# Patient Record
Sex: FEMALE | Race: WHITE | NOT HISPANIC OR LATINO | Employment: OTHER | URBAN - METROPOLITAN AREA
[De-identification: names, ages, dates, MRNs, and addresses within clinical notes are randomized per-mention and may not be internally consistent; named-entity substitution may affect disease eponyms.]

---

## 2017-01-23 ENCOUNTER — TELEPHONE (OUTPATIENT)
Dept: INTERNAL MEDICINE | Facility: CLINIC | Age: 72
End: 2017-01-23

## 2017-01-23 DIAGNOSIS — E11.9 TYPE 2 DIABETES MELLITUS WITHOUT COMPLICATION, WITHOUT LONG-TERM CURRENT USE OF INSULIN (HCC): ICD-10-CM

## 2017-01-23 RX ORDER — GLYBURIDE 5 MG/1
5 TABLET ORAL 4 TIMES DAILY
Qty: 360 TABLET | Refills: 1 | Status: SHIPPED | OUTPATIENT
Start: 2017-01-23 | End: 2017-04-20

## 2017-01-23 NOTE — TELEPHONE ENCOUNTER
RX sent in.   for patient.    ----- Message from Sherri Holliday MA sent at 2017 10:02 AM EST -----  Regarding: FW: REFILL  Contact: [phone]      ----- Message -----     From: Loren Thakkar     Sent: 2017   9:41 AM       To: [facility]  Subject: REFILL                                           :  49    ROBERTA PATIENT    PATIENT NEEDS A REFILL ON GLYBURIDE, 5 MG TABLET, TAKEN 4 X DAILY.    QUANTITY: 90    PHARMACY IS [id number], [place], [place].    FAX # [phone]

## 2017-03-09 RX ORDER — BENAZEPRIL HYDROCHLORIDE AND HYDROCHLOROTHIAZIDE 20; 12.5 MG/1; MG/1
1 TABLET ORAL DAILY
Qty: 90 TABLET | Refills: 1 | OUTPATIENT
Start: 2017-03-09 | End: 2017-08-24 | Stop reason: SDUPTHER

## 2017-04-05 RX ORDER — OMEPRAZOLE 40 MG/1
CAPSULE, DELAYED RELEASE ORAL
Qty: 90 CAPSULE | Refills: 0 | Status: SHIPPED | OUTPATIENT
Start: 2017-04-05 | End: 2017-04-20

## 2017-04-20 ENCOUNTER — OFFICE VISIT (OUTPATIENT)
Dept: INTERNAL MEDICINE | Facility: CLINIC | Age: 72
End: 2017-04-20

## 2017-04-20 VITALS
SYSTOLIC BLOOD PRESSURE: 124 MMHG | WEIGHT: 123 LBS | OXYGEN SATURATION: 97 % | BODY MASS INDEX: 21 KG/M2 | HEART RATE: 76 BPM | TEMPERATURE: 97.9 F | DIASTOLIC BLOOD PRESSURE: 64 MMHG | HEIGHT: 64 IN

## 2017-04-20 DIAGNOSIS — Z12.31 ENCOUNTER FOR SCREENING MAMMOGRAM FOR MALIGNANT NEOPLASM OF BREAST: ICD-10-CM

## 2017-04-20 DIAGNOSIS — Z13.9 SCREENING: ICD-10-CM

## 2017-04-20 DIAGNOSIS — I10 ESSENTIAL HYPERTENSION: Primary | ICD-10-CM

## 2017-04-20 DIAGNOSIS — N95.1 MENOPAUSAL SYMPTOM: ICD-10-CM

## 2017-04-20 DIAGNOSIS — E55.9 VITAMIN D DEFICIENCY: ICD-10-CM

## 2017-04-20 DIAGNOSIS — E11.9 TYPE 2 DIABETES MELLITUS WITHOUT COMPLICATION, WITHOUT LONG-TERM CURRENT USE OF INSULIN (HCC): ICD-10-CM

## 2017-04-20 DIAGNOSIS — E03.9 HYPOTHYROIDISM, UNSPECIFIED TYPE: ICD-10-CM

## 2017-04-20 PROCEDURE — 99214 OFFICE O/P EST MOD 30 MIN: CPT | Performed by: FAMILY MEDICINE

## 2017-04-20 RX ORDER — ASPIRIN 81 MG/1
81 TABLET ORAL DAILY
COMMUNITY
End: 2018-02-26

## 2017-04-20 NOTE — PROGRESS NOTES
Subjective     Olga Greco is a 71 y.o. female, who presents with a chief complaint of   Chief Complaint   Patient presents with   • Hypertension   • Hypothyroidism       HPI     1. DMII.  Denies episodes of hypoglycemia.  Has had an eye exam within the past year.    2. Menopausal symptoms.  Pt off hormones and we started paroxetine last time.  Feeling better.    3. HTN.  Denies chest pain, dizziness.    The following portions of the patient's history were reviewed and updated as appropriate: allergies, current medications, past family history, past medical history, past social history, past surgical history and problem list.    Allergies: Aspirin and Epinephrine    Review of Systems   Constitutional: Negative.    HENT: Negative.    Eyes: Negative.    Respiratory: Negative.    Cardiovascular: Negative.    Gastrointestinal: Negative.    Endocrine: Negative.    Genitourinary: Negative.    Musculoskeletal: Negative.    Skin: Negative.    Allergic/Immunologic: Negative.    Neurological: Negative.    Hematological: Negative.    Psychiatric/Behavioral: Negative.        Objective     Wt Readings from Last 3 Encounters:   04/20/17 123 lb (55.8 kg)   11/09/16 124 lb 6.4 oz (56.4 kg)   08/08/16 126 lb (57.2 kg)     Temp Readings from Last 3 Encounters:   04/20/17 97.9 °F (36.6 °C)   04/25/16 98.3 °F (36.8 °C) (Oral)   12/07/15 97.5 °F (36.4 °C)     BP Readings from Last 3 Encounters:   04/20/17 124/64   11/09/16 110/58   08/08/16 116/60     Pulse Readings from Last 3 Encounters:   04/20/17 76   11/09/16 56   08/08/16 85     Body mass index is 21.11 kg/(m^2).  SpO2 Readings from Last 3 Encounters:   04/20/17 97%   11/09/16 98%   08/08/16 98%       Physical Exam   Constitutional: She is oriented to person, place, and time. She appears well-developed and well-nourished.   HENT:   Head: Normocephalic.   Mouth/Throat: Oropharynx is clear and moist.   Eyes: Conjunctivae and EOM are normal. Pupils are equal, round, and reactive to  light.   Neck: Normal range of motion. Neck supple. No thyromegaly present.   Cardiovascular: Normal rate, regular rhythm and normal heart sounds.    Pulmonary/Chest: Effort normal and breath sounds normal.   Abdominal: Soft. Bowel sounds are normal. There is no hepatosplenomegaly.   Musculoskeletal: Normal range of motion. She exhibits no edema.   Lymphadenopathy:     She has no cervical adenopathy.   Neurological: She is alert and oriented to person, place, and time.   Skin: Skin is warm and dry. No rash noted.   Psychiatric: She has a normal mood and affect. Her behavior is normal.   Vitals reviewed.      Results for orders placed or performed in visit on 11/14/16   Comprehensive Metabolic Panel   Result Value Ref Range    Glucose 231 (H) 65 - 99 mg/dL    BUN 13 8 - 23 mg/dL    Creatinine 0.91 0.57 - 1.00 mg/dL    eGFR Non African Am 61 >60 mL/min/1.73    eGFR African Am 74 >60 mL/min/1.73    BUN/Creatinine Ratio 14.3 7.0 - 25.0    Sodium 140 136 - 145 mmol/L    Potassium 4.3 3.5 - 5.2 mmol/L    Chloride 99 98 - 107 mmol/L    Total CO2 28.5 22.0 - 29.0 mmol/L    Calcium 10.2 8.8 - 10.5 mg/dL    Total Protein 7.0 6.0 - 8.5 g/dL    Albumin 4.70 3.50 - 5.20 g/dL    Globulin 2.3 gm/dL    A/G Ratio 2.0 g/dL    Total Bilirubin 0.7 0.2 - 1.2 mg/dL    Alkaline Phosphatase 61 40 - 129 U/L    AST (SGOT) 16 5 - 32 U/L    ALT (SGPT) 20 5 - 33 U/L   Lipid Panel With / Chol / HDL Ratio   Result Value Ref Range    Total Cholesterol 246 (H) 0 - 200 mg/dL    Triglycerides 72 0 - 150 mg/dL    HDL Cholesterol 98 (H) 40 - 60 mg/dL    VLDL Cholesterol 14.4 7 - 27 mg/dL    LDL Cholesterol  134 (H) 0 - 100 mg/dL    Chol/HDL Ratio 2.51    Hemoglobin A1c   Result Value Ref Range    Hemoglobin A1C 9.80 (H) 4.80 - 5.60 %   Microalbumin / Creatinine Urine Ratio   Result Value Ref Range    Creatinine, Urine 67.0 Not Estab. mg/dL    Microalbumin, Urine 9.6 Not Estab. ug/mL    Microalbumin/Creatinine Ratio Urine 14.3 0.0 - 30.0 mg/g creat    Vitamin B12   Result Value Ref Range    Vitamin B-12 673 211 - 946 pg/mL   Vitamin D 25 Hydroxy   Result Value Ref Range    25 Hydroxy, Vitamin D 34.1 ng/mL   TSH   Result Value Ref Range    TSH 0.407 0.270 - 4.200 mIU/mL   T4, Free   Result Value Ref Range    Free T4 1.90 (H) 0.93 - 1.70 ng/dL   CBC & Differential   Result Value Ref Range    WBC 5.70 4.80 - 10.80 10*3/mm3    RBC 4.66 4.20 - 5.40 10*6/mm3    Hemoglobin 14.6 12.0 - 16.0 g/dL    Hematocrit 43.2 37.0 - 47.0 %    MCV 92.7 81.0 - 99.0 fL    MCH 31.3 (H) 27.0 - 31.0 pg    MCHC 33.8 31.0 - 37.0 g/dL    RDW 11.9 11.5 - 14.5 %    Platelets 259 140 - 500 10*3/mm3    Neutrophil Rel % 58.8 45.0 - 70.0 %    Lymphocyte Rel % 31.2 20.0 - 45.0 %    Monocyte Rel % 5.6 3.0 - 8.0 %    Eosinophil Rel % 3.0 0.0 - 4.0 %    Basophil Rel % 0.9 0.0 - 2.0 %    Neutrophils Absolute 3.35 1.50 - 8.30 10*3/mm3    Lymphocytes Absolute 1.78 0.60 - 4.80 10*3/mm3    Monocytes Absolute 0.32 0.00 - 1.00 10*3/mm3    Eosinophils Absolute 0.17 0.10 - 0.30 10*3/mm3    Basophils Absolute 0.05 0.00 - 0.20 10*3/mm3    Immature Granulocyte Rel % 0.5 0.0 - 0.5 %    Immature Grans Absolute 0.03 0.00 - 0.03 10*3/mm3    nRBC 0.0 0.0 - 0.0 /100 WBC       Assessment/Plan   Olga was seen today for hypertension and hypothyroidism.    Diagnoses and all orders for this visit:    Essential hypertension  -     Comprehensive Metabolic Panel; Future    Hypothyroidism, unspecified type  -     CBC & Differential; Future  -     TSH; Future  -     T4, Free; Future    Menopausal symptom    Type 2 diabetes mellitus without complication, without long-term current use of insulin  -     Hemoglobin A1c; Future  -     Lipid Panel With / Chol / HDL Ratio; Future  -     Vitamin B12; Future  -     Insulin Glargine (LANTUS SOLOSTAR) 100 UNIT/ML injection pen; Inject 10 Units under the skin Every Night.    Vitamin D deficiency  -     Vitamin D 25 Hydroxy; Future    Screening  -     Mammo Screening Bilateral With CAD;  Future    Encounter for screening mammogram for malignant neoplasm of breast   -     Mammo Screening Bilateral With CAD; Future      1. HTN.  Controlled.  Labs reviewed.  Continue same.    2. Hypothyroidism.  Free T4 mildly elevated.  TSH normal. Continue same for now.    3. Menopausal symptoms.  Continue paroxetine.    4. Vitamin D deficiency.  Improved with supplement.    5. DMII.  A1c 9.8, up from 8.2.  Max doses of Bydureon and Januvia, and Invokana.  Also on glyburide.  Add long-acting insulin, Lantus 10 units daily.  Call in 2-3 weeks with fasting blood sugars. Eye exam UTD.  She is on ACE-I, not statin.  Add ASA 81 mg daily.    Outpatient Medications Prior to Visit   Medication Sig Dispense Refill   • B Complex Vitamins (VITAMIN B COMPLEX) capsule capsule Take  by mouth daily.     • benazepril-hydrochlorthiazide (LOTENSIN HCT) 20-12.5 MG per tablet Take 1 tablet by mouth Daily. 90 tablet 1   • Biotin (BIOTIN MAXIMUM STRENGTH) 10 MG tablet Take 1,000 mcg by mouth daily.     • CALCIUM-VITAMIN D PO Take  by mouth daily.     • Canagliflozin (INVOKANA) 300 MG tablet Take 300 mg by mouth daily. 90 tablet 0   • Chromium Picolinate 1000 MCG tablet Take 1,000 mcg by mouth daily.     • Exenatide ER (BYDUREON) 2 MG Suspension Reconstituted ER Inject 2 mg as directed Every 7 (Seven) Days. 12 each 2   • glucose blood test strip Accu-Chek Compact Test Drum In Vitro Strip; Patient Sig: Accu-Chek Compact Test Drum In Vitro Strip ; 0; 07-Dec-2015; Active 180 each 1   • Lancets (ACCU-CHEK SOFT TOUCH) lancets Use one daily. 90 each 3   • levothyroxine (SYNTHROID) 88 MCG tablet Take 1 tablet by mouth Daily. She alternates this QOD with the 100 mcg dose. 90 tablet 3   • levothyroxine (SYNTHROID, LEVOTHROID) 100 MCG tablet Take 100 mcg by mouth daily.     • PARoxetine (PAXIL) 10 MG tablet Take 1 tablet by mouth Every Morning. 90 tablet 1   • SITagliptin (JANUVIA) 100 MG tablet Take one po  daily 90 tablet 1   • glyBURIDE (DIAbeta)  5 MG tablet Take 1 tablet by mouth 4 (Four) Times a Day. 360 tablet 1   • estrogens, conjugated, (PREMARIN) 0.3 MG tablet Take 1 tablet by mouth daily. 90 tablet 1   • omeprazole (priLOSEC) 40 MG capsule TAKE ONE CAPSULE BY MOUTH DAILY 90 capsule 0   • triamcinolone (KENALOG) 0.5 % ointment Apply  topically 2 (two) times a day. 15 g 1     No facility-administered medications prior to visit.      New Medications Ordered This Visit   Medications   • Insulin Glargine (LANTUS SOLOSTAR) 100 UNIT/ML injection pen     Sig: Inject 10 Units under the skin Every Night.     Dispense:  1 pen     Refill:  11     [unfilled]  Medications Discontinued During This Encounter   Medication Reason   • estrogens, conjugated, (PREMARIN) 0.3 MG tablet Therapy completed   • omeprazole (priLOSEC) 40 MG capsule Therapy completed   • triamcinolone (KENALOG) 0.5 % ointment Therapy completed   • glyBURIDE (DIAbeta) 5 MG tablet          Return in about 3 months (around 7/20/2017).

## 2017-04-21 RX ORDER — BLOOD SUGAR DIAGNOSTIC, DRUM
STRIP MISCELLANEOUS
Qty: 180 EACH | Refills: 2 | Status: SHIPPED | OUTPATIENT
Start: 2017-04-21 | End: 2018-03-12 | Stop reason: SDUPTHER

## 2017-04-25 ENCOUNTER — RESULTS ENCOUNTER (OUTPATIENT)
Dept: INTERNAL MEDICINE | Facility: CLINIC | Age: 72
End: 2017-04-25

## 2017-04-25 DIAGNOSIS — E55.9 VITAMIN D DEFICIENCY: ICD-10-CM

## 2017-04-25 DIAGNOSIS — E11.9 TYPE 2 DIABETES MELLITUS WITHOUT COMPLICATION, WITHOUT LONG-TERM CURRENT USE OF INSULIN (HCC): ICD-10-CM

## 2017-04-25 DIAGNOSIS — E03.9 HYPOTHYROIDISM, UNSPECIFIED TYPE: ICD-10-CM

## 2017-04-25 DIAGNOSIS — I10 ESSENTIAL HYPERTENSION: ICD-10-CM

## 2017-05-04 DIAGNOSIS — N95.1 MENOPAUSAL SYMPTOM: ICD-10-CM

## 2017-05-04 DIAGNOSIS — E11.9 TYPE 2 DIABETES MELLITUS WITHOUT COMPLICATION, WITH LONG-TERM CURRENT USE OF INSULIN (HCC): ICD-10-CM

## 2017-05-04 DIAGNOSIS — Z79.4 TYPE 2 DIABETES MELLITUS WITHOUT COMPLICATION, WITH LONG-TERM CURRENT USE OF INSULIN (HCC): ICD-10-CM

## 2017-05-04 DIAGNOSIS — E03.9 ACQUIRED HYPOTHYROIDISM: ICD-10-CM

## 2017-05-04 RX ORDER — PAROXETINE 10 MG/1
10 TABLET, FILM COATED ORAL EVERY MORNING
Qty: 90 TABLET | Refills: 1 | Status: SHIPPED | OUTPATIENT
Start: 2017-05-04 | End: 2017-07-13

## 2017-05-04 RX ORDER — LEVOTHYROXINE SODIUM 0.1 MG/1
100 TABLET ORAL DAILY
Qty: 90 TABLET | Refills: 1 | Status: SHIPPED | OUTPATIENT
Start: 2017-05-04 | End: 2017-07-13 | Stop reason: SDUPTHER

## 2017-05-15 ENCOUNTER — HOSPITAL ENCOUNTER (OUTPATIENT)
Dept: MAMMOGRAPHY | Facility: HOSPITAL | Age: 72
Discharge: HOME | End: 2017-05-15
Attending: FAMILY MEDICINE | Admitting: FAMILY MEDICINE

## 2017-05-15 DIAGNOSIS — Z13.9 SCREENING: ICD-10-CM

## 2017-05-15 DIAGNOSIS — Z12.31 ENCOUNTER FOR SCREENING MAMMOGRAM FOR MALIGNANT NEOPLASM OF BREAST: ICD-10-CM

## 2017-05-15 PROCEDURE — G0202 SCR MAMMO BI INCL CAD: HCPCS

## 2017-05-15 PROCEDURE — 77063 BREAST TOMOSYNTHESIS BI: CPT

## 2017-06-29 RX ORDER — OMEPRAZOLE 40 MG/1
CAPSULE, DELAYED RELEASE ORAL
Qty: 90 CAPSULE | Refills: 1 | Status: SHIPPED | OUTPATIENT
Start: 2017-06-29 | End: 2017-12-28 | Stop reason: SDUPTHER

## 2017-07-07 LAB
25(OH)D3+25(OH)D2 SERPL-MCNC: 27.7 NG/ML
ALBUMIN SERPL-MCNC: 4.4 G/DL (ref 3.5–5.2)
ALBUMIN/GLOB SERPL: 2.2 G/DL
ALP SERPL-CCNC: 58 U/L (ref 40–129)
ALT SERPL-CCNC: 19 U/L (ref 5–33)
AST SERPL-CCNC: 17 U/L (ref 5–32)
BASOPHILS # BLD AUTO: 0.03 10*3/MM3 (ref 0–0.2)
BASOPHILS NFR BLD AUTO: 0.7 % (ref 0–2)
BILIRUB SERPL-MCNC: 0.9 MG/DL (ref 0.2–1.2)
BUN SERPL-MCNC: 18 MG/DL (ref 8–23)
BUN/CREAT SERPL: 20.7 (ref 7–25)
CALCIUM SERPL-MCNC: 9.8 MG/DL (ref 8.8–10.5)
CHLORIDE SERPL-SCNC: 100 MMOL/L (ref 98–107)
CHOLEST SERPL-MCNC: 207 MG/DL (ref 0–200)
CHOLEST/HDLC SERPL: 1.78 {RATIO}
CO2 SERPL-SCNC: 27.9 MMOL/L (ref 22–29)
CREAT SERPL-MCNC: 0.87 MG/DL (ref 0.57–1)
EOSINOPHIL # BLD AUTO: 0.13 10*3/MM3 (ref 0.1–0.3)
EOSINOPHIL NFR BLD AUTO: 3.1 % (ref 0–4)
ERYTHROCYTE [DISTWIDTH] IN BLOOD BY AUTOMATED COUNT: 12.1 % (ref 11.5–14.5)
GLOBULIN SER CALC-MCNC: 2 GM/DL
GLUCOSE SERPL-MCNC: 140 MG/DL (ref 65–99)
HBA1C MFR BLD: 8.2 % (ref 4.8–5.6)
HCT VFR BLD AUTO: 42 % (ref 37–47)
HDLC SERPL-MCNC: 116 MG/DL (ref 40–60)
HGB BLD-MCNC: 13.9 G/DL (ref 12–16)
IMM GRANULOCYTES # BLD: 0.01 10*3/MM3 (ref 0–0.03)
IMM GRANULOCYTES NFR BLD: 0.2 % (ref 0–0.5)
LDLC SERPL CALC-MCNC: 80 MG/DL (ref 0–100)
LYMPHOCYTES # BLD AUTO: 1.47 10*3/MM3 (ref 0.6–4.8)
LYMPHOCYTES NFR BLD AUTO: 34.8 % (ref 20–45)
MCH RBC QN AUTO: 31.2 PG (ref 27–31)
MCHC RBC AUTO-ENTMCNC: 33.1 G/DL (ref 31–37)
MCV RBC AUTO: 94.4 FL (ref 81–99)
MONOCYTES # BLD AUTO: 0.28 10*3/MM3 (ref 0–1)
MONOCYTES NFR BLD AUTO: 6.6 % (ref 3–8)
NEUTROPHILS # BLD AUTO: 2.31 10*3/MM3 (ref 1.5–8.3)
NEUTROPHILS NFR BLD AUTO: 54.6 % (ref 45–70)
NRBC BLD AUTO-RTO: 0 /100 WBC (ref 0–0)
PLATELET # BLD AUTO: 241 10*3/MM3 (ref 140–500)
POTASSIUM SERPL-SCNC: 4.3 MMOL/L (ref 3.5–5.2)
PROT SERPL-MCNC: 6.4 G/DL (ref 6–8.5)
RBC # BLD AUTO: 4.45 10*6/MM3 (ref 4.2–5.4)
SODIUM SERPL-SCNC: 140 MMOL/L (ref 136–145)
T4 FREE SERPL-MCNC: 1.8 NG/DL (ref 0.93–1.7)
TRIGL SERPL-MCNC: 56 MG/DL (ref 0–150)
TSH SERPL DL<=0.005 MIU/L-ACNC: 0.26 MIU/ML (ref 0.27–4.2)
VIT B12 SERPL-MCNC: 489 PG/ML (ref 211–946)
VLDLC SERPL CALC-MCNC: 11.2 MG/DL (ref 7–27)
WBC # BLD AUTO: 4.23 10*3/MM3 (ref 4.8–10.8)

## 2017-07-13 ENCOUNTER — OFFICE VISIT (OUTPATIENT)
Dept: INTERNAL MEDICINE | Facility: CLINIC | Age: 72
End: 2017-07-13

## 2017-07-13 VITALS
DIASTOLIC BLOOD PRESSURE: 74 MMHG | OXYGEN SATURATION: 98 % | SYSTOLIC BLOOD PRESSURE: 128 MMHG | HEIGHT: 64 IN | HEART RATE: 77 BPM | WEIGHT: 132 LBS | BODY MASS INDEX: 22.53 KG/M2 | TEMPERATURE: 97.7 F

## 2017-07-13 DIAGNOSIS — N95.1 MENOPAUSAL SYMPTOM: ICD-10-CM

## 2017-07-13 DIAGNOSIS — E03.9 ACQUIRED HYPOTHYROIDISM: ICD-10-CM

## 2017-07-13 DIAGNOSIS — E55.9 VITAMIN D DEFICIENCY: ICD-10-CM

## 2017-07-13 DIAGNOSIS — E11.9 TYPE 2 DIABETES MELLITUS WITHOUT COMPLICATION, WITH LONG-TERM CURRENT USE OF INSULIN (HCC): ICD-10-CM

## 2017-07-13 DIAGNOSIS — K21.9 GASTROESOPHAGEAL REFLUX DISEASE WITHOUT ESOPHAGITIS: ICD-10-CM

## 2017-07-13 DIAGNOSIS — Z79.4 TYPE 2 DIABETES MELLITUS WITHOUT COMPLICATION, WITH LONG-TERM CURRENT USE OF INSULIN (HCC): ICD-10-CM

## 2017-07-13 DIAGNOSIS — I10 ESSENTIAL HYPERTENSION: Primary | ICD-10-CM

## 2017-07-13 DIAGNOSIS — E03.9 HYPOTHYROIDISM, UNSPECIFIED TYPE: ICD-10-CM

## 2017-07-13 DIAGNOSIS — M75.41 ROTATOR CUFF IMPINGEMENT SYNDROME OF RIGHT SHOULDER: ICD-10-CM

## 2017-07-13 PROCEDURE — 99214 OFFICE O/P EST MOD 30 MIN: CPT | Performed by: FAMILY MEDICINE

## 2017-07-13 RX ORDER — LEVOTHYROXINE SODIUM 0.1 MG/1
100 TABLET ORAL EVERY OTHER DAY
Qty: 90 TABLET | Refills: 1 | Status: SHIPPED | OUTPATIENT
Start: 2017-07-13 | End: 2017-10-11

## 2017-07-13 RX ORDER — LEVOTHYROXINE SODIUM 88 MCG
88 TABLET ORAL DAILY
Qty: 90 TABLET | Refills: 3 | Status: SHIPPED | OUTPATIENT
Start: 2017-07-13 | End: 2017-10-11

## 2017-07-13 RX ORDER — MELOXICAM 15 MG/1
15 TABLET ORAL DAILY
Qty: 30 TABLET | Refills: 0 | Status: SHIPPED | OUTPATIENT
Start: 2017-07-13 | End: 2018-01-17

## 2017-07-13 NOTE — PROGRESS NOTES
Subjective     Olga Greco is a 71 y.o. female, who presents with a chief complaint of   Chief Complaint   Patient presents with   • Hypertension   • Shoulder Pain     Right  x  2 months       Hypertension        1. DMII.  Started Lantus 10 units last visit.  Denies episodes of hypoglycemia.  Has had an eye exam within the past year.  Home fasting blood sugars 100-150s.    2. Menopausal symptoms.  Wants to wean of paroxetine due to weight gain.    3. HTN.  Denies chest pain, dizziness.    4. Right shoulder pain X 2 months.  Hurts to sleep on it and work the .  Achy.    The following portions of the patient's history were reviewed and updated as appropriate: allergies, current medications, past family history, past medical history, past social history, past surgical history and problem list.    Allergies: Aspirin and Epinephrine    Review of Systems   Constitutional: Negative.    HENT: Negative.    Eyes: Negative.    Respiratory: Negative.    Cardiovascular: Negative.    Gastrointestinal: Negative.    Endocrine: Negative.    Genitourinary: Negative.    Musculoskeletal: Negative.    Skin: Negative.    Allergic/Immunologic: Negative.    Neurological: Negative.    Hematological: Negative.    Psychiatric/Behavioral: Negative.        Objective     Wt Readings from Last 3 Encounters:   07/13/17 132 lb (59.9 kg)   04/20/17 123 lb (55.8 kg)   11/09/16 124 lb 6.4 oz (56.4 kg)     Temp Readings from Last 3 Encounters:   07/13/17 97.7 °F (36.5 °C)   04/20/17 97.9 °F (36.6 °C)   04/25/16 98.3 °F (36.8 °C) (Oral)     BP Readings from Last 3 Encounters:   07/13/17 128/74   04/20/17 124/64   11/09/16 110/58     Pulse Readings from Last 3 Encounters:   07/13/17 77   04/20/17 76   11/09/16 56     Body mass index is 22.66 kg/(m^2).  SpO2 Readings from Last 3 Encounters:   07/13/17 98%   04/20/17 97%   11/09/16 98%       Physical Exam   Constitutional: She is oriented to person, place, and time. She appears well-developed  and well-nourished.   HENT:   Head: Normocephalic.   Mouth/Throat: Oropharynx is clear and moist.   Eyes: Conjunctivae and EOM are normal. Pupils are equal, round, and reactive to light.   Neck: Normal range of motion. Neck supple. No thyromegaly present.   Cardiovascular: Normal rate, regular rhythm and normal heart sounds.    Pulmonary/Chest: Effort normal and breath sounds normal.   Abdominal: Soft. Bowel sounds are normal. There is no hepatosplenomegaly.   Musculoskeletal: Normal range of motion. She exhibits no edema.        Right shoulder: She exhibits pain (with impingement test). She exhibits normal range of motion, no tenderness, no bony tenderness, no swelling and normal strength.   Lymphadenopathy:     She has no cervical adenopathy.   Neurological: She is alert and oriented to person, place, and time.   Skin: Skin is warm and dry. No rash noted.   Psychiatric: She has a normal mood and affect. Her behavior is normal.   Vitals reviewed.      Results for orders placed or performed in visit on 04/25/17   Comprehensive Metabolic Panel   Result Value Ref Range    Glucose 140 (H) 65 - 99 mg/dL    BUN 18 8 - 23 mg/dL    Creatinine 0.87 0.57 - 1.00 mg/dL    eGFR Non African Am 65 >60 mL/min/1.73    eGFR African Am 78 >60 mL/min/1.73    BUN/Creatinine Ratio 20.7 7.0 - 25.0    Sodium 140 136 - 145 mmol/L    Potassium 4.3 3.5 - 5.2 mmol/L    Chloride 100 98 - 107 mmol/L    Total CO2 27.9 22.0 - 29.0 mmol/L    Calcium 9.8 8.8 - 10.5 mg/dL    Total Protein 6.4 6.0 - 8.5 g/dL    Albumin 4.40 3.50 - 5.20 g/dL    Globulin 2.0 gm/dL    A/G Ratio 2.2 g/dL    Total Bilirubin 0.9 0.2 - 1.2 mg/dL    Alkaline Phosphatase 58 40 - 129 U/L    AST (SGOT) 17 5 - 32 U/L    ALT (SGPT) 19 5 - 33 U/L   Hemoglobin A1c   Result Value Ref Range    Hemoglobin A1C 8.20 (H) 4.80 - 5.60 %   TSH   Result Value Ref Range    TSH 0.261 (L) 0.270 - 4.200 mIU/mL   T4, Free   Result Value Ref Range    Free T4 1.80 (H) 0.93 - 1.70 ng/dL   Lipid  Panel With / Chol / HDL Ratio   Result Value Ref Range    Total Cholesterol 207 (H) 0 - 200 mg/dL    Triglycerides 56 0 - 150 mg/dL    HDL Cholesterol 116 (H) 40 - 60 mg/dL    VLDL Cholesterol 11.2 7 - 27 mg/dL    LDL Cholesterol  80 0 - 100 mg/dL    Chol/HDL Ratio 1.78    Vitamin B12   Result Value Ref Range    Vitamin B-12 489 211 - 946 pg/mL   Vitamin D 25 Hydroxy   Result Value Ref Range    25 Hydroxy, Vitamin D 27.7 ng/mL   CBC & Differential   Result Value Ref Range    WBC 4.23 (L) 4.80 - 10.80 10*3/mm3    RBC 4.45 4.20 - 5.40 10*6/mm3    Hemoglobin 13.9 12.0 - 16.0 g/dL    Hematocrit 42.0 37.0 - 47.0 %    MCV 94.4 81.0 - 99.0 fL    MCH 31.2 (H) 27.0 - 31.0 pg    MCHC 33.1 31.0 - 37.0 g/dL    RDW 12.1 11.5 - 14.5 %    Platelets 241 140 - 500 10*3/mm3    Neutrophil Rel % 54.6 45.0 - 70.0 %    Lymphocyte Rel % 34.8 20.0 - 45.0 %    Monocyte Rel % 6.6 3.0 - 8.0 %    Eosinophil Rel % 3.1 0.0 - 4.0 %    Basophil Rel % 0.7 0.0 - 2.0 %    Neutrophils Absolute 2.31 1.50 - 8.30 10*3/mm3    Lymphocytes Absolute 1.47 0.60 - 4.80 10*3/mm3    Monocytes Absolute 0.28 0.00 - 1.00 10*3/mm3    Eosinophils Absolute 0.13 0.10 - 0.30 10*3/mm3    Basophils Absolute 0.03 0.00 - 0.20 10*3/mm3    Immature Granulocyte Rel % 0.2 0.0 - 0.5 %    Immature Grans Absolute 0.01 0.00 - 0.03 10*3/mm3    nRBC 0.0 0.0 - 0.0 /100 WBC       Assessment/Plan   Olga was seen today for hypertension and shoulder pain.    Diagnoses and all orders for this visit:    Essential hypertension  -     Comprehensive Metabolic Panel; Future    Hypothyroidism, unspecified type  -     CBC & Differential; Future  -     TSH; Future  -     T4, Free; Future    Menopausal symptom    Vitamin D deficiency  -     Vitamin D 25 Hydroxy; Future    Type 2 diabetes mellitus without complication, with long-term current use of insulin  -     Hemoglobin A1c; Future  -     Lipid Panel With / Chol / HDL Ratio; Future  -     Microalbumin / Creatinine Urine Ratio; Future  -      Vitamin B12; Future  -     Insulin Pen Needle (BD PEN NEEDLE RICHARD U/F) 32G X 4 MM misc; 1 each Daily.    Gastroesophageal reflux disease without esophagitis    Acquired hypothyroidism  -     SYNTHROID 88 MCG tablet; Take 1 tablet by mouth Daily. She alternates this QOD with the 100 mcg dose.  -     levothyroxine (SYNTHROID, LEVOTHROID) 100 MCG tablet; Take 1 tablet by mouth Every Other Day. Alternate QOD with the 88 mcg tablet.    Rotator cuff impingement syndrome of right shoulder  -     meloxicam (MOBIC) 15 MG tablet; Take 1 tablet by mouth Daily. Take with food.      1. HTN.  Controlled.  Labs reviewed.  Continue same.    2. Hypothyroidism.  Free T4 mildly elevated and TSH suppressed. She is alternating 100 mcg with 88 mcg QOD.  Change to 88 mcg daily.    3. Menopausal symptoms.  Doing well.  Wean off paroxetine.    4. Vitamin D deficiency.  Continue supplement.    5. DMII.  A1c 8.2, down from 9.8.  Max doses of Bydureon and Januvia, and Invokana.  Increase Lantus to 12 units daily.   Eye exam UTD.  She is on ACE-I, not statin.  Add ASA 81 mg daily.    6. GERD. Controlled with PPI.    7. Right rotator cuff impingement.  Meloxicam.  Consider PT.    Outpatient Medications Prior to Visit   Medication Sig Dispense Refill   • ACCU-CHEK COMPACT PLUS test strip USE TO TEST TWO TIMES A  each 2   • aspirin 81 MG EC tablet Take 81 mg by mouth Daily.     • B Complex Vitamins (VITAMIN B COMPLEX) capsule capsule Take  by mouth daily.     • benazepril-hydrochlorthiazide (LOTENSIN HCT) 20-12.5 MG per tablet Take 1 tablet by mouth Daily. 90 tablet 1   • Biotin (BIOTIN MAXIMUM STRENGTH) 10 MG tablet Take 1,000 mcg by mouth daily.     • CALCIUM-VITAMIN D PO Take  by mouth daily.     • Canagliflozin (INVOKANA) 300 MG tablet Take 300 mg by mouth Daily. 90 tablet 3   • Chromium Picolinate 1000 MCG tablet Take 1,000 mcg by mouth daily.     • Exenatide ER (BYDUREON) 2 MG Suspension Reconstituted ER Inject 2 mg as directed Every  7 (Seven) Days. 12 each 2   • Insulin Glargine (LANTUS SOLOSTAR) 100 UNIT/ML injection pen Inject 10 Units under the skin Every Night. 1 pen 11   • Lancets (ACCU-CHEK SOFT TOUCH) lancets Use one daily. 90 each 3   • omeprazole (priLOSEC) 40 MG capsule TAKE ONE CAPSULE BY MOUTH DAILY 90 capsule 1   • SITagliptin (JANUVIA) 100 MG tablet Take one po  daily 90 tablet 1   • levothyroxine (SYNTHROID) 88 MCG tablet Take 1 tablet by mouth Daily. She alternates this QOD with the 100 mcg dose. 90 tablet 3   • levothyroxine (SYNTHROID, LEVOTHROID) 100 MCG tablet Take 1 tablet by mouth Daily. 90 tablet 1   • PARoxetine (PAXIL) 10 MG tablet Take 1 tablet by mouth Every Morning. 90 tablet 1     No facility-administered medications prior to visit.      New Medications Ordered This Visit   Medications   • SYNTHROID 88 MCG tablet     Sig: Take 1 tablet by mouth Daily. She alternates this QOD with the 100 mcg dose.     Dispense:  90 tablet     Refill:  3     Brand name Synthroid please.   • levothyroxine (SYNTHROID, LEVOTHROID) 100 MCG tablet     Sig: Take 1 tablet by mouth Every Other Day. Alternate QOD with the 88 mcg tablet.     Dispense:  90 tablet     Refill:  1     Brand name Synthroid only please.   • Insulin Pen Needle (BD PEN NEEDLE RICHARD U/F) 32G X 4 MM misc     Si each Daily.     Dispense:  100 each     Refill:  3   • meloxicam (MOBIC) 15 MG tablet     Sig: Take 1 tablet by mouth Daily. Take with food.     Dispense:  30 tablet     Refill:  0     [unfilled]  Medications Discontinued During This Encounter   Medication Reason   • levothyroxine (SYNTHROID) 88 MCG tablet Reorder   • levothyroxine (SYNTHROID, LEVOTHROID) 100 MCG tablet Reorder   • PARoxetine (PAXIL) 10 MG tablet          Return in about 3 months (around 10/13/2017).

## 2017-07-18 ENCOUNTER — RESULTS ENCOUNTER (OUTPATIENT)
Dept: INTERNAL MEDICINE | Facility: CLINIC | Age: 72
End: 2017-07-18

## 2017-07-18 DIAGNOSIS — I10 ESSENTIAL HYPERTENSION: ICD-10-CM

## 2017-07-18 DIAGNOSIS — Z79.4 TYPE 2 DIABETES MELLITUS WITHOUT COMPLICATION, WITH LONG-TERM CURRENT USE OF INSULIN (HCC): ICD-10-CM

## 2017-07-18 DIAGNOSIS — E55.9 VITAMIN D DEFICIENCY: ICD-10-CM

## 2017-07-18 DIAGNOSIS — E03.9 HYPOTHYROIDISM, UNSPECIFIED TYPE: ICD-10-CM

## 2017-07-18 DIAGNOSIS — E11.9 TYPE 2 DIABETES MELLITUS WITHOUT COMPLICATION, WITH LONG-TERM CURRENT USE OF INSULIN (HCC): ICD-10-CM

## 2017-07-27 ENCOUNTER — TELEPHONE (OUTPATIENT)
Dept: INTERNAL MEDICINE | Facility: CLINIC | Age: 72
End: 2017-07-27

## 2017-08-24 RX ORDER — BENAZEPRIL HYDROCHLORIDE AND HYDROCHLOROTHIAZIDE 20; 12.5 MG/1; MG/1
1 TABLET ORAL DAILY
Qty: 90 TABLET | Refills: 1 | Status: SHIPPED | OUTPATIENT
Start: 2017-08-24 | End: 2018-01-17

## 2017-09-11 RX ORDER — EXENATIDE 2 MG/.65ML
INJECTION, SUSPENSION, EXTENDED RELEASE SUBCUTANEOUS
Qty: 4 EACH | Refills: 4 | Status: SHIPPED | OUTPATIENT
Start: 2017-09-11 | End: 2018-02-03 | Stop reason: SDUPTHER

## 2017-10-05 LAB
25(OH)D3+25(OH)D2 SERPL-MCNC: 33.4 NG/ML
ALBUMIN SERPL-MCNC: 4.4 G/DL (ref 3.5–5.2)
ALBUMIN/CREAT UR: 8.6 MG/G CREAT (ref 0–30)
ALBUMIN/GLOB SERPL: 2.3 G/DL
ALP SERPL-CCNC: 63 U/L (ref 40–129)
ALT SERPL-CCNC: 28 U/L (ref 5–33)
AST SERPL-CCNC: 26 U/L (ref 5–32)
BASOPHILS # BLD AUTO: 0.04 10*3/MM3 (ref 0–0.2)
BASOPHILS NFR BLD AUTO: 0.8 % (ref 0–2)
BILIRUB SERPL-MCNC: 0.8 MG/DL (ref 0.2–1.2)
BUN SERPL-MCNC: 20 MG/DL (ref 8–23)
BUN/CREAT SERPL: 22.2 (ref 7–25)
CALCIUM SERPL-MCNC: 9.8 MG/DL (ref 8.8–10.5)
CHLORIDE SERPL-SCNC: 98 MMOL/L (ref 98–107)
CHOLEST SERPL-MCNC: 195 MG/DL (ref 0–200)
CHOLEST/HDLC SERPL: 2.5 {RATIO}
CO2 SERPL-SCNC: 24.1 MMOL/L (ref 22–29)
CREAT SERPL-MCNC: 0.9 MG/DL (ref 0.57–1)
CREAT UR-MCNC: 123.4 MG/DL
EOSINOPHIL # BLD AUTO: 0.26 10*3/MM3 (ref 0.1–0.3)
EOSINOPHIL NFR BLD AUTO: 5.1 % (ref 0–4)
ERYTHROCYTE [DISTWIDTH] IN BLOOD BY AUTOMATED COUNT: 12.1 % (ref 11.5–14.5)
GLOBULIN SER CALC-MCNC: 1.9 GM/DL
GLUCOSE SERPL-MCNC: 138 MG/DL (ref 65–99)
HBA1C MFR BLD: 9.2 % (ref 4.8–5.6)
HCT VFR BLD AUTO: 40.8 % (ref 37–47)
HDLC SERPL-MCNC: 78 MG/DL (ref 40–60)
HGB BLD-MCNC: 13.8 G/DL (ref 12–16)
IMM GRANULOCYTES # BLD: 0.03 10*3/MM3 (ref 0–0.03)
IMM GRANULOCYTES NFR BLD: 0.6 % (ref 0–0.5)
LDLC SERPL CALC-MCNC: 102 MG/DL (ref 0–100)
LYMPHOCYTES # BLD AUTO: 1.75 10*3/MM3 (ref 0.6–4.8)
LYMPHOCYTES NFR BLD AUTO: 34.2 % (ref 20–45)
MCH RBC QN AUTO: 31.6 PG (ref 27–31)
MCHC RBC AUTO-ENTMCNC: 33.8 G/DL (ref 31–37)
MCV RBC AUTO: 93.4 FL (ref 81–99)
MICROALBUMIN UR-MCNC: 10.6 UG/ML
MONOCYTES # BLD AUTO: 0.34 10*3/MM3 (ref 0–1)
MONOCYTES NFR BLD AUTO: 6.6 % (ref 3–8)
NEUTROPHILS # BLD AUTO: 2.7 10*3/MM3 (ref 1.5–8.3)
NEUTROPHILS NFR BLD AUTO: 52.7 % (ref 45–70)
NRBC BLD AUTO-RTO: 0 /100 WBC (ref 0–0)
PLATELET # BLD AUTO: 245 10*3/MM3 (ref 140–500)
POTASSIUM SERPL-SCNC: 3.6 MMOL/L (ref 3.5–5.2)
PROT SERPL-MCNC: 6.3 G/DL (ref 6–8.5)
RBC # BLD AUTO: 4.37 10*6/MM3 (ref 4.2–5.4)
SODIUM SERPL-SCNC: 137 MMOL/L (ref 136–145)
T4 FREE SERPL-MCNC: 1.76 NG/DL (ref 0.93–1.7)
TRIGL SERPL-MCNC: 75 MG/DL (ref 0–150)
TSH SERPL DL<=0.005 MIU/L-ACNC: 0.72 MIU/ML (ref 0.27–4.2)
VIT B12 SERPL-MCNC: 629 PG/ML (ref 211–946)
VLDLC SERPL CALC-MCNC: 15 MG/DL (ref 7–27)
WBC # BLD AUTO: 5.12 10*3/MM3 (ref 4.8–10.8)

## 2017-10-11 ENCOUNTER — OFFICE VISIT (OUTPATIENT)
Dept: INTERNAL MEDICINE | Facility: CLINIC | Age: 72
End: 2017-10-11

## 2017-10-11 VITALS
SYSTOLIC BLOOD PRESSURE: 128 MMHG | BODY MASS INDEX: 22.2 KG/M2 | WEIGHT: 130 LBS | DIASTOLIC BLOOD PRESSURE: 70 MMHG | HEIGHT: 64 IN | OXYGEN SATURATION: 98 % | HEART RATE: 72 BPM | RESPIRATION RATE: 16 BRPM

## 2017-10-11 DIAGNOSIS — I10 ESSENTIAL HYPERTENSION: Primary | ICD-10-CM

## 2017-10-11 DIAGNOSIS — K21.9 GASTROESOPHAGEAL REFLUX DISEASE WITHOUT ESOPHAGITIS: ICD-10-CM

## 2017-10-11 DIAGNOSIS — N95.1 MENOPAUSAL SYMPTOM: ICD-10-CM

## 2017-10-11 DIAGNOSIS — E11.9 TYPE 2 DIABETES MELLITUS WITHOUT COMPLICATION, WITH LONG-TERM CURRENT USE OF INSULIN (HCC): ICD-10-CM

## 2017-10-11 DIAGNOSIS — E03.9 ACQUIRED HYPOTHYROIDISM: ICD-10-CM

## 2017-10-11 DIAGNOSIS — E03.9 HYPOTHYROIDISM, UNSPECIFIED TYPE: ICD-10-CM

## 2017-10-11 DIAGNOSIS — E55.9 VITAMIN D DEFICIENCY: ICD-10-CM

## 2017-10-11 DIAGNOSIS — Z79.4 TYPE 2 DIABETES MELLITUS WITHOUT COMPLICATION, WITH LONG-TERM CURRENT USE OF INSULIN (HCC): ICD-10-CM

## 2017-10-11 PROCEDURE — 99214 OFFICE O/P EST MOD 30 MIN: CPT | Performed by: FAMILY MEDICINE

## 2017-10-11 RX ORDER — LEVOTHYROXINE SODIUM 88 MCG
88 TABLET ORAL EVERY OTHER DAY
Qty: 90 TABLET | Refills: 3
Start: 2017-10-11 | End: 2018-04-19

## 2017-10-11 RX ORDER — LEVOTHYROXINE SODIUM 0.1 MG/1
100 TABLET ORAL EVERY OTHER DAY
COMMUNITY
Start: 2017-10-11 | End: 2018-04-19 | Stop reason: SDUPTHER

## 2017-10-11 RX ORDER — LEVOTHYROXINE SODIUM 0.1 MG/1
100 TABLET ORAL EVERY OTHER DAY
COMMUNITY
End: 2017-10-11

## 2017-10-11 NOTE — PROGRESS NOTES
Subjective     Olga Greco is a 72 y.o. female, who presents with a chief complaint of   Chief Complaint   Patient presents with   • Hypothyroidism   • Diabetes       Hypothyroidism     Diabetes     Hypertension     1. DMII.  Started Lantus 10 units last visit.  Denies episodes of hypoglycemia.  Has had an eye exam within the past year.  Her fasting blood sugars are often > 200 despite very low carb diet.    2. HTN.  Denies chest pain, dizziness.    3. Hypothyroidism.  We decreased her dose from alternating 100 mcg with 88 mcg to 88 mcg daily last visit.    The following portions of the patient's history were reviewed and updated as appropriate: allergies, current medications, past family history, past medical history, past social history, past surgical history and problem list.    Allergies: Aspirin and Epinephrine    Review of Systems   Constitutional: Negative.    HENT: Negative.    Eyes: Negative.    Respiratory: Negative.    Cardiovascular: Negative.    Gastrointestinal: Negative.    Endocrine: Negative.    Genitourinary: Negative.    Musculoskeletal: Negative.    Skin: Negative.    Allergic/Immunologic: Negative.    Neurological: Negative.    Hematological: Negative.    Psychiatric/Behavioral: Negative.      Objective     Wt Readings from Last 3 Encounters:   10/11/17 130 lb (59 kg)   07/13/17 132 lb (59.9 kg)   04/20/17 123 lb (55.8 kg)     Temp Readings from Last 3 Encounters:   07/13/17 97.7 °F (36.5 °C)   04/20/17 97.9 °F (36.6 °C)   04/25/16 98.3 °F (36.8 °C) (Oral)     BP Readings from Last 3 Encounters:   10/11/17 128/70   07/13/17 128/74   04/20/17 124/64     Pulse Readings from Last 3 Encounters:   10/11/17 72   07/13/17 77   04/20/17 76     Body mass index is 22.31 kg/(m^2).  SpO2 Readings from Last 3 Encounters:   10/11/17 98%   07/13/17 98%   04/20/17 97%       Physical Exam   Constitutional: She is oriented to person, place, and time. She appears well-developed and well-nourished.   HENT:   Head:  Normocephalic.   Mouth/Throat: Oropharynx is clear and moist.   Eyes: Conjunctivae and EOM are normal. Pupils are equal, round, and reactive to light.   Neck: Normal range of motion. Neck supple. No thyromegaly present.   Cardiovascular: Normal rate, regular rhythm and normal heart sounds.    Pulmonary/Chest: Effort normal and breath sounds normal.   Abdominal: Soft. Bowel sounds are normal. There is no hepatosplenomegaly.   Musculoskeletal: Normal range of motion. She exhibits no edema.        Right shoulder: She exhibits pain (with impingement test). She exhibits normal range of motion, no tenderness, no bony tenderness, no swelling and normal strength.   Lymphadenopathy:     She has no cervical adenopathy.   Neurological: She is alert and oriented to person, place, and time.   Skin: Skin is warm and dry. No rash noted.   Psychiatric: She has a normal mood and affect. Her behavior is normal.   Vitals reviewed.      Results for orders placed or performed in visit on 07/18/17   Comprehensive Metabolic Panel   Result Value Ref Range    Glucose 138 (H) 65 - 99 mg/dL    BUN 20 8 - 23 mg/dL    Creatinine 0.90 0.57 - 1.00 mg/dL    eGFR Non African Am 62 >60 mL/min/1.73    eGFR African Am 75 >60 mL/min/1.73    BUN/Creatinine Ratio 22.2 7.0 - 25.0    Sodium 137 136 - 145 mmol/L    Potassium 3.6 3.5 - 5.2 mmol/L    Chloride 98 98 - 107 mmol/L    Total CO2 24.1 22.0 - 29.0 mmol/L    Calcium 9.8 8.8 - 10.5 mg/dL    Total Protein 6.3 6.0 - 8.5 g/dL    Albumin 4.40 3.50 - 5.20 g/dL    Globulin 1.9 gm/dL    A/G Ratio 2.3 g/dL    Total Bilirubin 0.8 0.2 - 1.2 mg/dL    Alkaline Phosphatase 63 40 - 129 U/L    AST (SGOT) 26 5 - 32 U/L    ALT (SGPT) 28 5 - 33 U/L   Hemoglobin A1c   Result Value Ref Range    Hemoglobin A1C 9.20 (H) 4.80 - 5.60 %   Lipid Panel With / Chol / HDL Ratio   Result Value Ref Range    Total Cholesterol 195 0 - 200 mg/dL    Triglycerides 75 0 - 150 mg/dL    HDL Cholesterol 78 (H) 40 - 60 mg/dL    VLDL  Cholesterol 15 7 - 27 mg/dL    LDL Cholesterol  102 (H) 0 - 100 mg/dL    Chol/HDL Ratio 2.50    Microalbumin / Creatinine Urine Ratio   Result Value Ref Range    Creatinine, Urine 123.4 Not Estab. mg/dL    Microalbumin, Urine 10.6 Not Estab. ug/mL    Microalbumin/Creatinine Ratio 8.6 0.0 - 30.0 mg/g creat   TSH   Result Value Ref Range    TSH 0.724 0.270 - 4.200 mIU/mL   Vitamin D 25 Hydroxy   Result Value Ref Range    25 Hydroxy, Vitamin D 33.4 ng/mL   Vitamin B12   Result Value Ref Range    Vitamin B-12 629 211 - 946 pg/mL   T4, Free   Result Value Ref Range    Free T4 1.76 (H) 0.93 - 1.70 ng/dL   CBC & Differential   Result Value Ref Range    WBC 5.12 4.80 - 10.80 10*3/mm3    RBC 4.37 4.20 - 5.40 10*6/mm3    Hemoglobin 13.8 12.0 - 16.0 g/dL    Hematocrit 40.8 37.0 - 47.0 %    MCV 93.4 81.0 - 99.0 fL    MCH 31.6 (H) 27.0 - 31.0 pg    MCHC 33.8 31.0 - 37.0 g/dL    RDW 12.1 11.5 - 14.5 %    Platelets 245 140 - 500 10*3/mm3    Neutrophil Rel % 52.7 45.0 - 70.0 %    Lymphocyte Rel % 34.2 20.0 - 45.0 %    Monocyte Rel % 6.6 3.0 - 8.0 %    Eosinophil Rel % 5.1 (H) 0.0 - 4.0 %    Basophil Rel % 0.8 0.0 - 2.0 %    Neutrophils Absolute 2.70 1.50 - 8.30 10*3/mm3    Lymphocytes Absolute 1.75 0.60 - 4.80 10*3/mm3    Monocytes Absolute 0.34 0.00 - 1.00 10*3/mm3    Eosinophils Absolute 0.26 0.10 - 0.30 10*3/mm3    Basophils Absolute 0.04 0.00 - 0.20 10*3/mm3    Immature Granulocyte Rel % 0.6 (H) 0.0 - 0.5 %    Immature Grans Absolute 0.03 0.00 - 0.03 10*3/mm3    nRBC 0.0 0.0 - 0.0 /100 WBC       Assessment/Plan   Olga was seen today for hypothyroidism and diabetes.    Diagnoses and all orders for this visit:    Essential hypertension  -     Comprehensive Metabolic Panel; Future  -     Lipid Panel With / Chol / HDL Ratio; Future    Hypothyroidism, unspecified type  -     TSH; Future  -     T4, Free; Future    Menopausal symptom    Vitamin D deficiency    Type 2 diabetes mellitus without complication, with long-term current use of  insulin  -     Hemoglobin A1c; Future  -     Vitamin B12; Future    Gastroesophageal reflux disease without esophagitis  -     CBC & Differential; Future    Acquired hypothyroidism  -     SYNTHROID 88 MCG tablet; Take 1 tablet by mouth Every Other Day. She alternates this QOD with the 100 mcg dose.    1. HTN.  Controlled.  Labs reviewed.  Continue same.    2. Hypothyroidism.  Adequate replacement with 88 mcg and alternating 100 mcg every other daily.    3. Menopausal symptoms.  Doing well off HRT and paroxetine.    4. Vitamin D deficiency.  Continue supplement.    5. DMII.  A1c 9.2, up from 8.2.  Max doses of Bydureon and Januvia, and Invokana.  On Lantus to 12 units daily; increase to 16 units daily and will titrate based on fasting blood sugars.  Eye exam UTD.  She is on ACE-I in ASA, not statin.  Consider endocrinology consult.  Consider short-acting insulin at meals.    6. GERD. Controlled with PPI.    Outpatient Medications Prior to Visit   Medication Sig Dispense Refill   • ACCU-CHEK COMPACT PLUS test strip USE TO TEST TWO TIMES A  each 2   • aspirin 81 MG EC tablet Take 81 mg by mouth Daily.     • B Complex Vitamins (VITAMIN B COMPLEX) capsule capsule Take  by mouth daily.     • benazepril-hydrochlorthiazide (LOTENSIN HCT) 20-12.5 MG per tablet Take 1 tablet by mouth Daily. 90 tablet 1   • Biotin (BIOTIN MAXIMUM STRENGTH) 10 MG tablet Take 1,000 mcg by mouth daily.     • BYDUREON 2 MG pen-injector INJECT 2MG (THE CONTENTS OF ONE PEN OR ONE VIAL) IMMEDIATELY SUBCUTANEOUSLY INTO THE THIGH, ABDOMEN, OR UPPER ARM EVERY 7 DAYS 4 each 4   • CALCIUM-VITAMIN D PO Take  by mouth daily.     • Canagliflozin (INVOKANA) 300 MG tablet Take 300 mg by mouth Daily. 90 tablet 3   • Chromium Picolinate 1000 MCG tablet Take 1,000 mcg by mouth daily.     • Exenatide ER (BYDUREON) 2 MG Suspension Reconstituted ER Inject 2 mg as directed Every 7 (Seven) Days. 12 each 2   • Insulin Glargine (LANTUS SOLOSTAR) 100 UNIT/ML  injection pen Inject 10 Units under the skin Every Night. 1 pen 11   • Insulin Pen Needle (BD PEN NEEDLE RICHARD U/F) 32G X 4 MM misc 1 each Daily. 100 each 3   • Lancets (ACCU-CHEK SOFT TOUCH) lancets Use one daily. 90 each 3   • meloxicam (MOBIC) 15 MG tablet Take 1 tablet by mouth Daily. Take with food. 30 tablet 0   • omeprazole (priLOSEC) 40 MG capsule TAKE ONE CAPSULE BY MOUTH DAILY 90 capsule 1   • SITagliptin (JANUVIA) 100 MG tablet Take one po  daily 90 tablet 1   • levothyroxine (SYNTHROID, LEVOTHROID) 100 MCG tablet Take 1 tablet by mouth Every Other Day. Alternate QOD with the 88 mcg tablet. 90 tablet 1   • SYNTHROID 88 MCG tablet Take 1 tablet by mouth Daily. She alternates this QOD with the 100 mcg dose. 90 tablet 3     No facility-administered medications prior to visit.      New Medications Ordered This Visit   Medications   • SYNTHROID 88 MCG tablet     Sig: Take 1 tablet by mouth Every Other Day. She alternates this QOD with the 100 mcg dose.     Dispense:  90 tablet     Refill:  3     Brand name Synthroid please.     [unfilled]  Medications Discontinued During This Encounter   Medication Reason   • levothyroxine (SYNTHROID, LEVOTHROID) 100 MCG tablet    • SYNTHROID 88 MCG tablet    • levothyroxine (SYNTHROID, LEVOTHROID) 100 MCG tablet          Return in about 3 months (around 1/11/2018).

## 2017-10-16 ENCOUNTER — RESULTS ENCOUNTER (OUTPATIENT)
Dept: INTERNAL MEDICINE | Facility: CLINIC | Age: 72
End: 2017-10-16

## 2017-10-16 DIAGNOSIS — I10 ESSENTIAL HYPERTENSION: ICD-10-CM

## 2017-10-16 DIAGNOSIS — K21.9 GASTROESOPHAGEAL REFLUX DISEASE WITHOUT ESOPHAGITIS: ICD-10-CM

## 2017-10-16 DIAGNOSIS — Z79.4 TYPE 2 DIABETES MELLITUS WITHOUT COMPLICATION, WITH LONG-TERM CURRENT USE OF INSULIN (HCC): ICD-10-CM

## 2017-10-16 DIAGNOSIS — E03.9 HYPOTHYROIDISM, UNSPECIFIED TYPE: ICD-10-CM

## 2017-10-16 DIAGNOSIS — E11.9 TYPE 2 DIABETES MELLITUS WITHOUT COMPLICATION, WITH LONG-TERM CURRENT USE OF INSULIN (HCC): ICD-10-CM

## 2017-12-28 RX ORDER — OMEPRAZOLE 40 MG/1
CAPSULE, DELAYED RELEASE ORAL
Qty: 90 CAPSULE | Refills: 1 | Status: ON HOLD | OUTPATIENT
Start: 2017-12-28 | End: 2018-10-21

## 2018-01-15 LAB
ALBUMIN SERPL-MCNC: 4.3 G/DL (ref 3.5–5.2)
ALBUMIN/GLOB SERPL: 2.9 G/DL
ALP SERPL-CCNC: 66 U/L (ref 40–129)
ALT SERPL-CCNC: 72 U/L (ref 5–33)
AST SERPL-CCNC: 64 U/L (ref 5–32)
BASOPHILS # BLD AUTO: 0.05 10*3/MM3 (ref 0–0.2)
BASOPHILS NFR BLD AUTO: 1.1 % (ref 0–2)
BILIRUB SERPL-MCNC: 0.6 MG/DL (ref 0.2–1.2)
BUN SERPL-MCNC: 16 MG/DL (ref 8–23)
BUN/CREAT SERPL: 19.8 (ref 7–25)
CALCIUM SERPL-MCNC: 9.3 MG/DL (ref 8.8–10.5)
CHLORIDE SERPL-SCNC: 101 MMOL/L (ref 98–107)
CHOLEST SERPL-MCNC: 225 MG/DL (ref 0–200)
CHOLEST/HDLC SERPL: 3.31 {RATIO}
CO2 SERPL-SCNC: 27.3 MMOL/L (ref 22–29)
CREAT SERPL-MCNC: 0.81 MG/DL (ref 0.57–1)
EOSINOPHIL # BLD AUTO: 0.28 10*3/MM3 (ref 0.1–0.3)
EOSINOPHIL NFR BLD AUTO: 5.9 % (ref 0–4)
ERYTHROCYTE [DISTWIDTH] IN BLOOD BY AUTOMATED COUNT: 12.8 % (ref 11.5–14.5)
GLOBULIN SER CALC-MCNC: 1.5 GM/DL
GLUCOSE SERPL-MCNC: 172 MG/DL (ref 65–99)
HBA1C MFR BLD: 10.3 % (ref 4.8–5.6)
HCT VFR BLD AUTO: 40.1 % (ref 37–47)
HDLC SERPL-MCNC: 68 MG/DL (ref 40–60)
HGB BLD-MCNC: 13.2 G/DL (ref 12–16)
IMM GRANULOCYTES # BLD: 0.01 10*3/MM3 (ref 0–0.03)
IMM GRANULOCYTES NFR BLD: 0.2 % (ref 0–0.5)
LDLC SERPL CALC-MCNC: 131 MG/DL (ref 0–100)
LYMPHOCYTES # BLD AUTO: 1.46 10*3/MM3 (ref 0.6–4.8)
LYMPHOCYTES NFR BLD AUTO: 30.7 % (ref 20–45)
MCH RBC QN AUTO: 31.9 PG (ref 27–31)
MCHC RBC AUTO-ENTMCNC: 32.9 G/DL (ref 31–37)
MCV RBC AUTO: 96.9 FL (ref 81–99)
MONOCYTES # BLD AUTO: 0.37 10*3/MM3 (ref 0–1)
MONOCYTES NFR BLD AUTO: 7.8 % (ref 3–8)
NEUTROPHILS # BLD AUTO: 2.59 10*3/MM3 (ref 1.5–8.3)
NEUTROPHILS NFR BLD AUTO: 54.3 % (ref 45–70)
NRBC BLD AUTO-RTO: 0 /100 WBC (ref 0–0)
PLATELET # BLD AUTO: 261 10*3/MM3 (ref 140–500)
POTASSIUM SERPL-SCNC: 4.2 MMOL/L (ref 3.5–5.2)
PROT SERPL-MCNC: 5.8 G/DL (ref 6–8.5)
RBC # BLD AUTO: 4.14 10*6/MM3 (ref 4.2–5.4)
SODIUM SERPL-SCNC: 140 MMOL/L (ref 136–145)
T4 FREE SERPL-MCNC: 1.2 NG/DL (ref 0.93–1.7)
TRIGL SERPL-MCNC: 130 MG/DL (ref 0–150)
TSH SERPL DL<=0.005 MIU/L-ACNC: 2.84 MIU/ML (ref 0.27–4.2)
VIT B12 SERPL-MCNC: 731 PG/ML (ref 232–1245)
VLDLC SERPL CALC-MCNC: 26 MG/DL (ref 7–27)
WBC # BLD AUTO: 4.76 10*3/MM3 (ref 4.8–10.8)

## 2018-01-17 ENCOUNTER — OFFICE VISIT (OUTPATIENT)
Dept: INTERNAL MEDICINE | Facility: CLINIC | Age: 73
End: 2018-01-17

## 2018-01-17 VITALS
DIASTOLIC BLOOD PRESSURE: 80 MMHG | HEART RATE: 90 BPM | TEMPERATURE: 97.8 F | WEIGHT: 120 LBS | OXYGEN SATURATION: 99 % | SYSTOLIC BLOOD PRESSURE: 140 MMHG | BODY MASS INDEX: 20.49 KG/M2 | HEIGHT: 64 IN

## 2018-01-17 DIAGNOSIS — E55.9 VITAMIN D DEFICIENCY: ICD-10-CM

## 2018-01-17 DIAGNOSIS — E03.9 HYPOTHYROIDISM, UNSPECIFIED TYPE: ICD-10-CM

## 2018-01-17 DIAGNOSIS — I10 ESSENTIAL HYPERTENSION: ICD-10-CM

## 2018-01-17 DIAGNOSIS — K21.9 GASTROESOPHAGEAL REFLUX DISEASE WITHOUT ESOPHAGITIS: ICD-10-CM

## 2018-01-17 DIAGNOSIS — E11.9 TYPE 2 DIABETES MELLITUS WITHOUT COMPLICATION, WITH LONG-TERM CURRENT USE OF INSULIN (HCC): Primary | ICD-10-CM

## 2018-01-17 DIAGNOSIS — R74.01 ELEVATED TRANSAMINASE LEVEL: ICD-10-CM

## 2018-01-17 DIAGNOSIS — Z79.4 TYPE 2 DIABETES MELLITUS WITHOUT COMPLICATION, WITH LONG-TERM CURRENT USE OF INSULIN (HCC): Primary | ICD-10-CM

## 2018-01-17 PROCEDURE — 99214 OFFICE O/P EST MOD 30 MIN: CPT | Performed by: FAMILY MEDICINE

## 2018-01-17 RX ORDER — BENAZEPRIL HYDROCHLORIDE 5 MG/1
5 TABLET, FILM COATED ORAL DAILY
Qty: 30 TABLET | Refills: 5 | Status: SHIPPED | OUTPATIENT
Start: 2018-01-17 | End: 2018-01-18 | Stop reason: SDUPTHER

## 2018-01-17 NOTE — PROGRESS NOTES
Subjective     Olga Greco is a 72 y.o. female, who presents with a chief complaint of   Chief Complaint   Patient presents with   • Hypertension   • Weight Loss       Hypertension     Weight Loss     Hypothyroidism     Diabetes   Associated symptoms include weight loss.     1. DMII.  Fasting blood sugars 120s.  Blood sugars after meals going up to 300s, 400s.  Taking Lantus 16 units QHS.    2. HTN.  Pt was feeling dizzy, light-headed, tired.  Noticed BP was 70s systolic.  D/Ze benazepril/hctz and felt much better.  Now home blood pressures running 130s systolic.    3. Hypothyroidism.  She is alternating 88 mcg and 100 mcg every other day.    The following portions of the patient's history were reviewed and updated as appropriate: allergies, current medications, past family history, past medical history, past social history, past surgical history and problem list.    Allergies: Aspirin and Epinephrine    Review of Systems   Constitutional: Positive for weight loss.   HENT: Negative.    Eyes: Negative.    Respiratory: Negative.    Cardiovascular: Negative.    Gastrointestinal: Negative.    Endocrine: Negative.    Genitourinary: Negative.    Musculoskeletal: Negative.    Skin: Negative.    Allergic/Immunologic: Negative.    Neurological: Negative.    Hematological: Negative.    Psychiatric/Behavioral: Negative.      Objective     Wt Readings from Last 3 Encounters:   01/17/18 54.4 kg (120 lb)   10/11/17 59 kg (130 lb)   07/13/17 59.9 kg (132 lb)     Temp Readings from Last 3 Encounters:   01/17/18 97.8 °F (36.6 °C)   07/13/17 97.7 °F (36.5 °C)   04/20/17 97.9 °F (36.6 °C)     BP Readings from Last 3 Encounters:   01/17/18 140/80   10/11/17 128/70   07/13/17 128/74     Pulse Readings from Last 3 Encounters:   01/17/18 90   10/11/17 72   07/13/17 77     Body mass index is 20.59 kg/(m^2).  SpO2 Readings from Last 3 Encounters:   01/17/18 99%   10/11/17 98%   07/13/17 98%       Physical Exam   Constitutional: She is  oriented to person, place, and time. She appears well-developed and well-nourished.   HENT:   Head: Normocephalic.   Mouth/Throat: Oropharynx is clear and moist.   Eyes: Conjunctivae and EOM are normal. Pupils are equal, round, and reactive to light.   Neck: Normal range of motion. Neck supple. No thyromegaly present.   Cardiovascular: Normal rate, regular rhythm and normal heart sounds.    Pulmonary/Chest: Effort normal and breath sounds normal.   Abdominal: Soft. Bowel sounds are normal. There is no hepatosplenomegaly.   Musculoskeletal: Normal range of motion. She exhibits no edema.   Lymphadenopathy:     She has no cervical adenopathy.   Neurological: She is alert and oriented to person, place, and time.   Skin: Skin is warm and dry. No rash noted.   Psychiatric: She has a normal mood and affect. Her behavior is normal.   Vitals reviewed.      Results for orders placed or performed in visit on 10/16/17   Comprehensive Metabolic Panel   Result Value Ref Range    Glucose 172 (H) 65 - 99 mg/dL    BUN 16 8 - 23 mg/dL    Creatinine 0.81 0.57 - 1.00 mg/dL    eGFR Non African Am 70 >60 mL/min/1.73    eGFR African Am 85 >60 mL/min/1.73    BUN/Creatinine Ratio 19.8 7.0 - 25.0    Sodium 140 136 - 145 mmol/L    Potassium 4.2 3.5 - 5.2 mmol/L    Chloride 101 98 - 107 mmol/L    Total CO2 27.3 22.0 - 29.0 mmol/L    Calcium 9.3 8.8 - 10.5 mg/dL    Total Protein 5.8 (L) 6.0 - 8.5 g/dL    Albumin 4.30 3.50 - 5.20 g/dL    Globulin 1.5 gm/dL    A/G Ratio 2.9 g/dL    Total Bilirubin 0.6 0.2 - 1.2 mg/dL    Alkaline Phosphatase 66 40 - 129 U/L    AST (SGOT) 64 (H) 5 - 32 U/L    ALT (SGPT) 72 (H) 5 - 33 U/L   Hemoglobin A1c   Result Value Ref Range    Hemoglobin A1C 10.30 (H) 4.80 - 5.60 %   Lipid Panel With / Chol / HDL Ratio   Result Value Ref Range    Total Cholesterol 225 (H) 0 - 200 mg/dL    Triglycerides 130 0 - 150 mg/dL    HDL Cholesterol 68 (H) 40 - 60 mg/dL    VLDL Cholesterol 26 7 - 27 mg/dL    LDL Cholesterol  131 (H) 0 -  100 mg/dL    Chol/HDL Ratio 3.31    TSH   Result Value Ref Range    TSH 2.840 0.270 - 4.200 mIU/mL   T4, Free   Result Value Ref Range    Free T4 1.20 0.93 - 1.70 ng/dL   Vitamin B12   Result Value Ref Range    Vitamin B-12 731 232 - 1245 pg/mL   CBC & Differential   Result Value Ref Range    WBC 4.76 (L) 4.80 - 10.80 10*3/mm3    RBC 4.14 (L) 4.20 - 5.40 10*6/mm3    Hemoglobin 13.2 12.0 - 16.0 g/dL    Hematocrit 40.1 37.0 - 47.0 %    MCV 96.9 81.0 - 99.0 fL    MCH 31.9 (H) 27.0 - 31.0 pg    MCHC 32.9 31.0 - 37.0 g/dL    RDW 12.8 11.5 - 14.5 %    Platelets 261 140 - 500 10*3/mm3    Neutrophil Rel % 54.3 45.0 - 70.0 %    Lymphocyte Rel % 30.7 20.0 - 45.0 %    Monocyte Rel % 7.8 3.0 - 8.0 %    Eosinophil Rel % 5.9 (H) 0.0 - 4.0 %    Basophil Rel % 1.1 0.0 - 2.0 %    Neutrophils Absolute 2.59 1.50 - 8.30 10*3/mm3    Lymphocytes Absolute 1.46 0.60 - 4.80 10*3/mm3    Monocytes Absolute 0.37 0.00 - 1.00 10*3/mm3    Eosinophils Absolute 0.28 0.10 - 0.30 10*3/mm3    Basophils Absolute 0.05 0.00 - 0.20 10*3/mm3    Immature Granulocyte Rel % 0.2 0.0 - 0.5 %    Immature Grans Absolute 0.01 0.00 - 0.03 10*3/mm3    nRBC 0.0 0.0 - 0.0 /100 WBC       Assessment/Plan   Olga was seen today for hypertension and weight loss.    Diagnoses and all orders for this visit:    Type 2 diabetes mellitus without complication, with long-term current use of insulin  -     Ambulatory Referral to Endocrinology  -     Hemoglobin A1c; Future  -     Vitamin B12; Future    Essential hypertension  -     benazepril (LOTENSIN) 5 MG tablet; Take 1 tablet by mouth Daily.  -     Comprehensive Metabolic Panel; Future  -     Lipid Panel With / Chol / HDL Ratio; Future    Hypothyroidism, unspecified type  -     CBC & Differential; Future  -     TSH; Future  -     T4, Free; Future    Vitamin D deficiency    Gastroesophageal reflux disease without esophagitis    Elevated transaminase level  -     Hepatitis B & C Profile; Future      1. DMII.  A1c 10.3, up from  9.2.  Max doses of Bydureon and Januvia, and Invokana.  On Lantus to 16 units daily.  Blood sugar going up with meals.  Fasting blood sugars okay.  Likely needs short acting insulin with meals.  She is on ACE-I in ASA, not statin.  Endocrinology consult.  Eye exam due in April.    2. HTN.  Over-treated.  D/c benazepril-hctz.  Start benazepril 5 mg daily.  Monitor home blood pressures.    3. Hypothyroidism.  Adequate replacement with 88 mcg and alternating 100 mcg every other daily.    4. Vitamin D deficiency.  Continue supplement.    5. GERD. Controlled with PPI.    6. Elevated transaminases.  Check hep b, c.  Consider liver u/s.    Outpatient Medications Prior to Visit   Medication Sig Dispense Refill   • ACCU-CHEK COMPACT PLUS test strip USE TO TEST TWO TIMES A  each 2   • aspirin 81 MG EC tablet Take 81 mg by mouth Daily.     • B Complex Vitamins (VITAMIN B COMPLEX) capsule capsule Take  by mouth daily.     • Biotin (BIOTIN MAXIMUM STRENGTH) 10 MG tablet Take 1,000 mcg by mouth daily.     • BYDUREON 2 MG pen-injector INJECT 2MG (THE CONTENTS OF ONE PEN OR ONE VIAL) IMMEDIATELY SUBCUTANEOUSLY INTO THE THIGH, ABDOMEN, OR UPPER ARM EVERY 7 DAYS 4 each 4   • CALCIUM-VITAMIN D PO Take  by mouth daily.     • Canagliflozin (INVOKANA) 300 MG tablet Take 300 mg by mouth Daily. 90 tablet 3   • Chromium Picolinate 1000 MCG tablet Take 1,000 mcg by mouth daily.     • Insulin Glargine (LANTUS SOLOSTAR) 100 UNIT/ML injection pen Inject 10 Units under the skin Every Night. 1 pen 11   • Insulin Pen Needle (BD PEN NEEDLE RICHARD U/F) 32G X 4 MM misc 1 each Daily. 100 each 3   • Lancets (ACCU-CHEK SOFT TOUCH) lancets Use one daily. 90 each 3   • levothyroxine (SYNTHROID, LEVOTHROID) 100 MCG tablet Take 1 tablet by mouth Every Other Day. Alternate every other day with 88 mcg daily.     • omeprazole (priLOSEC) 40 MG capsule TAKE ONE CAPSULE BY MOUTH DAILY 90 capsule 1   • SITagliptin (JANUVIA) 100 MG tablet Take one po  daily 90  tablet 1   • SYNTHROID 88 MCG tablet Take 1 tablet by mouth Every Other Day. She alternates this QOD with the 100 mcg dose. 90 tablet 3   • benazepril-hydrochlorthiazide (LOTENSIN HCT) 20-12.5 MG per tablet Take 1 tablet by mouth Daily. 90 tablet 1   • Exenatide ER (BYDUREON) 2 MG Suspension Reconstituted ER Inject 2 mg as directed Every 7 (Seven) Days. 12 each 2   • meloxicam (MOBIC) 15 MG tablet Take 1 tablet by mouth Daily. Take with food. 30 tablet 0     No facility-administered medications prior to visit.      New Medications Ordered This Visit   Medications   • benazepril (LOTENSIN) 5 MG tablet     Sig: Take 1 tablet by mouth Daily.     Dispense:  30 tablet     Refill:  5     [unfilled]  Medications Discontinued During This Encounter   Medication Reason   • Exenatide ER (BYDUREON) 2 MG Suspension Reconstituted ER Therapy completed   • meloxicam (MOBIC) 15 MG tablet Therapy completed   • benazepril-hydrochlorthiazide (LOTENSIN HCT) 20-12.5 MG per tablet          Return in about 3 months (around 4/17/2018).

## 2018-01-18 DIAGNOSIS — I10 ESSENTIAL HYPERTENSION: ICD-10-CM

## 2018-01-18 RX ORDER — BENAZEPRIL HYDROCHLORIDE 5 MG/1
5 TABLET, FILM COATED ORAL DAILY
Qty: 30 TABLET | Refills: 5 | Status: SHIPPED | OUTPATIENT
Start: 2018-01-18 | End: 2018-04-25

## 2018-02-03 DIAGNOSIS — E11.9 TYPE 2 DIABETES MELLITUS WITHOUT COMPLICATION, WITHOUT LONG-TERM CURRENT USE OF INSULIN (HCC): ICD-10-CM

## 2018-02-05 RX ORDER — LANCETS
EACH MISCELLANEOUS
Qty: 100 EACH | Refills: 2 | Status: SHIPPED | OUTPATIENT
Start: 2018-02-05 | End: 2018-03-12 | Stop reason: SDUPTHER

## 2018-02-05 RX ORDER — EXENATIDE 2 MG/.65ML
INJECTION, SUSPENSION, EXTENDED RELEASE SUBCUTANEOUS
Qty: 4 EACH | Refills: 6 | Status: SHIPPED | OUTPATIENT
Start: 2018-02-05 | End: 2018-04-06

## 2018-02-16 ENCOUNTER — TELEPHONE (OUTPATIENT)
Dept: INTERNAL MEDICINE | Facility: CLINIC | Age: 73
End: 2018-02-16

## 2018-02-16 RX ORDER — AZITHROMYCIN 250 MG/1
TABLET, FILM COATED ORAL
Qty: 6 TABLET | Refills: 0 | Status: SHIPPED | OUTPATIENT
Start: 2018-02-16 | End: 2018-02-26

## 2018-02-16 NOTE — TELEPHONE ENCOUNTER
----- Message from OPAL Nieto sent at 2/16/2018  9:45 AM EST -----  Regarding: FW: SINUS INFECTION  Contact: [phone]  LENA roper   as directed  Disp #1  ----- Message -----     From: Samantha Street MA     Sent: 2/16/2018   9:41 AM       To: OPAL Nieto  Subject: FW: SINUS INFECTION                                  ----- Message -----     From: Lena Cope     Sent: 2/16/2018   9:33 AM       To: [facility]  Subject: SINUS INFECTION                                  ROBERTA PT    Patient called to say that she has had a sore throat, cough with green mucus, and a sinus infection for 14 days. She said that she knows it is not the flu because her  has been with her the whole time and he does not have it.  She has been taking mucinex but now she is asking for antibiotic.    eunice banuelos    Please call pt     Sent to pharmacy   Pt aware

## 2018-02-26 ENCOUNTER — OFFICE VISIT (OUTPATIENT)
Dept: ENDOCRINOLOGY | Age: 73
End: 2018-02-26

## 2018-02-26 VITALS
OXYGEN SATURATION: 98 % | SYSTOLIC BLOOD PRESSURE: 110 MMHG | WEIGHT: 119 LBS | DIASTOLIC BLOOD PRESSURE: 66 MMHG | HEIGHT: 64 IN | HEART RATE: 105 BPM | BODY MASS INDEX: 20.32 KG/M2

## 2018-02-26 DIAGNOSIS — Z79.4 TYPE 2 DIABETES MELLITUS WITHOUT COMPLICATION, WITH LONG-TERM CURRENT USE OF INSULIN (HCC): Primary | ICD-10-CM

## 2018-02-26 DIAGNOSIS — E03.9 HYPOTHYROIDISM, UNSPECIFIED TYPE: ICD-10-CM

## 2018-02-26 DIAGNOSIS — E11.9 TYPE 2 DIABETES MELLITUS WITHOUT COMPLICATION, WITH LONG-TERM CURRENT USE OF INSULIN (HCC): Primary | ICD-10-CM

## 2018-02-26 PROCEDURE — 99205 OFFICE O/P NEW HI 60 MIN: CPT | Performed by: INTERNAL MEDICINE

## 2018-02-26 RX ORDER — GLIMEPIRIDE 2 MG/1
2 TABLET ORAL
Qty: 30 TABLET | Refills: 11 | Status: SHIPPED | OUTPATIENT
Start: 2018-02-26 | End: 2018-04-06

## 2018-02-26 NOTE — PROGRESS NOTES
Chief Complaint   Patient presents with   • Diabetes     TYPE 2 DM   NEW PATIENT APPOINTMENT/TYPE 2 DIABETES MELLITUS    Olga Greco 72 y.o. WF presents with Type 2 dm as a new patient. Consulted by Dr. Zapata.     Type 2 dm - Diagnosed in 2003. Was started on oral agents initially. Insulin was started on April 24th 2017. Currently on Bydueron 2 mg once a week, lantus 12 - 18 units at bed time, she changes the dose based on her BG and what she ate at that time, januvia 100 mg po daily, invokana 300 mg po daily, not on metformin due to diarrhea.   No UTI, no hx of pancreatitis.   Checks BG 2 times a day.   FBG - 62 - 180 mg/dl and Pre - meals - 200 - 300  Mg/dl  C/o increased urination or increased thirst. No BG less than 60 mg/dl in the last one month, does have hypoglycemic awareness. Highest BG in the last 1 month was -  409 Mg/dl.  Pt got her log book for me. No nausea and vomiting.   Up to date with eye exam, April 2017 and no dm retinopathy.    No tingling or numbness in her b/l feet, no ulcers and amputations of her feet.   No CAD, CKD,CVA per pt.   Pt is physically active. weight has been stable.   Pt tries to follow DM diet for most part.    On Ace inb.    Hypothyroidism    Currently on synthroid 88 mcg oral every other day, 100 mcg oral every other day.     I have reviewed the patient's allergies, medicines, past medical hx, family hx and social hx in detail.    Past Medical History:   Diagnosis Date   • Diabetes mellitus    • Hypertension    • Hypothyroidism        Family History   Problem Relation Age of Onset   • Heart disease Father    • Diabetes Sister    • Hypertension Mother        Social History     Social History   • Marital status:      Spouse name: N/A   • Number of children: N/A   • Years of education: N/A     Occupational History   • Not on file.     Social History Main Topics   • Smoking status: Never Smoker   • Smokeless tobacco: Never Used   • Alcohol use 1.2 oz/week     2  Glasses of wine per week      Comment: daily   • Drug use: No   • Sexual activity: Yes     Partners: Male     Other Topics Concern   • Not on file     Social History Narrative       Allergies   Allergen Reactions   • Aspirin      GI distress   • Epinephrine      Tachycardia         Current Outpatient Prescriptions:   •  ACCU-CHEK COMPACT PLUS test strip, USE TO TEST TWO TIMES A DAY, Disp: 180 each, Rfl: 2  •  ACCU-CHEK SOFTCLIX LANCETS lancets, USE ONCE DAILY, Disp: 100 each, Rfl: 2  •  B Complex Vitamins (VITAMIN B COMPLEX) capsule capsule, Take  by mouth daily., Disp: , Rfl:   •  benazepril (LOTENSIN) 5 MG tablet, Take 1 tablet by mouth Daily., Disp: 30 tablet, Rfl: 5  •  Biotin (BIOTIN MAXIMUM STRENGTH) 10 MG tablet, Take 1,000 mcg by mouth daily., Disp: , Rfl:   •  BYDUREON 2 MG pen-injector injection, INJECT 2MG (THE CONTENTS OF ONE PEN OR ONE VIAL) IMMEDIATELY SUBCUTANEOUSLY INTO THE THIGH, ABDOMEN, OR UPPER ARM EVERY 7 DAYS, Disp: 4 each, Rfl: 6  •  CALCIUM-VITAMIN D PO, Take  by mouth daily., Disp: , Rfl:   •  Canagliflozin (INVOKANA) 300 MG tablet, Take 300 mg by mouth Daily., Disp: 90 tablet, Rfl: 3  •  Chromium Picolinate 1000 MCG tablet, Take 1,000 mcg by mouth daily., Disp: , Rfl:   •  Insulin Glargine (LANTUS SOLOSTAR) 100 UNIT/ML injection pen, Inject 10 Units under the skin Every Night. (Patient taking differently: Inject 18 Units under the skin Every Night.), Disp: 1 pen, Rfl: 11  •  Insulin Pen Needle (BD PEN NEEDLE RICHARD U/F) 32G X 4 MM misc, 1 each Daily., Disp: 100 each, Rfl: 3  •  levothyroxine (SYNTHROID, LEVOTHROID) 100 MCG tablet, Take 1 tablet by mouth Every Other Day. Alternate every other day with 88 mcg daily., Disp: , Rfl:   •  omeprazole (priLOSEC) 40 MG capsule, TAKE ONE CAPSULE BY MOUTH DAILY, Disp: 90 capsule, Rfl: 1  •  SITagliptin (JANUVIA) 100 MG tablet, Take one po  daily, Disp: 90 tablet, Rfl: 1  •  SYNTHROID 88 MCG tablet, Take 1 tablet by mouth Every Other Day. She alternates  "this QOD with the 100 mcg dose., Disp: 90 tablet, Rfl: 3  •  glimepiride (AMARYL) 2 MG tablet, Take 1 tablet by mouth Daily With Breakfast & Dinner., Disp: 30 tablet, Rfl: 11    Review of Systems   Constitutional: Positive for fatigue. Negative for fever.   HENT: Positive for voice change. Negative for facial swelling, nosebleeds and trouble swallowing.    Eyes: Negative for pain and redness.   Respiratory: Positive for shortness of breath. Negative for wheezing.    Cardiovascular: Positive for palpitations. Negative for leg swelling.   Gastrointestinal: Negative for abdominal pain, diarrhea and vomiting.   Endocrine: Positive for cold intolerance, heat intolerance and polydipsia. Negative for polyuria.   Genitourinary: Positive for frequency. Negative for decreased urine volume.   Musculoskeletal: Negative for joint swelling and neck pain.   Skin: Negative for rash.   Allergic/Immunologic: Negative for immunocompromised state.   Neurological: Positive for light-headedness. Negative for seizures and facial asymmetry.   Hematological: Does not bruise/bleed easily.   Psychiatric/Behavioral: Negative for agitation and confusion.       Objective:     /66  Pulse 105  Ht 162.6 cm (64\")  Wt 54 kg (119 lb)  SpO2 98%  BMI 20.43 kg/m2    Physical Exam   Constitutional: She is oriented to person, place, and time. She appears well-nourished.   Thin female   HENT:   Head: Normocephalic and atraumatic.   Eyes: Conjunctivae and EOM are normal. No scleral icterus.   Neck: Normal range of motion. Neck supple. No thyromegaly present.   Cardiovascular: Normal rate and normal heart sounds.  Exam reveals no friction rub.    No murmur heard.  Pulmonary/Chest: Effort normal and breath sounds normal. No stridor. She has no wheezes. She has no rales.   Abdominal: Soft. Bowel sounds are normal. She exhibits no distension. There is no tenderness.   No central obesity   Musculoskeletal: She exhibits no edema or tenderness. "   Lymphadenopathy:     She has no cervical adenopathy.   Neurological: She is alert and oriented to person, place, and time. She displays normal reflexes. She exhibits normal muscle tone.   Skin: Skin is warm and dry. She is not diaphoretic.   Psychiatric: She has a normal mood and affect.   Vitals reviewed.         Results Review:    I reviewed the patient's new clinical results.    Results Encounter on 10/16/2017   Component Date Value Ref Range Status   • WBC 01/15/2018 4.76* 4.80 - 10.80 10*3/mm3 Final   • RBC 01/15/2018 4.14* 4.20 - 5.40 10*6/mm3 Final   • Hemoglobin 01/15/2018 13.2  12.0 - 16.0 g/dL Final   • Hematocrit 01/15/2018 40.1  37.0 - 47.0 % Final   • MCV 01/15/2018 96.9  81.0 - 99.0 fL Final   • MCH 01/15/2018 31.9* 27.0 - 31.0 pg Final   • MCHC 01/15/2018 32.9  31.0 - 37.0 g/dL Final   • RDW 01/15/2018 12.8  11.5 - 14.5 % Final   • Platelets 01/15/2018 261  140 - 500 10*3/mm3 Final   • Neutrophil Rel % 01/15/2018 54.3  45.0 - 70.0 % Final   • Lymphocyte Rel % 01/15/2018 30.7  20.0 - 45.0 % Final   • Monocyte Rel % 01/15/2018 7.8  3.0 - 8.0 % Final   • Eosinophil Rel % 01/15/2018 5.9* 0.0 - 4.0 % Final   • Basophil Rel % 01/15/2018 1.1  0.0 - 2.0 % Final   • Neutrophils Absolute 01/15/2018 2.59  1.50 - 8.30 10*3/mm3 Final   • Lymphocytes Absolute 01/15/2018 1.46  0.60 - 4.80 10*3/mm3 Final   • Monocytes Absolute 01/15/2018 0.37  0.00 - 1.00 10*3/mm3 Final   • Eosinophils Absolute 01/15/2018 0.28  0.10 - 0.30 10*3/mm3 Final   • Basophils Absolute 01/15/2018 0.05  0.00 - 0.20 10*3/mm3 Final   • Immature Granulocyte Rel % 01/15/2018 0.2  0.0 - 0.5 % Final   • Immature Grans Absolute 01/15/2018 0.01  0.00 - 0.03 10*3/mm3 Final   • nRBC 01/15/2018 0.0  0.0 - 0.0 /100 WBC Final   • Glucose 01/15/2018 172* 65 - 99 mg/dL Final   • BUN 01/15/2018 16  8 - 23 mg/dL Final   • Creatinine 01/15/2018 0.81  0.57 - 1.00 mg/dL Final   • eGFR Non  Am 01/15/2018 70  >60 mL/min/1.73 Final   • eGFR African Am  01/15/2018 85  >60 mL/min/1.73 Final   • BUN/Creatinine Ratio 01/15/2018 19.8  7.0 - 25.0 Final   • Sodium 01/15/2018 140  136 - 145 mmol/L Final   • Potassium 01/15/2018 4.2  3.5 - 5.2 mmol/L Final   • Chloride 01/15/2018 101  98 - 107 mmol/L Final   • Total CO2 01/15/2018 27.3  22.0 - 29.0 mmol/L Final   • Calcium 01/15/2018 9.3  8.8 - 10.5 mg/dL Final   • Total Protein 01/15/2018 5.8* 6.0 - 8.5 g/dL Final   • Albumin 01/15/2018 4.30  3.50 - 5.20 g/dL Final   • Globulin 01/15/2018 1.5  gm/dL Final   • A/G Ratio 01/15/2018 2.9  g/dL Final   • Total Bilirubin 01/15/2018 0.6  0.2 - 1.2 mg/dL Final   • Alkaline Phosphatase 01/15/2018 66  40 - 129 U/L Final   • AST (SGOT) 01/15/2018 64* 5 - 32 U/L Final   • ALT (SGPT) 01/15/2018 72* 5 - 33 U/L Final   • Hemoglobin A1C 01/15/2018 10.30* 4.80 - 5.60 % Final   • Total Cholesterol 01/15/2018 225* 0 - 200 mg/dL Final   • Triglycerides 01/15/2018 130  0 - 150 mg/dL Final   • HDL Cholesterol 01/15/2018 68* 40 - 60 mg/dL Final   • VLDL Cholesterol 01/15/2018 26  7 - 27 mg/dL Final   • LDL Cholesterol  01/15/2018 131* 0 - 100 mg/dL Final   • Chol/HDL Ratio 01/15/2018 3.31   Final   • TSH 01/15/2018 2.840  0.270 - 4.200 mIU/mL Final   • Free T4 01/15/2018 1.20  0.93 - 1.70 ng/dL Final   • Vitamin B-12 01/15/2018 731  232 - 1245 pg/mL Final       Olga was seen today for diabetes.    Diagnoses and all orders for this visit:    Type 2 diabetes mellitus without complication, with long-term current use of insulin  -     TSH; Future  -     T4, Free; Future  -     Basic Metabolic Panel; Future  -     Hemoglobin A1c; Future  -     Vitamin B12 & Folate; Future  -     Lipid Panel; Future  -     Anti-islet Cell Antibody; Future  -     Glutamic Acid Decarboxylase; Future  -     C-Peptide; Future  -     Insulin Antibody; Future    Hypothyroidism, unspecified type  -     TSH; Future  -     T4, Free; Future  -     Basic Metabolic Panel; Future  -     Hemoglobin A1c; Future  -     Vitamin B12 &  Folate; Future  -     Lipid Panel; Future  -     Anti-islet Cell Antibody; Future  -     Glutamic Acid Decarboxylase; Future  -     C-Peptide; Future  -     Insulin Antibody; Future    Other orders  -     glimepiride (AMARYL) 2 MG tablet; Take 1 tablet by mouth Daily With Breakfast & Dinner.    Type 2 diabetes mellitus-uncontrolled  Will change Lantus to 10 units in the morning and 8 units in the evening  Will add glimiperide 2 mg twice daily with meals  Continue Januvia and invokana at the current doses  Given the family history of type 1 diabetes mellitus and patient's phenotype Will check C-peptide level, dad 65, insulin antibody, anti-islet cell antibody levels.    Hyperlipidemia  Will check lipid panel    Hypothyroidism  Continue the current doses of levothyroxin  Noted that TSH levels are within normal limits.    Discussed with the patient extensively about continues glucose monitoring and the benefits of it.  Patient was also interested in insulin pump, discussed with the patient that based on the current blood work up will make further plans in this regard.    Thank you for asking me to see your patient, Olga Greco in consultation.    35 minutes out of 60 minutes face to face spent in counseling the patient extensively on medication changes, treatment plan, blood workup that is necessary.    Angi Rogers MD  02/26/18

## 2018-03-12 DIAGNOSIS — E11.9 TYPE 2 DIABETES MELLITUS WITHOUT COMPLICATION, WITHOUT LONG-TERM CURRENT USE OF INSULIN (HCC): ICD-10-CM

## 2018-03-12 RX ORDER — LANCETS
EACH MISCELLANEOUS
Qty: 200 EACH | Refills: 5 | Status: SHIPPED | OUTPATIENT
Start: 2018-03-12

## 2018-03-14 RX ORDER — SITAGLIPTIN 100 MG/1
TABLET, FILM COATED ORAL
Qty: 90 TABLET | Refills: 0 | Status: SHIPPED | OUTPATIENT
Start: 2018-03-14 | End: 2018-04-06

## 2018-03-28 DIAGNOSIS — E11.9 TYPE 2 DIABETES MELLITUS WITHOUT COMPLICATION, WITHOUT LONG-TERM CURRENT USE OF INSULIN (HCC): ICD-10-CM

## 2018-03-28 DIAGNOSIS — E11.9 TYPE 2 DIABETES MELLITUS WITHOUT COMPLICATION, WITH LONG-TERM CURRENT USE OF INSULIN (HCC): ICD-10-CM

## 2018-03-28 DIAGNOSIS — Z79.4 TYPE 2 DIABETES MELLITUS WITHOUT COMPLICATION, WITH LONG-TERM CURRENT USE OF INSULIN (HCC): ICD-10-CM

## 2018-04-03 ENCOUNTER — LAB (OUTPATIENT)
Dept: LAB | Facility: HOSPITAL | Age: 73
End: 2018-04-03

## 2018-04-03 DIAGNOSIS — E11.9 TYPE 2 DIABETES MELLITUS WITHOUT COMPLICATION, WITH LONG-TERM CURRENT USE OF INSULIN (HCC): ICD-10-CM

## 2018-04-03 DIAGNOSIS — Z79.4 TYPE 2 DIABETES MELLITUS WITHOUT COMPLICATION, WITH LONG-TERM CURRENT USE OF INSULIN (HCC): ICD-10-CM

## 2018-04-03 DIAGNOSIS — E03.9 HYPOTHYROIDISM, UNSPECIFIED TYPE: ICD-10-CM

## 2018-04-03 LAB
ANION GAP SERPL CALCULATED.3IONS-SCNC: 12.4 MMOL/L
BUN BLD-MCNC: 22 MG/DL (ref 8–23)
BUN/CREAT SERPL: 28.6 (ref 7–25)
CALCIUM SPEC-SCNC: 9.6 MG/DL (ref 8.8–10.5)
CHLORIDE SERPL-SCNC: 102 MMOL/L (ref 98–107)
CHOLEST SERPL-MCNC: 232 MG/DL (ref 0–200)
CO2 SERPL-SCNC: 26.6 MMOL/L (ref 22–29)
CREAT BLD-MCNC: 0.77 MG/DL (ref 0.57–1)
FOLATE SERPL-MCNC: >20 NG/ML (ref 4.78–20)
GFR SERPL CREATININE-BSD FRML MDRD: 74 ML/MIN/1.73
GLUCOSE BLD-MCNC: 170 MG/DL (ref 65–99)
HBA1C MFR BLD: 9.3 % (ref 4.8–5.6)
HDLC SERPL-MCNC: 98 MG/DL (ref 40–60)
LDLC SERPL CALC-MCNC: 122 MG/DL (ref 0–100)
LDLC/HDLC SERPL: 1.24 {RATIO}
POTASSIUM BLD-SCNC: 4.2 MMOL/L (ref 3.5–5.2)
SODIUM BLD-SCNC: 141 MMOL/L (ref 136–145)
T4 FREE SERPL-MCNC: 1.48 NG/DL (ref 0.93–1.7)
TRIGL SERPL-MCNC: 62 MG/DL (ref 0–150)
TSH SERPL DL<=0.05 MIU/L-ACNC: 5.78 MIU/ML (ref 0.27–4.2)
VIT B12 BLD-MCNC: 637 PG/ML (ref 232–1245)
VLDLC SERPL-MCNC: 12.4 MG/DL (ref 7–27)

## 2018-04-03 PROCEDURE — 36415 COLL VENOUS BLD VENIPUNCTURE: CPT

## 2018-04-03 PROCEDURE — 84439 ASSAY OF FREE THYROXINE: CPT

## 2018-04-03 PROCEDURE — 84443 ASSAY THYROID STIM HORMONE: CPT

## 2018-04-03 PROCEDURE — 86341 ISLET CELL ANTIBODY: CPT

## 2018-04-03 PROCEDURE — 82746 ASSAY OF FOLIC ACID SERUM: CPT

## 2018-04-03 PROCEDURE — 82607 VITAMIN B-12: CPT

## 2018-04-03 PROCEDURE — 80048 BASIC METABOLIC PNL TOTAL CA: CPT

## 2018-04-03 PROCEDURE — 83036 HEMOGLOBIN GLYCOSYLATED A1C: CPT

## 2018-04-03 PROCEDURE — 80061 LIPID PANEL: CPT

## 2018-04-03 PROCEDURE — 86337 INSULIN ANTIBODIES: CPT

## 2018-04-03 PROCEDURE — 84681 ASSAY OF C-PEPTIDE: CPT

## 2018-04-04 LAB
C PEPTIDE SERPL-MCNC: <0.1 NG/ML (ref 1.1–4.4)
PANC ISLET CELL AB TITR SER: NEGATIVE {TITER}

## 2018-04-06 LAB — GAD65 AB SER-ACNC: NORMAL U/ML (ref 0–5)

## 2018-04-08 LAB — INSULIN AB SER-ACNC: 32 UU/ML

## 2018-04-10 ENCOUNTER — TELEPHONE (OUTPATIENT)
Dept: ENDOCRINOLOGY | Age: 73
End: 2018-04-10

## 2018-04-10 NOTE — TELEPHONE ENCOUNTER
Patient called office about her novolog dose she was suppose to take. Spoke with patient about the sliding scale that Dr Rogers wanted her to follow patient voice understanding and sliding scale was emailed to patient     Will add NovoLog sliding scale before meals - low dose-1 unit for 50 about 1 50 mg/dL.  BG less than 150 - zero  151 - 200 - 1 unit  201 - 250 - 2 units  251 - 300 - 3 units  301 - 350 - 4 units  Above 350 mg/dl - 5 units.     ------

## 2018-04-16 ENCOUNTER — TELEPHONE (OUTPATIENT)
Dept: INTERNAL MEDICINE | Facility: CLINIC | Age: 73
End: 2018-04-16

## 2018-04-16 NOTE — TELEPHONE ENCOUNTER
Deleted duplicate orders and advised patient still need to keep lab appt to draw the rest.  She voiced understanding.    ----- Message from Yair Zapata MD sent at 4/16/2018  5:12 PM EDT -----  Regarding: RE: LAB CHANGES  I can see the labs from endo.  All I need is the Hepatitis B/C profile, CBC, CMP.  Everything else is a duplicate.  (Use orrders from 1/17/18.)  ----- Message -----  From: Sherri Holliday MA  Sent: 4/16/2018   2:16 PM  To: Yair Zapata MD  Subject: FW: LAB CHANGES                                  Labs are on chart.  Please advise any changes.  Thanks.  ----- Message -----  From: Aziza Albarran  Sent: 4/16/2018   1:56 PM  To: Hailey García [facility]  Subject: LAB CHANGES                                      Benny    Has lab appointment on morning of April 19th, Thursday.  She had labs approximately 2 weeks ago with the Endocrinologist.  Does Dr. Zapata want to adjust any of the orders based on what was done for the Endocrinologist.  Patient says they should have faxed them over.    She will be here Thursday morning.

## 2018-04-17 DIAGNOSIS — Z00.00 HEALTHCARE MAINTENANCE: ICD-10-CM

## 2018-04-17 DIAGNOSIS — E53.9 VITAMIN B DEFICIENCY: ICD-10-CM

## 2018-04-17 DIAGNOSIS — E11.9 TYPE 2 DIABETES MELLITUS WITHOUT COMPLICATION, WITH LONG-TERM CURRENT USE OF INSULIN (HCC): ICD-10-CM

## 2018-04-17 DIAGNOSIS — N95.1 MENOPAUSAL SYMPTOM: ICD-10-CM

## 2018-04-17 DIAGNOSIS — E78.5 HYPERLIPIDEMIA, UNSPECIFIED HYPERLIPIDEMIA TYPE: ICD-10-CM

## 2018-04-17 DIAGNOSIS — E55.9 VITAMIN D DEFICIENCY: ICD-10-CM

## 2018-04-17 DIAGNOSIS — E03.9 HYPOTHYROIDISM, UNSPECIFIED TYPE: ICD-10-CM

## 2018-04-17 DIAGNOSIS — Z79.4 TYPE 2 DIABETES MELLITUS WITHOUT COMPLICATION, WITH LONG-TERM CURRENT USE OF INSULIN (HCC): ICD-10-CM

## 2018-04-17 DIAGNOSIS — I10 ESSENTIAL HYPERTENSION: Primary | ICD-10-CM

## 2018-04-19 ENCOUNTER — LAB (OUTPATIENT)
Dept: INTERNAL MEDICINE | Facility: CLINIC | Age: 73
End: 2018-04-19

## 2018-04-19 ENCOUNTER — OFFICE VISIT (OUTPATIENT)
Dept: ENDOCRINOLOGY | Age: 73
End: 2018-04-19

## 2018-04-19 VITALS
WEIGHT: 121 LBS | HEIGHT: 64 IN | SYSTOLIC BLOOD PRESSURE: 122 MMHG | OXYGEN SATURATION: 96 % | DIASTOLIC BLOOD PRESSURE: 68 MMHG | HEART RATE: 74 BPM | BODY MASS INDEX: 20.66 KG/M2

## 2018-04-19 DIAGNOSIS — I10 ESSENTIAL HYPERTENSION: ICD-10-CM

## 2018-04-19 DIAGNOSIS — E03.9 ACQUIRED HYPOTHYROIDISM: ICD-10-CM

## 2018-04-19 DIAGNOSIS — E55.9 VITAMIN D DEFICIENCY: ICD-10-CM

## 2018-04-19 DIAGNOSIS — Z79.4 TYPE 2 DIABETES MELLITUS WITHOUT COMPLICATION, WITH LONG-TERM CURRENT USE OF INSULIN (HCC): ICD-10-CM

## 2018-04-19 DIAGNOSIS — E53.9 VITAMIN B DEFICIENCY: ICD-10-CM

## 2018-04-19 DIAGNOSIS — E03.9 HYPOTHYROIDISM, UNSPECIFIED TYPE: ICD-10-CM

## 2018-04-19 DIAGNOSIS — IMO0002 DIABETES MELLITUS TYPE 1, UNCONTROLLED, WITH COMPLICATIONS: Primary | ICD-10-CM

## 2018-04-19 DIAGNOSIS — Z11.59 NEED FOR HEPATITIS B SCREENING TEST: ICD-10-CM

## 2018-04-19 DIAGNOSIS — N95.1 MENOPAUSAL SYMPTOM: ICD-10-CM

## 2018-04-19 DIAGNOSIS — R74.01 ELEVATED TRANSAMINASE LEVEL: ICD-10-CM

## 2018-04-19 DIAGNOSIS — E78.5 HYPERLIPIDEMIA, UNSPECIFIED HYPERLIPIDEMIA TYPE: ICD-10-CM

## 2018-04-19 DIAGNOSIS — E11.9 TYPE 2 DIABETES MELLITUS WITHOUT COMPLICATION, WITH LONG-TERM CURRENT USE OF INSULIN (HCC): ICD-10-CM

## 2018-04-19 DIAGNOSIS — Z11.59 ENCOUNTER FOR HEPATITIS C SCREENING TEST FOR LOW RISK PATIENT: ICD-10-CM

## 2018-04-19 DIAGNOSIS — Z79.4 TYPE 2 DIABETES MELLITUS WITHOUT COMPLICATION, WITH LONG-TERM CURRENT USE OF INSULIN (HCC): Primary | ICD-10-CM

## 2018-04-19 DIAGNOSIS — E11.9 TYPE 2 DIABETES MELLITUS WITHOUT COMPLICATION, WITH LONG-TERM CURRENT USE OF INSULIN (HCC): Primary | ICD-10-CM

## 2018-04-19 DIAGNOSIS — I10 ESSENTIAL HYPERTENSION: Primary | ICD-10-CM

## 2018-04-19 DIAGNOSIS — Z00.00 HEALTHCARE MAINTENANCE: ICD-10-CM

## 2018-04-19 DIAGNOSIS — E78.49 OTHER HYPERLIPIDEMIA: ICD-10-CM

## 2018-04-19 PROCEDURE — 95250 CONT GLUC MNTR PHYS/QHP EQP: CPT | Performed by: INTERNAL MEDICINE

## 2018-04-19 PROCEDURE — 99215 OFFICE O/P EST HI 40 MIN: CPT | Performed by: INTERNAL MEDICINE

## 2018-04-19 RX ORDER — LEVOTHYROXINE SODIUM 0.1 MG/1
100 TABLET ORAL DAILY
Qty: 30 TABLET | Refills: 11 | Status: SHIPPED | OUTPATIENT
Start: 2018-04-19 | End: 2018-05-31

## 2018-04-19 RX ORDER — ATORVASTATIN CALCIUM 20 MG/1
20 TABLET, FILM COATED ORAL DAILY
Qty: 30 TABLET | Refills: 11 | Status: SHIPPED | OUTPATIENT
Start: 2018-04-19 | End: 2019-03-04 | Stop reason: SDUPTHER

## 2018-04-19 NOTE — PROGRESS NOTES
72 y.o.    Patient Care Team:  Yair Zapata MD as PCP - General    Chief Complaint:    6 WEEK FOLLOW UP/ TYPE 1 DIABETES MELLITUS  Subjective     HPI    Olga Greco 72 y.o. WF presents with Type 1 dm as a follow up patient. Consulted by Dr. Zapata.      Type 1 dm - Diagnosed in 2003 was initially thought to be type 2 dm. Was started on oral agents initially. Insulin was started on April 24th 2017.    When she came to me in Feb 2018 she was oral hypoglycemic agents but due to her phenotype and family hx of type 1 dm, I checked her ab and they were positive.   Patient had low C-peptide levels, positive james 65, insulin autoantibody's.  Patient's oral hypoglycemics were stopped.  Currently on lantus 10 units in the morning and 8 units at bed time.   Novolog ssi - low dose ssi tid ac - 1 unit - 50 above 150 mg/dl.   She has been checking her blood sugars at least 9 -10 times a day for the last 60 days.  Noted extremely variable blood sugars.  Average blood sugars at around  mg/dL.  She got her log book for me to review.  No nausea or vomiting.  Still continues to have some increased urination and thirst.  Last eye examination was in April 2018 in no history of diabetic retinopathy. No tingling or numbness in her bilateral feet.  No ulcers and amputations of her feet.  No coronary artery disease, CK D, CVA per patient.  She is physically active and tries to exercise at least 3-4 times a week.  She is trying to follow diabetic diet as much as she can.  She wants to get a diabetic education.  On Ace inhibitor.  Never had DKA episodes.     Hypothyroidism    Currently on synthroid 88 mcg oral every other day, 100 mcg oral every other day.     Interval History      The following portions of the patient's history were reviewed and updated as appropriate: allergies, current medications, past family history, past medical history, past social history, past surgical history and problem list.    Past Medical  History:   Diagnosis Date   • Diabetes mellitus    • Hypertension    • Hypothyroidism      Family History   Problem Relation Age of Onset   • Heart disease Father    • Diabetes Sister    • Hypertension Mother      Social History     Social History   • Marital status:      Spouse name: N/A   • Number of children: N/A   • Years of education: N/A     Occupational History   • Not on file.     Social History Main Topics   • Smoking status: Never Smoker   • Smokeless tobacco: Never Used   • Alcohol use 1.2 oz/week     2 Glasses of wine per week      Comment: daily   • Drug use: No   • Sexual activity: Yes     Partners: Male     Other Topics Concern   • Not on file     Social History Narrative   • No narrative on file     Allergies   Allergen Reactions   • Aspirin      GI distress   • Epinephrine      Tachycardia       Current Outpatient Prescriptions:   •  ACCU-CHEK SOFTCLIX LANCETS lancets, USE TO TEST BLOOD SUGAR 4 TIMES DAILY, Disp: 200 each, Rfl: 5  •  B Complex Vitamins (VITAMIN B COMPLEX) capsule capsule, Take  by mouth daily., Disp: , Rfl:   •  benazepril (LOTENSIN) 5 MG tablet, Take 1 tablet by mouth Daily., Disp: 30 tablet, Rfl: 5  •  Biotin (BIOTIN MAXIMUM STRENGTH) 10 MG tablet, Take 1,000 mcg by mouth daily., Disp: , Rfl:   •  CALCIUM-VITAMIN D PO, Take  by mouth daily., Disp: , Rfl:   •  Chromium Picolinate 1000 MCG tablet, Take 1,000 mcg by mouth daily., Disp: , Rfl:   •  glucose blood (ACCU-CHEK COMPACT PLUS) test strip, USE TO TEST BLOOD SUGAR 4 TIMES DAILY, Disp: 200 each, Rfl: 5  •  insulin aspart (NOVOLOG FLEXPEN) 100 UNIT/ML solution pen-injector sc pen, Inject 0-5 Units under the skin 3 (Three) Times a Day With Meals., Disp: 5 pen, Rfl: 3  •  Insulin Glargine (LANTUS SOLOSTAR) 100 UNIT/ML injection pen, INJECT 10 UNITS UNDER THE SKIN IN THE AM AND 8 UNITS UNDER THE SKIN IN THE PM, Disp: 10 pen, Rfl: 3  •  Insulin Pen Needle (BD PEN NEEDLE RICHARD U/F) 32G X 4 MM misc, USE TO INJECT INSULIN, Disp:  "200 each, Rfl: 3  •  levothyroxine (SYNTHROID, LEVOTHROID) 100 MCG tablet, Take 1 tablet by mouth Daily., Disp: 30 tablet, Rfl: 11  •  atorvastatin (LIPITOR) 20 MG tablet, Take 1 tablet by mouth Daily., Disp: 30 tablet, Rfl: 11  •  omeprazole (priLOSEC) 40 MG capsule, TAKE ONE CAPSULE BY MOUTH DAILY, Disp: 90 capsule, Rfl: 1        Review of Systems   Constitutional: Positive for fatigue. Negative for fever.   HENT: Negative for facial swelling, nosebleeds, trouble swallowing and voice change.    Eyes: Positive for visual disturbance. Negative for pain and redness.   Respiratory: Negative for shortness of breath and wheezing.    Cardiovascular: Negative for palpitations and leg swelling.   Gastrointestinal: Negative for abdominal pain, diarrhea and vomiting.   Endocrine: Negative for polydipsia and polyuria.   Genitourinary: Negative for decreased urine volume and frequency.   Musculoskeletal: Negative for joint swelling and neck pain.   Skin: Negative for rash.   Allergic/Immunologic: Negative for immunocompromised state.   Neurological: Positive for dizziness. Negative for seizures and facial asymmetry.   Hematological: Bruises/bleeds easily.   Psychiatric/Behavioral: Negative for agitation and confusion.       Objective       Vitals:    04/19/18 1326   BP: 122/68   Pulse: 74   SpO2: 96%   Weight: 54.9 kg (121 lb)   Height: 162.6 cm (64\")     Body mass index is 20.77 kg/m².      Physical Exam   Gen exam - alert and oriented x 3, not in distress.   HEENT - Acanthosis nigricans. Thyroid palpable.   Resp - Clear to auscultation.   CVS - S1,S2 heard and no murmurs.   Abd - Non tender, BS heard.   Ext - No edema and intact pin prick and proprioception.     Results Review:     I reviewed the patient's new clinical results.    Medical records reviewed  Summary: done      Lab on 04/03/2018   Component Date Value Ref Range Status   • TSH 04/03/2018 5.780* 0.270 - 4.200 mIU/mL Final   • Free T4 04/03/2018 1.48  0.93 - 1.70 " ng/dL Final   • Glucose 04/03/2018 170* 65 - 99 mg/dL Final   • BUN 04/03/2018 22  8 - 23 mg/dL Final   • Creatinine 04/03/2018 0.77  0.57 - 1.00 mg/dL Final   • Sodium 04/03/2018 141  136 - 145 mmol/L Final   • Potassium 04/03/2018 4.2  3.5 - 5.2 mmol/L Final   • Chloride 04/03/2018 102  98 - 107 mmol/L Final   • CO2 04/03/2018 26.6  22.0 - 29.0 mmol/L Final   • Calcium 04/03/2018 9.6  8.8 - 10.5 mg/dL Final   • eGFR Non African Amer 04/03/2018 74  >60 mL/min/1.73 Final   • BUN/Creatinine Ratio 04/03/2018 28.6* 7.0 - 25.0 Final   • Anion Gap 04/03/2018 12.4  mmol/L Final   • Hemoglobin A1C 04/03/2018 9.30* 4.80 - 5.60 % Final   • Folate 04/03/2018 >20.00* 4.78 - 20.00 ng/mL Final   • Vitamin B-12 04/03/2018 637  232 - 1,245 pg/mL Final   • Total Cholesterol 04/03/2018 232* 0 - 200 mg/dL Final   • Triglycerides 04/03/2018 62  0 - 150 mg/dL Final   • HDL Cholesterol 04/03/2018 98* 40 - 60 mg/dL Final   • LDL Cholesterol  04/03/2018 122* 0 - 100 mg/dL Final   • VLDL Cholesterol 04/03/2018 12.4  7 - 27 mg/dL Final   • LDL/HDL Ratio 04/03/2018 1.24   Final   • Islet Cell Ab 04/04/2018 Negative  Neg:<1:1 Final   • RONNIE-65 04/06/2018 See below.  0.0 - 5.0 U/mL Final   • C-Peptide 04/04/2018 <0.1* 1.1 - 4.4 ng/mL Final   • Insulin AutoAb 04/08/2018 32* uU/mL Final     Lab Results   Component Value Date    HGBA1C 9.30 (H) 04/03/2018    HGBA1C 10.30 (H) 01/15/2018    HGBA1C 9.20 (H) 10/04/2017     Lab Results   Component Value Date    MICROALBUR 10.6 10/04/2017    CREATININE 0.77 04/03/2018     Imaging Results (most recent)     None                Assessment and Plan:    Olga was seen today for diabetes.    Diagnoses and all orders for this visit:    Diabetes mellitus type 1, uncontrolled, with complications  -     Basic Metabolic Panel; Future  -     Hemoglobin A1c; Future  -     Lipid Panel; Future  -     Ambulatory Referral to Diabetic Education    Acquired hypothyroidism  -     Basic Metabolic Panel; Future  -      Hemoglobin A1c; Future  -     Lipid Panel; Future  -     Ambulatory Referral to Diabetic Education  -     TSH; Future  -     T4, Free; Future    Other hyperlipidemia  -     Basic Metabolic Panel; Future  -     Hemoglobin A1c; Future  -     Lipid Panel; Future  -     Ambulatory Referral to Diabetic Education    Other orders  -     levothyroxine (SYNTHROID, LEVOTHROID) 100 MCG tablet; Take 1 tablet by mouth Daily.  -     atorvastatin (LIPITOR) 20 MG tablet; Take 1 tablet by mouth Daily.    Type 1 diabetes mellitus-newly diagnosed  Patient was probably could have PB.   Extensively discussed with the patient differences between type 1 diabetes and type 2 diabetes  Discussed with the patient about DKA its causes, prevention strategy and went to approach 8 hours  Will refer the patient to diabetic educator to talk more about diabetes, diabetic education, carbohydrate counting and also about insulin pump.  Will increase Lantus to 12 units in the morning and 8 units at bedtime  Will add NovoLog 4 units with each meal  Will continue low-dose sliding scale NovoLog 3 times a day before meals and at bedtime.    Hypothyroidism  Will change levothyroxin to 100 µg oral daily  Will repeat thyroid function tests in the next 6 weeks.    Hyperlipidemia  Will start patient on Lipitor 20 mg oral at bedtime.  LDL elevated.    Placed CGM on the pt for BG monitoring.   Explained the benefits of CGM to the pt.   CGM would help in adjusting pt's insulin regimen and prevent hyperglycemia and hypoglycemia episodes.   Pt would wear CGM for the next 2 weeks and based on the data downloaded we will adjust his DM medications.       Reviewed Lab results with the patient.       The total face to face time spent 40minutes with the patient,22minutes (greater than 50% of the total time) was spent counseling and coordination of the care on type 1 diabetes mellitus, its implications and treatment plan.

## 2018-04-20 LAB
ALBUMIN SERPL-MCNC: 4.1 G/DL (ref 3.5–5.2)
ALBUMIN/GLOB SERPL: 2.1 G/DL
ALP SERPL-CCNC: 70 U/L (ref 40–129)
ALT SERPL-CCNC: 65 U/L (ref 5–33)
AST SERPL-CCNC: 43 U/L (ref 5–32)
BASOPHILS # BLD AUTO: 0.04 10*3/MM3 (ref 0–0.2)
BASOPHILS NFR BLD AUTO: 1.2 % (ref 0–2)
BILIRUB SERPL-MCNC: 0.8 MG/DL (ref 0.2–1.2)
BUN SERPL-MCNC: 17 MG/DL (ref 8–23)
BUN/CREAT SERPL: 22.1 (ref 7–25)
CALCIUM SERPL-MCNC: 9.6 MG/DL (ref 8.8–10.5)
CHLORIDE SERPL-SCNC: 96 MMOL/L (ref 98–107)
CO2 SERPL-SCNC: 25.8 MMOL/L (ref 22–29)
CREAT SERPL-MCNC: 0.77 MG/DL (ref 0.57–1)
EOSINOPHIL # BLD AUTO: 0.11 10*3/MM3 (ref 0.1–0.3)
EOSINOPHIL NFR BLD AUTO: 3.2 % (ref 0–4)
ERYTHROCYTE [DISTWIDTH] IN BLOOD BY AUTOMATED COUNT: 12.3 % (ref 11.5–14.5)
GFR SERPLBLD CREATININE-BSD FMLA CKD-EPI: 74 ML/MIN/1.73
GFR SERPLBLD CREATININE-BSD FMLA CKD-EPI: 90 ML/MIN/1.73
GLOBULIN SER CALC-MCNC: 2 GM/DL
GLUCOSE SERPL-MCNC: 316 MG/DL (ref 65–99)
HBV CORE AB SERPL QL IA: NEGATIVE
HBV CORE IGM SERPL QL IA: NEGATIVE
HBV E AB SERPL QL IA: NEGATIVE
HBV E AG SERPL QL IA: NEGATIVE
HBV SURFACE AB SER QL: NON REACTIVE
HBV SURFACE AG SERPL QL IA: NEGATIVE
HCT VFR BLD AUTO: 40.4 % (ref 37–47)
HCV AB S/CO SERPL IA: <0.1 S/CO RATIO (ref 0–0.9)
HGB BLD-MCNC: 13.2 G/DL (ref 12–16)
IMM GRANULOCYTES # BLD: 0.01 10*3/MM3 (ref 0–0.03)
IMM GRANULOCYTES NFR BLD: 0.3 % (ref 0–0.5)
LABORATORY COMMENT REPORT: NORMAL
LYMPHOCYTES # BLD AUTO: 1.23 10*3/MM3 (ref 0.6–4.8)
LYMPHOCYTES NFR BLD AUTO: 36.1 % (ref 20–45)
MCH RBC QN AUTO: 32.8 PG (ref 27–31)
MCHC RBC AUTO-ENTMCNC: 32.7 G/DL (ref 31–37)
MCV RBC AUTO: 100.5 FL (ref 81–99)
MONOCYTES # BLD AUTO: 0.31 10*3/MM3 (ref 0–1)
MONOCYTES NFR BLD AUTO: 9.1 % (ref 3–8)
NEUTROPHILS # BLD AUTO: 1.71 10*3/MM3 (ref 1.5–8.3)
NEUTROPHILS NFR BLD AUTO: 50.1 % (ref 45–70)
NRBC BLD AUTO-RTO: 0 /100 WBC (ref 0–0)
PLATELET # BLD AUTO: 213 10*3/MM3 (ref 140–500)
POTASSIUM SERPL-SCNC: 4.3 MMOL/L (ref 3.5–5.2)
PROT SERPL-MCNC: 6.1 G/DL (ref 6–8.5)
RBC # BLD AUTO: 4.02 10*6/MM3 (ref 4.2–5.4)
SODIUM SERPL-SCNC: 136 MMOL/L (ref 136–145)
WBC # BLD AUTO: 3.41 10*3/MM3 (ref 4.8–10.8)

## 2018-04-25 ENCOUNTER — OFFICE VISIT (OUTPATIENT)
Dept: INTERNAL MEDICINE | Facility: CLINIC | Age: 73
End: 2018-04-25

## 2018-04-25 VITALS
WEIGHT: 122 LBS | HEIGHT: 64 IN | RESPIRATION RATE: 16 BRPM | BODY MASS INDEX: 20.83 KG/M2 | TEMPERATURE: 98.1 F | SYSTOLIC BLOOD PRESSURE: 126 MMHG | HEART RATE: 82 BPM | OXYGEN SATURATION: 98 % | DIASTOLIC BLOOD PRESSURE: 62 MMHG

## 2018-04-25 DIAGNOSIS — E03.9 ACQUIRED HYPOTHYROIDISM: ICD-10-CM

## 2018-04-25 DIAGNOSIS — R74.01 ELEVATED TRANSAMINASE LEVEL: ICD-10-CM

## 2018-04-25 DIAGNOSIS — E55.9 VITAMIN D DEFICIENCY: ICD-10-CM

## 2018-04-25 DIAGNOSIS — E10.9 TYPE 1 DIABETES MELLITUS WITHOUT COMPLICATION (HCC): Primary | ICD-10-CM

## 2018-04-25 DIAGNOSIS — K21.9 GASTROESOPHAGEAL REFLUX DISEASE WITHOUT ESOPHAGITIS: ICD-10-CM

## 2018-04-25 DIAGNOSIS — I10 ESSENTIAL HYPERTENSION: ICD-10-CM

## 2018-04-25 PROCEDURE — 99214 OFFICE O/P EST MOD 30 MIN: CPT | Performed by: FAMILY MEDICINE

## 2018-04-25 RX ORDER — BENAZEPRIL HYDROCHLORIDE AND HYDROCHLOROTHIAZIDE 20; 12.5 MG/1; MG/1
1 TABLET ORAL DAILY
COMMUNITY
End: 2018-06-08 | Stop reason: SDUPTHER

## 2018-04-25 NOTE — PROGRESS NOTES
Subjective     Olga Greco is a 72 y.o. female, who presents with a chief complaint of   Chief Complaint   Patient presents with   • Diabetes     Hypertension     Weight Loss     Hypothyroidism     Diabetes   Associated symptoms include weight loss.     1. Diabetes mellitus.  Pt seeing Dr. Rogers and taking Lantus and Novolog.  Denies hypoglycemia.    2. HTN.  Tolerating benazepril-hctz.  Home blood pressures at goal    3. Hypothyroidism.  Recent TSH was elevated and Dr. Rogers increased her levothyroxine to 100 mcg daily.    The following portions of the patient's history were reviewed and updated as appropriate: allergies, current medications, past family history, past medical history, past social history, past surgical history and problem list.    Allergies: Aspirin and Epinephrine    Review of Systems   Constitutional: Positive for weight loss.   HENT: Negative.    Eyes: Negative.    Respiratory: Negative.    Cardiovascular: Negative.    Gastrointestinal: Negative.    Endocrine: Negative.    Genitourinary: Negative.    Musculoskeletal: Negative.    Skin: Negative.    Allergic/Immunologic: Negative.    Neurological: Negative.    Hematological: Negative.    Psychiatric/Behavioral: Negative.      Objective     Wt Readings from Last 3 Encounters:   04/25/18 55.3 kg (122 lb)   04/19/18 54.9 kg (121 lb)   02/26/18 54 kg (119 lb)     Temp Readings from Last 3 Encounters:   04/25/18 98.1 °F (36.7 °C)   01/17/18 97.8 °F (36.6 °C)   07/13/17 97.7 °F (36.5 °C)     BP Readings from Last 3 Encounters:   04/25/18 126/62   04/19/18 122/68   02/26/18 110/66     Pulse Readings from Last 3 Encounters:   04/25/18 82   04/19/18 74   02/26/18 105     Body mass index is 20.93 kg/m².  SpO2 Readings from Last 3 Encounters:   01/17/18 99%   10/11/17 98%   07/13/17 98%       Physical Exam   Constitutional: She is oriented to person, place, and time. She appears well-developed and well-nourished.   HENT:   Head: Normocephalic and  atraumatic.   Mouth/Throat: Oropharynx is clear and moist.   Eyes: Conjunctivae and EOM are normal.   Neck: Neck supple. No thyromegaly present.   Cardiovascular: Normal rate, regular rhythm and normal heart sounds.    Pulmonary/Chest: Effort normal and breath sounds normal.   Abdominal: Soft. Bowel sounds are normal. There is no hepatosplenomegaly.   Musculoskeletal: Normal range of motion. She exhibits no edema.   Neurological: She is alert and oriented to person, place, and time.   Skin: Skin is warm and dry. No rash noted.   Psychiatric: She has a normal mood and affect. Her behavior is normal.   Nursing note and vitals reviewed.      Results for orders placed or performed in visit on 04/19/18   Comprehensive metabolic panel   Result Value Ref Range    Glucose 316 (H) 65 - 99 mg/dL    BUN 17 8 - 23 mg/dL    Creatinine 0.77 0.57 - 1.00 mg/dL    eGFR Non African Am 74 >60 mL/min/1.73    eGFR African Am 90 >60 mL/min/1.73    BUN/Creatinine Ratio 22.1 7.0 - 25.0    Sodium 136 136 - 145 mmol/L    Potassium 4.3 3.5 - 5.2 mmol/L    Chloride 96 (L) 98 - 107 mmol/L    Total CO2 25.8 22.0 - 29.0 mmol/L    Calcium 9.6 8.8 - 10.5 mg/dL    Total Protein 6.1 6.0 - 8.5 g/dL    Albumin 4.10 3.50 - 5.20 g/dL    Globulin 2.0 gm/dL    A/G Ratio 2.1 g/dL    Total Bilirubin 0.8 0.2 - 1.2 mg/dL    Alkaline Phosphatase 70 40 - 129 U/L    AST (SGOT) 43 (H) 5 - 32 U/L    ALT (SGPT) 65 (H) 5 - 33 U/L   Hepatitis B & C Profile   Result Value Ref Range    Hepatitis B Surface Ag Negative Negative    Hep B E Ag Negative Negative    Hep B Core IgM Negative Negative    Hep B Core Total Ab Negative Negative    Hep B E Ab Negative Negative    Hep B S Ab Non Reactive     Hepatitis C Ab <0.1 0.0 - 0.9 s/co ratio   CBC w AUTO Differential   Result Value Ref Range    WBC 3.41 (L) 4.80 - 10.80 10*3/mm3    RBC 4.02 (L) 4.20 - 5.40 10*6/mm3    Hemoglobin 13.2 12.0 - 16.0 g/dL    Hematocrit 40.4 37.0 - 47.0 %    .5 (H) 81.0 - 99.0 fL    MCH 32.8  (H) 27.0 - 31.0 pg    MCHC 32.7 31.0 - 37.0 g/dL    RDW 12.3 11.5 - 14.5 %    Platelets 213 140 - 500 10*3/mm3    Neutrophil Rel % 50.1 45.0 - 70.0 %    Lymphocyte Rel % 36.1 20.0 - 45.0 %    Monocyte Rel % 9.1 (H) 3.0 - 8.0 %    Eosinophil Rel % 3.2 0.0 - 4.0 %    Basophil Rel % 1.2 0.0 - 2.0 %    Neutrophils Absolute 1.71 1.50 - 8.30 10*3/mm3    Lymphocytes Absolute 1.23 0.60 - 4.80 10*3/mm3    Monocytes Absolute 0.31 0.00 - 1.00 10*3/mm3    Eosinophils Absolute 0.11 0.10 - 0.30 10*3/mm3    Basophils Absolute 0.04 0.00 - 0.20 10*3/mm3    Immature Granulocyte Rel % 0.3 0.0 - 0.5 %    Immature Grans Absolute 0.01 0.00 - 0.03 10*3/mm3    nRBC 0.0 0.0 - 0.0 /100 WBC   Comment:   Result Value Ref Range    Comment Comment      Assessment/Plan   Olga was seen today for diabetes.    Diagnoses and all orders for this visit:    Type 1 diabetes mellitus without complication    Essential hypertension    Acquired hypothyroidism    Vitamin D deficiency    Gastroesophageal reflux disease without esophagitis    Elevated transaminase level  -     US Liver; Future    1. DMI.  Dr. Rogers changed her diagnosis to type 1 diabetes.  She is on ACE-I in [facility], and statin.  Continue per Dr. Rogers.  Eye exam due in April.    2. HTN.  Controlled.  Continue benazepril-hctz.  Monitor home blood pressures.    3. Hypothyroidism.  On replacement.    4. Vitamin D deficiency.  Continue supplement.    5. GERD. She stopped omeprazole 2 weeks ago and is doing fine.  Will monitor.    6. Elevated transaminases.  Hep b, c.  Check liver u/s.      Outpatient Medications Prior to Visit   Medication Sig Dispense Refill   • ACCU-CHEK SOFTCLIX LANCETS lancets USE TO TEST BLOOD SUGAR 4 TIMES DAILY 200 each 5   • atorvastatin (LIPITOR) 20 MG tablet Take 1 tablet by mouth Daily. 30 tablet 11   • B Complex Vitamins (VITAMIN B COMPLEX) capsule capsule Take  by mouth daily.     • Biotin (BIOTIN MAXIMUM STRENGTH) 10 MG tablet Take 1,000 mcg by mouth daily.     •  CALCIUM-VITAMIN D PO Take  by mouth daily.     • Chromium Picolinate 1000 MCG tablet Take 1,000 mcg by mouth daily.     • glucose blood (ACCU-CHEK COMPACT PLUS) test strip USE TO TEST BLOOD SUGAR 4 TIMES DAILY 200 each 5   • insulin aspart (NOVOLOG FLEXPEN) 100 UNIT/ML solution pen-injector sc pen Inject 0-5 Units under the skin 3 (Three) Times a Day With Meals. 5 pen 3   • Insulin Glargine (LANTUS SOLOSTAR) 100 UNIT/ML injection pen INJECT 10 UNITS UNDER THE SKIN IN THE AM AND 8 UNITS UNDER THE SKIN IN THE PM 10 pen 3   • Insulin Pen Needle (BD PEN NEEDLE RICHARD U/F) 32G X 4 MM misc USE TO INJECT INSULIN 200 each 3   • levothyroxine (SYNTHROID, LEVOTHROID) 100 MCG tablet Take 1 tablet by mouth Daily. 30 tablet 11   • omeprazole (priLOSEC) 40 MG capsule TAKE ONE CAPSULE BY MOUTH DAILY 90 capsule 1   • benazepril (LOTENSIN) 5 MG tablet Take 1 tablet by mouth Daily. 30 tablet 5     No facility-administered medications prior to visit.      No orders of the defined types were placed in this encounter.    [unfilled]  Medications Discontinued During This Encounter   Medication Reason   • benazepril (LOTENSIN) 5 MG tablet          Return in about 6 months (around 10/25/2018).

## 2018-05-01 ENCOUNTER — HOSPITAL ENCOUNTER (OUTPATIENT)
Dept: ULTRASOUND IMAGING | Facility: HOSPITAL | Age: 73
Discharge: HOME | End: 2018-05-01
Attending: FAMILY MEDICINE | Admitting: FAMILY MEDICINE

## 2018-05-01 DIAGNOSIS — R74.01 ELEVATED TRANSAMINASE LEVEL: ICD-10-CM

## 2018-05-01 PROCEDURE — 76705 ECHO EXAM OF ABDOMEN: CPT

## 2018-05-02 ENCOUNTER — TELEPHONE (OUTPATIENT)
Dept: INTERNAL MEDICINE | Facility: CLINIC | Age: 73
End: 2018-05-02

## 2018-05-02 NOTE — TELEPHONE ENCOUNTER
LVM with results on personal mailbox. Advised patient to contact office with any questions or concerns.     ----- Message from Yair Zapata MD sent at 5/2/2018  1:18 PM EDT -----  Please call the patient regarding her result.  Liver ultrasound is normal.  We'll monitor her blood tests.

## 2018-05-09 DIAGNOSIS — Z79.4 TYPE 2 DIABETES MELLITUS WITHOUT COMPLICATION, WITH LONG-TERM CURRENT USE OF INSULIN (HCC): ICD-10-CM

## 2018-05-09 DIAGNOSIS — E11.9 TYPE 2 DIABETES MELLITUS WITHOUT COMPLICATION, WITH LONG-TERM CURRENT USE OF INSULIN (HCC): ICD-10-CM

## 2018-05-09 NOTE — TELEPHONE ENCOUNTER
Pen needles sent  To [facility] in  [place] at patient request    ----- Message from Makenna Edward sent at 5/9/2018  3:09 PM EDT -----  Contact: PATIENT  PATIENT IS OUT OF Insulin Pen Needle (BD PEN NEEDLE RICHARD U/F) 32G X 4 MM misc CALLED INTO [address] [facility] 53 - [phone]  - [phone]  [phone] (Phone)  [phone] (Fax)

## 2018-05-14 ENCOUNTER — TELEPHONE (OUTPATIENT)
Dept: ENDOCRINOLOGY | Age: 73
End: 2018-05-14

## 2018-05-14 ENCOUNTER — TREATMENT (OUTPATIENT)
Dept: ENDOCRINOLOGY | Age: 73
End: 2018-05-14

## 2018-05-14 DIAGNOSIS — E10.9 TYPE 1 DIABETES MELLITUS WITHOUT COMPLICATION (HCC): ICD-10-CM

## 2018-05-14 PROCEDURE — 95251 CONT GLUC MNTR ANALYSIS I&R: CPT | Performed by: INTERNAL MEDICINE

## 2018-05-14 NOTE — PROGRESS NOTES
Continues glucose monitoring data    Patient wore continuous glucose monitoring-free style amry Pro from April 19 - May 3rd   Average blood glucose reading was 210 mg/dl.   Estimated HbA1c 8.9%   Likelihood of low blood glucose levels low  Likelihood of high blood glucose levels high  Blood glucose trends showed high blood sugars between 8-11 AM and 5-8 PM    Current treatment regimen  Lantus 12 units in the morning and 8 units at bedtime  NovoLog 4 units with each meal  Low-dose sliding scale before meals and at bedtime.       Changes to the treatment regimen  Lantus 15 units in the morning and 12 units at bedtime  NovoLog 6 units with breakfast, 5 units with lunch and 6 units with dinner  Continue NovoLog low-dose sliding scale with each meal and at bedtime.  BG less than 150 - zero  151 - 200 - 1 unit  201 - 250 - 2 units  251 - 300 - 3 units  301 - 350 - 4 units  Above 350 mg/dl - 5 units.       Patient will use the current insulin regimen while we are waiting for the insulin pump approval  Will call the patient and make these changes.

## 2018-05-14 NOTE — TELEPHONE ENCOUNTER
SPOKE WITH PATIENT   PATIENT HAD QUESTION ON THE INSULIN PUMP SHE IS TRYING TO GET PATIENT STATED SHE HAS TALKED TO HER INSURANCE COMPANY. LET PATIENT KNOW THAT I WILL TALK THE [employer] ON Tuesday PATIENT VOICE UNDERSTANDING      ----- Message from Makenna Mccullough Rep sent at 5/11/2018 10:15 AM EDT -----  Contact: PATIENT  PATIENT IS REQUESTING A CALL BACK WHEN YOU HAVE A FEW MINUTES TO DISCUSS A PUMP AND METER, SHE WENT TO A CLASS YESTERDAY AND SHE WOULD LIKE TO DISCUSS WITH YOU HER FINDINGS.

## 2018-05-15 DIAGNOSIS — E11.9 TYPE 2 DIABETES MELLITUS WITHOUT COMPLICATION, WITH LONG-TERM CURRENT USE OF INSULIN (HCC): ICD-10-CM

## 2018-05-15 DIAGNOSIS — Z79.4 TYPE 2 DIABETES MELLITUS WITHOUT COMPLICATION, WITH LONG-TERM CURRENT USE OF INSULIN (HCC): ICD-10-CM

## 2018-05-16 ENCOUNTER — TELEPHONE (OUTPATIENT)
Dept: ENDOCRINOLOGY | Age: 73
End: 2018-05-16

## 2018-05-16 NOTE — TELEPHONE ENCOUNTER
Spoke with patient about the results from the [employer] CGM she wore and the medication change   Changes to the treatment regimen  Lantus 15 units in the morning and 12 units at bedtime  NovoLog 6 units with breakfast, 5 units with lunch and 6 units with dinner  Continue NovoLog low-dose sliding scale with each meal and at bedtime.  BG less than 150 - zero  151 - 200 - 1 unit  201 - 250 - 2 units  251 - 300 - 3 units  301 - 350 - 4 units  Above 350 mg/dl - 5 units.

## 2018-05-31 ENCOUNTER — RESULTS ENCOUNTER (OUTPATIENT)
Dept: ENDOCRINOLOGY | Age: 73
End: 2018-05-31

## 2018-05-31 ENCOUNTER — TELEPHONE (OUTPATIENT)
Dept: ENDOCRINOLOGY | Age: 73
End: 2018-05-31

## 2018-05-31 ENCOUNTER — LAB (OUTPATIENT)
Dept: LAB | Facility: HOSPITAL | Age: 73
End: 2018-05-31

## 2018-05-31 DIAGNOSIS — E78.49 OTHER HYPERLIPIDEMIA: ICD-10-CM

## 2018-05-31 DIAGNOSIS — E03.9 ACQUIRED HYPOTHYROIDISM: ICD-10-CM

## 2018-05-31 DIAGNOSIS — IMO0002 DIABETES MELLITUS TYPE 1, UNCONTROLLED, WITH COMPLICATIONS: ICD-10-CM

## 2018-05-31 LAB
ANION GAP SERPL CALCULATED.3IONS-SCNC: 9.5 MMOL/L
BASOPHILS # BLD AUTO: 0.03 10*3/MM3 (ref 0–0.2)
BASOPHILS NFR BLD AUTO: 0.7 % (ref 0–2)
BUN BLD-MCNC: 15 MG/DL (ref 8–23)
BUN/CREAT SERPL: 22.4 (ref 7–25)
CALCIUM SPEC-SCNC: 9.3 MG/DL (ref 8.8–10.5)
CHLORIDE SERPL-SCNC: 105 MMOL/L (ref 98–107)
CO2 SERPL-SCNC: 27.5 MMOL/L (ref 22–29)
CREAT BLD-MCNC: 0.67 MG/DL (ref 0.57–1)
DEPRECATED RDW RBC AUTO: 43.4 FL (ref 37–54)
EOSINOPHIL # BLD AUTO: 0.17 10*3/MM3 (ref 0.1–0.3)
EOSINOPHIL NFR BLD AUTO: 3.7 % (ref 0–4)
ERYTHROCYTE [DISTWIDTH] IN BLOOD BY AUTOMATED COUNT: 11.9 % (ref 11.5–14.5)
GFR SERPL CREATININE-BSD FRML MDRD: 87 ML/MIN/1.73
GLUCOSE BLD-MCNC: 104 MG/DL (ref 65–99)
HCT VFR BLD AUTO: 39.6 % (ref 37–47)
HGB BLD-MCNC: 13.1 G/DL (ref 12–16)
IMM GRANULOCYTES # BLD: 0.01 10*3/MM3 (ref 0–0.03)
IMM GRANULOCYTES NFR BLD: 0.2 % (ref 0–0.5)
LYMPHOCYTES # BLD AUTO: 1.85 10*3/MM3 (ref 0.6–4.8)
LYMPHOCYTES NFR BLD AUTO: 40.2 % (ref 20–45)
MCH RBC QN AUTO: 32.8 PG (ref 27–31)
MCHC RBC AUTO-ENTMCNC: 33.1 G/DL (ref 31–37)
MCV RBC AUTO: 99 FL (ref 81–99)
MONOCYTES # BLD AUTO: 0.31 10*3/MM3 (ref 0–1)
MONOCYTES NFR BLD AUTO: 6.7 % (ref 3–8)
NEUTROPHILS # BLD AUTO: 2.23 10*3/MM3 (ref 1.5–8.3)
NEUTROPHILS NFR BLD AUTO: 48.5 % (ref 45–70)
NRBC BLD MANUAL-RTO: 0 /100 WBC (ref 0–0)
PLATELET # BLD AUTO: 203 10*3/MM3 (ref 140–500)
PMV BLD AUTO: 9 FL (ref 7.4–10.4)
POTASSIUM BLD-SCNC: 4.1 MMOL/L (ref 3.5–5.2)
RBC # BLD AUTO: 4 10*6/MM3 (ref 4.2–5.4)
SODIUM BLD-SCNC: 142 MMOL/L (ref 136–145)
T4 FREE SERPL-MCNC: 1.66 NG/DL (ref 0.93–1.7)
TSH SERPL DL<=0.05 MIU/L-ACNC: 0.1 MIU/ML (ref 0.27–4.2)
WBC NRBC COR # BLD: 4.6 10*3/MM3 (ref 4.8–10.8)

## 2018-05-31 PROCEDURE — 84443 ASSAY THYROID STIM HORMONE: CPT | Performed by: INTERNAL MEDICINE

## 2018-05-31 PROCEDURE — 80048 BASIC METABOLIC PNL TOTAL CA: CPT

## 2018-05-31 PROCEDURE — 36415 COLL VENOUS BLD VENIPUNCTURE: CPT

## 2018-05-31 PROCEDURE — 84439 ASSAY OF FREE THYROXINE: CPT | Performed by: INTERNAL MEDICINE

## 2018-05-31 PROCEDURE — 85025 COMPLETE CBC W/AUTO DIFF WBC: CPT | Performed by: FAMILY MEDICINE

## 2018-05-31 RX ORDER — LEVOTHYROXINE SODIUM 88 UG/1
88 TABLET ORAL DAILY
Qty: 30 TABLET | Refills: 11 | Status: SHIPPED | OUTPATIENT
Start: 2018-05-31 | End: 2018-07-11 | Stop reason: SDUPTHER

## 2018-05-31 NOTE — TELEPHONE ENCOUNTER
----- Message from Makenna Mccullough Rep sent at 5/31/2018  9:01 AM EDT -----  Contact: PATIENT      ----- Message -----  From: Makenna Mccullough Rep  Sent: 5/31/2018   8:57 AM  To: Aziza Zacarias MA    PATIENT STATED THAT SHE DROPPED HER GLUCOSE METER; AND SHE NEEDS  A SCRIPT FOR A NEW ONE.      ACCU CHEK COMPACT PLUS.       [address] [facility] 53 - [phone] [facility] [phone]  [phone] (Phone)  [phone] (Fax)    SCRIPT SENT

## 2018-06-04 ENCOUNTER — TELEPHONE (OUTPATIENT)
Dept: ENDOCRINOLOGY | Age: 73
End: 2018-06-04

## 2018-06-04 RX ORDER — LANCETS
EACH MISCELLANEOUS
Qty: 200 EACH | Refills: 11 | Status: SHIPPED | OUTPATIENT
Start: 2018-06-04 | End: 2018-10-29

## 2018-06-04 RX ORDER — BLOOD-GLUCOSE METER
EACH MISCELLANEOUS
Qty: 1 KIT | Refills: 0 | Status: SHIPPED | OUTPATIENT
Start: 2018-06-04

## 2018-06-04 NOTE — TELEPHONE ENCOUNTER
Spoke with patient about insulin pump   Will get a hold of dexcom and [facility] medical

## 2018-06-08 RX ORDER — BENAZEPRIL HYDROCHLORIDE AND HYDROCHLOROTHIAZIDE 20; 12.5 MG/1; MG/1
TABLET ORAL
Qty: 90 TABLET | Refills: 1 | Status: ON HOLD | OUTPATIENT
Start: 2018-06-08 | End: 2018-10-21

## 2018-06-12 DIAGNOSIS — E11.9 TYPE 2 DIABETES MELLITUS WITHOUT COMPLICATION, WITHOUT LONG-TERM CURRENT USE OF INSULIN (HCC): ICD-10-CM

## 2018-06-12 NOTE — TELEPHONE ENCOUNTER
Spoke with patient about medication and dose [facility]      ----- Message from Makenna Mccullough sent at 6/12/2018  3:34 PM EDT -----  Contact: PATIENT   PATIENT STATED THAT SHE NEEDS A SCRIPT OF Insulin Glargine (LANTUS SOLOSTAR), PATIENT USES 15 UNITS IN THE AM. AND 12 UNITS IN THE PM, PATIENT IS LEAVING AT 5 IN THE MORNING FOR A TRIP AND WOULD LIKE TO PICK THE SCRIPT UP THIS EVENING.      [facility] [address] [facility] 53 - [phone]  - [phone]  [phone] (Phone)  [phone] (Fax)    PATIENT WILL BE OUT BE BEFORE SHE RETURNS FROM HER TRIP

## 2018-06-27 ENCOUNTER — TELEPHONE (OUTPATIENT)
Dept: ENDOCRINOLOGY | Age: 73
End: 2018-06-27

## 2018-06-27 NOTE — TELEPHONE ENCOUNTER
SPOKE WITH PATIENT LET PATIENT KNOW THAT WHEN SHE RECEIVE HER INSULIN PUMP TO CALL [employer] TO SET UP A TIME TO GET THE PUMP SETUP. PATIENT ALSO STATED THAT SHE HAS NEVER HEARD FROM [employer] LET PATIENT KNOW THAT I HAVE SENT MESSAGE TO [employer] AND WILL GET A GOOD NUMBER FOR HER TO CALL   PATIENT VOICE UNDERSTANDING        ----- Message from Makenna Mccullough sent at 6/27/2018 11:05 AM EDT -----  Contact: PATIENT    PATIENT STATED THAT SHE HAS BEEN APPROVED FOR THE PUMP AND SHE WANTED TO KNOW IF SHE IS TO GET WITH DR. JESUS TO GET THIS SET UP AND WHERE SHE GOES FROM HERE ONCE SHE RECEIVES THE PUMP    BEST # FOR [id number]    PATIENT HAS NOT HEARD ANYTHING ABOUT THE DEXCOM CGM SHE WOULD LIKE TO STATUS OF THIS IF YOU KNOW ANYTHING.  OR HOW SHE GOES ABOUT FINDING OUT IF THERE IS ANYTHING SHE NEEDS TO DO.  PATIENT STATED THIS IS MORE IMPORTANT TO HER THEN THE PUMP RIGHT NOW.

## 2018-06-28 ENCOUNTER — TELEPHONE (OUTPATIENT)
Dept: ENDOCRINOLOGY | Age: 73
End: 2018-06-28

## 2018-06-28 NOTE — TELEPHONE ENCOUNTER
[facility] MEDICAL FORM HAS BEEN FAXED BACK TO THEM     ----- Message from Makenna Edward sent at 6/27/2018  4:16 PM EDT -----  Contact: [facility] MEDICAL  [facility] MEDICAL [facility]PPLIES FAX TWICE AND IS RE FAXING A LETTER IN REGARDS TO PATIENT INSULIN PUMP SUPPLIES SO IT CAN BE SENT TO THE PATIENT. PLEASE BE ON THE LOOK OUT FOR IT AND FAX BACK AS SOON AS POSSIBLE.

## 2018-07-03 ENCOUNTER — TELEPHONE (OUTPATIENT)
Dept: ENDOCRINOLOGY | Age: 73
End: 2018-07-03

## 2018-07-10 ENCOUNTER — OFFICE VISIT (OUTPATIENT)
Dept: ENDOCRINOLOGY | Age: 73
End: 2018-07-10

## 2018-07-10 VITALS
OXYGEN SATURATION: 97 % | DIASTOLIC BLOOD PRESSURE: 74 MMHG | HEIGHT: 64 IN | BODY MASS INDEX: 21.51 KG/M2 | WEIGHT: 126 LBS | SYSTOLIC BLOOD PRESSURE: 128 MMHG | HEART RATE: 74 BPM

## 2018-07-10 DIAGNOSIS — IMO0002 DIABETES MELLITUS TYPE 1, UNCONTROLLED, WITH COMPLICATIONS: Primary | ICD-10-CM

## 2018-07-10 DIAGNOSIS — E03.9 HYPOTHYROIDISM, UNSPECIFIED TYPE: ICD-10-CM

## 2018-07-10 PROCEDURE — 99214 OFFICE O/P EST MOD 30 MIN: CPT | Performed by: INTERNAL MEDICINE

## 2018-07-10 NOTE — PROGRESS NOTES
72 y.o.    Patient Care Team:  Yair Zapata MD as PCP - General    Chief Complaint:    FOLLOW UP/ TYPE 1 DIABETES MELLITUS  Subjective     HPI    Olga Greco 72 y.o. WF presents with Type 1 dm as a follow up patient. Consulted by Dr. Zapata.      Type 1 dm - Diagnosed in 2003 was initially thought to be type 2 dm. Was started on oral agents initially. Insulin was started on April 24th 2017.    When she came to me in Feb 2018 she was oral hypoglycemic agents but due to her phenotype and family hx of type 1 dm, I checked her ab and they were positive.   Patient had low C-peptide levels, positive james 65, insulin autoantibody's.  Patient's oral hypoglycemics were stopped.  Pt got her 630 g approved but she is going on vacation for 3 weeks, we are waiting for her to get back from vacation before we get her started with pump.   Today in the clinic pt reports she is on lantus 10 units in the morning and 8 units at bed time. She changes the dose of lantus some times based on her BG levels.   Novolog ssi - low dose ssi tid ac - 1 unit for 50 above 150 mg/dl.   She has been checking her blood sugars at least 4 - 5  times a day.  Average blood sugars are around 90-1 80 mg/dL range.  She does have few blood sugars which are less than 60 mg/dL range approximately 59 has been her lowest.  Does have hypoglycemic awareness.  Highest blood sugar in the last 2-3 weeks was around 400 mg/dL range.  Improved increased urination and thirst.  Last eye examination was in April 2018, no history of diabetic retinopathy.  Denied history of tingling and numbness in her bilateral feet, no ulcers or akathisia as of her feet.  No history of CAD, CK D, CVA.  She is physically active and exercises at least 3-4 times a week.  She is trying to follow diabetic diet as much as she can and is learning how to do the carbohydrate counting.  On Ace inhibitor  Never had DKA episodes.    Hyperlipidemia  On Lipitor 20 mg oral  daily.       Hypothyroidism    On levothyroxine 88 µg oral daily.    Reviewed primary care physician's/consulting physician documentation and lab results :     Interval History      The following portions of the patient's history were reviewed and updated as appropriate: allergies, current medications, past family history, past medical history, past social history, past surgical history and problem list.    Past Medical History:   Diagnosis Date   • Diabetes mellitus (CMS/HCC)    • Hypertension    • Hypothyroidism      Family History   Problem Relation Age of Onset   • Heart disease Father    • Diabetes Sister    • Hypertension Mother      Social History     Social History   • Marital status:      Spouse name: N/A   • Number of children: N/A   • Years of education: N/A     Occupational History   • Not on file.     Social History Main Topics   • Smoking status: Never Smoker   • Smokeless tobacco: Never Used   • Alcohol use 1.2 oz/week     2 Glasses of wine per week      Comment: daily   • Drug use: No   • Sexual activity: Yes     Partners: Male     Other Topics Concern   • Not on file     Social History Narrative   • No narrative on file     Allergies   Allergen Reactions   • Aspirin      GI distress   • Epinephrine      Tachycardia       Current Outpatient Prescriptions:   •  ACCU-CHEK FASTCLIX LANCETS misc, Use to test blood sugar 4 times daily e 10.9, Disp: 200 each, Rfl: 11  •  ACCU-CHEK SOFTCLIX LANCETS lancets, USE TO TEST BLOOD SUGAR 4 TIMES DAILY, Disp: 200 each, Rfl: 5  •  atorvastatin (LIPITOR) 20 MG tablet, Take 1 tablet by mouth Daily., Disp: 30 tablet, Rfl: 11  •  B Complex Vitamins (VITAMIN B COMPLEX) capsule capsule, Take  by mouth daily., Disp: , Rfl:   •  benazepril-hydrochlorthiazide (LOTENSIN HCT) 20-12.5 MG per tablet, TAKE ONE TABLET BY MOUTH DAILY, Disp: 90 tablet, Rfl: 1  •  Biotin (BIOTIN MAXIMUM STRENGTH) 10 MG tablet, Take 1,000 mcg by mouth daily., Disp: , Rfl:   •  Blood Glucose  Monitoring Suppl (ACCU-CHEK COMPACT CARE KIT) kit, 1 kit 4 (Four) Times a Day. ICD CODE E10.9, Disp: 1 each, Rfl: 1  •  Blood Glucose Monitoring Suppl (ACCU-CHEK RICHARD SMARTVIEW) w/Device kit, Use to check blood sugar 4 times daily  E10.9, Disp: 1 kit, Rfl: 0  •  CALCIUM-VITAMIN D PO, Take  by mouth daily., Disp: , Rfl:   •  Chromium Picolinate 1000 MCG tablet, Take 1,000 mcg by mouth daily., Disp: , Rfl:   •  glucose blood (ACCU-CHEK COMPACT PLUS) test strip, USE TO TEST BLOOD SUGAR 4 TIMES DAILY, Disp: 200 each, Rfl: 5  •  glucose blood (ACCU-CHEK SMARTVIEW) test strip, Use to test blood sugar 4 times daily E10.9, Disp: 200 each, Rfl: 11  •  insulin aspart (NOVOLOG FLEXPEN) 100 UNIT/ML solution pen-injector sc pen, Inject 0-5 Units under the skin 3 (Three) Times a Day With Meals., Disp: 5 pen, Rfl: 3  •  Insulin Glargine (LANTUS SOLOSTAR) 100 UNIT/ML injection pen, INJECT 15 UNITS UNDER THE SKIN IN THE AM AND 12 UNITS UNDER THE SKIN IN THE PM, Disp: 10 pen, Rfl: 3  •  Insulin Pen Needle (BD PEN NEEDLE RICHARD U/F) 32G X 4 MM misc, USE TO INJECT INSULIN, Disp: 400 each, Rfl: 3  •  levothyroxine (SYNTHROID, LEVOTHROID) 88 MCG tablet, Take 1 tablet by mouth Daily., Disp: 30 tablet, Rfl: 11  •  omeprazole (priLOSEC) 40 MG capsule, TAKE ONE CAPSULE BY MOUTH DAILY, Disp: 90 capsule, Rfl: 1        Review of Systems   Constitutional: Negative for appetite change, fatigue and fever.   Eyes: Negative for visual disturbance.   Respiratory: Positive for shortness of breath.    Cardiovascular: Negative for palpitations and leg swelling.   Gastrointestinal: Negative for abdominal pain and vomiting.   Endocrine: Negative for polydipsia and polyuria.   Musculoskeletal: Negative for joint swelling and neck pain.   Skin: Negative for rash.   Neurological: Negative for weakness and numbness.   Psychiatric/Behavioral: Negative for behavioral problems.       Objective       Vitals:    07/10/18 1245   BP: 128/74   Pulse: 74   SpO2: 97%  "  Weight: 57.2 kg (126 lb)   Height: 162.6 cm (64\")     Body mass index is 21.63 kg/m².      Physical Exam   Constitutional: She is oriented to person, place, and time. She appears well-nourished.   HENT:   Head: Normocephalic and atraumatic.   Eyes: Conjunctivae and EOM are normal. No scleral icterus.   Neck: Normal range of motion. Neck supple. No thyromegaly present.   Cardiovascular: Normal rate and normal heart sounds.  Exam reveals no friction rub.    No murmur heard.  Pulmonary/Chest: Effort normal and breath sounds normal. No stridor. She has no wheezes. She has no rales.   Abdominal: Soft. Bowel sounds are normal. She exhibits no distension. There is no tenderness.   No lipodystrophy   Musculoskeletal: She exhibits no edema or tenderness.   Lymphadenopathy:     She has no cervical adenopathy.   Neurological: She is alert and oriented to person, place, and time.   Hammer toes   Skin: Skin is warm and dry. She is not diaphoretic.   Psychiatric: She has a normal mood and affect.   Vitals reviewed.    Results Review:     I reviewed the patient's new clinical results and mentioned them above in HPI and in plan as well.    Medical records reviewed  Summary: done      Results Encounter on 05/31/2018   Component Date Value Ref Range Status   • TSH 05/31/2018 0.095* 0.270 - 4.200 mIU/mL Final   • Free T4 05/31/2018 1.66  0.93 - 1.70 ng/dL Final     Lab Results   Component Value Date    HGBA1C 9.30 (H) 04/03/2018    HGBA1C 10.30 (H) 01/15/2018    HGBA1C 9.20 (H) 10/04/2017     Lab Results   Component Value Date    MICROALBUR 10.6 10/04/2017    CREATININE 0.67 05/31/2018     Imaging Results (most recent)     None                Assessment and Plan:    Olga was seen today for diabetes.    Diagnoses and all orders for this visit:    Diabetes mellitus type 1, uncontrolled, with complications (CMS/[facility])  -     Lipid Panel  -     Basic Metabolic Panel; Future  -     Hemoglobin A1c; Future  -     Lipid Panel; Future  -     " "TSH; Future  -     Vitamin B12 & Folate; Future  -     Hemoglobin A1c  -     TSH    Hypothyroidism, unspecified type  -     Lipid Panel  -     Basic Metabolic Panel; Future  -     Hemoglobin A1c; Future  -     Lipid Panel; Future  -     TSH; Future  -     Vitamin B12 & Folate; Future  -     Hemoglobin A1c  -     TSH      Type 2 diabetes mellitus-uncontrolled  Will check HbA1c  Continue Lantus 10 units in the morning, 8 units at bedtime.  Advised the patient to take Lantus 8 units of her blood sugar is about 1 50 mg/dL  To give 45 units of Lantus if blood sugar is less than 1 50 mg/dL at bedtime.  Next  Will start patient on NovoLog 4 units with each meal  Continue NovoLog sliding scale with each meal and 1 unit for 50 about 1 50 mg/dL.      Patient has a habit of having a snack at around 4 PM, advised the patient to count carbohydrates at that time and to have at least 2 hours gap with between her insulin administrations    Patient will be started on insulin pump after she comes back from the medication.  Due to her insurance a sensor was not approved via currently working on getting her dexcom sensor for the patient.    Hyperlipidemia  Continue Lipitor 20 mg oral daily    Hypothyroidism  Continue levothyroxine 88 pg oral daily  Will check TSH levels.    Reviewed Lab results with the patient.             Angi Rogers MD  07/10/18    EMR Dragon / transcription disclaimer:     \"Dictated utilizing Dragon dictation\".  "

## 2018-07-11 LAB
CHOLEST SERPL-MCNC: 156 MG/DL (ref 0–200)
HBA1C MFR BLD: 7.11 % (ref 4.8–5.6)
HDLC SERPL-MCNC: 109 MG/DL (ref 40–60)
INTERPRETATION: NORMAL
LDLC SERPL CALC-MCNC: 38 MG/DL (ref 0–100)
Lab: NORMAL
TRIGL SERPL-MCNC: 44 MG/DL (ref 0–150)
TSH SERPL DL<=0.005 MIU/L-ACNC: 0.2 MIU/ML (ref 0.27–4.2)
VLDLC SERPL CALC-MCNC: 8.8 MG/DL (ref 5–40)

## 2018-07-11 RX ORDER — LEVOTHYROXINE SODIUM 0.07 MG/1
75 TABLET ORAL DAILY
Qty: 30 TABLET | Refills: 11 | Status: SHIPPED | OUTPATIENT
Start: 2018-07-11 | End: 2019-05-21

## 2018-07-18 ENCOUNTER — RESULTS ENCOUNTER (OUTPATIENT)
Dept: ENDOCRINOLOGY | Age: 73
End: 2018-07-18

## 2018-07-18 DIAGNOSIS — E03.9 ACQUIRED HYPOTHYROIDISM: ICD-10-CM

## 2018-07-18 DIAGNOSIS — IMO0002 DIABETES MELLITUS TYPE 1, UNCONTROLLED, WITH COMPLICATIONS: ICD-10-CM

## 2018-07-18 DIAGNOSIS — E78.49 OTHER HYPERLIPIDEMIA: ICD-10-CM

## 2018-07-27 ENCOUNTER — TELEPHONE (OUTPATIENT)
Dept: ENDOCRINOLOGY | Age: 73
End: 2018-07-27

## 2018-07-27 NOTE — TELEPHONE ENCOUNTER
SPOKE WITH PATIENT TO LET HER KNOW THAT I HAVE CONTACTED [employer] TO SEE WHAT WE ARE NEEDING TO GET HER A DEXCOM      ----- Message from Lena Lopez sent at 7/25/2018 12:18 PM EDT -----  Contact: PATIENT   MESSAGE FROM THE PATIENT   MESSAGE  : PATIENT HAS CALLED IN REGARDS TO HER DEXCOM AND IF OUR OFFICE HAS SENT OFF FOR THE PAPER WORK TO THEM. PATIENT IS REQUESTING A CALL BACK IN REGARDS TO THIS   CALL BACK NUMBER  [phone]

## 2018-08-06 ENCOUNTER — TELEPHONE (OUTPATIENT)
Dept: ENDOCRINOLOGY | Age: 73
End: 2018-08-06

## 2018-08-06 NOTE — TELEPHONE ENCOUNTER
Spoke with patient      ----- Message from Makenna Mccullough sent at 8/3/2018  9:06 AM EDT -----  Contact: PATIENT  PATIENT IS TAKING THE PUMP CLASS WED. 8/8/18 AND SHE WOULD LIKE A VIAL OF NOVOLOG TO BE SENT [facility]     [facility] [facility] 53 - [phone]  - [phone] (Phone)  [phone] (Fax)    PATIENT ALSO WANTED TO SEE IF YOU HAVE SENT EVERYTHING IN FOR HER DEXCOM CGM.  PATIENT STATED THIS HAS BEEN GOING ON FOR 4 MONTHS NOW. [employer] IS INDICATING TO PATIENT THEY HAVE NOT RECEIVED EVERYTHING.     BEST # FOR PATIENT [phone]

## 2018-08-08 RX ORDER — PERPHENAZINE 16 MG/1
TABLET, FILM COATED ORAL
Qty: 300 EACH | Refills: 12 | Status: SHIPPED | OUTPATIENT
Start: 2018-08-08 | End: 2018-10-23 | Stop reason: HOSPADM

## 2018-08-28 ENCOUNTER — RESULTS ENCOUNTER (OUTPATIENT)
Dept: ENDOCRINOLOGY | Age: 73
End: 2018-08-28

## 2018-08-28 DIAGNOSIS — E03.9 HYPOTHYROIDISM, UNSPECIFIED TYPE: ICD-10-CM

## 2018-08-28 DIAGNOSIS — IMO0002 DIABETES MELLITUS TYPE 1, UNCONTROLLED, WITH COMPLICATIONS: ICD-10-CM

## 2018-09-05 ENCOUNTER — APPOINTMENT (OUTPATIENT)
Dept: LAB | Facility: HOSPITAL | Age: 73
End: 2018-09-05

## 2018-09-05 LAB
ANION GAP SERPL CALCULATED.3IONS-SCNC: 11.5 MMOL/L
BUN BLD-MCNC: 16 MG/DL (ref 8–23)
BUN/CREAT SERPL: 22.2 (ref 7–25)
CALCIUM SPEC-SCNC: 9 MG/DL (ref 8.8–10.5)
CHLORIDE SERPL-SCNC: 102 MMOL/L (ref 98–107)
CHOLEST SERPL-MCNC: 138 MG/DL (ref 0–200)
CO2 SERPL-SCNC: 27.5 MMOL/L (ref 22–29)
CREAT BLD-MCNC: 0.72 MG/DL (ref 0.57–1)
FOLATE SERPL-MCNC: >20 NG/ML (ref 4.78–20)
GFR SERPL CREATININE-BSD FRML MDRD: 80 ML/MIN/1.73
GLUCOSE BLD-MCNC: 153 MG/DL (ref 65–99)
HBA1C MFR BLD: 6.9 % (ref 4.8–5.6)
HDLC SERPL-MCNC: 106 MG/DL (ref 40–60)
LDLC SERPL CALC-MCNC: 25 MG/DL (ref 0–100)
LDLC/HDLC SERPL: 0.23 {RATIO}
POTASSIUM BLD-SCNC: 4.3 MMOL/L (ref 3.5–5.2)
SODIUM BLD-SCNC: 141 MMOL/L (ref 136–145)
TRIGL SERPL-MCNC: 37 MG/DL (ref 0–150)
TSH SERPL DL<=0.05 MIU/L-ACNC: 0.41 MIU/ML (ref 0.27–4.2)
VIT B12 BLD-MCNC: 553 PG/ML (ref 232–1245)
VLDLC SERPL-MCNC: 7.4 MG/DL (ref 7–27)

## 2018-09-05 PROCEDURE — 82607 VITAMIN B-12: CPT | Performed by: INTERNAL MEDICINE

## 2018-09-05 PROCEDURE — 80061 LIPID PANEL: CPT | Performed by: INTERNAL MEDICINE

## 2018-09-05 PROCEDURE — 83036 HEMOGLOBIN GLYCOSYLATED A1C: CPT | Performed by: INTERNAL MEDICINE

## 2018-09-05 PROCEDURE — 82746 ASSAY OF FOLIC ACID SERUM: CPT | Performed by: INTERNAL MEDICINE

## 2018-09-05 PROCEDURE — 80048 BASIC METABOLIC PNL TOTAL CA: CPT | Performed by: INTERNAL MEDICINE

## 2018-09-05 PROCEDURE — 36415 COLL VENOUS BLD VENIPUNCTURE: CPT | Performed by: INTERNAL MEDICINE

## 2018-09-05 PROCEDURE — 84443 ASSAY THYROID STIM HORMONE: CPT | Performed by: INTERNAL MEDICINE

## 2018-09-13 ENCOUNTER — TELEPHONE (OUTPATIENT)
Dept: ENDOCRINOLOGY | Age: 73
End: 2018-09-13

## 2018-09-13 NOTE — TELEPHONE ENCOUNTER
Patient called the office wanting to get an update on dexcom  Spoke with  [employer]com rep. They are waiting for the insurance approval   Patient was notifed with the update

## 2018-09-18 ENCOUNTER — OFFICE VISIT (OUTPATIENT)
Dept: ENDOCRINOLOGY | Age: 73
End: 2018-09-18

## 2018-09-18 VITALS
OXYGEN SATURATION: 97 % | BODY MASS INDEX: 21.51 KG/M2 | WEIGHT: 126 LBS | HEIGHT: 64 IN | DIASTOLIC BLOOD PRESSURE: 64 MMHG | SYSTOLIC BLOOD PRESSURE: 122 MMHG | HEART RATE: 74 BPM

## 2018-09-18 DIAGNOSIS — E10.9 TYPE 1 DIABETES MELLITUS WITHOUT COMPLICATION (HCC): Primary | ICD-10-CM

## 2018-09-18 DIAGNOSIS — E03.9 HYPOTHYROIDISM, UNSPECIFIED TYPE: ICD-10-CM

## 2018-09-18 DIAGNOSIS — E78.2 MIXED HYPERLIPIDEMIA: ICD-10-CM

## 2018-09-18 PROCEDURE — 95250 CONT GLUC MNTR PHYS/QHP EQP: CPT | Performed by: INTERNAL MEDICINE

## 2018-09-18 PROCEDURE — 99214 OFFICE O/P EST MOD 30 MIN: CPT | Performed by: INTERNAL MEDICINE

## 2018-09-18 NOTE — PROGRESS NOTES
72 y.o.    Patient Care Team:  Yair Zapata MD as PCP - General    Chief Complaint:    10 WEEK FOLLOW UP/ TYPE 1 DIABETES MELLITUS  Subjective     HPI    Olga Greco 72 y.o. WF presents with Type 1 dm as a follow up patient. Consulted by Dr. Zapata.      Type 1 dm - Diagnosed in 2003 was initially thought to be type 2 dm. Was started on oral agents initially. Insulin was started on April 24th 2017.    When she came to me in Feb 2018 she was oral hypoglycemic agents but due to her phenotype and family hx of type 1 dm, I checked her ab and they were positive.   Patient had low C-peptide levels, positive james 65, insulin autoantibody's.  Patient's oral hypoglycemics were stopped.   Pt has been on insulin pump for about 5 - 6 weeks now.   She does report that she is comfortable managing the pump.   She she has been checking her blood sugars at least 10 times a day.  She does have variable blood sugars.  Most of her blood sugars are between 90-1 80 mg/dL.  She does have few blood sugars which are less than 60 in the last 1 month.  Does have hypoglycemic awareness.  Highest blood sugar in the last 1 month was around to 50 mg/dL.  Up-to-date with her eye examination, last eye examination was in April 2018, no history of diabetic retinopathy.  Denied history of diabetic neuropathy.  No history of coronary artery disease, CK D, CVA.  She is physically active and exercises at least 3-4 times a week.  She is following diabetic diet for most part and is comfortable counting the carbohydrates.  On Ace inhibitor and did not have DKA episodes.    We are still working to get the dexcom approved for the patient.     Hyperlipidemia  On Lipitor 20 mg oral daily.     Hypothyroidism    On levothyroxine 75 µg oral daily.    Reviewed primary care physician's/consulting physician documentation and lab results :     Interval History      The following portions of the patient's history were reviewed and updated as  appropriate: allergies, current medications, past family history, past medical history, past social history, past surgical history and problem list.    Past Medical History:   Diagnosis Date   • Diabetes mellitus (CMS/HCC)    • Hypertension    • Hypothyroidism      Family History   Problem Relation Age of Onset   • Heart disease Father    • Diabetes Sister    • Hypertension Mother      Social History     Social History   • Marital status:      Spouse name: N/A   • Number of children: N/A   • Years of education: N/A     Occupational History   • Not on file.     Social History Main Topics   • Smoking status: Never Smoker   • Smokeless tobacco: Never Used   • Alcohol use 1.2 oz/week     2 Glasses of wine per week      Comment: daily   • Drug use: No   • Sexual activity: Yes     Partners: Male     Other Topics Concern   • Not on file     Social History Narrative   • No narrative on file     Allergies   Allergen Reactions   • Aspirin      GI distress   • Epinephrine      Tachycardia       Current Outpatient Prescriptions:   •  atorvastatin (LIPITOR) 20 MG tablet, Take 1 tablet by mouth Daily., Disp: 30 tablet, Rfl: 11  •  B Complex Vitamins (VITAMIN B COMPLEX) capsule capsule, Take  by mouth daily., Disp: , Rfl:   •  benazepril-hydrochlorthiazide (LOTENSIN HCT) 20-12.5 MG per tablet, TAKE ONE TABLET BY MOUTH DAILY, Disp: 90 tablet, Rfl: 1  •  Biotin (BIOTIN MAXIMUM STRENGTH) 10 MG tablet, Take 1,000 mcg by mouth daily., Disp: , Rfl:   •  CALCIUM-VITAMIN D PO, Take  by mouth daily., Disp: , Rfl:   •  Chromium Picolinate 1000 MCG tablet, Take 1,000 mcg by mouth daily., Disp: , Rfl:   •  CONTOUR NEXT TEST test strip, Use to test blood sugar 7 times daily (Patient taking differently: Use to test blood sugar 10 times daily), Disp: 300 each, Rfl: 12  •  glucagon (GLUCAGON EMERGENCY) 1 MG injection, Inject 1 mg under the skin into the appropriate area as directed 1 (One) Time As Needed for Low Blood Sugar for up to 1  "dose., Disp: 1 kit, Rfl: 12  •  insulin aspart (NOVOLOG) 100 UNIT/ML injection, 50 units daily VIA insulin Pump, Disp: 20 mL, Rfl: 6  •  Insulin Pen Needle (BD PEN NEEDLE RICHARD U/F) 32G X 4 MM misc, USE TO INJECT INSULIN, Disp: 400 each, Rfl: 3  •  levothyroxine (SYNTHROID, LEVOTHROID) 75 MCG tablet, Take 1 tablet by mouth Daily., Disp: 30 tablet, Rfl: 11  •  omeprazole (priLOSEC) 40 MG capsule, TAKE ONE CAPSULE BY MOUTH DAILY, Disp: 90 capsule, Rfl: 1  •  ACCU-CHEK FASTCLIX LANCETS misc, Use to test blood sugar 4 times daily e 10.9, Disp: 200 each, Rfl: 11  •  ACCU-CHEK SOFTCLIX LANCETS lancets, USE TO TEST BLOOD SUGAR 4 TIMES DAILY, Disp: 200 each, Rfl: 5  •  Blood Glucose Monitoring Suppl (ACCU-CHEK COMPACT CARE KIT) kit, 1 kit 4 (Four) Times a Day. ICD CODE E10.9, Disp: 1 each, Rfl: 1  •  Blood Glucose Monitoring Suppl (ACCU-CHEK RICHARD SMARTVIEW) w/Device kit, Use to check blood sugar 4 times daily  E10.9, Disp: 1 kit, Rfl: 0  •  Insulin Glargine (LANTUS SOLOSTAR) 100 UNIT/ML injection pen, INJECT 15 UNITS UNDER THE SKIN IN THE AM AND 12 UNITS UNDER THE SKIN IN THE PM, Disp: 10 pen, Rfl: 3        Review of Systems   Constitutional: Positive for appetite change and fatigue. Negative for fever.   Eyes: Negative for visual disturbance.   Respiratory: Positive for shortness of breath.    Cardiovascular: Positive for palpitations. Negative for leg swelling.   Gastrointestinal: Negative for abdominal pain and vomiting.   Endocrine: Negative for polydipsia and polyuria.   Musculoskeletal: Negative for joint swelling and neck pain.   Skin: Negative for rash.   Neurological: Negative for weakness and numbness.   Psychiatric/Behavioral: Negative for behavioral problems.       Objective       Vitals:    09/18/18 1357   BP: 122/64   Pulse: 74   SpO2: 97%   Weight: 57.2 kg (126 lb)   Height: 162.6 cm (64\")     Body mass index is 21.63 kg/m².      Physical Exam   Constitutional: She is oriented to person, place, and time. She " appears well-nourished.   HENT:   Head: Normocephalic and atraumatic.   Eyes: Conjunctivae and EOM are normal. No scleral icterus.   Neck: Normal range of motion. Neck supple. No thyromegaly present.   Cardiovascular: Normal rate and normal heart sounds.  Exam reveals no friction rub.    No murmur heard.  Pulmonary/Chest: Effort normal and breath sounds normal. No stridor. She has no wheezes. She has no rales.   Abdominal: Soft. Bowel sounds are normal. She exhibits no distension. There is no tenderness.   Musculoskeletal: She exhibits no edema or tenderness.   Lymphadenopathy:     She has no cervical adenopathy.   Neurological: She is alert and oriented to person, place, and time.   Skin: Skin is warm and dry. She is not diaphoretic.   Psychiatric: She has a normal mood and affect.   Vitals reviewed.    Results Review:     I reviewed the patient's new clinical results and mentioned them above in HPI and in plan as well.    Medical records reviewed  Summary: done      Results Encounter on 08/28/2018   Component Date Value Ref Range Status   • Glucose 09/05/2018 153* 65 - 99 mg/dL Final   • BUN 09/05/2018 16  8 - 23 mg/dL Final   • Creatinine 09/05/2018 0.72  0.57 - 1.00 mg/dL Final   • Sodium 09/05/2018 141  136 - 145 mmol/L Final   • Potassium 09/05/2018 4.3  3.5 - 5.2 mmol/L Final   • Chloride 09/05/2018 102  98 - 107 mmol/L Final   • CO2 09/05/2018 27.5  22.0 - 29.0 mmol/L Final   • Calcium 09/05/2018 9.0  8.8 - 10.5 mg/dL Final   • eGFR Non African Amer 09/05/2018 80  >60 mL/min/1.73 Final   • BUN/Creatinine Ratio 09/05/2018 22.2  7.0 - 25.0 Final   • Anion Gap 09/05/2018 11.5  mmol/L Final   • Hemoglobin A1C 09/05/2018 6.90* 4.80 - 5.60 % Final   • Total Cholesterol 09/05/2018 138  0 - 200 mg/dL Final   • Triglycerides 09/05/2018 37  0 - 150 mg/dL Final   • HDL Cholesterol 09/05/2018 106* 40 - 60 mg/dL Final   • LDL Cholesterol  09/05/2018 25  0 - 100 mg/dL Final   • VLDL Cholesterol 09/05/2018 7.4  7 - 27  mg/dL Final   • LDL/HDL Ratio 09/05/2018 0.23   Final   • TSH 09/05/2018 0.405  0.270 - 4.200 mIU/mL Final   • Folate 09/05/2018 >20.00* 4.78 - 20.00 ng/mL Final   • Vitamin B-12 09/05/2018 553  232-1,245 pg/mL Final     Lab Results   Component Value Date    HGBA1C 6.90 (H) 09/05/2018    HGBA1C 7.11 (H) 07/10/2018    HGBA1C 9.30 (H) 04/03/2018     Lab Results   Component Value Date    MICROALBUR 10.6 10/04/2017    CREATININE 0.72 09/05/2018     Imaging Results (most recent)     None                Assessment and Plan:    Olga was seen today for diabetes.    Diagnoses and all orders for this visit:    Type 1 diabetes mellitus without complication (CMS/[facility])  -     Hemoglobin A1c; Future  -     Basic Metabolic Panel; Future  -     Lipid Panel; Future  -     TSH; Future  -     Vitamin B12 & Folate; Future    Hypothyroidism, unspecified type  -     Hemoglobin A1c; Future  -     Basic Metabolic Panel; Future  -     Lipid Panel; Future  -     TSH; Future  -     Vitamin B12 & Folate; Future    Mixed hyperlipidemia  -     Hemoglobin A1c; Future  -     Basic Metabolic Panel; Future  -     Lipid Panel; Future  -     TSH; Future  -     Vitamin B12 & Folate; Future      Type 1 diabetes mellitus-uncontrolled  Will contact the dexcom agents tomorrow to discuss the reason for the daily for the approval of her sensor.  Made changes to her basal rate at 12 AM.  Also made changes to her bolus rates for breakfast.  Would consider increasing the bolus rate for her lunch and dinner during her next visit.    Hypothyroidism  Thyroid levels are stable  Continue levothyroxine 75 µg oral daily.    Hyperlipidemia  Continue Lipitor 20 mg oral daily.    Due to the concern of hypoglycemia in the patient placed I pro on her to further assess her blood glucose trends on the pump changes that have been done today.  Will assess the sensor information in the next 3-4 days.      Reviewed Lab results with the patient.             Angi Rogers  "MD  09/18/18    EM[facility] Dragon / transcription disclaimer:     \"Dictated utilizing Dragon dictation\".  "

## 2018-09-21 ENCOUNTER — DOCUMENTATION (OUTPATIENT)
Dept: ENDOCRINOLOGY | Age: 73
End: 2018-09-21

## 2018-09-21 NOTE — PROGRESS NOTES
To whom so ever it may concern    Olga WANG ,73 y.o. WF is suffering with type 1 diabetes mellitus, she is currently being managed with [employer] insulin pump.  She does have history of low blood sugar episodes and hyperglycemic episodes which is the nature of the type of diabetes that she has.  She also has some decrease in hypoglycemic awareness.  Given the type of diabetes that the patient has-type 1 diabetes, patient's age she would definitely benefit with a continuous glucose monitoring that would assist in the management of her insulin pump settings and also the management of diabetes.     Please call me if there are any other questions.     Thanks.

## 2018-10-09 ENCOUNTER — TREATMENT (OUTPATIENT)
Dept: ENDOCRINOLOGY | Age: 73
End: 2018-10-09

## 2018-10-09 DIAGNOSIS — E10.9 TYPE 1 DIABETES MELLITUS WITHOUT COMPLICATION (HCC): ICD-10-CM

## 2018-10-09 PROCEDURE — 95251 CONT GLUC MNTR ANALYSIS I&R: CPT | Performed by: INTERNAL MEDICINE

## 2018-10-09 NOTE — PROGRESS NOTES
Continues glucose monitoring data    Patient wore continuous glucose monitoring- I pro for about 3 days  Average blood glucose reading was  Estimated HbA1c 5.8%   Likelihood of low blood glucose levels was mild  Likelihood of high blood glucose levels was high after meals  Blood glucose trends showed elevated BG at BF and lunch times    Current treatment regimen  On insulin pump     Changes to the treatment regimen    Bolus   12 am - 18 gm --> 16 gm  7 am - 16 gm --> 15 gm  12 pm - 18 gm --> 16 gm.     If pt has more questions give this to kuldeep to make changes on her pump.

## 2018-10-21 ENCOUNTER — APPOINTMENT (OUTPATIENT)
Dept: GENERAL RADIOLOGY | Facility: HOSPITAL | Age: 73
End: 2018-10-21

## 2018-10-21 ENCOUNTER — HOSPITAL ENCOUNTER (INPATIENT)
Facility: HOSPITAL | Age: 73
LOS: 2 days | Discharge: HOME | End: 2018-10-23
Attending: EMERGENCY MEDICINE | Admitting: HOSPITALIST

## 2018-10-21 DIAGNOSIS — E10.10 DIABETIC KETOACIDOSIS WITHOUT COMA ASSOCIATED WITH TYPE 1 DIABETES MELLITUS (HCC): Primary | ICD-10-CM

## 2018-10-21 DIAGNOSIS — R94.31 ABNORMAL EKG: ICD-10-CM

## 2018-10-21 DIAGNOSIS — R07.89 CHEST TIGHTNESS: ICD-10-CM

## 2018-10-21 LAB
ACETONE BLD QL: ABNORMAL
ACETONE BLD QL: NEGATIVE
ALBUMIN SERPL-MCNC: 4.3 G/DL (ref 3.5–5.2)
ALBUMIN/GLOB SERPL: 1.9 G/DL
ALP SERPL-CCNC: 95 U/L (ref 40–129)
ALT SERPL W P-5'-P-CCNC: 92 U/L (ref 5–33)
ANION GAP SERPL CALCULATED.3IONS-SCNC: 22.4 MMOL/L
ANION GAP SERPL CALCULATED.3IONS-SCNC: 9.6 MMOL/L
ARTERIAL PATENCY WRIST A: ABNORMAL
AST SERPL-CCNC: 86 U/L (ref 5–32)
ATMOSPHERIC PRESS: 748 MMHG
BASE EXCESS BLDA CALC-SCNC: -9.2 MMOL/L (ref 0–2)
BASOPHILS # BLD AUTO: 0.03 10*3/MM3 (ref 0–0.2)
BASOPHILS NFR BLD AUTO: 0.5 % (ref 0–2)
BDY SITE: ABNORMAL
BILIRUB SERPL-MCNC: 1.5 MG/DL (ref 0.2–1.2)
BILIRUB UR QL STRIP: NEGATIVE
BODY TEMPERATURE: 37 C
BUN BLD-MCNC: 18 MG/DL (ref 8–23)
BUN BLD-MCNC: 22 MG/DL (ref 8–23)
BUN/CREAT SERPL: 25 (ref 7–25)
BUN/CREAT SERPL: 26.5 (ref 7–25)
CALCIUM SPEC-SCNC: 8.1 MG/DL (ref 8.8–10.5)
CALCIUM SPEC-SCNC: 9.1 MG/DL (ref 8.8–10.5)
CHLORIDE SERPL-SCNC: 105 MMOL/L (ref 98–107)
CHLORIDE SERPL-SCNC: 91 MMOL/L (ref 98–107)
CLARITY UR: CLEAR
CO2 SERPL-SCNC: 18.6 MMOL/L (ref 22–29)
CO2 SERPL-SCNC: 22.4 MMOL/L (ref 22–29)
COLOR UR: YELLOW
CREAT BLD-MCNC: 0.72 MG/DL (ref 0.57–1)
CREAT BLD-MCNC: 0.83 MG/DL (ref 0.57–1)
DEPRECATED RDW RBC AUTO: 46.7 FL (ref 37–54)
EOSINOPHIL # BLD AUTO: 0.13 10*3/MM3 (ref 0.1–0.3)
EOSINOPHIL NFR BLD AUTO: 2.3 % (ref 0–4)
ERYTHROCYTE [DISTWIDTH] IN BLOOD BY AUTOMATED COUNT: 12.7 % (ref 11.5–14.5)
GFR SERPL CREATININE-BSD FRML MDRD: 68 ML/MIN/1.73
GFR SERPL CREATININE-BSD FRML MDRD: 80 ML/MIN/1.73
GLOBULIN UR ELPH-MCNC: 2.3 GM/DL
GLUCOSE BLD-MCNC: 388 MG/DL (ref 65–99)
GLUCOSE BLD-MCNC: 92 MG/DL (ref 65–99)
GLUCOSE BLDC GLUCOMTR-MCNC: 116 MG/DL (ref 70–130)
GLUCOSE BLDC GLUCOMTR-MCNC: 118 MG/DL (ref 70–130)
GLUCOSE BLDC GLUCOMTR-MCNC: 151 MG/DL (ref 70–130)
GLUCOSE BLDC GLUCOMTR-MCNC: 161 MG/DL (ref 70–130)
GLUCOSE BLDC GLUCOMTR-MCNC: 170 MG/DL (ref 70–130)
GLUCOSE BLDC GLUCOMTR-MCNC: 199 MG/DL (ref 70–130)
GLUCOSE BLDC GLUCOMTR-MCNC: 305 MG/DL (ref 70–130)
GLUCOSE BLDC GLUCOMTR-MCNC: 309 MG/DL (ref 70–130)
GLUCOSE BLDC GLUCOMTR-MCNC: 374 MG/DL (ref 70–130)
GLUCOSE BLDC GLUCOMTR-MCNC: 78 MG/DL (ref 70–130)
GLUCOSE UR STRIP-MCNC: ABNORMAL MG/DL
HBA1C MFR BLD: 7.2 % (ref 4.8–5.6)
HCO3 BLDA-SCNC: 15.9 MMOL/L (ref 20–26)
HCT VFR BLD AUTO: 40.5 % (ref 37–47)
HGB BLD-MCNC: 13.3 G/DL (ref 12–16)
HGB BLDA-MCNC: 12.5 G/DL (ref 13.5–17.5)
HGB UR QL STRIP.AUTO: NEGATIVE
IMM GRANULOCYTES # BLD: 0.03 10*3/MM3 (ref 0–0.03)
IMM GRANULOCYTES NFR BLD: 0.5 % (ref 0–0.5)
KETONES UR QL STRIP: ABNORMAL
LEUKOCYTE ESTERASE UR QL STRIP.AUTO: NEGATIVE
LYMPHOCYTES # BLD AUTO: 1.26 10*3/MM3 (ref 0.6–4.8)
LYMPHOCYTES NFR BLD AUTO: 22.7 % (ref 20–45)
MCH RBC QN AUTO: 32.7 PG (ref 27–31)
MCHC RBC AUTO-ENTMCNC: 32.8 G/DL (ref 31–37)
MCV RBC AUTO: 99.5 FL (ref 81–99)
MODALITY: ABNORMAL
MONOCYTES # BLD AUTO: 0.37 10*3/MM3 (ref 0–1)
MONOCYTES NFR BLD AUTO: 6.7 % (ref 3–8)
NEUTROPHILS # BLD AUTO: 3.74 10*3/MM3 (ref 1.5–8.3)
NEUTROPHILS NFR BLD AUTO: 67.3 % (ref 45–70)
NITRITE UR QL STRIP: NEGATIVE
NRBC BLD MANUAL-RTO: 0 /100 WBC (ref 0–0)
NT-PROBNP SERPL-MCNC: 166.9 PG/ML (ref 0–900)
PCO2 BLDA: 31.6 MM HG (ref 35–45)
PCO2 TEMP ADJ BLD: 31.6 MM HG (ref 35–45)
PH BLDA: 7.31 PH UNITS (ref 7.35–7.45)
PH UR STRIP.AUTO: 5.5 [PH] (ref 4.5–8)
PH, TEMP CORRECTED: 7.31 PH UNITS (ref 7.35–7.45)
PLATELET # BLD AUTO: 191 10*3/MM3 (ref 140–500)
PMV BLD AUTO: 10 FL (ref 7.4–10.4)
PO2 BLDA: 90.5 MM HG (ref 83–108)
PO2 TEMP ADJ BLD: 90.5 MM HG (ref 83–108)
POTASSIUM BLD-SCNC: 3.3 MMOL/L (ref 3.5–5.2)
POTASSIUM BLD-SCNC: 4.6 MMOL/L (ref 3.5–5.2)
PROT SERPL-MCNC: 6.6 G/DL (ref 6–8.5)
PROT UR QL STRIP: NEGATIVE
RBC # BLD AUTO: 4.07 10*6/MM3 (ref 4.2–5.4)
SAO2 % BLDCOA: 96.5 % (ref 94–99)
SODIUM BLD-SCNC: 132 MMOL/L (ref 136–145)
SODIUM BLD-SCNC: 137 MMOL/L (ref 136–145)
SP GR UR STRIP: 1.01 (ref 1–1.03)
TROPONIN T SERPL-MCNC: <0.01 NG/ML (ref 0–0.03)
UROBILINOGEN UR QL STRIP: ABNORMAL
VENTILATOR MODE: ABNORMAL
WBC NRBC COR # BLD: 5.56 10*3/MM3 (ref 4.8–10.8)

## 2018-10-21 PROCEDURE — 82962 GLUCOSE BLOOD TEST: CPT

## 2018-10-21 PROCEDURE — 84484 ASSAY OF TROPONIN QUANT: CPT | Performed by: EMERGENCY MEDICINE

## 2018-10-21 PROCEDURE — 25010000002 ONDANSETRON PER 1 MG: Performed by: EMERGENCY MEDICINE

## 2018-10-21 PROCEDURE — 36600 WITHDRAWAL OF ARTERIAL BLOOD: CPT

## 2018-10-21 PROCEDURE — 82607 VITAMIN B-12: CPT | Performed by: NURSE PRACTITIONER

## 2018-10-21 PROCEDURE — 25010000002 ENOXAPARIN PER 10 MG: Performed by: FAMILY MEDICINE

## 2018-10-21 PROCEDURE — 82803 BLOOD GASES ANY COMBINATION: CPT

## 2018-10-21 PROCEDURE — 80053 COMPREHEN METABOLIC PANEL: CPT | Performed by: EMERGENCY MEDICINE

## 2018-10-21 PROCEDURE — 99222 1ST HOSP IP/OBS MODERATE 55: CPT | Performed by: FAMILY MEDICINE

## 2018-10-21 PROCEDURE — 93005 ELECTROCARDIOGRAM TRACING: CPT | Performed by: EMERGENCY MEDICINE

## 2018-10-21 PROCEDURE — 83880 ASSAY OF NATRIURETIC PEPTIDE: CPT | Performed by: EMERGENCY MEDICINE

## 2018-10-21 PROCEDURE — 99285 EMERGENCY DEPT VISIT HI MDM: CPT

## 2018-10-21 PROCEDURE — 71046 X-RAY EXAM CHEST 2 VIEWS: CPT

## 2018-10-21 PROCEDURE — 63710000001 INSULIN ASPART PER 5 UNITS: Performed by: FAMILY MEDICINE

## 2018-10-21 PROCEDURE — 82009 KETONE BODYS QUAL: CPT | Performed by: EMERGENCY MEDICINE

## 2018-10-21 PROCEDURE — 85025 COMPLETE CBC W/AUTO DIFF WBC: CPT | Performed by: EMERGENCY MEDICINE

## 2018-10-21 PROCEDURE — 82009 KETONE BODYS QUAL: CPT | Performed by: FAMILY MEDICINE

## 2018-10-21 PROCEDURE — 83036 HEMOGLOBIN GLYCOSYLATED A1C: CPT | Performed by: EMERGENCY MEDICINE

## 2018-10-21 PROCEDURE — 81003 URINALYSIS AUTO W/O SCOPE: CPT | Performed by: EMERGENCY MEDICINE

## 2018-10-21 PROCEDURE — 93010 ELECTROCARDIOGRAM REPORT: CPT | Performed by: INTERNAL MEDICINE

## 2018-10-21 PROCEDURE — 99285 EMERGENCY DEPT VISIT HI MDM: CPT | Performed by: EMERGENCY MEDICINE

## 2018-10-21 PROCEDURE — 82746 ASSAY OF FOLIC ACID SERUM: CPT | Performed by: NURSE PRACTITIONER

## 2018-10-21 PROCEDURE — 63710000001 INSULIN REGULAR HUMAN PER 5 UNITS: Performed by: EMERGENCY MEDICINE

## 2018-10-21 RX ORDER — GLUCOSAMINE/D3/BOSWELLIA SERRA 1500MG-400
1 TABLET ORAL DAILY
COMMUNITY

## 2018-10-21 RX ORDER — SODIUM CHLORIDE 9 MG/ML
125 INJECTION, SOLUTION INTRAVENOUS CONTINUOUS
Status: DISCONTINUED | OUTPATIENT
Start: 2018-10-21 | End: 2018-10-21

## 2018-10-21 RX ORDER — ACETAMINOPHEN 325 MG/1
650 TABLET ORAL EVERY 4 HOURS PRN
Status: DISCONTINUED | OUTPATIENT
Start: 2018-10-21 | End: 2018-10-23 | Stop reason: HOSPADM

## 2018-10-21 RX ORDER — SODIUM CHLORIDE 9 MG/ML
40 INJECTION, SOLUTION INTRAVENOUS AS NEEDED
Status: DISCONTINUED | OUTPATIENT
Start: 2018-10-21 | End: 2018-10-23 | Stop reason: HOSPADM

## 2018-10-21 RX ORDER — POTASSIUM CHLORIDE 750 MG/1
40 CAPSULE, EXTENDED RELEASE ORAL ONCE
Status: COMPLETED | OUTPATIENT
Start: 2018-10-21 | End: 2018-10-21

## 2018-10-21 RX ORDER — SODIUM CHLORIDE 0.9 % (FLUSH) 0.9 %
10 SYRINGE (ML) INJECTION AS NEEDED
Status: DISCONTINUED | OUTPATIENT
Start: 2018-10-21 | End: 2018-10-23 | Stop reason: HOSPADM

## 2018-10-21 RX ORDER — BISACODYL 5 MG/1
5 TABLET, DELAYED RELEASE ORAL DAILY PRN
Status: DISCONTINUED | OUTPATIENT
Start: 2018-10-21 | End: 2018-10-23 | Stop reason: HOSPADM

## 2018-10-21 RX ORDER — ACETAMINOPHEN 500 MG
1000 TABLET ORAL ONCE
Status: COMPLETED | OUTPATIENT
Start: 2018-10-21 | End: 2018-10-21

## 2018-10-21 RX ORDER — ATORVASTATIN CALCIUM 20 MG/1
20 TABLET, FILM COATED ORAL DAILY
Status: DISCONTINUED | OUTPATIENT
Start: 2018-10-21 | End: 2018-10-23 | Stop reason: HOSPADM

## 2018-10-21 RX ORDER — POTASSIUM CHLORIDE 20 MEQ/1
TABLET, EXTENDED RELEASE ORAL
Status: COMPLETED
Start: 2018-10-21 | End: 2018-10-21

## 2018-10-21 RX ORDER — SODIUM CHLORIDE 9 MG/ML
500 INJECTION, SOLUTION INTRAVENOUS CONTINUOUS
Status: DISCONTINUED | OUTPATIENT
Start: 2018-10-21 | End: 2018-10-21

## 2018-10-21 RX ORDER — BENAZEPRIL HYDROCHLORIDE AND HYDROCHLOROTHIAZIDE 20; 12.5 MG/1; MG/1
1 TABLET ORAL DAILY
COMMUNITY
End: 2018-10-23 | Stop reason: HOSPADM

## 2018-10-21 RX ORDER — SODIUM CHLORIDE 0.9 % (FLUSH) 0.9 %
30 SYRINGE (ML) INJECTION ONCE AS NEEDED
Status: DISCONTINUED | OUTPATIENT
Start: 2018-10-21 | End: 2018-10-23 | Stop reason: HOSPADM

## 2018-10-21 RX ORDER — SODIUM CHLORIDE 0.9 % (FLUSH) 0.9 %
3 SYRINGE (ML) INJECTION EVERY 12 HOURS SCHEDULED
Status: DISCONTINUED | OUTPATIENT
Start: 2018-10-21 | End: 2018-10-23 | Stop reason: HOSPADM

## 2018-10-21 RX ORDER — SODIUM CHLORIDE 0.9 % (FLUSH) 0.9 %
1-10 SYRINGE (ML) INJECTION AS NEEDED
Status: DISCONTINUED | OUTPATIENT
Start: 2018-10-21 | End: 2018-10-23 | Stop reason: HOSPADM

## 2018-10-21 RX ORDER — SODIUM CHLORIDE 9 MG/ML
100 INJECTION, SOLUTION INTRAVENOUS CONTINUOUS
Status: DISCONTINUED | OUTPATIENT
Start: 2018-10-21 | End: 2018-10-22

## 2018-10-21 RX ORDER — LEVOTHYROXINE SODIUM 0.07 MG/1
75 TABLET ORAL DAILY
Status: DISCONTINUED | OUTPATIENT
Start: 2018-10-21 | End: 2018-10-23 | Stop reason: HOSPADM

## 2018-10-21 RX ORDER — CALCIUM CARBONATE 200(500)MG
1 TABLET,CHEWABLE ORAL 2 TIMES DAILY PRN
Status: DISCONTINUED | OUTPATIENT
Start: 2018-10-21 | End: 2018-10-23 | Stop reason: HOSPADM

## 2018-10-21 RX ORDER — SODIUM CHLORIDE 9 MG/ML
30 INJECTION, SOLUTION INTRAVENOUS CONTINUOUS PRN
Status: DISCONTINUED | OUTPATIENT
Start: 2018-10-21 | End: 2018-10-23 | Stop reason: HOSPADM

## 2018-10-21 RX ORDER — ONDANSETRON 2 MG/ML
4 INJECTION INTRAMUSCULAR; INTRAVENOUS EVERY 6 HOURS PRN
Status: DISCONTINUED | OUTPATIENT
Start: 2018-10-21 | End: 2018-10-23 | Stop reason: HOSPADM

## 2018-10-21 RX ORDER — ONDANSETRON 2 MG/ML
4 INJECTION INTRAMUSCULAR; INTRAVENOUS ONCE
Status: COMPLETED | OUTPATIENT
Start: 2018-10-21 | End: 2018-10-21

## 2018-10-21 RX ADMIN — Medication 3 ML: at 20:02

## 2018-10-21 RX ADMIN — ENOXAPARIN SODIUM 40 MG: 40 INJECTION SUBCUTANEOUS at 17:29

## 2018-10-21 RX ADMIN — HUMAN INSULIN 8 UNITS: 100 INJECTION, SOLUTION SUBCUTANEOUS at 11:05

## 2018-10-21 RX ADMIN — ACETAMINOPHEN 1000 MG: 500 TABLET, FILM COATED ORAL at 11:11

## 2018-10-21 RX ADMIN — SODIUM CHLORIDE 500 ML/HR: 9 INJECTION, SOLUTION INTRAVENOUS at 14:36

## 2018-10-21 RX ADMIN — SODIUM CHLORIDE 125 ML/HR: 9 INJECTION, SOLUTION INTRAVENOUS at 16:26

## 2018-10-21 RX ADMIN — SODIUM CHLORIDE 6 UNITS/HR: 9 INJECTION, SOLUTION INTRAVENOUS at 13:37

## 2018-10-21 RX ADMIN — HUMAN INSULIN 10 UNITS: 100 INJECTION, SOLUTION SUBCUTANEOUS at 13:01

## 2018-10-21 RX ADMIN — POTASSIUM CHLORIDE 40 MEQ: 1500 TABLET, EXTENDED RELEASE ORAL at 20:01

## 2018-10-21 RX ADMIN — LEVOTHYROXINE SODIUM 75 MCG: 75 TABLET ORAL at 20:01

## 2018-10-21 RX ADMIN — POTASSIUM CHLORIDE 40 MEQ: 750 CAPSULE, EXTENDED RELEASE ORAL at 20:02

## 2018-10-21 RX ADMIN — ONDANSETRON 4 MG: 2 INJECTION, SOLUTION INTRAMUSCULAR; INTRAVENOUS at 11:03

## 2018-10-21 RX ADMIN — SODIUM CHLORIDE 100 ML/HR: 9 INJECTION, SOLUTION INTRAVENOUS at 23:38

## 2018-10-21 RX ADMIN — ATORVASTATIN CALCIUM 20 MG: 20 TABLET, FILM COATED ORAL at 17:29

## 2018-10-21 RX ADMIN — INSULIN ASPART 4 UNITS: 100 INJECTION, SOLUTION INTRAVENOUS; SUBCUTANEOUS at 20:09

## 2018-10-21 RX ADMIN — SODIUM CHLORIDE 500 ML: 9 INJECTION, SOLUTION INTRAVENOUS at 10:30

## 2018-10-21 NOTE — ED NOTES
PT admitted to [facility] per stretcher with IV site patent. And IVFS infusing.     Malcolm Pepe, RN  10/21/18 [id number]

## 2018-10-21 NOTE — ED PROVIDER NOTES
Subjective     History provided by:  Patient, spouse and medical records    History of Present Illness    · Chief complaint: High blood sugar    · Location: She had chest tightness across her entire chest.    · Quality/Severity: The patient's blood sugars been running 300-400 last 24 hours.    · Timing/Onset: Her blood sugar started to go up 24 hours ago being in the 200s yesterday, and spiking in the 300 to 400s yesterday evening.    · Modifying Factors: The patient has an insulin palm and has given herself extra insulin boluses, but her sugar remains high.    · Associated symptoms: Morning she reported some generalized chest tightness associated with shortness of breath.  She also felt like her heart was racing.  Mild nausea without vomiting.  Increase urine output.    · Narrative: The patient is a 73-year-old white female complaining of her blood sugar being high for the last 2 days, markedly high since yesterday evening.  The patient is a type 1 insulin-dependent diabetic and 2003 and has been using an insulin pump since this past August.  Dr. Rogers is her endocrinologist.  She also reports this morning having some generalized chest tightness associated with shortness of breath and a sensation that her heart was racing.  She also reports a little nausea this morning and increased urinary frequency.  Her past medical history is also significant for hypothyroidism and hypercholesterolemia.  She denies any previous cardiac history.  Review of her medical records show no evidence of diabetic complications her renal impairment.    ED Triage Vitals [10/21/18 0938]   Temp Heart Rate Resp BP SpO2   97.8 °F (36.6 °C) 93 18 149/74 99 %      Temp src Heart Rate Source Patient Position BP Location FiO2 (%)   Oral Monitor Lying Right arm --       Review of Systems   Constitutional: Negative for activity change, appetite change, chills, diaphoresis, fatigue and fever.   HENT: Negative for congestion, dental problem, ear  pain, hearing loss, mouth sores, postnasal drip, rhinorrhea, sinus pressure, sore throat and voice change.    Eyes: Negative for photophobia, pain, discharge, redness and visual disturbance.   Respiratory: Positive for chest tightness and shortness of breath. Negative for cough, wheezing and stridor.    Cardiovascular: Positive for palpitations. Negative for chest pain and leg swelling.   Gastrointestinal: Positive for nausea. Negative for abdominal pain, diarrhea and vomiting.   Endocrine: Positive for polyphagia. Negative for polydipsia.   Genitourinary: Positive for frequency. Negative for difficulty urinating, dysuria, flank pain, hematuria, pelvic pain and urgency.   Musculoskeletal: Negative for arthralgias, back pain, gait problem, joint swelling, myalgias, neck pain and neck stiffness.   Skin: Negative for color change and rash.   Neurological: Negative for dizziness, tremors, seizures, syncope, facial asymmetry, speech difficulty, weakness, light-headedness, numbness and headaches.   Hematological: Negative for adenopathy.   Psychiatric/Behavioral: Negative.  Negative for confusion and decreased concentration. The patient is not nervous/anxious.        Past Medical History:   Diagnosis Date   • Diabetes mellitus (CMS/HCC)    • Hypertension    • Hypothyroidism        Allergies   Allergen Reactions   • Aspirin      GI distress   • Epinephrine      Tachycardia       Past Surgical History:   Procedure Laterality Date   • BREAST BIOPSY     • CHOLECYSTECTOMY     • HYSTERECTOMY     • TONSILLECTOMY     • TUBAL ABDOMINAL LIGATION         Family History   Problem Relation Age of Onset   • Heart disease Father    • Diabetes Sister    • Hypertension Mother        Social History     Social History   • Marital status:      Social History Main Topics   • Smoking status: Never Smoker   • Smokeless tobacco: Never Used   • Alcohol use 1.2 oz/week     2 Glasses of wine per week      Comment: daily   • Drug use: No   •  Sexual activity: Yes     Partners: Male     Other Topics Concern   • Not on file           Objective   Physical Exam   Constitutional: She is oriented to person, place, and time. She appears well-developed and well-nourished. No distress.   The patient appears healthy and in no acute distress.  She appears younger than her stated age.  Review of her vital signs: She is afebrile with temperature 97.8, slightly tachycardic heart rate 93, blood pressure slightly elevated 149/74, respiratory rate is normal at 18 with a normal oxygen saturation of 99% on room air.   HENT:   Head: Normocephalic and atraumatic.   Nose: Nose normal.   Mouth/Throat: Oropharynx is clear and moist. No oropharyngeal exudate.   Eyes: Pupils are equal, round, and reactive to light. Conjunctivae and EOM are normal. Right eye exhibits no discharge. Left eye exhibits no discharge. No scleral icterus.   Neck: Normal range of motion. Neck supple. No JVD present. No thyromegaly present.   Cardiovascular: Normal rate, regular rhythm and normal heart sounds.    No murmur heard.  Pulmonary/Chest: Effort normal and breath sounds normal. She has no wheezes. She has no rales. She exhibits no tenderness.   Abdominal: Soft. Bowel sounds are normal. She exhibits no distension. There is no tenderness.   Musculoskeletal: Normal range of motion. She exhibits no edema, tenderness or deformity.   Lymphadenopathy:     She has no cervical adenopathy.   Neurological: She is alert and oriented to person, place, and time. No cranial nerve deficit. Coordination normal.   No focal motor sensory deficit   Skin: Skin is warm and dry. Capillary refill takes less than 2 seconds. No rash noted. She is not diaphoretic.   Psychiatric: She has a normal mood and affect. Her behavior is normal. Judgment and thought content normal.   Nursing note and vitals reviewed.      ECG 12 Lead    Date/Time: 10/21/2018 9:44 AM  Performed by: HÉCTOR STEPHEN  Authorized by: HÉCTOR STEPHEN    Comments: EKG performed 09:44, EKG read 09:46.  Normal sinus rhythms rate 91, left axis deviation, no acute ST segment elevation or depression consistent with ischemia, he weighs in leads 3 and aVF suggestive of a prior inferior MI, no ectopy, normal TN and QT intervals, poor R-wave progression.  No old tracings available for comparison.                 ED Course  ED Course as of Oct 21 1335   Sun Oct 21, 2018   1326 The patient's initial fingerstick glucose was 374.  I ordered a normal saline 500 cc bolus followed by infusion at 250 cc/HR.  Infusion was a little slower than normal for hyperglycemia/DKA due to the patient's age.  The patient was administered regular insulin 8 units IV.  At 12:20 fingerstick glucose had dropped only to 305.  [TP]   1328 Review of her test results: Her CMP had a markedly elevated blood glucose of 388 with a slightly low CO2 of 18.6 suggestive of early DKA.  Her sodium was slightly low at 132, her potassium was normal at 4.6.  Her renal function was normal.  She has elevated transaminases with an ALT of 92 and AST of 86.  Her CBC had a normal white count, normal hemoglobin and hematocrit, normal platelets.  Her hemoglobin A1c was elevated at 7.2.  Her serum ketones were trace.  Her ABG had a low pH of 7.3 and a low CO2 of 31.6 suggestive of DKA.  Her urinalysis had large glucose and ketones again suggestive of DKA.  Her cardiac troponin was negative.  Her BNP was normal at 166.6.  I interpreted her EKG is normal sinus rhythm with a rate of 91, Q waves in leads 3 and aVF suggestive of an old in.  My, no acute ST segment changes consistent with ischemia.  Her chest x-ray was normal.  [TP]   1332 12:18 the patient was discussed with Dr. Contreras, hospitalist, who agreed to admit the patient.  He recommended an additional insulin bolus of 10 units regular IV followed by infusion of 6 units per hour which was ordered.  [TP]   1333 Second liter normal saline started at 500cc/hr.  [TP]       ED Course User Index  [TP] Skyler Schroeder MD                  MDM  Number of Diagnoses or Management Options  Abnormal EKG:   Chest tightness:   Diabetic ketoacidosis without coma associated with type 1 diabetes mellitus (CMS/HCC): new and requires workup     Amount and/or Complexity of Data Reviewed  Clinical lab tests: ordered and reviewed  Tests in the radiology section of CPT®: ordered and reviewed  Tests in the medicine section of CPT®: ordered and reviewed  Independent visualization of images, tracings, or specimens: yes    Risk of Complications, Morbidity, and/or Mortality  Presenting problems: high  Diagnostic procedures: high  Management options: high  General comments: My differential diagnosis for chest pain includes but is not limited to:  Muscle strain, costochondritis, myositis, pleurisy, rib fracture, intercostal neuritis, herpes zoster, tumor, myocardial infarction, coronary syndrome, unstable angina, angina, aortic dissection, mitral valve prolapse, pericarditis, palpitations, pulmonary embolus, pneumonia, pneumothorax, lung cancer, GERD, esophagitis, esophageal spasm    Patient Progress  Patient progress: stable        Final diagnoses:   Diabetic ketoacidosis without coma associated with type 1 diabetes mellitus (CMS/HCC)   Chest tightness   Abnormal EKG           Labs Reviewed   COMPREHENSIVE METABOLIC PANEL - Abnormal; Notable for the following:        Result Value    Glucose 388 (*)     Sodium 132 (*)     Chloride 91 (*)     CO2 18.6 (*)     ALT (SGPT) 92 (*)     AST (SGOT) 86 (*)     Total Bilirubin 1.5 (*)     BUN/Creatinine Ratio 26.5 (*)     All other components within normal limits    Narrative:     The MDRD GFR formula is only valid for adults with stable renal function between ages 18 and 70.   URINALYSIS W/ MICROSCOPIC IF INDICATED (NO CULTURE) - Abnormal; Notable for the following:     Glucose,  mg/dL (2+) (*)     All other components within normal limits    Narrative:      Urine microscopic not indicated.   HEMOGLOBIN A1C - Abnormal; Notable for the following:     Hemoglobin A1C 7.20 (*)     All other components within normal limits    Narrative:     Hemoglobin A1C Ranges:    Increased Risk for Diabetes  5.7% to 6.4%  Diabetes                     >= 6.5%  Diabetic Goal                < 7.0%   CBC WITH AUTO DIFFERENTIAL - Abnormal; Notable for the following:     RBC 4.07 (*)     MCV 99.5 (*)     MCH 32.7 (*)     All other components within normal limits   ACETONE - Abnormal; Notable for the following:     Acetone Trace (*)     All other components within normal limits   BLOOD GAS, ARTERIAL - Abnormal; Notable for the following:     pH, Arterial 7.311 (*)     pCO2, Arterial 31.6 (*)     HCO3, Arterial 15.9 (*)     Base Excess, Arterial -9.2 (*)     Hemoglobin, Blood Gas 12.5 (*)     pCO2, Temperature Corrected 31.6 (*)     pH, Temp Corrected 7.311 (*)     All other components within normal limits   POCT GLUCOSE FINGERSTICK - Abnormal; Notable for the following:     Glucose 374 (*)     All other components within normal limits    Narrative:     Meter: [id number] : [id number] [facility] NURSING ASSISTANT   POCT GLUCOSE FINGERSTICK - Abnormal; Notable for the following:     Glucose 305 (*)     All other components within normal limits    Narrative:     Meter: [id number] : [id number] [facility] NURSING ASSISTANT   POCT GLUCOSE FINGERSTICK - Abnormal; Notable for the following:     Glucose 309 (*)     All other components within normal limits    Narrative:     Meter: [id number] : [id number] [facility]   TROPONIN (IN-HOUSE) - Normal    Narrative:     Troponin T Reference Ranges:  Less than 0.03 ng/mL:    Negative for AMI  0.03 to 0.09 ng/mL:      Indeterminant for AMI  Greater than 0.09 ng/mL: Positive for AMI   BNP (IN-HOUSE) - Normal    Narrative:     Among patients with dyspnea, NT-proBNP is highly sensitive for the detection of acute congestive heart  failure. In addition NT-proBNP of <300 pg/ml effectively rules out acute congestive heart failure with 99% negative predictive value.   BLOOD GAS, ARTERIAL   POCT GLUCOSE FINGERSTICK   POCT GLUCOSE FINGERSTICK   POCT GLUCOSE FINGERSTICK   POCT GLUCOSE FINGERSTICK   POCT GLUCOSE FINGERSTICK   POCT GLUCOSE FINGERSTICK   POCT GLUCOSE FINGERSTICK   POCT GLUCOSE FINGERSTICK   POCT GLUCOSE FINGERSTICK   POCT GLUCOSE FINGERSTICK   POCT GLUCOSE FINGERSTICK   POCT GLUCOSE FINGERSTICK   POCT GLUCOSE FINGERSTICK   POCT GLUCOSE FINGERSTICK   POCT GLUCOSE FINGERSTICK   POCT GLUCOSE FINGERSTICK   POCT GLUCOSE FINGERSTICK   POCT GLUCOSE FINGERSTICK   POCT GLUCOSE FINGERSTICK   POCT GLUCOSE FINGERSTICK   POCT GLUCOSE FINGERSTICK   POCT GLUCOSE FINGERSTICK   POCT GLUCOSE FINGERSTICK   POCT GLUCOSE FINGERSTICK   POCT GLUCOSE FINGERSTICK   POCT GLUCOSE FINGERSTICK   CBC AND DIFFERENTIAL    Narrative:     The following orders were created for panel order CBC & Differential.  Procedure                               Abnormality         Status                     ---------                               -----------         ------                     CBC Auto Differential[[id number]]        Abnormal            Final result                 Please view results for these tests on the individual orders.   KETONE BODIES SERUM    Narrative:     The following orders were created for panel order Ketone Bodies, Serum (Not performed at [place]).  Procedure                               Abnormality         Status                     ---------                               -----------         ------                     Acetone[[id number]]                      Abnormal            Final result                 Please view results for these tests on the individual orders.     XR Chest 2 View   ED Interpretation   Normal lung fields, normal cardiac silhouette, normal mediastinum.  Normal chest x-ray.             Medication List      No changes were made to  your prescriptions during this visit.            Skyler Schroeder MD  10/21/18 1450

## 2018-10-22 LAB
ANION GAP SERPL CALCULATED.3IONS-SCNC: 10.2 MMOL/L
ANION GAP SERPL CALCULATED.3IONS-SCNC: 10.8 MMOL/L
ANION GAP SERPL CALCULATED.3IONS-SCNC: 11.8 MMOL/L
ANION GAP SERPL CALCULATED.3IONS-SCNC: 17.6 MMOL/L
ANION GAP SERPL CALCULATED.3IONS-SCNC: 20.9 MMOL/L
BUN BLD-MCNC: 10 MG/DL (ref 8–23)
BUN BLD-MCNC: 12 MG/DL (ref 8–23)
BUN BLD-MCNC: 15 MG/DL (ref 8–23)
BUN BLD-MCNC: 16 MG/DL (ref 8–23)
BUN BLD-MCNC: 8 MG/DL (ref 8–23)
BUN/CREAT SERPL: 12.5 (ref 7–25)
BUN/CREAT SERPL: 15.4 (ref 7–25)
BUN/CREAT SERPL: 17.4 (ref 7–25)
BUN/CREAT SERPL: 19.5 (ref 7–25)
BUN/CREAT SERPL: 21.3 (ref 7–25)
CALCIUM SPEC-SCNC: 7.7 MG/DL (ref 8.8–10.5)
CALCIUM SPEC-SCNC: 7.8 MG/DL (ref 8.8–10.5)
CALCIUM SPEC-SCNC: 8.3 MG/DL (ref 8.8–10.5)
CHLORIDE SERPL-SCNC: 104 MMOL/L (ref 98–107)
CHLORIDE SERPL-SCNC: 107 MMOL/L (ref 98–107)
CHLORIDE SERPL-SCNC: 107 MMOL/L (ref 98–107)
CHLORIDE SERPL-SCNC: 108 MMOL/L (ref 98–107)
CHLORIDE SERPL-SCNC: 109 MMOL/L (ref 98–107)
CO2 SERPL-SCNC: 13.1 MMOL/L (ref 22–29)
CO2 SERPL-SCNC: 13.4 MMOL/L (ref 22–29)
CO2 SERPL-SCNC: 19.2 MMOL/L (ref 22–29)
CO2 SERPL-SCNC: 19.8 MMOL/L (ref 22–29)
CO2 SERPL-SCNC: 20.2 MMOL/L (ref 22–29)
CREAT BLD-MCNC: 0.64 MG/DL (ref 0.57–1)
CREAT BLD-MCNC: 0.65 MG/DL (ref 0.57–1)
CREAT BLD-MCNC: 0.69 MG/DL (ref 0.57–1)
CREAT BLD-MCNC: 0.75 MG/DL (ref 0.57–1)
CREAT BLD-MCNC: 0.77 MG/DL (ref 0.57–1)
FOLATE SERPL-MCNC: >20 NG/ML (ref 4.78–20)
GFR SERPL CREATININE-BSD FRML MDRD: 74 ML/MIN/1.73
GFR SERPL CREATININE-BSD FRML MDRD: 76 ML/MIN/1.73
GFR SERPL CREATININE-BSD FRML MDRD: 84 ML/MIN/1.73
GFR SERPL CREATININE-BSD FRML MDRD: 90 ML/MIN/1.73
GFR SERPL CREATININE-BSD FRML MDRD: 91 ML/MIN/1.73
GLUCOSE BLD-MCNC: 154 MG/DL (ref 65–99)
GLUCOSE BLD-MCNC: 205 MG/DL (ref 65–99)
GLUCOSE BLD-MCNC: 256 MG/DL (ref 65–99)
GLUCOSE BLD-MCNC: 267 MG/DL (ref 65–99)
GLUCOSE BLD-MCNC: 341 MG/DL (ref 65–99)
GLUCOSE BLDC GLUCOMTR-MCNC: 147 MG/DL (ref 70–130)
GLUCOSE BLDC GLUCOMTR-MCNC: 148 MG/DL (ref 70–130)
GLUCOSE BLDC GLUCOMTR-MCNC: 150 MG/DL (ref 70–130)
GLUCOSE BLDC GLUCOMTR-MCNC: 155 MG/DL (ref 70–130)
GLUCOSE BLDC GLUCOMTR-MCNC: 167 MG/DL (ref 70–130)
GLUCOSE BLDC GLUCOMTR-MCNC: 177 MG/DL (ref 70–130)
GLUCOSE BLDC GLUCOMTR-MCNC: 189 MG/DL (ref 70–130)
GLUCOSE BLDC GLUCOMTR-MCNC: 199 MG/DL (ref 70–130)
GLUCOSE BLDC GLUCOMTR-MCNC: 199 MG/DL (ref 70–130)
GLUCOSE BLDC GLUCOMTR-MCNC: 230 MG/DL (ref 70–130)
GLUCOSE BLDC GLUCOMTR-MCNC: 230 MG/DL (ref 70–130)
GLUCOSE BLDC GLUCOMTR-MCNC: 239 MG/DL (ref 70–130)
GLUCOSE BLDC GLUCOMTR-MCNC: 241 MG/DL (ref 70–130)
GLUCOSE BLDC GLUCOMTR-MCNC: 251 MG/DL (ref 70–130)
GLUCOSE BLDC GLUCOMTR-MCNC: 254 MG/DL (ref 70–130)
GLUCOSE BLDC GLUCOMTR-MCNC: 318 MG/DL (ref 70–130)
GLUCOSE BLDC GLUCOMTR-MCNC: 329 MG/DL (ref 70–130)
MAGNESIUM SERPL-MCNC: 1.7 MG/DL (ref 1.7–2.5)
MAGNESIUM SERPL-MCNC: 2.2 MG/DL (ref 1.7–2.5)
MAGNESIUM SERPL-MCNC: 2.5 MG/DL (ref 1.7–2.5)
MAGNESIUM SERPL-MCNC: 2.8 MG/DL (ref 1.7–2.5)
PHOSPHATE SERPL-MCNC: 1 MG/DL (ref 2.7–4.5)
PHOSPHATE SERPL-MCNC: 2 MG/DL (ref 2.7–4.5)
PHOSPHATE SERPL-MCNC: 3.5 MG/DL (ref 2.7–4.5)
PHOSPHATE SERPL-MCNC: 3.7 MG/DL (ref 2.7–4.5)
POTASSIUM BLD-SCNC: 3.6 MMOL/L (ref 3.5–5.2)
POTASSIUM BLD-SCNC: 4.1 MMOL/L (ref 3.5–5.2)
POTASSIUM BLD-SCNC: 4.6 MMOL/L (ref 3.5–5.2)
POTASSIUM BLD-SCNC: 4.8 MMOL/L (ref 3.5–5.2)
POTASSIUM BLD-SCNC: 4.9 MMOL/L (ref 3.5–5.2)
SODIUM BLD-SCNC: 138 MMOL/L (ref 136–145)
SODIUM BLD-SCNC: 140 MMOL/L (ref 136–145)
VIT B12 BLD-MCNC: 627 PG/ML (ref 232–1245)

## 2018-10-22 PROCEDURE — 83735 ASSAY OF MAGNESIUM: CPT | Performed by: INTERNAL MEDICINE

## 2018-10-22 PROCEDURE — 80048 BASIC METABOLIC PNL TOTAL CA: CPT | Performed by: INTERNAL MEDICINE

## 2018-10-22 PROCEDURE — 25010000002 ENOXAPARIN PER 10 MG: Performed by: FAMILY MEDICINE

## 2018-10-22 PROCEDURE — 63710000001 INSULIN REGULAR HUMAN PER 5 UNITS: Performed by: INTERNAL MEDICINE

## 2018-10-22 PROCEDURE — 84100 ASSAY OF PHOSPHORUS: CPT | Performed by: INTERNAL MEDICINE

## 2018-10-22 PROCEDURE — 25810000003 POTASSIUM CHLORIDE PER 2 MEQ: Performed by: INTERNAL MEDICINE

## 2018-10-22 PROCEDURE — 94799 UNLISTED PULMONARY SVC/PX: CPT

## 2018-10-22 PROCEDURE — 80048 BASIC METABOLIC PNL TOTAL CA: CPT | Performed by: FAMILY MEDICINE

## 2018-10-22 PROCEDURE — 82962 GLUCOSE BLOOD TEST: CPT

## 2018-10-22 PROCEDURE — 99232 SBSQ HOSP IP/OBS MODERATE 35: CPT | Performed by: NURSE PRACTITIONER

## 2018-10-22 RX ORDER — POTASSIUM CHLORIDE 750 MG/1
10 CAPSULE, EXTENDED RELEASE ORAL AS NEEDED
Status: DISCONTINUED | OUTPATIENT
Start: 2018-10-22 | End: 2018-10-23 | Stop reason: HOSPADM

## 2018-10-22 RX ORDER — MAGNESIUM SULFATE HEPTAHYDRATE 40 MG/ML
4 INJECTION, SOLUTION INTRAVENOUS AS NEEDED
Status: DISCONTINUED | OUTPATIENT
Start: 2018-10-22 | End: 2018-10-23 | Stop reason: HOSPADM

## 2018-10-22 RX ORDER — POTASSIUM CHLORIDE 1.5 G/1.77G
10 POWDER, FOR SOLUTION ORAL AS NEEDED
Status: DISCONTINUED | OUTPATIENT
Start: 2018-10-22 | End: 2018-10-23 | Stop reason: HOSPADM

## 2018-10-22 RX ORDER — DEXTROSE AND SODIUM CHLORIDE 5; .45 G/100ML; G/100ML
150 INJECTION, SOLUTION INTRAVENOUS CONTINUOUS PRN
Status: DISCONTINUED | OUTPATIENT
Start: 2018-10-22 | End: 2018-10-23 | Stop reason: HOSPADM

## 2018-10-22 RX ORDER — DEXTROSE, SODIUM CHLORIDE, AND POTASSIUM CHLORIDE 5; .45; .15 G/100ML; G/100ML; G/100ML
150 INJECTION INTRAVENOUS CONTINUOUS PRN
Status: DISCONTINUED | OUTPATIENT
Start: 2018-10-22 | End: 2018-10-23 | Stop reason: HOSPADM

## 2018-10-22 RX ORDER — POTASSIUM CHLORIDE 7.46 G/1000ML
10 INJECTION, SOLUTION INTRAVENOUS AS NEEDED
Status: DISCONTINUED | OUTPATIENT
Start: 2018-10-22 | End: 2018-10-23 | Stop reason: HOSPADM

## 2018-10-22 RX ORDER — POTASSIUM CHLORIDE 750 MG/1
20 CAPSULE, EXTENDED RELEASE ORAL AS NEEDED
Status: DISCONTINUED | OUTPATIENT
Start: 2018-10-22 | End: 2018-10-23 | Stop reason: HOSPADM

## 2018-10-22 RX ORDER — POTASSIUM CHLORIDE 1.5 G/1.77G
20 POWDER, FOR SOLUTION ORAL AS NEEDED
Status: DISCONTINUED | OUTPATIENT
Start: 2018-10-22 | End: 2018-10-23 | Stop reason: HOSPADM

## 2018-10-22 RX ORDER — MAGNESIUM SULFATE HEPTAHYDRATE 40 MG/ML
2 INJECTION, SOLUTION INTRAVENOUS AS NEEDED
Status: DISCONTINUED | OUTPATIENT
Start: 2018-10-22 | End: 2018-10-23 | Stop reason: HOSPADM

## 2018-10-22 RX ORDER — DEXTROSE MONOHYDRATE 25 G/50ML
12.5 INJECTION, SOLUTION INTRAVENOUS
Status: DISCONTINUED | OUTPATIENT
Start: 2018-10-22 | End: 2018-10-23 | Stop reason: HOSPADM

## 2018-10-22 RX ORDER — POTASSIUM CHLORIDE 1.5 G/1.77G
40 POWDER, FOR SOLUTION ORAL AS NEEDED
Status: DISCONTINUED | OUTPATIENT
Start: 2018-10-22 | End: 2018-10-23 | Stop reason: HOSPADM

## 2018-10-22 RX ORDER — SODIUM CHLORIDE 450 MG/100ML
250 INJECTION, SOLUTION INTRAVENOUS CONTINUOUS
Status: DISCONTINUED | OUTPATIENT
Start: 2018-10-22 | End: 2018-10-23 | Stop reason: HOSPADM

## 2018-10-22 RX ORDER — SODIUM CHLORIDE AND POTASSIUM CHLORIDE 150; 450 MG/100ML; MG/100ML
250 INJECTION, SOLUTION INTRAVENOUS CONTINUOUS PRN
Status: DISCONTINUED | OUTPATIENT
Start: 2018-10-22 | End: 2018-10-23 | Stop reason: HOSPADM

## 2018-10-22 RX ORDER — POTASSIUM CHLORIDE 750 MG/1
40 CAPSULE, EXTENDED RELEASE ORAL AS NEEDED
Status: DISCONTINUED | OUTPATIENT
Start: 2018-10-22 | End: 2018-10-23 | Stop reason: HOSPADM

## 2018-10-22 RX ADMIN — DEXTROSE MONOHYDRATE, SODIUM CHLORIDE, AND POTASSIUM CHLORIDE 150 ML/HR: 50; 4.5; 1.49 INJECTION, SOLUTION INTRAVENOUS at 23:06

## 2018-10-22 RX ADMIN — LEVOTHYROXINE SODIUM 75 MCG: 75 TABLET ORAL at 20:03

## 2018-10-22 RX ADMIN — Medication 3 ML: at 20:03

## 2018-10-22 RX ADMIN — Medication 3 ML: at 09:00

## 2018-10-22 RX ADMIN — DEXTROSE MONOHYDRATE, SODIUM CHLORIDE, AND POTASSIUM CHLORIDE 150 ML/HR: 50; 4.5; 1.49 INJECTION, SOLUTION INTRAVENOUS at 17:05

## 2018-10-22 RX ADMIN — SODIUM CHLORIDE 5.63 UNITS/HR: 9 INJECTION, SOLUTION INTRAVENOUS at 06:22

## 2018-10-22 RX ADMIN — POTASSIUM CHLORIDE AND SODIUM CHLORIDE 250 ML/HR: 450; 150 INJECTION, SOLUTION INTRAVENOUS at 07:30

## 2018-10-22 RX ADMIN — ATORVASTATIN CALCIUM 20 MG: 20 TABLET, FILM COATED ORAL at 09:00

## 2018-10-22 RX ADMIN — DEXTROSE MONOHYDRATE, SODIUM CHLORIDE, AND POTASSIUM CHLORIDE 150 ML/HR: 50; 4.5; 1.49 INJECTION, SOLUTION INTRAVENOUS at 10:11

## 2018-10-22 RX ADMIN — ENOXAPARIN SODIUM 40 MG: 40 INJECTION SUBCUTANEOUS at 17:05

## 2018-10-22 RX ADMIN — SODIUM CHLORIDE 1000 ML: 900 INJECTION, SOLUTION INTRAVENOUS at 06:20

## 2018-10-22 RX ADMIN — MAGNESIUM SULFATE HEPTAHYDRATE 4 G: 40 INJECTION, SOLUTION INTRAVENOUS at 10:57

## 2018-10-22 RX ADMIN — SODIUM CHLORIDE 500 ML: 9 INJECTION, SOLUTION INTRAVENOUS at 09:04

## 2018-10-22 RX ADMIN — POTASSIUM PHOSPHATE, MONOBASIC AND POTASSIUM PHOSPHATE, DIBASIC 45 MMOL: 224; 236 INJECTION, SOLUTION INTRAVENOUS at 10:57

## 2018-10-22 RX ADMIN — ACETAMINOPHEN 650 MG: 325 TABLET, FILM COATED ORAL at 13:18

## 2018-10-22 RX ADMIN — HUMAN INSULIN 5.6 UNITS: 100 INJECTION, SOLUTION SUBCUTANEOUS at 06:21

## 2018-10-22 NOTE — PLAN OF CARE
Problem: Patient Care Overview  Goal: Plan of Care Review  Outcome: Ongoing (interventions implemented as appropriate)   10/22/18 0415   Coping/Psychosocial   Plan of Care Reviewed With patient   Plan of Care Review   Progress improving   OTHER   Outcome Summary GAP has closed, accu checks have been changed to ac/hs with SSI, VSS, no acute events overnight     Goal: Individualization and Mutuality  Outcome: Ongoing (interventions implemented as appropriate)    Goal: Discharge Needs Assessment  Outcome: Ongoing (interventions implemented as appropriate)      Problem: Diabetes, Type 1 (Adult)  Goal: Signs and Symptoms of Listed Potential Problems Will be Absent, Minimized or Managed (Diabetes, Type 1)   10/22/18 0415   Goal/Outcome Evaluation   Problems Assessed (Type 1 Diabetes) all   Problems Present (Type 1 Diabetes) DKA (diabetic ketoacidosis);situational response

## 2018-10-22 NOTE — PLAN OF CARE
Problem: Patient Care Overview  Goal: Plan of Care Review  Outcome: Ongoing (interventions implemented as appropriate)   10/22/18 1756   Coping/Psychosocial   Plan of Care Reviewed With patient;spouse   Plan of Care Review   Progress improving   OTHER   Outcome Summary Pt remains on Insulin drip/DKA protocol. Last BMP was at 1600 and GAP was 10.8, but bicarb remains slightly low at 20.2. Insulin drip currently infusing at 0.7un/hr. Blood sugars have been mostly in the 140s-190s today. Pt states she is feeling better and is hungry. Diabetic educator came and spoke with pt. Pt to resume insulin pump when able to. Next BMP at 2000. VSS       Problem: Diabetes, Type 1 (Adult)  Goal: Signs and Symptoms of Listed Potential Problems Will be Absent, Minimized or Managed (Diabetes, Type 1)  Outcome: Ongoing (interventions implemented as appropriate)  Pt remains on Insulin drip   10/22/18 1756   Goal/Outcome Evaluation   Problems Assessed (Type 1 Diabetes) all   Problems Present (Type 1 Diabetes) DKA (diabetic ketoacidosis)       Problem: Hyperglycemia, Persistent (Adult)  Goal: Signs and Symptoms of Listed Potential Problems Will be Absent, Minimized or Managed (Hyperglycemia, Persistent)  Outcome: Ongoing (interventions implemented as appropriate)  Pt on DKA protocol/Insulin drip   10/22/18 1756   Goal/Outcome Evaluation   Problems Assessed (Hyperglycemia) all   Problems Present (Hyperglycemia) hyperglycemia       Problem: Fluid Volume Deficit (Adult)  Goal: Optimal Fluid Balance  Outcome: Ongoing (interventions implemented as appropriate)  Pt given 500 cc bolus this AM; good urine output   10/22/18 1756   Fluid Volume Deficit (Adult)   Optimal Fluid Balance making progress toward outcome

## 2018-10-22 NOTE — CONSULTS
"Diabetes Education  Assessment/Teaching    Patient Name:  Olga Greco  YOB: 1945  MRN: [id number]  Admit Date:  10/21/2018      Assessment Date:  10/22/2018    Most Recent Value   General Information    Height  162.6 cm (64\")   Height Method  Stated   Weight  56.3 kg (124 lb 1.9 oz)   Weight Method  Bed scale   Pregnancy Assessment   Diabetes History   What type of diabetes do you have?  Type 1   Current DM knowledge  excellent   Have you had diabetes education/teaching in the past?  no   Do you test your blood sugar at home?  yes   Frequency of checks  7 times a day   Who performs the test?  self   Have you had low blood sugar? (<70mg/dl)  yes   How often do you have low blood sugar?  rare   Have you had high blood sugar? (>140mg/dl)  yes   How often do you have high blood sugar?  occasionally   Education Preferences   Nutrition Information   Assessment Topics   Healthy Eating - Assessment  Needs education   Being Active - Assessment  Needs education   Taking Medication - Assessment  Needs education   Problem Solving - Assessment  Needs education   Reducing Risk - Assessment  Needs education   Healthy Coping - Assessment  Competent   Monitoring - Assessment  Competent   DM Goals   Taking Medication - Goal  Tomorrow            Most Recent Value   DM Education Needs   Meter  Has own   Medication  Insulin [insulin pump]   Problem Solving  Hypoglycemia, Hyperglycemia, Sick days, Signs, Symptoms, Treatment   Reducing Risks  A1C testing   Healthy Eating  RD consult   Physical Activity Frequency  Discussed exercise importance   Healthy Coping  Appropriate   Discharge Plan  Home   Motivation  Strong   Teaching Method  Explanation, Discussion   Patient Response  Verbalized understanding            Other Comments:  Patient took off her sq insulin cannula today and it was bent which is probably why here sugars were not responding to basal/bolus injections.  She has her insulin pump and supplies with her at " the bedside.  ANDREW Fernandez has the Insulin pump contract, order set and flow sheet that are to be filled out prior to insulin pump being restarted.  Patient is very knowledgeable about her pump.  Thank you for this referral.  Please reconsult if needed.        Electronically signed by:  Cece Saleh RN  10/22/18 3:07 PM

## 2018-10-22 NOTE — PLAN OF CARE
Problem: Patient Care Overview  Goal: Plan of Care Review  Outcome: Ongoing (interventions implemented as appropriate)   10/21/18 2012   Coping/Psychosocial   Plan of Care Reviewed With patient   Plan of Care Review   Progress improving   OTHER   Outcome Summary Anion gap is now WNL. DKA protocol not started. CO2 is improved. Insulin drip stopped. No orders to repeat labs at this time. Bedside blood glucose is stabilizing.     Goal: Individualization and Mutuality  Outcome: Ongoing (interventions implemented as appropriate)   10/21/18 2012   Individualization   Patient Specific Goals (Include Timeframe) manage blood glucose levels back to patient's normal in the next 24 hours   Patient Specific Interventions patient off insulin drip, gap is closed, CO2 is improved     Goal: Discharge Needs Assessment  Outcome: Ongoing (interventions implemented as appropriate)   10/21/18 2012   Discharge Needs Assessment   Concerns to be Addressed denies needs/concerns at this time     Goal: Interprofessional Rounds/Family Conf  Outcome: Ongoing (interventions implemented as appropriate)      Problem: Diabetes, Type 1 (Adult)  Goal: Signs and Symptoms of Listed Potential Problems Will be Absent, Minimized or Managed (Diabetes, Type 1)  Outcome: Ongoing (interventions implemented as appropriate)   10/21/18 2012   Goal/Outcome Evaluation   Problems Assessed (Type 1 Diabetes) all   Problems Present (Type 1 Diabetes) DKA (diabetic ketoacidosis);situational response;hyperglycemia       Problem: Diabetes, Type 2 (Adult)  Goal: Signs and Symptoms of Listed Potential Problems Will be Absent, Minimized or Managed (Diabetes, Type 2)  Outcome: Ongoing (interventions implemented as appropriate)   10/21/18 2012   Goal/Outcome Evaluation   Problems Assessed (Type 2 Diabetes) all   Problems Present (Type 2 Diabetes) DKA (diabetic ketoacidosis)/HHS (hyperosmolar hyperglycemic state);hyperglycemia;situational response       Problem: Hyperglycemia,  Persistent (Adult)  Goal: Signs and Symptoms of Listed Potential Problems Will be Absent, Minimized or Managed (Hyperglycemia, Persistent)  Outcome: Ongoing (interventions implemented as appropriate)   10/21/18 2012   Goal/Outcome Evaluation   Problems Assessed (Hyperglycemia) all   Problems Present (Hyperglycemia) hyperglycemia       Problem: Fluid Volume Deficit (Adult)  Goal: Identify Related Risk Factors and Signs and Symptoms  Outcome: Outcome(s) achieved Date Met: 10/21/18   10/21/18 2012   Fluid Volume Deficit (Adult)   Related Risk Factors (Fluid Volume Deficit) advanced age;autoregulation impaired   Signs and Symptoms (Fluid Volume Deficit) lab value changes     Goal: Optimal Fluid Balance  Outcome: Ongoing (interventions implemented as appropriate)   10/21/18 2012   Fluid Volume Deficit (Adult)   Optimal Fluid Balance making progress toward outcome

## 2018-10-22 NOTE — PROGRESS NOTES
"SERVICE: [facility] HOSPITALIST    CONSULTANTS:     CHIEF COMPLAINT: Follow up DKA    SUBJECTIVE: Patient notes return of nausea this morning with elevated glucose.  Noted gap opening overnight, back on insulin drip.  She notes feeling dehydrated.  She reports she did eat food last night. Otherwise Denies f/c/cough/soa/chest pain/n/v/d/abdominal pain or other new concerns.    OBJECTIVE:    /58   Pulse 67   Temp 97.6 °F (36.4 °C) (Oral)   Resp 18   Ht 162.6 cm (64\")   Wt 56.3 kg (124 lb 3.2 oz)   SpO2 98%   BMI 21.32 kg/m²     MEDS/LABS REVIEWED AND ORDERED    atorvastatin 20 mg Oral Daily   enoxaparin 40 mg Subcutaneous Q24H   levothyroxine 75 mcg Oral Daily   sodium chloride 3 mL Intravenous Q12H     Physical Exam   Constitutional: She is oriented to person, place, and time. She appears well-developed and well-nourished.   HENT:   Head: Normocephalic and atraumatic.   Eyes: Pupils are equal, round, and reactive to light. EOM are normal.   Cardiovascular: Normal rate and regular rhythm.    Pulmonary/Chest: Effort normal and breath sounds normal.   Abdominal: Soft. Bowel sounds are normal.   Musculoskeletal: She exhibits no edema.   Neurological: She is alert and oriented to person, place, and time.   Skin: Skin is warm and dry. No erythema.   Psychiatric: She has a normal mood and affect. Her behavior is normal.   Vitals reviewed.    LAB/DIAGNOSTICS:    Lab Results (last 24 hours)     Procedure Component Value Units Date/Time    POC Glucose Once [[id number]]  (Abnormal) Collected:  10/22/18 1000    Specimen:  Blood Updated:  10/22/18 1006     Glucose 150 (H) mg/dL     Narrative:       Meter: [id number] : [id number] Alexander Serrano RN Validator    POC Glucose Once [[id number]]  (Abnormal) Collected:  10/22/18 0847    Specimen:  Blood Updated:  10/22/18 0853     Glucose 199 (H) mg/dL     Narrative:       Meter: [id number] : [id number] Willy Hall RN    Phosphorus [[id number]]  " (Abnormal) Collected:  10/22/18 0743    Specimen:  Blood from Arm, Right Updated:  10/22/18 0816     Phosphorus 1.0 (L) mg/dL     Basic Metabolic Panel [[id number]]  (Abnormal) Collected:  10/22/18 0743    Specimen:  Blood from Arm, Right Updated:  10/22/18 0816     Glucose 267 (H) mg/dL      BUN 15 mg/dL      Creatinine 0.77 mg/dL      Sodium 138 mmol/L      Potassium 3.6 mmol/L      Chloride 107 mmol/L      CO2 13.4 (L) mmol/L      Calcium 7.8 (L) mg/dL      eGFR Non African Amer 74 mL/min/1.73      BUN/Creatinine Ratio 19.5     Anion Gap 17.6 mmol/L     Narrative:       The MDRD GFR formula is only valid for adults with stable renal function between ages 18 and 70.    Magnesium [[id number]]  (Normal) Collected:  10/22/18 0743    Specimen:  Blood from Arm, Right Updated:  10/22/18 0816     Magnesium 1.7 mg/dL     POC Glucose Once [[id number]]  (Abnormal) Collected:  10/22/18 0724    Specimen:  Blood Updated:  10/22/18 0742     Glucose 254 (H) mg/dL     Narrative:       Meter: [id number] : [id number] Alexander Serrano RN Validator    POC Glucose Once [[id number]]  (Abnormal) Collected:  10/22/18 0627    Specimen:  Blood Updated:  10/22/18 0652     Glucose 329 (H) mg/dL     Narrative:       Meter: [id number] : [id number] Will Lewis RN    Basic Metabolic Panel [[id number]]  (Abnormal) Collected:  10/22/18 0527    Specimen:  Blood from Arm, Right Updated:  10/22/18 0559     Glucose 341 (H) mg/dL      BUN 16 mg/dL      Creatinine 0.75 mg/dL      Sodium 138 mmol/L      Potassium 4.6 mmol/L      Chloride 104 mmol/L      CO2 13.1 (L) mmol/L      Calcium 8.3 (L) mg/dL      eGFR Non African Amer 76 mL/min/1.73      BUN/Creatinine Ratio 21.3     Anion Gap 20.9 mmol/L     Narrative:       The MDRD GFR formula is only valid for adults with stable renal function between ages 18 and 70.    POC Glucose Once [[id number]]  (Abnormal) Collected:  10/22/18 0526    Specimen:  Blood Updated:  10/22/18 0545     Glucose 318 (H) mg/dL      Narrative:       Meter: [id number] : [id number] Will Lewis RN    POC Glucose Once [[id number]]  (Normal) Collected:  10/21/18 2336    Specimen:  Blood Updated:  10/21/18 2344     Glucose 116 mg/dL     Narrative:       Meter: [id number] : [id number] Will Lewis RN    POC Glucose Once [[id number]]  (Abnormal) Collected:  10/21/18 2006    Specimen:  Blood Updated:  10/21/18 2034     Glucose 161 (H) mg/dL     Narrative:       INSULIN  GIVEN Meter: [id number] : [id number] Jennifer Chiu RN    POC Glucose Once [[id number]]  (Abnormal) Collected:  10/21/18 1827    Specimen:  Blood Updated:  10/21/18 1833     Glucose 151 (H) mg/dL     Narrative:       Meter: [id number] : [id number] Bronson Thomas RN    Basic Metabolic Panel [[id number]]  (Abnormal) Collected:  10/21/18 1715    Specimen:  Blood from Arm, Right Updated:  10/21/18 1753     Glucose 92 mg/dL      BUN 18 mg/dL      Creatinine 0.72 mg/dL      Sodium 137 mmol/L      Potassium 3.3 (L) mmol/L      Chloride 105 mmol/L      CO2 22.4 mmol/L      Calcium 8.1 (L) mg/dL      eGFR Non African Amer 80 mL/min/1.73      BUN/Creatinine Ratio 25.0     Anion Gap 9.6 mmol/L     Narrative:       The MDRD GFR formula is only valid for adults with stable renal function between ages 18 and 70.    Acetone [[id number]]  (Normal) Collected:  10/21/18 1715    Specimen:  Blood from Arm, Right Updated:  10/21/18 1743     Acetone Negative    POC Glucose Once [[id number]]  (Normal) Collected:  10/21/18 1719    Specimen:  Blood Updated:  10/21/18 1724     Glucose 78 mg/dL     Narrative:       Meter: [id number] : [id number] Bronson Thomas RN    POC Glucose Once [[id number]]  (Normal) Collected:  10/21/18 1616    Specimen:  Blood Updated:  10/21/18 1622     Glucose 118 mg/dL     Narrative:       Meter: [id number] : [id number] Bronson Thomas RN    POC Glucose Once [[id number]]  (Abnormal) Collected:  10/21/18 1509    Specimen:  Blood Updated:  10/21/18 1526     Glucose 170 (H)  mg/dL     Narrative:       Meter: [id number] : [id number] Bronson Thomas RN    POC Glucose Once [[id number]]  (Abnormal) Collected:  10/21/18 1431    Specimen:  Blood Updated:  10/21/18 1437     Glucose 199 (H) mg/dL     Narrative:       Meter: [id number] : [id number] [employer]    POC Glucose Once [[id number]]  (Abnormal) Collected:  10/21/18 1300    Specimen:  Blood Updated:  10/21/18 1306     Glucose 309 (H) mg/dL     Narrative:       Meter: [id number] : [id number] [employer]    POC Glucose Once [[id number]]  (Abnormal) Collected:  10/21/18 1215    Specimen:  Blood Updated:  10/21/18 1221     Glucose 305 (H) mg/dL     Narrative:       Meter: [id number] : [id number] James Almaraz NURSING ASSISTANT    Blood Gas, Arterial [[id number]]  (Abnormal) Collected:  10/21/18 1150    Specimen:  Arterial Blood Updated:  10/21/18 1200     Site Left Brachial     Will's Test N/A     pH, Arterial 7.311 (L) pH units      pCO2, Arterial 31.6 (L) mm Hg      pO2, Arterial 90.5 mm Hg      HCO3, Arterial 15.9 (L) mmol/L      Base Excess, Arterial -9.2 (L) mmol/L      O2 Saturation, Arterial 96.5 %      Hemoglobin, Blood Gas 12.5 (L) g/dL      Temperature 37.0 C      Barometric Pressure for Blood Gas 748 mmHg      Modality Room Air     Ventilator Mode NA     pCO2, Temperature Corrected 31.6 (L) mm Hg      pH, Temp Corrected 7.311 (L) pH Units      pO2, Temperature Corrected 90.5 mm Hg     Ketone Bodies, Serum (Not performed at [facility]) [[id number]] Collected:  10/21/18 1029    Specimen:  Blood Updated:  10/21/18 1134    Narrative:       The following orders were created for panel order Ketone Bodies, Serum (Not performed at [facility]).  Procedure                               Abnormality         Status                     ---------                               -----------         ------                     Acetone[[id number]]                      Abnormal            Final result                  Please view results for these tests on the individual orders.    Acetone [[id number]]  (Abnormal) Collected:  10/21/18 1029    Specimen:  Blood Updated:  10/21/18 1134     Acetone Trace (A)    BNP [[id number]]  (Normal) Collected:  10/21/18 1029    Specimen:  Blood Updated:  10/21/18 1128     proBNP 166.9 pg/mL     Narrative:       Among patients with dyspnea, NT-proBNP is highly sensitive for the detection of acute congestive heart failure. In addition NT-proBNP of <300 pg/ml effectively rules out acute congestive heart failure with 99% negative predictive value.    Troponin [[id number]]  (Normal) Collected:  10/21/18 1029    Specimen:  Blood Updated:  10/21/18 1120     Troponin T <0.010 ng/mL     Narrative:       Troponin T Reference Ranges:  Less than 0.03 ng/mL:    Negative for AMI  0.03 to 0.09 ng/mL:      Indeterminant for AMI  Greater than 0.09 ng/mL: Positive for AMI    Comprehensive Metabolic Panel [[id number]]  (Abnormal) Collected:  10/21/18 1029    Specimen:  Blood Updated:  10/21/18 1118     Glucose 388 (H) mg/dL      BUN 22 mg/dL      Creatinine 0.83 mg/dL      Sodium 132 (L) mmol/L      Potassium 4.6 mmol/L      Chloride 91 (L) mmol/L      CO2 18.6 (L) mmol/L      Calcium 9.1 mg/dL      Total Protein 6.6 g/dL      Albumin 4.30 g/dL      ALT (SGPT) 92 (H) U/L      AST (SGOT) 86 (H) U/L      Alkaline Phosphatase 95 U/L      Total Bilirubin 1.5 (H) mg/dL      eGFR Non African Amer 68 mL/min/1.73      Globulin 2.3 gm/dL      A/G Ratio 1.9 g/dL      BUN/Creatinine Ratio 26.5 (H)     Anion Gap 22.4 mmol/L     Narrative:       The MDRD GFR formula is only valid for adults with stable renal function between ages 18 and 70.        ECG 12 Lead   ED Interpretation   Skyler Stephen MD     10/21/2018  1:36 PM   ECG 12 Lead      Date/Time: 10/21/2018 9:44 AM   Performed by: SKYLER STEPHEN   Authorized by: SKYLER STEPHEN    Comments: EKG performed 09:44, EKG read 09:46.  Normal sinus rhythms rate    91, left  axis deviation, no acute ST segment elevation or depression    consistent with ischemia, he weighs in leads 3 and aVF suggestive of a    prior inferior MI, no ectopy, normal GA and QT intervals, poor R-wave    progression.  No old tracings available for comparison.         Final Result   RR Interval= 659 ms   GA Interval= 151 ms   QRSD Interval= 82 ms   QT Interval= 371 ms   QTc Interval= 457 ms   Heart Rate= 91 ms   P Axis= 55 deg   QRS Axis= -85 deg   T Wave Axis= 56 deg   I: 40 Axis= -21 deg   T: 40 Axis= 254 deg   ST Axis= 55 deg   Sinus rhythm   Probable left atrial enlargement   Abnormal R-wave progression, late transition   Inferior infarct, old   c/w prior ecg, inferior q waves are now seen   Electronically Signed by:  Danna Akers ([facility]) 22-Oct-2018 09:53:56   Date and Time of Study: 2018-10-21 09:44:10           Xr Chest 2 View    Result Date: 10/22/2018  Negative chest.  This report was finalized on 10/22/2018 7:11 AM by Dr. Jaiden Brantley MD.      ASSESSMENT/PLAN:  DKA/DM1 with hyperglycemia: A1c 7.2%  Electrolyte imbalance:  Resume DKA protocol   Anion gap reopened with discontinuation of insulin drip and oral diet overnight, CO2 down to 13.1  Interestingly serum acetone and urine ketones are now negative  Resume insulin drip, IV hydration, nothing by mouth  Monitor, diabetic educator and nutrition to see    Macrocytosis: Check B12 and folate    Hypothyroidism: No acute issues on levothyroxin 75 µg daily    Hypertension: Blood pressure at goal, no acute issues here, noted in past on Lotensin HCT  Hyperlipidemia: No acute issues on Lipitor 20 mg daily

## 2018-10-22 NOTE — PLAN OF CARE
Problem: Patient Care Overview  Goal: Discharge Needs Assessment  Outcome: Ongoing (interventions implemented as appropriate)   10/21/18 1959 10/22/18 0930 10/22/18 0941   Discharge Needs Assessment   Readmission Within the Last 30 Days --  no previous admission in last 30 days --    Concerns to be Addressed --  denies needs/concerns at this time --    Patient/Family Anticipates Transition to --  home with family --    Patient/Family Anticipated Services at Transition education services;durable medical equipment --  --    Transportation Anticipated --  family or friend will provide --    Equipment Needed After Discharge --  none --    Discharge Coordination/Progress --  --  Patient in agreement with speaking to  at bedside. She lives in a multi level home with . She is able to stay on one level. She has been independent with ADLs and still drives. Daughter lives close and available to assist is needed. She has no home health, home oxygen, nebulizer or CPAP/BiPAP services. She does have a glucometer and an implanted insulin pump. She says she checks her blood sugars seven times a day. She has a Living Will. She fills scripts at [facility] in [place] and has insurance coverage. She has no issues getting or paying for her medicines. Verified home address, phone number and PCP. Patient says the plan is home when medically stable.  will continue to follow.    Disability   Equipment Currently Used at Home --  glucometer;other (see comments)  (insulin implanted pump) --

## 2018-10-22 NOTE — NURSING NOTE
Discharge Planning Assessment  THERESE Pelletier     Patient Name: Olga Greco  MRN: [id number]  Today's Date: 10/22/2018    Admit Date: 10/21/2018          Discharge Needs Assessment     Row Name 10/22/18 0930       Living Environment    Lives With spouse    Current Living Arrangements home/apartment/condo    Primary Care Provided by self    Provides Primary Care For no one    Family Caregiver if Needed spouse    Able to Return to Prior Arrangements yes       Resource/Environmental Concerns    Resource/Environmental Concerns none       Transition Planning    Patient/Family Anticipates Transition to home with family    Transportation Anticipated family or friend will provide       Discharge Needs Assessment    Readmission Within the Last 30 Days no previous admission in last 30 days    Concerns to be Addressed denies needs/concerns at this time    Equipment Currently Used at Home glucometer;other (see comments)   insulin implanted pump    Equipment Needed After Discharge none            Discharge Plan     Row Name 10/22/18 0955       Plan    Plan Comments Patient in agreement with speaking to  at bedside.  She lives in a multi level home with .  She is able to stay on one level.  She has been independent with ADLs and still drives.  Daughter lives close and available to assist is needed. She has no home health, home oxygen, nebulizer or CPAP/BiPAP services.  She does have a glucometer and an implanted insulin pump.  She says she checks her blood sugars seven times a day.  She has a Living Will.  She [facility] scripts [facility] [place] and has insurance coverage.  She has no issues getting or paying for her medicines.  Verified home address, phone number and PCP.  Patient says the plan is home when medically stable.   will continue to follow.     Row Name 10/22/18 2970       Plan    Plan Plan is home.    Patient/Family in Agreement with Plan unable to assess        Destination      No service coordination in this encounter.      Durable Medical Equipment     No service coordination in this encounter.      Dialysis/Infusion     No service coordination in this encounter.      Home Medical Care     No service coordination in this encounter.      Social Care     No service coordination in this encounter.                Demographic Summary     Row Name 10/22/18 0929       General Information    Admission Type inpatient    Arrived From home    Referral Source admission list    Reason for Consult discharge planning    Preferred Language English     Used During This Interaction no       Contact Information    Permission Granted to Share Info With             Functional Status    No documentation.           Psychosocial    No documentation.           Abuse/Neglect    No documentation.           Legal    No documentation.           Substance Abuse    No documentation.           Patient Forms    No documentation.         Mary Jimenes RN

## 2018-10-22 NOTE — CONSULTS
Adult Nutrition  Assessment/PES    Patient Name:  Olga Greco  YOB: 1945  MRN: [id number]  Admit Date:  10/21/2018    Assessment Date:  10/22/2018    Comments:  Advance diet as indicated per DKA protocol.   Will cont to follow          Reason for Assessment     Row Name 10/22/18 1207          Reason for Assessment    Reason For Assessment physician consult     Diagnosis diabetes diagnosis/complications   DKA, insulin pump 8/2018 hx type 1 (as an adult), HTN             Nutrition/Diet History     Row Name 10/22/18 1207          Nutrition/Diet History    Typical Food/Fluid Intake Pt up in chair in ICU. NKFA> Denies issue chew/swallowing. Reports treated as type 2 for 5 yrs then it was decided she was type 1. Feels like insulin pump still need adjusting b/c BG goes high w things she doesn't think should as much. Reports 45-60 gm CHO is target for meals but report of actual intake appears much less.      Meal/Snack Patterns am: egg, toast, SF jelly  Lunch: meat/cheese roll up or PB SF jelly sandwich 4pm glass wine with cheese/crackers/nuts 6pm dinner baked fish, carrots, green beans maybe half of roll or potatoes if fancy             Anthropometrics     Row Name 10/22/18 1209 10/22/18 0529       Anthropometrics    Weight 56.3 kg (124 lb 1.9 oz) 56.3 kg (124 lb 3.2 oz)       Usual Body Weight (UBW)    Weight Gain --   reports maybe gained 2# with pump  --       Body Mass Index (BMI)    BMI Assessment BMI 18.5-24.9: normal  --            Labs/Tests/Procedures/Meds     Row Name 10/22/18 1210          Labs/Procedures/Meds    Lab Results Reviewed reviewed     Lab Results Comments phos 1.0 L, HgA1c 7.3         Diagnostic Tests/Procedures    Diagnostic Test/Procedure Reviewed reviewed        Medications    Pertinent Medications Reviewed reviewed     Pertinent Medications Comments insulin               Estimated/Assessed Needs     Row Name 10/22/18 1211          Calculation Measurements    Weight Used For  Calculations 56.3 kg (124 lb 1.9 oz)        Estimated/Assessed Needs    Additional Documentation Calorie Requirements (Group);Fluid Requirements (Group);Protein Requirements (Group)        Calorie Requirements    Estimated Calorie Need Method [place]     Estimated Calorie Requirement Comment 5632-6306 kcal    150 gm CHo, 45% kcal                                                                                        Protein Requirements    Est Protein Requirement Amount (gms/kg) 1.0 gm protein     Estimated Protein Requirements (gms/day) 56.3        Fluid Requirements    Estimated Fluid Requirements (mL/day) 1383     Estimated Fluid Requirement Method RDA Method     RDA Method (mL) 1383                   Nutrition Prescription Ordered     Row Name 10/22/18 1211          Nutrition Prescription PO    Current PO Diet NPO             Evaluation of Received Nutrient/Fluid Intake     Row Name 10/22/18 1212 10/22/18 1211       Calculation Measurements    Weight Used For Calculations  -- 56.3 kg (124 lb 1.9 oz)       Fluid Intake Evaluation    Oral Fluid (mL) 840   61%  --       PO Evaluation    Number of Days PO Intake Evaluated Insufficient Data  --            Evaluation of Prescribed Nutrient/Fluid Intake     Row Name 10/22/18 1211          Calculation Measurements    Weight Used For Calculations 56.3 kg (124 lb 1.9 oz)           Problem/Interventions:        Problem 1     Row Name 10/22/18 1212          Nutrition Diagnoses Problem 1    Problem 1 Altered Nutrition Related to Labs     Etiology (related to) Medical Diagnosis     Endocrine DM1   DKA     Signs/Symptoms (evidenced by) NPO;Biochemical                     Intervention Goal     Row Name 10/22/18 1213          Intervention Goal    PO Establish PO;PO intake (%)     PO Intake % 50 %     Weight No significant weight loss             Nutrition Intervention     Row Name 10/22/18 1213          Nutrition Intervention    RD/Tech Action Follow Tx progress              Nutrition Prescription     Row Name 10/22/18 1213          Other Orders    Other diet as indicated per DKA protocol             Education/Evaluation     Row Name 10/22/18 1213          Education    Education Other (comment)   Pt statees 45-60 gm CHO goal for meal but up on review appears to be much lower than that.         Monitor/Evaluation    Monitor Per protocol;I&O;PO intake;Pertinent labs;Weight;Symptoms     Education Follow-up Reinforce PRN         Electronically signed by:  Veda Whitehead RD  10/22/18 12:14 PM

## 2018-10-23 VITALS
HEIGHT: 64 IN | DIASTOLIC BLOOD PRESSURE: 76 MMHG | BODY MASS INDEX: 21.65 KG/M2 | SYSTOLIC BLOOD PRESSURE: 158 MMHG | OXYGEN SATURATION: 100 % | TEMPERATURE: 96.4 F | RESPIRATION RATE: 18 BRPM | HEART RATE: 67 BPM | WEIGHT: 126.8 LBS

## 2018-10-23 LAB
ANION GAP SERPL CALCULATED.3IONS-SCNC: 10.5 MMOL/L
ANION GAP SERPL CALCULATED.3IONS-SCNC: 8.3 MMOL/L
ANION GAP SERPL CALCULATED.3IONS-SCNC: 9.7 MMOL/L
BUN BLD-MCNC: 4 MG/DL (ref 8–23)
BUN BLD-MCNC: 5 MG/DL (ref 8–23)
BUN BLD-MCNC: 6 MG/DL (ref 8–23)
BUN/CREAT SERPL: 6.3 (ref 7–25)
BUN/CREAT SERPL: 8.2 (ref 7–25)
BUN/CREAT SERPL: 8.8 (ref 7–25)
CALCIUM SPEC-SCNC: 7.7 MG/DL (ref 8.8–10.5)
CALCIUM SPEC-SCNC: 8.2 MG/DL (ref 8.8–10.5)
CALCIUM SPEC-SCNC: 8.3 MG/DL (ref 8.8–10.5)
CHLORIDE SERPL-SCNC: 105 MMOL/L (ref 98–107)
CHLORIDE SERPL-SCNC: 109 MMOL/L (ref 98–107)
CHLORIDE SERPL-SCNC: 113 MMOL/L (ref 98–107)
CO2 SERPL-SCNC: 21.3 MMOL/L (ref 22–29)
CO2 SERPL-SCNC: 21.7 MMOL/L (ref 22–29)
CO2 SERPL-SCNC: 23.5 MMOL/L (ref 22–29)
CREAT BLD-MCNC: 0.57 MG/DL (ref 0.57–1)
CREAT BLD-MCNC: 0.64 MG/DL (ref 0.57–1)
CREAT BLD-MCNC: 0.73 MG/DL (ref 0.57–1)
GFR SERPL CREATININE-BSD FRML MDRD: 105 ML/MIN/1.73
GFR SERPL CREATININE-BSD FRML MDRD: 79 ML/MIN/1.73
GFR SERPL CREATININE-BSD FRML MDRD: 91 ML/MIN/1.73
GLUCOSE BLD-MCNC: 100 MG/DL (ref 65–99)
GLUCOSE BLD-MCNC: 232 MG/DL (ref 65–99)
GLUCOSE BLD-MCNC: 298 MG/DL (ref 65–99)
GLUCOSE BLDC GLUCOMTR-MCNC: 102 MG/DL (ref 70–130)
GLUCOSE BLDC GLUCOMTR-MCNC: 119 MG/DL (ref 70–130)
GLUCOSE BLDC GLUCOMTR-MCNC: 141 MG/DL (ref 70–130)
GLUCOSE BLDC GLUCOMTR-MCNC: 150 MG/DL (ref 70–130)
GLUCOSE BLDC GLUCOMTR-MCNC: 152 MG/DL (ref 70–130)
GLUCOSE BLDC GLUCOMTR-MCNC: 167 MG/DL (ref 70–130)
GLUCOSE BLDC GLUCOMTR-MCNC: 205 MG/DL (ref 70–130)
GLUCOSE BLDC GLUCOMTR-MCNC: 267 MG/DL (ref 70–130)
GLUCOSE BLDC GLUCOMTR-MCNC: 303 MG/DL (ref 70–130)
GLUCOSE BLDC GLUCOMTR-MCNC: 89 MG/DL (ref 70–130)
MAGNESIUM SERPL-MCNC: 2.1 MG/DL (ref 1.7–2.5)
MAGNESIUM SERPL-MCNC: 2.4 MG/DL (ref 1.7–2.5)
PHOSPHATE SERPL-MCNC: 2 MG/DL (ref 2.7–4.5)
PHOSPHATE SERPL-MCNC: 2.9 MG/DL (ref 2.7–4.5)
POTASSIUM BLD-SCNC: 4.1 MMOL/L (ref 3.5–5.2)
POTASSIUM BLD-SCNC: 4.2 MMOL/L (ref 3.5–5.2)
POTASSIUM BLD-SCNC: 4.3 MMOL/L (ref 3.5–5.2)
SODIUM BLD-SCNC: 139 MMOL/L (ref 136–145)
SODIUM BLD-SCNC: 140 MMOL/L (ref 136–145)
SODIUM BLD-SCNC: 143 MMOL/L (ref 136–145)

## 2018-10-23 PROCEDURE — 82962 GLUCOSE BLOOD TEST: CPT

## 2018-10-23 PROCEDURE — 80048 BASIC METABOLIC PNL TOTAL CA: CPT | Performed by: INTERNAL MEDICINE

## 2018-10-23 PROCEDURE — 80048 BASIC METABOLIC PNL TOTAL CA: CPT | Performed by: NURSE PRACTITIONER

## 2018-10-23 PROCEDURE — 83735 ASSAY OF MAGNESIUM: CPT | Performed by: INTERNAL MEDICINE

## 2018-10-23 PROCEDURE — 84100 ASSAY OF PHOSPHORUS: CPT | Performed by: INTERNAL MEDICINE

## 2018-10-23 PROCEDURE — 99238 HOSP IP/OBS DSCHRG MGMT 30/<: CPT | Performed by: NURSE PRACTITIONER

## 2018-10-23 RX ORDER — POTASSIUM CHLORIDE 750 MG/1
TABLET, EXTENDED RELEASE ORAL
Status: COMPLETED
Start: 2018-10-23 | End: 2018-10-23

## 2018-10-23 RX ADMIN — DEXTROSE MONOHYDRATE 12.5 G: 25 INJECTION, SOLUTION INTRAVENOUS at 05:03

## 2018-10-23 RX ADMIN — ATORVASTATIN CALCIUM 20 MG: 20 TABLET, FILM COATED ORAL at 09:16

## 2018-10-23 RX ADMIN — POTASSIUM CHLORIDE 10 MEQ: 10 TABLET, EXTENDED RELEASE ORAL at 00:37

## 2018-10-23 RX ADMIN — Medication 3 ML: at 09:00

## 2018-10-23 RX ADMIN — SODIUM PHOSPHATE, MONOBASIC, MONOHYDRATE 15 MMOL: 276; 142 INJECTION, SOLUTION INTRAVENOUS at 01:06

## 2018-10-23 RX ADMIN — DEXTROSE MONOHYDRATE, SODIUM CHLORIDE, AND POTASSIUM CHLORIDE 150 ML/HR: 50; 4.5; 1.49 INJECTION, SOLUTION INTRAVENOUS at 05:35

## 2018-10-23 RX ADMIN — POTASSIUM CHLORIDE 10 MEQ: 750 CAPSULE, EXTENDED RELEASE ORAL at 05:34

## 2018-10-23 RX ADMIN — POTASSIUM CHLORIDE 10 MEQ: 10 TABLET, EXTENDED RELEASE ORAL at 05:34

## 2018-10-23 RX ADMIN — POTASSIUM CHLORIDE 10 MEQ: 750 CAPSULE, EXTENDED RELEASE ORAL at 00:37

## 2018-10-23 NOTE — PLAN OF CARE
Problem: Patient Care Overview  Goal: Plan of Care Review  Outcome: Ongoing (interventions implemented as appropriate)   10/23/18 0514   Coping/Psychosocial   Plan of Care Reviewed With patient   Plan of Care Review   Progress no change   OTHER   Outcome Summary Patient remains on insulin gtt tonight. Patient's BG increased to 239 tonight which required insulin gtt titration, then patient's BG dropped as low as 89 which required 12.5mg of 50% dextrose to be administered. RN continuing to monitor patient's BG closely. Anion gap has closed, with most recent result being 8.3. Patient resting comfortably tonight. Continue to monitor closely, continue with current plan of care.        Problem: Diabetes, Type 1 (Adult)  Goal: Signs and Symptoms of Listed Potential Problems Will be Absent, Minimized or Managed (Diabetes, Type 1)  Outcome: Ongoing (interventions implemented as appropriate)      Problem: Diabetes, Type 2 (Adult)  Goal: Signs and Symptoms of Listed Potential Problems Will be Absent, Minimized or Managed (Diabetes, Type 2)  Outcome: Ongoing (interventions implemented as appropriate)      Problem: Hyperglycemia, Persistent (Adult)  Goal: Signs and Symptoms of Listed Potential Problems Will be Absent, Minimized or Managed (Hyperglycemia, Persistent)  Outcome: Ongoing (interventions implemented as appropriate)      Problem: Fluid Volume Deficit (Adult)  Goal: Optimal Fluid Balance  Outcome: Ongoing (interventions implemented as appropriate)

## 2018-10-23 NOTE — NURSING NOTE
Pt  before breakfast pt gave self novolog insulin pump bolus of 2.6 units with bfst.  after bfst, no bolus given. Nurse rechecked at 0920 to turn ins gtt off as ordered,  at this time after bfst.

## 2018-10-23 NOTE — PROGRESS NOTES
Adult Nutrition  Assessment/PES    Patient Name:  Olga Greco  YOB: 1945  MRN: [id number]  Admit Date:  10/21/2018    Assessment Date:  10/23/2018    Comments:            Reason for Assessment     Row Name 10/23/18 1047          Reason for Assessment    Reason For Assessment other (see comments)   education                 Nutrition Prescription Ordered     Row Name 10/23/18 1047          Nutrition Prescription PO    Common Modifiers Consistent Carbohydrate                   Education/Evaluation     Row Name 10/23/18 1047          Education    Education Education topics   Reviewed goal for at least RDA 45 gm/meal CHO with pt. She feels her basal dose likely need to be adjusted b/c her BG reacts quickly to any amount CHO. Pt asked appropriate questions about wine & CHO.      Education Topics CHO counting;Diabetes        Monitor/Evaluation    Education Follow-up Other (comment)   Verbalized understanding, expect good compliance.          Electronically signed by:  Veda Whitehead RD  10/23/18 10:49 AM

## 2018-10-23 NOTE — NURSING NOTE
UNABLE TO GET A HOLD OF DIABETIC EDUCATOR. CALLED cd IN [facility], AND [facility] DEPT AND LEFT MESSAGES

## 2018-10-23 NOTE — DISCHARGE SUMMARY
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        Operations and Procedures Performed:     Xr Chest 2 View    Result Date: 10/22/2018  Narrative: INDICATION: Chest tightness and tachycardia for 24 hours.  COMPARISON: None.  FINDINGS: PA and lateral chest. Cardiac and mediastinal contours are normal. Lungs are clear. Vascularity is normal. There is no pneumothorax.      Impression: Negative chest.  This report was finalized on 10/22/2018 7:11 AM by Dr. Jaiden Brantley MD.      Allergies:  is allergic to aspirin and epinephrine.    Emile  n/a    Discharge Medications:     Discharge Medications      Changes to Medications      Instructions Start Date   insulin aspart 100 UNIT/ML injection  Commonly known as:  NOVOLOG  What changed:  · how much to take  · how to take this  · when to take this  · additional instructions   50 units daily VIA insulin Pump      levothyroxine 75 MCG tablet  Commonly known as:  SYNTHROID, LEVOTHROID  What changed:  when to take this   75 mcg, Oral, Daily         Continue These Medications      Instructions Start Date   ACCU-CHEK COMPACT CARE KIT kit   1 kit, Does not apply, 4 Times Daily, ICD CODE E10.9      ACCU-CHEK RICHARD SMARTVIEW w/Device kit   Use to check blood sugar 4 times daily  E10.9      ACCU-CHEK SOFTCLIX LANCETS lancets   USE TO TEST BLOOD SUGAR 4 TIMES DAILY      ACCU-CHEK FASTCLIX LANCETS misc   Use to test blood sugar 4 times daily e 10.9      atorvastatin 20 MG tablet  Commonly known as:  LIPITOR   20 mg, Oral, Daily      Biotin 35850 MCG tablet   1 tablet, Oral,  "Daily      CALCIUM 600+D3 PO   500 Units, Oral, Daily      Chromium Picolinate 1000 MCG tablet   1,000 mcg, Oral, Daily      glucagon 1 MG injection  Commonly known as:  GLUCAGON EMERGENCY   1 mg, Subcutaneous, Once As Needed      Insulin Pen Needle 32G X 4 MM misc  Commonly known as:  BD PEN NEEDLE RICHARD U/F   USE TO INJECT INSULIN      vitamin b complex capsule capsule   1 capsule, Oral, Daily         Stop These Medications    benazepril-hydrochlorthiazide 20-12.5 MG per tablet  Commonly known as:  LOTENSIN HCT     CONTOUR NEXT TEST test strip  Generic drug:  glucose blood          History of Present Illness: Taken from [facility] on admit:  \"A very pleasant 73-year-old white female patient Dr. Polk who presented emergency room with complaints of elevated blood sugar.  Patient's monitors her blood sugars 7 times a day and has been on insulin pump since August 2018.  Last 48 hours her blood sugars are ranging persistently over 250-300 and was not able to get them down.  She has given herself several extra insulin boluses better sugars remain high.  She said she did eat more than she normally does last night and some Mexican food but they went up even before she went out to dinner.  She will not sensitivity to cats and came back inside feels some chest tightness across her entire chest felt tired and weak like her blood sugars were low but then checked her blood sugars but they're actually high.  She denies any chest pain no radiation of pain no diaphoresis  She denies any fever chills dysuria frequency of bowel pain nausea vomiting\"     Hospital Course  DKA/DM1 with hyperglycemia: A1c 7.2%  Electrolyte imbalance:  Pump pad needle was kinked and patient was not receiving insulin as cause of DKA  Gap closed, d/c'd insulin drip, overlapped x 2 hours with patient's home pump with continued gap closure  Diabetic educator and nutrition followed and patient was in contact with her diabetic provider while here as well  The " patient scheduled herself an appointment with Dr. Hernandez 11/5/18  F/U Yair Zapata MD 1 week     Macrocytosis: B12 and folate normal  F/U Yair Zapata MD      Hypothyroidism: No acute issues on levothyroxine 75 µg daily     Hypertension: Blood pressure at goal, no acute issues here, no medications  Noted in past on Lotensin HCT     Hyperlipidemia: No acute issues on Lipitor 20 mg daily    Last Lab Results:   Lab Results (most recent)     Procedure Component Value Units Date/Time    POC Glucose Once [[id number]]  (Abnormal) Collected:  10/23/18 0919    Specimen:  Blood Updated:  10/23/18 0925     Glucose 267 (H) mg/dL     Narrative:       Meter: [id number] : [id number] Matty Luevano RN    POC Glucose Once [[id number]]  (Abnormal) Collected:  10/23/18 0655    Specimen:  Blood Updated:  10/23/18 0703     Glucose 167 (H) mg/dL     Narrative:       Meter: [id number] : [id number] Will Lewis RN    POC Glucose Once [[id number]]  (Abnormal) Collected:  10/23/18 0559    Specimen:  Blood Updated:  10/23/18 0606     Glucose 152 (H) mg/dL     Narrative:       Meter: [id number] : [id number] Will Lewis RN    POC Glucose Once [[id number]]  (Abnormal) Collected:  10/23/18 0522    Specimen:  Blood Updated:  10/23/18 0529     Glucose 141 (H) mg/dL     Narrative:       Meter: [id number] : [id number] Will Lewis RN    POC Glucose Once [[id number]]  (Normal) Collected:  10/23/18 0459    Specimen:  Blood Updated:  10/23/18 0505     Glucose 89 mg/dL     Narrative:       Meter: [id number] : [id number] Will Lewis RN    Phosphorus [[id number]]  (Normal) Collected:  10/23/18 0403    Specimen:  Blood from Arm, Left Updated:  10/23/18 0459     Phosphorus 2.9 mg/dL     Basic Metabolic Panel [[id number]]  (Abnormal) Collected:  10/23/18 0403    Specimen:  Blood from Arm, Left Updated:  10/23/18 0459     Glucose 100 (H) mg/dL      BUN 5 (L) mg/dL      Creatinine 0.57 mg/dL      Sodium 143 mmol/L       Potassium 4.1 mmol/L      Chloride 113 (H) mmol/L      CO2 21.7 (L) mmol/L      Calcium 7.7 (L) mg/dL      eGFR Non African Amer 105 mL/min/1.73      BUN/Creatinine Ratio 8.8     Anion Gap 8.3 mmol/L     Narrative:       The MDRD GFR formula is only valid for adults with stable renal function between ages 18 and 70.    Magnesium [[id number]]  (Normal) Collected:  10/23/18 0403    Specimen:  Blood from Arm, Left Updated:  10/23/18 0453     Magnesium 2.1 mg/dL     POC Glucose Once [[id number]]  (Normal) Collected:  10/23/18 0402    Specimen:  Blood Updated:  10/23/18 0418     Glucose 102 mg/dL     Narrative:       Meter: [id number] : [id number] Will Lewis RN    POC Glucose Once [[id number]]  (Normal) Collected:  10/23/18 0258    Specimen:  Blood Updated:  10/23/18 0306     Glucose 119 mg/dL     Narrative:       Meter: [id number] : [id number] Will Lewis RN    POC Glucose Once [[id number]]  (Abnormal) Collected:  10/23/18 0156    Specimen:  Blood Updated:  10/23/18 0204     Glucose 150 (H) mg/dL     Narrative:       Meter: [id number] : [id number] Brianne Muse RN    POC Glucose Once [[id number]]  (Abnormal) Collected:  10/23/18 0101    Specimen:  Blood Updated:  10/23/18 0108     Glucose 205 (H) mg/dL     Narrative:       Meter: [id number] : [id number] Brianne Muse RN    Phosphorus [[id number]]  (Abnormal) Collected:  10/22/18 2350    Specimen:  Blood Updated:  10/23/18 0022     Phosphorus 2.0 (L) mg/dL     Basic Metabolic Panel [[id number]]  (Abnormal) Collected:  10/22/18 2350    Specimen:  Blood Updated:  10/23/18 0019     Glucose 232 (H) mg/dL      BUN 6 (L) mg/dL      Creatinine 0.73 mg/dL      Sodium 140 mmol/L      Potassium 4.3 mmol/L      Chloride 109 (H) mmol/L      CO2 21.3 (L) mmol/L      Calcium 8.2 (L) mg/dL      eGFR Non African Amer 79 mL/min/1.73      BUN/Creatinine Ratio 8.2     Anion Gap 9.7 mmol/L     Narrative:       The MDRD GFR formula is only valid for adults with stable renal  function between ages 18 and 70.    Magnesium [[id number]]  (Normal) Collected:  10/22/18 2350    Specimen:  Blood Updated:  10/23/18 0019     Magnesium 2.4 mg/dL     POC Glucose Once [[id number]]  (Abnormal) Collected:  10/22/18 2348    Specimen:  Blood Updated:  10/22/18 2358     Glucose 230 (H) mg/dL     Narrative:       Meter: [id number] : [id number] Will Lewis RN    POC Glucose Once [[id number]]  (Abnormal) Collected:  10/22/18 2259    Specimen:  Blood Updated:  10/22/18 2309     Glucose 239 (H) mg/dL     Narrative:       Meter: [id number] : [id number] Brianne Muse RN    POC Glucose Once [[id number]]  (Abnormal) Collected:  10/22/18 2156    Specimen:  Blood Updated:  10/22/18 2202     Glucose 230 (H) mg/dL     Narrative:       Meter: [id number] : [id number] Will Lewis RN    POC Glucose Once [[id number]]  (Abnormal) Collected:  10/22/18 2059    Specimen:  Blood Updated:  10/22/18 2106     Glucose 241 (H) mg/dL     Narrative:       Meter: [id number] : [id number] Will Lewis RN    Phosphorus [[id number]]  (Normal) Collected:  10/22/18 2000    Specimen:  Blood from Arm, Left Updated:  10/22/18 2024     Phosphorus 3.7 mg/dL     Basic Metabolic Panel [[id number]]  (Abnormal) Collected:  10/22/18 2000    Specimen:  Blood from Arm, Left Updated:  10/22/18 2024     Glucose 256 (H) mg/dL      BUN 8 mg/dL      Creatinine 0.64 mg/dL      Sodium 138 mmol/L      Potassium 4.9 mmol/L      Chloride 107 mmol/L      CO2 19.2 (L) mmol/L      Calcium 7.7 (L) mg/dL      eGFR Non African Amer 91 mL/min/1.73      BUN/Creatinine Ratio 12.5     Anion Gap 11.8 mmol/L     Narrative:       The MDRD GFR formula is only valid for adults with stable renal function between ages 18 and 70.    Magnesium [[id number]]  (Normal) Collected:  10/22/18 2000    Specimen:  Blood from Arm, Left Updated:  10/22/18 2024     Magnesium 2.5 mg/dL     POC Glucose Once [[id number]]  (Abnormal) Collected:  10/22/18 1959    Specimen:  Blood  Updated:  10/22/18 2018     Glucose 251 (H) mg/dL     Narrative:       Meter: [id number] : [id number] Will Lewis RN    POC Glucose Once [[id number]]  (Abnormal) Collected:  10/22/18 1800    Specimen:  Blood Updated:  10/22/18 1816     Glucose 199 (H) mg/dL     Narrative:       Meter: [id number] : [id number] Alexander Serrano RN Validator    Phosphorus [[id number]]  (Normal) Collected:  10/22/18 1620    Specimen:  Blood Updated:  10/22/18 1656     Phosphorus 3.5 mg/dL     Magnesium [[id number]]  (Abnormal) Collected:  10/22/18 1620    Specimen:  Blood Updated:  10/22/18 1656     Magnesium 2.8 (H) mg/dL     Basic Metabolic Panel [[id number]]  (Abnormal) Collected:  10/22/18 1620    Specimen:  Blood Updated:  10/22/18 1656     Glucose 205 (H) mg/dL      BUN 10 mg/dL      Creatinine 0.65 mg/dL      Sodium 140 mmol/L      Potassium 4.8 mmol/L      Chloride 109 (H) mmol/L      CO2 20.2 (L) mmol/L      Calcium 7.8 (L) mg/dL      eGFR Non African Amer 90 mL/min/1.73      BUN/Creatinine Ratio 15.4     Anion Gap 10.8 mmol/L     Narrative:       The MDRD GFR formula is only valid for adults with stable renal function between ages 18 and 70.    POC Glucose Once [[id number]]  (Abnormal) Collected:  10/22/18 1602    Specimen:  Blood Updated:  10/22/18 1619     Glucose 189 (H) mg/dL     Narrative:       Meter: [id number] : [id number] Alexander Serrano RN Validator    POC Glucose Once [[id number]]  (Abnormal) Collected:  10/22/18 1502    Specimen:  Blood Updated:  10/22/18 1519     Glucose 155 (H) mg/dL     Narrative:       Meter: [id number] : [id number] Alexander Serrano RN Validator    POC Glucose Once [[id number]]  (Abnormal) Collected:  10/22/18 1405    Specimen:  Blood Updated:  10/22/18 1428     Glucose 167 (H) mg/dL     Narrative:       Meter: [id number] : [id number] Alexander Serrano RN Validator    POC Glucose Once [[id number]]  (Abnormal) Collected:  10/22/18 1311    Specimen:  Blood Updated:  10/22/18 1325     Glucose 177  (H) mg/dL     Narrative:       Meter: [id number] : [id number] Alexander Serrano RN Validator    Phosphorus [[id number]]  (Abnormal) Collected:  10/22/18 1159    Specimen:  Blood Updated:  10/22/18 1229     Phosphorus 2.0 (L) mg/dL     Magnesium [[id number]]  (Normal) Collected:  10/22/18 1159    Specimen:  Blood Updated:  10/22/18 1229     Magnesium 2.2 mg/dL     Basic Metabolic Panel [[id number]]  (Abnormal) Collected:  10/22/18 1159    Specimen:  Blood Updated:  10/22/18 1228     Glucose 154 (H) mg/dL      BUN 12 mg/dL      Creatinine 0.69 mg/dL      Sodium 138 mmol/L      Potassium 4.1 mmol/L      Chloride 108 (H) mmol/L      CO2 19.8 (L) mmol/L      Calcium 7.8 (L) mg/dL      eGFR Non African Amer 84 mL/min/1.73      BUN/Creatinine Ratio 17.4     Anion Gap 10.2 mmol/L     Narrative:       The MDRD GFR formula is only valid for adults with stable renal function between ages 18 and 70.    Folate [[id number]]  (Abnormal) Collected:  10/21/18 1029    Specimen:  Blood Updated:  10/22/18 1224     Folate >20.00 (H) ng/mL     Vitamin B12 [[id number]]  (Normal) Collected:  10/21/18 1029    Specimen:  Blood Updated:  10/22/18 1224     Vitamin B-12 627 pg/mL     POC Glucose Once [[id number]]  (Abnormal) Collected:  10/22/18 1158    Specimen:  Blood Updated:  10/22/18 1205     Glucose 148 (H) mg/dL     Narrative:       Meter: [id number] : [id number] Wisnome Fernandez RN    POC Glucose Once [[id number]]  (Abnormal) Collected:  10/22/18 1104    Specimen:  Blood Updated:  10/22/18 1121     Glucose 147 (H) mg/dL     Narrative:       Meter: [id number] : [id number] Alexander Serrano RN Validator    POC Glucose Once [[id number]]  (Abnormal) Collected:  10/22/18 1000    Specimen:  Blood Updated:  10/22/18 1006     Glucose 150 (H) mg/dL     Narrative:       Meter: [id number] : [id number] Alexander Serrano RN Validator    POC Glucose Once [[id number]]  (Abnormal) Collected:  10/22/18 0847    Specimen:  Blood Updated:  10/22/18 0853      Glucose 199 (H) mg/dL     Narrative:       Meter: [id number] : [id number] Willy Hall RN    Phosphorus [[id number]]  (Abnormal) Collected:  10/22/18 0743    Specimen:  Blood from Arm, Right Updated:  10/22/18 0816     Phosphorus 1.0 (L) mg/dL     Basic Metabolic Panel [[id number]]  (Abnormal) Collected:  10/22/18 0743    Specimen:  Blood from Arm, Right Updated:  10/22/18 0816     Glucose 267 (H) mg/dL      BUN 15 mg/dL      Creatinine 0.77 mg/dL      Sodium 138 mmol/L      Potassium 3.6 mmol/L      Chloride 107 mmol/L      CO2 13.4 (L) mmol/L      Calcium 7.8 (L) mg/dL      eGFR Non African Amer 74 mL/min/1.73      BUN/Creatinine Ratio 19.5     Anion Gap 17.6 mmol/L     Narrative:       The MDRD GFR formula is only valid for adults with stable renal function between ages 18 and 70.    Magnesium [[id number]]  (Normal) Collected:  10/22/18 0743    Specimen:  Blood from Arm, Right Updated:  10/22/18 0816     Magnesium 1.7 mg/dL     POC Glucose Once [[id number]]  (Abnormal) Collected:  10/22/18 0724    Specimen:  Blood Updated:  10/22/18 0742     Glucose 254 (H) mg/dL     Narrative:       Meter: [id number] : [id number] Alexander Serrano RN Validator    POC Glucose Once [[id number]]  (Abnormal) Collected:  10/22/18 0627    Specimen:  Blood Updated:  10/22/18 0652     Glucose 329 (H) mg/dL     Narrative:       Meter: [id number] : [id number] Will Lewis RN    Basic Metabolic Panel [[id number]]  (Abnormal) Collected:  10/22/18 0527    Specimen:  Blood from Arm, Right Updated:  10/22/18 0559     Glucose 341 (H) mg/dL      BUN 16 mg/dL      Creatinine 0.75 mg/dL      Sodium 138 mmol/L      Potassium 4.6 mmol/L      Chloride 104 mmol/L      CO2 13.1 (L) mmol/L      Calcium 8.3 (L) mg/dL      eGFR Non African Amer 76 mL/min/1.73      BUN/Creatinine Ratio 21.3     Anion Gap 20.9 mmol/L     Narrative:       The MDRD GFR formula is only valid for adults with stable renal function between ages 18 and 70.    POC Glucose Once  [[id number]]  (Abnormal) Collected:  10/22/18 0526    Specimen:  Blood Updated:  10/22/18 0545     Glucose 318 (H) mg/dL     Narrative:       Meter: [id number] : [id number] Will Lewis RN    POC Glucose Once [[id number]]  (Normal) Collected:  10/21/18 2336    Specimen:  Blood Updated:  10/21/18 2344     Glucose 116 mg/dL     Narrative:       Meter: [id number] : [id number] Will Lewis RN    POC Glucose Once [[id number]]  (Abnormal) Collected:  10/21/18 2006    Specimen:  Blood Updated:  10/21/18 2034     Glucose 161 (H) mg/dL     Narrative:       INSULIN  GIVEN Meter: [id number] : [id number] Jennifer Chiu RN    POC Glucose Once [[id number]]  (Abnormal) Collected:  10/21/18 1827    Specimen:  Blood Updated:  10/21/18 1833     Glucose 151 (H) mg/dL     Narrative:       Meter: [id number] : [id number] Bronson Thomas RN    Basic Metabolic Panel [[id number]]  (Abnormal) Collected:  10/21/18 1715    Specimen:  Blood from Arm, Right Updated:  10/21/18 1753     Glucose 92 mg/dL      BUN 18 mg/dL      Creatinine 0.72 mg/dL      Sodium 137 mmol/L      Potassium 3.3 (L) mmol/L      Chloride 105 mmol/L      CO2 22.4 mmol/L      Calcium 8.1 (L) mg/dL      eGFR Non African Amer 80 mL/min/1.73      BUN/Creatinine Ratio 25.0     Anion Gap 9.6 mmol/L     Narrative:       The MDRD GFR formula is only valid for adults with stable renal function between ages 18 and 70.    Acetone [[id number]]  (Normal) Collected:  10/21/18 1715    Specimen:  Blood from Arm, Right Updated:  10/21/18 1743     Acetone Negative    POC Glucose Once [[id number]]  (Normal) Collected:  10/21/18 1719    Specimen:  Blood Updated:  10/21/18 1724     Glucose 78 mg/dL     Narrative:       Meter: [id number] : [id number] Bronson Thomas RN    POC Glucose Once [[id number]]  (Normal) Collected:  10/21/18 1616    Specimen:  Blood Updated:  10/21/18 1622     Glucose 118 mg/dL     Narrative:       Meter: [id number] : [id number] Bronson Thomas RN    POC  Glucose Once [[id number]]  (Abnormal) Collected:  10/21/18 1509    Specimen:  Blood Updated:  10/21/18 1526     Glucose 170 (H) mg/dL     Narrative:       Meter: [id number] : [id number] Bronson Thomas RN    POC Glucose Once [[id number]]  (Abnormal) Collected:  10/21/18 1431    Specimen:  Blood Updated:  10/21/18 1437     Glucose 199 (H) mg/dL     Narrative:       Meter: [id number] : [id number] [employer]    POC Glucose Once [[id number]]  (Abnormal) Collected:  10/21/18 1300    Specimen:  Blood Updated:  10/21/18 1306     Glucose 309 (H) mg/dL     Narrative:       Meter: [id number] : [id number] Kairos AR    POC Glucose Once [[id number]]  (Abnormal) Collected:  10/21/18 1215    Specimen:  Blood Updated:  10/21/18 1221     Glucose 305 (H) mg/dL     Narrative:       Meter: [id number] : [id number] James Almaraz NURSING ASSISTANT    Blood Gas, Arterial [[id number]]  (Abnormal) Collected:  10/21/18 1150    Specimen:  Arterial Blood Updated:  10/21/18 1200     Site Left Brachial     Will's Test N/A     pH, Arterial 7.311 (L) pH units      pCO2, Arterial 31.6 (L) mm Hg      pO2, Arterial 90.5 mm Hg      HCO3, Arterial 15.9 (L) mmol/L      Base Excess, Arterial -9.2 (L) mmol/L      O2 Saturation, Arterial 96.5 %      Hemoglobin, Blood Gas 12.5 (L) g/dL      Temperature 37.0 C      Barometric Pressure for Blood Gas 748 mmHg      Modality Room Air     Ventilator Mode NA     pCO2, Temperature Corrected 31.6 (L) mm Hg      pH, Temp Corrected 7.311 (L) pH Units      pO2, Temperature Corrected 90.5 mm Hg     Ketone Bodies, Serum (Not performed at [facility]) [[id number]] Collected:  10/21/18 1029    Specimen:  Blood Updated:  10/21/18 1134    Narrative:       The following orders were created for panel order Ketone Bodies, Serum (Not performed at [facility]).  Procedure                               Abnormality         Status                     ---------                                -----------         ------                     Acetone[[id number]]                      Abnormal            Final result                 Please view results for these tests on the individual orders.    Acetone [[id number]]  (Abnormal) Collected:  10/21/18 1029    Specimen:  Blood Updated:  10/21/18 1134     Acetone Trace (A)    BNP [[id number]]  (Normal) Collected:  10/21/18 1029    Specimen:  Blood Updated:  10/21/18 1128     proBNP 166.9 pg/mL     Narrative:       Among patients with dyspnea, NT-proBNP is highly sensitive for the detection of acute congestive heart failure. In addition NT-proBNP of <300 pg/ml effectively rules out acute congestive heart failure with 99% negative predictive value.    Troponin [[id number]]  (Normal) Collected:  10/21/18 1029    Specimen:  Blood Updated:  10/21/18 1120     Troponin T <0.010 ng/mL     Narrative:       Troponin T Reference Ranges:  Less than 0.03 ng/mL:    Negative for AMI  0.03 to 0.09 ng/mL:      Indeterminant for AMI  Greater than 0.09 ng/mL: Positive for AMI    Comprehensive Metabolic Panel [[id number]]  (Abnormal) Collected:  10/21/18 1029    Specimen:  Blood Updated:  10/21/18 1118     Glucose 388 (H) mg/dL      BUN 22 mg/dL      Creatinine 0.83 mg/dL      Sodium 132 (L) mmol/L      Potassium 4.6 mmol/L      Chloride 91 (L) mmol/L      CO2 18.6 (L) mmol/L      Calcium 9.1 mg/dL      Total Protein 6.6 g/dL      Albumin 4.30 g/dL      ALT (SGPT) 92 (H) U/L      AST (SGOT) 86 (H) U/L      Alkaline Phosphatase 95 U/L      Total Bilirubin 1.5 (H) mg/dL      eGFR Non African Amer 68 mL/min/1.73      Globulin 2.3 gm/dL      A/G Ratio 1.9 g/dL      BUN/Creatinine Ratio 26.5 (H)     Anion Gap 22.4 mmol/L     Narrative:       The MDRD GFR formula is only valid for adults with stable renal function between ages 18 and 70.    Hemoglobin A1c [[id number]]  (Abnormal) Collected:  10/21/18 1029    Specimen:  Blood Updated:  10/21/18 1051     Hemoglobin A1C 7.20 (H) %     Narrative:        Hemoglobin A1C Ranges:    Increased Risk for Diabetes  5.7% to 6.4%  Diabetes                     >= 6.5%  Diabetic Goal                < 7.0%    CBC & Differential [[id number]] Collected:  10/21/18 1029    Specimen:  Blood Updated:  10/21/18 1050    Narrative:       The following orders were created for panel order CBC & Differential.  Procedure                               Abnormality         Status                     ---------                               -----------         ------                     CBC Auto Differential[[id number]]        Abnormal            Final result                 Please view results for these tests on the individual orders.    CBC Auto Differential [[id number]]  (Abnormal) Collected:  10/21/18 1029    Specimen:  Blood Updated:  10/21/18 1050     WBC 5.56 10*3/mm3      RBC 4.07 (L) 10*6/mm3      Hemoglobin 13.3 g/dL      Hematocrit 40.5 %      MCV 99.5 (H) fL      MCH 32.7 (H) pg      MCHC 32.8 g/dL      RDW 12.7 %      RDW-SD 46.7 fl      MPV 10.0 fL      Platelets 191 10*3/mm3      Neutrophil % 67.3 %      Lymphocyte % 22.7 %      Monocyte % 6.7 %      Eosinophil % 2.3 %      Basophil % 0.5 %      Immature Grans % 0.5 %      Neutrophils, Absolute 3.74 10*3/mm3      Lymphocytes, Absolute 1.26 10*3/mm3      Monocytes, Absolute 0.37 10*3/mm3      Eosinophils, Absolute 0.13 10*3/mm3      Basophils, Absolute 0.03 10*3/mm3      Immature Grans, Absolute 0.03 10*3/mm3      nRBC 0.0 /100 WBC     Urinalysis With Microscopic If Indicated (No Culture) - Urine, Clean Catch [[id number]]  (Abnormal) Collected:  10/21/18 1030    Specimen:  Urine from Urine, Clean Catch Updated:  10/21/18 1038     Color, UA Yellow     Appearance, UA Clear     pH, UA 5.5     Specific Gravity, UA 1.015     Glucose,  mg/dL (2+) (A)     Ketones, UA >=160 mg/dL (4+)     Bilirubin, UA Negative     Blood, UA Negative     Protein, UA Negative     Leuk Esterase, UA Negative     Nitrite, UA Negative     Urobilinogen, UA  0.2 E.U./dL    Narrative:       Urine microscopic not indicated.    POC Glucose Once [[id number]]  (Abnormal) Collected:  10/21/18 0935    Specimen:  Blood Updated:  10/21/18 0942     Glucose 374 (H) mg/dL     Narrative:       Meter: [id number] : [id number] James Almaraz NURSING ASSISTANT        Imaging Results (most recent)     Procedure Component Value Units Date/Time    XR Chest 2 View [[id number]] Collected:  10/22/18 0710     Updated:  10/22/18 0713    Narrative:       INDICATION: Chest tightness and tachycardia for 24 hours.     COMPARISON: None.     FINDINGS: PA and lateral chest. Cardiac and mediastinal contours are  normal. Lungs are clear. Vascularity is normal. There is no  pneumothorax.       Impression:       Negative chest.     This report was finalized on 10/22/2018 7:11 AM by Dr. Jaiden Brantley MD.           PROCEDURES: NONE    Condition on Discharge:  stable    Physical Exam at Discharge  Vital Signs  Temp:  [96.4 °F (35.8 °C)-98.4 °F (36.9 °C)] 96.4 °F (35.8 °C)  Heart Rate:  [65-73] 67  Resp:  [18-20] 18  BP: (118-158)/(66-88) 158/76    Physical Exam:  Physical Exam   Constitutional: Patient appears well-developed and well-nourished and in no acute distress   HEENT:   Head: Normocephalic and atraumatic.   Eyes:  Pupils are equal, round, and reactive to light. EOM are intact. Sclera are anicteric and non-injected.  Mouth and Throat: Patient has moist mucous membranes. Oropharynx is clear of any erythema or exudate.     Neck: Neck supple. No JVD present. No thyromegaly present. No lymphadenopathy present.  Cardiovascular: Regular rate, regular rhythm, S1 normal and S2 normal.  Exam reveals no gallop and no friction rub.  No murmur heard.  Pulmonary/Chest: Lungs are clear to auscultation bilaterally. No respiratory distress. No wheezes. No rhonchi. No rales.   Abdominal: Soft. Bowel sounds are normal. No distension and no mass. There is no hepatosplenomegaly. There is no tenderness.    Musculoskeletal: Normal Muscle tone  Extremities: No edema. Pulses are palpable in all 4 extremities.  Neurological: Patient is alert and oriented to person, place, and time. Cranial nerves II-XII are grossly intact with no focal deficits.  Skin: Skin is warm. No rash noted. Nails show no clubbing.  No cyanosis or erythema.    Discharge Disposition  Home    Visiting Nurse:    No     Home PT/OT:  No     Home Safety Evaluation:  No     DME  None new, has insulin pump    Discharge Diet:    Dietary Orders     Start     Ordered    10/23/18 0647  Diet Regular; Consistent Carbohydrate  Diet Effective Now     Question Answer Comment   Diet Texture / Consistency Regular    Common Modifiers Consistent Carbohydrate        10/23/18 0647        Activity at Discharge:  As tolerated    Pre-discharge education  Diabetic, medications, follow up    Follow-up Appointments  Future Appointments  Date Time Provider Department Center   10/29/2018 10:15 AM Yair Zapata MD [facility] None   11/5/2018 9:40 AM Angi Rogers MD [facility] None   12/20/2018 12:20 PM Angi Rogers MD [facility] None     Additional Instructions for the Follow-ups that You Need to Schedule     Discharge Follow-up with PCP    As directed      Currently Documented PCP:  Yair Zapata MD  PCP Phone Number:  [phone]    Follow Up Details:  1 week         Discharge Follow-up with Specialty: Endocrine provider; 2 Weeks    As directed      Specialty:  Endocrine provider    Follow Up:  2 Weeks               Test Results Pending at Discharge: NONE       OPAL Pond  10/23/18  1:43 PM    Time: Discharge 30 min (if over 30 minutes give explanation as to why it took greater than 30 minutes)

## 2018-10-23 NOTE — PAYOR COMM NOTE
"Debora Herbert (73 y.o. Female)     ATTN: NURSE REVIEWER  MEMBER ID: [id number]  [facility] FOR INPT REVIEW. PLEASE REPLY TO SIMONA LIAO AT FAX#[phone] OR PHONE#[phone]. THANK YOU.        Date of Birth Social Security Number Address Home Phone MRN    1945  [id number] [phone] [id number]    [employer] Marital Status          Buddhist        Admission Date Admission Type Admitting Provider Attending Provider Department, Room/Bed    10/21/18 Emergency Amilcar Mayberry MD Steele, Jacquelene R, MD [facility] ICU, ICU3/1    Discharge Date Discharge Disposition Discharge Destination         Home or Self Care              Attending Provider:  Amilcar Mayberry MD    Allergies:  Aspirin, Epinephrine    Isolation:  None   Infection:  None   Code Status:  CPR    Ht:  162.6 cm (64\")   Wt:  57.5 kg (126 lb 12.8 oz)    Admission Cmt:  None   Principal Problem:  None                Active Insurance as of 10/21/2018     Primary Coverage     Payor Plan Insurance Group Employer/Plan Group    HUMANA MEDICARE REPLACEMENT HUMANA MEDICARE REPL [id number]     Payor Plan Address Payor Plan Phone Number Effective From Effective To    [address] [address] [phone] 1/1/2015     [facility] [id number]       Subscriber Name Subscriber Birth Date Member ID       DEBORA HERBERT 1945 [id number]                 Emergency Contacts      (Rel.) Home Phone Work Phone Mobile Phone    HerbertEvan diana (Spouse) [phone] -- --    Maria Ines Moore (Daughter) -- -- [phone]    Leanne (Son) -- -- [phone]               History & Physical      Melissa Contreras MD at 10/21/2018  5:02 PM              HISTORY AND PHYSICAL      Patient Care Team:  Yair Zapata MD as PCP - General    CHIEF COMPLAINT: Blood sugar, chest tightness, weakness, nausea    HISTORY OF PRESENT ILLNESS:    .  A very pleasant 73-year-old white female patient Dr." Felicitas who presented emergency room with complaints of elevated blood sugar.  Patient's monitors her blood sugars 7 times a day and has been on insulin pump since August 2018.  Last 48 hours her blood sugars are ranging persistently over 250-300 and was not able to get them down.  She has given herself several extra insulin boluses better sugars remain high.  She said she did eat more than she normally does last night and some Mexican food but they went up even before she went out to dinner.  She will not sensitivity to cats and came back inside feels some chest tightness across her entire chest felt tired and weak like her blood sugars were low but then checked her blood sugars but they're actually high.  She denies any chest pain no radiation of pain no diaphoresis  She denies any fever chills dysuria frequency of bowel pain nausea vomiting    Past Medical History:   Diagnosis Date   • Diabetes mellitus (CMS/HCC)    • Hypertension    • Hypothyroidism      Past Surgical History:   Procedure Laterality Date   • BREAST BIOPSY     • CHOLECYSTECTOMY     • HYSTERECTOMY     • TONSILLECTOMY     • TUBAL ABDOMINAL LIGATION       Family History   Problem Relation Age of Onset   • Heart disease Father    • Diabetes Sister    • Hypertension Mother        Social history   has never smoked.  She does consume 1-2 glasses of wine daily.  No drug use she is  lives with her   Prescriptions Prior to Admission   Medication Sig Dispense Refill Last Dose   • atorvastatin (LIPITOR) 20 MG tablet Take 1 tablet by mouth Daily. 30 tablet 11 10/20/2018 at Unknown time   • B Complex Vitamins (VITAMIN B COMPLEX) capsule capsule Take 1 capsule by mouth Daily.   10/20/2018 at Unknown time   • benazepril-hydrochlorthiazide (LOTENSIN HCT) 20-12.5 MG per tablet Take 1 tablet by mouth Daily.      • Biotin 49753 MCG tablet Take 1 tablet by mouth Daily.   10/20/2018 at Unknown time   • Blood Glucose Monitoring Suppl (ACCU-CHEK  COMPACT CARE KIT) kit 1 kit 4 (Four) Times a Day. ICD CODE E10.9 1 each 1 10/21/2018 at Unknown time   • Calcium Carb-Cholecalciferol (CALCIUM 600+D3 PO) Take 500 Units by mouth Daily.   10/20/2018 at Unknown time   • Chromium Picolinate 1000 MCG tablet Take 1,000 mcg by mouth daily.   10/20/2018 at Unknown time   • insulin aspart (NOVOLOG) 100 UNIT/ML injection 50 units daily VIA insulin Pump (Patient taking differently: Inject 1 Units under the skin into the appropriate area as directed 3 (Three) Times a Day Before Meals. 50 units daily VIA insulin Pump) 20 mL 6 10/21/2018 at Unknown time   • levothyroxine (SYNTHROID, LEVOTHROID) 75 MCG tablet Take 1 tablet by mouth Daily. (Patient taking differently: Take 75 mcg by mouth Every Night.) 30 tablet 11 10/20/2018 at Unknown time   • ACCU-CHEK FASTCLIX LANCETS misc Use to test blood sugar 4 times daily e 10.9 200 each 11 Not Taking   • ACCU-CHEK SOFTCLIX LANCETS lancets USE TO TEST BLOOD SUGAR 4 TIMES DAILY 200 each 5 Not Taking   • Blood Glucose Monitoring Suppl (ACCU-CHEK RICHARD SMARTVIEW) w/Device kit Use to check blood sugar 4 times daily  E10.9 1 kit 0 Not Taking   • CONTOUR NEXT TEST test strip Use to test blood sugar 7 times daily (Patient taking differently: Use to test blood sugar 10 times daily) 300 each 12 Taking   • glucagon (GLUCAGON EMERGENCY) 1 MG injection Inject 1 mg under the skin into the appropriate area as directed 1 (One) Time As Needed for Low Blood Sugar for up to 1 dose. 1 kit 12 Unknown at Unknown time   • Insulin Pen Needle (BD PEN NEEDLE RICHARD U/F) 32G X 4 MM misc USE TO INJECT INSULIN 400 each 3 Taking     Allergies:  Aspirin and Epinephrine     Review of Systems   Constitutional: Positive for fatigue. Negative for activity change and appetite change.   HENT: Negative for congestion.    Respiratory: Negative for cough, chest tightness, shortness of breath and wheezing.    Cardiovascular: Positive for chest pain.   Gastrointestinal: Negative  "for abdominal distention, abdominal pain, diarrhea, nausea and vomiting.   Endocrine: Negative for polyphagia and polyuria.   Genitourinary: Negative for frequency.   Skin: Negative for rash.   Neurological: Negative for light-headedness.   Hematological: Does not bruise/bleed easily.   Psychiatric/Behavioral: Negative for agitation and behavioral problems.       Debilities/Disabilities Identified: None     Emotional Behavior: Appropriate    Vital Signs  Temp:  [97.7 °F (36.5 °C)-97.8 °F (36.6 °C)] 97.7 °F (36.5 °C)  Heart Rate:  [] 76  Resp:  [18] 18  BP: (130-168)/(66-99) 131/73    Flowsheet Rows      First Filed Value   Admission Height  162.6 cm (64\") Documented at 10/21/2018 0938   Admission Weight  56.2 kg (124 lb) Documented at 10/21/2018 0938           Physical Exam   Constitutional: She appears well-developed and well-nourished.   HENT:   Head: Normocephalic.   Mouth/Throat: Oropharynx is clear and moist.   Eyes: Conjunctivae are normal.   Neck: Normal range of motion. No JVD present. No thyromegaly present.   Cardiovascular: Normal rate, regular rhythm and normal heart sounds.    No murmur heard.  Pulmonary/Chest: Effort normal and breath sounds normal. No respiratory distress. She has no wheezes. She has no rales.   Abdominal: Soft. Bowel sounds are normal. She exhibits no distension. There is no tenderness. There is no guarding.   Neurological: She is alert.   Skin: Skin is warm and dry. No rash noted.   Nursing note and vitals reviewed.     Results Review:    I reviewed the patient's new clinical results.  Lab Results (most recent)     Procedure Component Value Units Date/Time    POC Glucose Once [[id number]]  (Normal) Collected:  10/21/18 1616    Specimen:  Blood Updated:  10/21/18 1622     Glucose 118 mg/dL     Narrative:       Meter: [id number] : [id number] Bronson Thomas RN    POC Glucose Once [[id number]]  (Abnormal) Collected:  10/21/18 1509    Specimen:  Blood Updated:  10/21/18 1526     " Glucose 170 (H) mg/dL     Narrative:       Meter: [id number] : [id number] Bronson Thomas RN    POC Glucose Once [[id number]]  (Abnormal) Collected:  10/21/18 1431    Specimen:  Blood Updated:  10/21/18 1437     Glucose 199 (H) mg/dL     Narrative:       Meter: [id number] : [id number] [employer]    POC Glucose Once [[id number]]  (Abnormal) Collected:  10/21/18 1300    Specimen:  Blood Updated:  10/21/18 1306     Glucose 309 (H) mg/dL     Narrative:       Meter: [id number] : [id number] [employer]    POC Glucose Once [[id number]]  (Abnormal) Collected:  10/21/18 1215    Specimen:  Blood Updated:  10/21/18 1221     Glucose 305 (H) mg/dL     Narrative:       Meter: [id number] : [id number] James Almaraz NURSING ASSISTANT    Blood Gas, Arterial [[id number]]  (Abnormal) Collected:  10/21/18 1150    Specimen:  Arterial Blood Updated:  10/21/18 1200     Site Left Brachial     Will's Test N/A     pH, Arterial 7.311 (L) pH units      pCO2, Arterial 31.6 (L) mm Hg      pO2, Arterial 90.5 mm Hg      HCO3, Arterial 15.9 (L) mmol/L      Base Excess, Arterial -9.2 (L) mmol/L      O2 Saturation, Arterial 96.5 %      Hemoglobin, Blood Gas 12.5 (L) g/dL      Temperature 37.0 C      Barometric Pressure for Blood Gas 748 mmHg      Modality Room Air     Ventilator Mode NA     pCO2, Temperature Corrected 31.6 (L) mm Hg      pH, Temp Corrected 7.311 (L) pH Units      pO2, Temperature Corrected 90.5 mm Hg     Ketone Bodies, Serum (Not performed at [facility]) [[id number]] Collected:  10/21/18 1029    Specimen:  Blood Updated:  10/21/18 1134    Narrative:       The following orders were created for panel order Ketone Bodies, Serum (Not performed at [facility]).  Procedure                               Abnormality         Status                     ---------                               -----------         ------                     Acetone[[id number]]                      Abnormal            Final result                  Please view results for these tests on the individual orders.    Acetone [[id number]]  (Abnormal) Collected:  10/21/18 1029    Specimen:  Blood Updated:  10/21/18 1134     Acetone Trace (A)    BNP [[id number]]  (Normal) Collected:  10/21/18 1029    Specimen:  Blood Updated:  10/21/18 1128     proBNP 166.9 pg/mL     Narrative:       Among patients with dyspnea, NT-proBNP is highly sensitive for the detection of acute congestive heart failure. In addition NT-proBNP of <300 pg/ml effectively rules out acute congestive heart failure with 99% negative predictive value.    Troponin [[id number]]  (Normal) Collected:  10/21/18 1029    Specimen:  Blood Updated:  10/21/18 1120     Troponin T <0.010 ng/mL     Narrative:       Troponin T Reference Ranges:  Less than 0.03 ng/mL:    Negative for AMI  0.03 to 0.09 ng/mL:      Indeterminant for AMI  Greater than 0.09 ng/mL: Positive for AMI    Comprehensive Metabolic Panel [[id number]]  (Abnormal) Collected:  10/21/18 1029    Specimen:  Blood Updated:  10/21/18 1118     Glucose 388 (H) mg/dL      BUN 22 mg/dL      Creatinine 0.83 mg/dL      Sodium 132 (L) mmol/L      Potassium 4.6 mmol/L      Chloride 91 (L) mmol/L      CO2 18.6 (L) mmol/L      Calcium 9.1 mg/dL      Total Protein 6.6 g/dL      Albumin 4.30 g/dL      ALT (SGPT) 92 (H) U/L      AST (SGOT) 86 (H) U/L      Alkaline Phosphatase 95 U/L      Total Bilirubin 1.5 (H) mg/dL      eGFR Non African Amer 68 mL/min/1.73      Globulin 2.3 gm/dL      A/G Ratio 1.9 g/dL      BUN/Creatinine Ratio 26.5 (H)     Anion Gap 22.4 mmol/L     Narrative:       The MDRD GFR formula is only valid for adults with stable renal function between ages 18 and 70.    Hemoglobin A1c [[id number]]  (Abnormal) Collected:  10/21/18 1029    Specimen:  Blood Updated:  10/21/18 1051     Hemoglobin A1C 7.20 (H) %     Narrative:       Hemoglobin A1C Ranges:    Increased Risk for Diabetes  5.7% to 6.4%  Diabetes                     >= 6.5%  Diabetic  Goal                < 7.0%    CBC & Differential [[id number]] Collected:  10/21/18 1029    Specimen:  Blood Updated:  10/21/18 1050    Narrative:       The following orders were created for panel order CBC & Differential.  Procedure                               Abnormality         Status                     ---------                               -----------         ------                     CBC Auto Differential[[id number]]        Abnormal            Final result                 Please view results for these tests on the individual orders.    CBC Auto Differential [[id number]]  (Abnormal) Collected:  10/21/18 1029    Specimen:  Blood Updated:  10/21/18 1050     WBC 5.56 10*3/mm3      RBC 4.07 (L) 10*6/mm3      Hemoglobin 13.3 g/dL      Hematocrit 40.5 %      MCV 99.5 (H) fL      MCH 32.7 (H) pg      MCHC 32.8 g/dL      RDW 12.7 %      RDW-SD 46.7 fl      MPV 10.0 fL      Platelets 191 10*3/mm3      Neutrophil % 67.3 %      Lymphocyte % 22.7 %      Monocyte % 6.7 %      Eosinophil % 2.3 %      Basophil % 0.5 %      Immature Grans % 0.5 %      Neutrophils, Absolute 3.74 10*3/mm3      Lymphocytes, Absolute 1.26 10*3/mm3      Monocytes, Absolute 0.37 10*3/mm3      Eosinophils, Absolute 0.13 10*3/mm3      Basophils, Absolute 0.03 10*3/mm3      Immature Grans, Absolute 0.03 10*3/mm3      nRBC 0.0 /100 WBC     Urinalysis With Microscopic If Indicated (No Culture) - Urine, Clean Catch [[id number]]  (Abnormal) Collected:  10/21/18 1030    Specimen:  Urine from Urine, Clean Catch Updated:  10/21/18 1038     Color, UA Yellow     Appearance, UA Clear     pH, UA 5.5     Specific Gravity, UA 1.015     Glucose,  mg/dL (2+) (A)     Ketones, UA >=160 mg/dL (4+)     Bilirubin, UA Negative     Blood, UA Negative     Protein, UA Negative     Leuk Esterase, UA Negative     Nitrite, UA Negative     Urobilinogen, UA 0.2 E.U./dL    Narrative:       Urine microscopic not indicated.    POC Glucose Once [[id number]]  (Abnormal) Collected:   10/21/18 0935    Specimen:  Blood Updated:  10/21/18 0942     Glucose 374 (H) mg/dL     Narrative:       Meter: [id number] : [id number] James Almaraz NURSING ASSISTANT          Imaging Results (most recent)     Procedure Component Value Units Date/Time    XR Chest 2 View [[id number]] Resulted:  10/21/18 1211     Updated:  10/21/18 1211        reviewed    ECG/EMG Results (most recent)     Procedure Component Value Units Date/Time    ECG 12 Lead [[id number]] Collected:  10/21/18 0944     Updated:  10/21/18 1336        reviewed    Assessment/Plan       1.  Diabetic ketoacidosis patient will be continued on IV fluids and insulin drip.  Her pH is 7.31 and likely will improve within the next 12-14 hours.    2.  Hyponatremia due to hyperglycemia continue with IV normal saline    3.  Hypothyroidism stable nothing acute    4.  Hypertension nothing acute continue home ACE inhibitor and thiazide      5.  DVT prophylaxis Lovenox and SCDs have been ordered          I discussed the patients findings and my recommendations with patient     Melissa Contreras MD  10/21/18  5:02 PM        Electronically signed by Melissa Contreras MD at 10/21/2018  5:10 PM             ICU Vital Signs     Row Name 10/23/18 0900 10/23/18 0603 10/23/18 0600 10/23/18 0500 10/23/18 0405       Vitals    Pulse  --  -- 69 72  --    BP  -- 137/88  --  --  --    Noninvasive MAP (mmHg)  -- 114  --  --  --       Oxygen Therapy    SpO2 100 % 95 % 96 % 97 %  --    Pulse Oximetry Type  -- Continuous  --  --  --    Device (Oxygen Therapy)  -- room air  -- room air room air    Row Name 10/23/18 0402 10/23/18 0200 10/23/18 0100 10/23/18 0002 10/22/18 7534       Height and Weight    Weight 57.5 kg (126 lb 12.8 oz)  --  --  --  --    Weight Method Bed scale  --  --  --  --       Vitals    Temp 98.4 °F (36.9 °C)  --  -- 97.7 °F (36.5 °C)  --    Temp src Oral  --  -- Oral  --    Pulse 70  -- 65 69  --    Heart Rate Source Monitor  --  -- Monitor  --     Resp 18  --  -- 18  --    Resp Rate Source Visual  --  -- Visual  --    /80  --  -- 129/72  --    Noninvasive MAP (mmHg) 105  --  -- 114  --    BP Location Left arm  --  -- Left arm  --    BP Method Automatic  --  -- Automatic  --    Patient Position Lying  --  -- Lying  --       Oxygen Therapy    SpO2 96 %  -- 97 % 98 %  --    Pulse Oximetry Type Continuous  --  -- Continuous  --    Device (Oxygen Therapy) room air room air  -- room air room air    Row Name 10/22/18 2200 10/22/18 2133 10/22/18 2100 10/22/18 2002 10/22/18 2001       Vitals    Temp  --  --  -- 98.1 °F (36.7 °C)  --    Temp src  --  --  -- Oral  --    Pulse 73 73  -- 69  --    Heart Rate Source Monitor Monitor  -- Monitor  --    Resp  --  --  -- 20  --    Resp Rate Source  --  --  -- Visual  --    BP  --  --  -- 137/68  --    Noninvasive MAP (mmHg)  --  --  -- 92  --    BP Location  --  --  -- Left arm  --    BP Method  --  --  -- Automatic  --    Patient Position  --  --  -- Lying  --       Oxygen Therapy    SpO2 95 % 96 %  -- 98 %  --    Pulse Oximetry Type  -- Continuous  -- Continuous  --    Device (Oxygen Therapy) room air room air room air room air room air    Row Name 10/22/18 1926 10/22/18 1650 10/22/18 1505 10/22/18 1244 10/22/18 1209       Height and Weight    Weight  --  --  --  -- 56.3 kg (124 lb 1.9 oz)       Vitals    Temp  --  -- 98.1 °F (36.7 °C)  --  --    Temp src  --  -- Oral  --  --    Pulse  --  -- 72  --  --    Heart Rate Source  --  -- Monitor  --  --    Resp  --  -- 20  --  --    Resp Rate Source  --  -- Visual  --  --    BP  --  -- 124/66  --  --    Noninvasive MAP (mmHg)  --  -- 91  --  --    BP Location  --  -- Right arm  --  --    BP Method  --  -- Automatic  --  --    Patient Position  --  -- Lying  --  --       Oxygen Therapy    SpO2  --  -- 98 %  --  --    Device (Oxygen Therapy) room air room air  -- room air  --    Row Name 10/22/18 1105 10/22/18 1040                Vitals    Temp 97.8 °F (36.6 °C)  --        Temp src Oral  --       Pulse 69 67       Heart Rate Source Monitor Monitor       Resp 18  --       Resp Rate Source Visual  --       /90  --       Noninvasive MAP (mmHg) 106  --       BP Location Right arm  --       BP Method Automatic  --       Patient Position Lying  --          Oxygen Therapy    SpO2 100 % 98 %       Pulse Oximetry Type  -- Continuous       Device (Oxygen Therapy)  -- room air           Lines, Drains & Airways    Active LDAs     Name:   Placement date:   Placement time:   Site:   Days:    Peripheral IV 10/21/18 0950 Right Antecubital  10/21/18    0950    Antecubital    2    Peripheral IV 10/22/18 0735 Right Forearm  10/22/18    0735    Forearm    1         Inactive LDAs     None                Hospital Medications (all)       Dose Frequency Start End    acetaminophen (TYLENOL) tablet 1,000 mg 1,000 mg Once 10/21/2018 10/21/2018    Sig - Route: Take 2 tablets by mouth 1 (One) Time. - Oral    Cosign for Ordering: Accepted by Skyler Schroeder MD on 10/21/2018  3:32 PM    acetaminophen (TYLENOL) tablet 650 mg 650 mg Every 4 Hours PRN 10/21/2018     Sig - Route: Take 2 tablets by mouth Every 4 (Four) Hours As Needed for Mild Pain  or Fever (temp over 100.5). - Oral    atorvastatin (LIPITOR) tablet 20 mg 20 mg Daily 10/21/2018 4/19/2019    Sig - Route: Take 1 tablet by mouth Daily. - Oral    bisacodyl (DULCOLAX) EC tablet 5 mg 5 mg Daily PRN 10/21/2018     Sig - Route: Take 1 tablet by mouth Daily As Needed for Constipation. - Oral    calcium carbonate (TUMS) chewable tablet 500 mg (200 mg elemental) 1 tablet 2 Times Daily PRN 10/21/2018     Sig - Route: Chew 500 mg 2 (Two) Times a Day As Needed for Heartburn. - Oral    dextrose (D50W) 25 g/ 50mL Intravenous Solution 12.5 g 12.5 g Every 15 Minutes PRN 10/22/2018     Sig - Route: Infuse 25 mL into a venous catheter Every 15 (Fifteen) Minutes As Needed for Low Blood Sugar (for blood glucose < 100 mg/dL). - Intravenous    dextrose 5 % and sodium  chloride 0.45 % infusion 150 mL/hr Continuous PRN 10/22/2018     Sig - Route: Infuse 150 mL/hr into a venous catheter Continuous As Needed (Once Glucose Less Than or Equal to 200 mg/dL and Serum Potassium Greater Than 5). - Intravenous    dextrose 5 % and sodium chloride 0.45 % with KCl 20 mEq/L infusion 150 mL/hr Continuous PRN 10/22/2018     Sig - Route: Infuse 150 mL/hr into a venous catheter Continuous As Needed (Once Glucose Less Than or Equal to 200 mg/dL and Serum Potassium Less Than or Equal to 5). - Intravenous    enoxaparin (LOVENOX) syringe 40 mg 40 mg Every 24 Hours 10/21/2018     Sig - Route: Inject 0.4 mL under the skin into the appropriate area as directed Daily. - Subcutaneous    insulin regular (humuLIN R,novoLIN R) 100 Units in sodium chloride 0.9 % 100 mL (1 Units/mL) infusion 0.1 Units/kg/hr × 56.3 kg Titrated 10/22/2018     Sig - Route: Infuse 5.6 Units/hr into a venous catheter Dose Adjusted By Provider As Needed. - Intravenous    insulin regular (humuLIN R,novoLIN R) injection 10 Units 10 Units Once 10/21/2018 10/21/2018    Sig - Route: Infuse 10 Units into a venous catheter 1 (One) Time. - Intravenous    insulin regular (humuLIN R,novoLIN R) injection 5.6 Units 0.1 Units/kg × 56.3 kg Once 10/22/2018 10/22/2018    Sig - Route: Infuse 5.6 Units into a venous catheter 1 (One) Time. - Intravenous    insulin regular (humuLIN R,novoLIN R) injection 5.6 Units 0.1 Units/kg × 56.3 kg Once As Needed 10/22/2018     Sig - Route: Infuse 5.6 Units into a venous catheter 1 (One) Time As Needed for High Blood Sugar (if the glucose does not decrease by 10% in the first hour after start of infusion). - Intravenous    insulin regular (humuLIN R,novoLIN R) injection 8 Units 8 Units Once 10/21/2018 10/21/2018    Sig - Route: Infuse 8 Units into a venous catheter 1 (One) Time. - Intravenous    levothyroxine (SYNTHROID, LEVOTHROID) tablet 75 mcg 75 mcg Daily 10/21/2018     Sig - Route: Take 1 tablet by mouth Daily.  "- Oral    magnesium hydroxide (MILK OF MAGNESIA) 400 MG/5ML suspension 30 mL 30 mL Daily PRN 10/21/2018     Sig - Route: Take 30 mL by mouth Daily As Needed for Constipation. - Oral    Magnesium Sulfate 2 gram / 50mL Infusion (GIVE X 3 BAGS TO EQUAL 6GM TOTAL DOSE) - Mg 1.1 - 1.5 mg/dl 2 g As Needed 10/22/2018     Sig - Route: Infuse 50 mL into a venous catheter As Needed (See Administration Instructions). - Intravenous    Linked Group 1:  \"Or\" Linked Group Details        Magnesium Sulfate 2 gram Bolus, followed by 8 gram infusion (total Mg dose 10 grams)- Mg less than or equal to 1mg/dL 2 g As Needed 10/22/2018     Sig - Route: Infuse 50 mL into a venous catheter As Needed (See Administration Instructions). - Intravenous    Linked Group 1:  \"Or\" Linked Group Details        Magnesium Sulfate 4 gram infusion- Mg 1.6-1.9 mg/dL 4 g As Needed 10/22/2018     Sig - Route: Infuse 100 mL into a venous catheter As Needed (See Administration Instructions). - Intravenous    Linked Group 1:  \"Or\" Linked Group Details        ondansetron (ZOFRAN) injection 4 mg 4 mg Once 10/21/2018 10/21/2018    Sig - Route: Infuse 2 mL into a venous catheter 1 (One) Time. - Intravenous    ondansetron (ZOFRAN) injection 4 mg 4 mg Every 6 Hours PRN 10/21/2018     Sig - Route: Infuse 2 mL into a venous catheter Every 6 (Six) Hours As Needed for Nausea or Vomiting. - Intravenous    potassium chloride (K-DUR,KLOR-CON) 20 MEQ CR tablet  - ADS Override Pull   10/21/2018 10/21/2018    Notes to Pharmacy: Created by cabinet override    potassium chloride (KLOR-CON) packet 10 mEq 10 mEq As Needed 10/22/2018     Sig - Route: Take 10 mEq by mouth As Needed (Potassium replacement per admin instructions). - Oral    Linked Group 2:  \"Or\" Linked Group Details        potassium chloride (KLOR-CON) packet 20 mEq 20 mEq As Needed 10/22/2018     Sig - Route: Take 20 mEq by mouth As Needed (Potassium replacement per admin instructions). - Oral    Linked Group 3:  \"Or\" " "Linked Group Details        potassium chloride (KLOR-CON) packet 40 mEq 40 mEq As Needed 10/22/2018     Sig - Route: Take 40 mEq by mouth As Needed (Potassium replacement per admin instructions). - Oral    Linked Group 4:  \"Or\" Linked Group Details        potassium chloride (MICRO-K) CR capsule 10 mEq 10 mEq As Needed 10/22/2018     Sig - Route: Take 1 capsule by mouth As Needed (Potassium replacement per admin instructions). - Oral    Linked Group 2:  \"Or\" Linked Group Details        potassium chloride (MICRO-K) CR capsule 20 mEq 20 mEq As Needed 10/22/2018     Sig - Route: Take 2 capsules by mouth As Needed (Potassium replacement per admin instructions). - Oral    Linked Group 3:  \"Or\" Linked Group Details        potassium chloride (MICRO-K) CR capsule 40 mEq 40 mEq Once 10/21/2018 10/21/2018    Sig - Route: Take 4 capsules by mouth 1 (One) Time. - Oral    potassium chloride (MICRO-K) CR capsule 40 mEq 40 mEq As Needed 10/22/2018     Sig - Route: Take 4 capsules by mouth As Needed (Potassium replacement per admin instructions). - Oral    Linked Group 4:  \"Or\" Linked Group Details        potassium chloride 10 mEq in 100 mL IVPB 10 mEq As Needed 10/22/2018     Sig - Route: Infuse 100 mL into a venous catheter As Needed (Potassium replacement per admin instructions). - Intravenous    Linked Group 4:  \"Or\" Linked Group Details        potassium chloride 10 mEq in 100 mL IVPB 10 mEq As Needed 10/22/2018     Sig - Route: Infuse 100 mL into a venous catheter As Needed (Potassium replacement per admin instructions). - Intravenous    Linked Group 3:  \"Or\" Linked Group Details        potassium chloride 10 mEq in 100 mL IVPB 10 mEq As Needed 10/22/2018     Sig - Route: Infuse 100 mL into a venous catheter As Needed (Potassium replacement per admin instructions). - Intravenous    Linked Group 2:  \"Or\" Linked Group Details        potassium phosphate 15 mmol in sodium chloride 0.9 % 100 mL infusion 15 mmol As Needed 10/22/2018  " "   Sig - Route: Infuse 15 mmol into a venous catheter As Needed (Phosphorus replacement - see admin instructions). - Intravenous    Linked Group 5:  \"Or\" Linked Group Details        potassium phosphate 30 mmol in sodium chloride 0.9 % 250 mL infusion 30 mmol As Needed 10/22/2018     Sig - Route: Infuse 30 mmol into a venous catheter As Needed (Phosphorus replacement - see admin instructions). - Intravenous    Linked Group 5:  \"Or\" Linked Group Details        potassium phosphate 45 mmol in sodium chloride 0.9 % 500 mL infusion 45 mmol As Needed 10/22/2018     Sig - Route: Infuse 45 mmol into a venous catheter As Needed (Phosphorus replacement - see admin instruction). - Intravenous    Linked Group 5:  \"Or\" Linked Group Details        sodium chloride 0.45 % infusion 250 mL/hr Continuous 10/22/2018     Sig - Route: Infuse 250 mL/hr into a venous catheter Continuous. - Intravenous    sodium chloride 0.45 % with KCl 20 mEq/L infusion 250 mL/hr Continuous PRN 10/22/2018     Sig - Route: Infuse 250 mL/hr into a venous catheter Continuous As Needed (After Initial 2 Hours - If Potassium Level is Less Than or Equal to 5 (If Greater Than 5 use 0.45%)). - Intravenous    sodium chloride 0.9 % bolus 1,000 mL 1,000 mL Once 10/22/2018 10/22/2018    Sig - Route: Infuse 1,000 mL into a venous catheter 1 (One) Time. - Intravenous    sodium chloride 0.9 % bolus 500 mL 500 mL Once 10/21/2018 10/21/2018    Sig - Route: Infuse 500 mL into a venous catheter 1 (One) Time. - Intravenous    sodium chloride 0.9 % bolus 500 mL 500 mL Once 10/22/2018 10/22/2018    Sig - Route: Infuse 500 mL into a venous catheter 1 (One) Time. - Intravenous    sodium chloride 0.9 % flush 1-10 mL 1-10 mL As Needed 10/21/2018     Sig - Route: Infuse 1-10 mL into a venous catheter As Needed for Line Care. - Intravenous    sodium chloride 0.9 % flush 10 mL 10 mL As Needed 10/21/2018     Sig - Route: Infuse 10 mL into a venous catheter As Needed for Line Care. - " "Intravenous    Linked Group 6:  \"And\" Linked Group Details        sodium chloride 0.9 % flush 3 mL 3 mL Every 12 Hours Scheduled 10/21/2018     Sig - Route: Infuse 3 mL into a venous catheter Every 12 (Twelve) Hours. - Intravenous    sodium chloride 0.9 % flush 30 mL 30 mL Once As Needed 10/21/2018     Sig - Route: Infuse 30 mL into a venous catheter 1 (One) Time As Needed for Line Care. - Intravenous    sodium chloride 0.9 % infusion 40 mL 40 mL As Needed 10/21/2018     Sig - Route: Infuse 40 mL into a venous catheter As Needed for Line Care. - Intravenous    sodium chloride 0.9 % infusion 30 mL/hr Continuous PRN 10/21/2018     Sig - Route: Infuse 30 mL/hr into a venous catheter Continuous As Needed (if insulin infusion rate is insufficient to maintain patency.). - Intravenous    sodium phosphates 15 mmol in sodium chloride 0.9 % 250 mL IVPB 15 mmol As Needed 10/22/2018     Sig - Route: Infuse 15 mmol into a venous catheter As Needed (Peripheral IV - Phosphorus 1.8 - 2.5 mg/dL & Potassium Greater Than 4). - Intravenous    Linked Group 5:  \"Or\" Linked Group Details        sodium phosphates 30 mmol in sodium chloride 0.9 % 250 mL IVPB 30 mmol As Needed 10/22/2018     Sig - Route: Infuse 30 mmol into a venous catheter As Needed (Peripheral IV - Phosphorus 1.3-1.7 mg/dL & Potassium Greater Than 4). - Intravenous    Linked Group 5:  \"Or\" Linked Group Details        sodium phosphates 45 mmol in sodium chloride 0.9 % 500 mL IVPB 45 mmol As Needed 10/22/2018     Sig - Route: Infuse 45 mmol into a venous catheter As Needed (Peripheral IV - Phosphorus Less Than 1.3 mg/dL & Potassium Greater Than 4). - Intravenous    Linked Group 5:  \"Or\" Linked Group Details        insulin aspart (novoLOG) injection 0-24 Units (Discontinued) 0-24 Units 4 Times Daily Before Meals & Nightly 10/21/2018 10/21/2018    Sig - Route: Inject 0-24 Units under the skin into the appropriate area as directed 4 (Four) Times a Day Before Meals & at " Bedtime. - Subcutaneous    insulin aspart (novoLOG) injection 0-24 Units (Discontinued) 0-24 Units Every 4 Hours 10/22/2018 10/21/2018    Sig - Route: Inject 0-24 Units under the skin into the appropriate area as directed Every 4 (Four) Hours. - Subcutaneous    Cosign for Ordering: Accepted by Melissa Contreras MD on 10/22/2018  8:01 AM    insulin aspart (novoLOG) injection 0-24 Units (Discontinued) 0-24 Units 4 Times Daily Before Meals & Nightly 10/22/2018 10/22/2018    Sig - Route: Inject 0-24 Units under the skin into the appropriate area as directed 4 (Four) Times a Day Before Meals & at Bedtime. - Subcutaneous    insulin regular (HumuLIN R,NovoLIN R) 100 Units in sodium chloride 0.9 % 100 mL (1 Units/mL) infusion (Discontinued) 6 Units/hr Titrated 10/21/2018 10/21/2018    Sig - Route: Infuse 6 Units/hr into a venous catheter Dose Adjusted By Provider As Needed. - Intravenous    sodium chloride 0.9 % infusion (Discontinued) 100 mL/hr Continuous 10/21/2018 10/22/2018    Sig - Route: Infuse 100 mL/hr into a venous catheter Continuous. - Intravenous    sodium chloride 0.9 % infusion (Discontinued) 500 mL/hr Continuous 10/21/2018 10/21/2018    Sig - Route: Infuse 500 mL/hr into a venous catheter Continuous. - Intravenous    Cosign for Ordering: Accepted by Skyler Schroeder MD on 10/21/2018  3:32 PM    sodium chloride 0.9 % infusion (Discontinued) 125 mL/hr Continuous 10/21/2018 10/21/2018    Sig - Route: Infuse 125 mL/hr into a venous catheter Continuous. - Intravenous            Lab Results (last 24 hours)     Procedure Component Value Units Date/Time    POC Glucose Once [[id number]]  (Abnormal) Collected:  10/23/18 0919    Specimen:  Blood Updated:  10/23/18 0925     Glucose 267 (H) mg/dL     Narrative:       Meter: [id number] : [id number] Matty Luevano RN    POC Glucose Once [[id number]]  (Abnormal) Collected:  10/23/18 0655    Specimen:  Blood Updated:  10/23/18 0703     Glucose 167 (H) mg/dL      Narrative:       Meter: [id number] : [id number] Will Lewis RN    POC Glucose Once [[id number]]  (Abnormal) Collected:  10/23/18 0559    Specimen:  Blood Updated:  10/23/18 0606     Glucose 152 (H) mg/dL     Narrative:       Meter: [id number] : [id number] Will Lewis RN    POC Glucose Once [[id number]]  (Abnormal) Collected:  10/23/18 0522    Specimen:  Blood Updated:  10/23/18 0529     Glucose 141 (H) mg/dL     Narrative:       Meter: [id number] : [id number] Will Lewis RN    POC Glucose Once [[id number]]  (Normal) Collected:  10/23/18 0459    Specimen:  Blood Updated:  10/23/18 0505     Glucose 89 mg/dL     Narrative:       Meter: [id number] : [id number] Will Lewis RN    Phosphorus [[id number]]  (Normal) Collected:  10/23/18 0403    Specimen:  Blood from Arm, Left Updated:  10/23/18 0459     Phosphorus 2.9 mg/dL     Basic Metabolic Panel [[id number]]  (Abnormal) Collected:  10/23/18 0403    Specimen:  Blood from Arm, Left Updated:  10/23/18 0459     Glucose 100 (H) mg/dL      BUN 5 (L) mg/dL      Creatinine 0.57 mg/dL      Sodium 143 mmol/L      Potassium 4.1 mmol/L      Chloride 113 (H) mmol/L      CO2 21.7 (L) mmol/L      Calcium 7.7 (L) mg/dL      eGFR Non African Amer 105 mL/min/1.73      BUN/Creatinine Ratio 8.8     Anion Gap 8.3 mmol/L     Narrative:       The MDRD GFR formula is only valid for adults with stable renal function between ages 18 and 70.    Magnesium [[id number]]  (Normal) Collected:  10/23/18 0403    Specimen:  Blood from Arm, Left Updated:  10/23/18 0453     Magnesium 2.1 mg/dL     POC Glucose Once [[id number]]  (Normal) Collected:  10/23/18 0402    Specimen:  Blood Updated:  10/23/18 0418     Glucose 102 mg/dL     Narrative:       Meter: [id number] : [id number] Will Lewis RN    POC Glucose Once [[id number]]  (Normal) Collected:  10/23/18 0258    Specimen:  Blood Updated:  10/23/18 0306     Glucose 119 mg/dL     Narrative:       Meter: [id number] : [id number] Will  Debbie MORALES    POC Glucose Once [[id number]]  (Abnormal) Collected:  10/23/18 0156    Specimen:  Blood Updated:  10/23/18 0204     Glucose 150 (H) mg/dL     Narrative:       Meter: [id number] : [id number] Brianne Muse RN    POC Glucose Once [[id number]]  (Abnormal) Collected:  10/23/18 0101    Specimen:  Blood Updated:  10/23/18 0108     Glucose 205 (H) mg/dL     Narrative:       Meter: [id number] : [id number] Brianne Muse RN    Phosphorus [[id number]]  (Abnormal) Collected:  10/22/18 2350    Specimen:  Blood Updated:  10/23/18 0022     Phosphorus 2.0 (L) mg/dL     Basic Metabolic Panel [[id number]]  (Abnormal) Collected:  10/22/18 2350    Specimen:  Blood Updated:  10/23/18 0019     Glucose 232 (H) mg/dL      BUN 6 (L) mg/dL      Creatinine 0.73 mg/dL      Sodium 140 mmol/L      Potassium 4.3 mmol/L      Chloride 109 (H) mmol/L      CO2 21.3 (L) mmol/L      Calcium 8.2 (L) mg/dL      eGFR Non African Amer 79 mL/min/1.73      BUN/Creatinine Ratio 8.2     Anion Gap 9.7 mmol/L     Narrative:       The MDRD GFR formula is only valid for adults with stable renal function between ages 18 and 70.    Magnesium [[id number]]  (Normal) Collected:  10/22/18 2350    Specimen:  Blood Updated:  10/23/18 0019     Magnesium 2.4 mg/dL     POC Glucose Once [[id number]]  (Abnormal) Collected:  10/22/18 2348    Specimen:  Blood Updated:  10/22/18 2358     Glucose 230 (H) mg/dL     Narrative:       Meter: [id number] : [id number] Will Lewis RN    POC Glucose Once [[id number]]  (Abnormal) Collected:  10/22/18 2259    Specimen:  Blood Updated:  10/22/18 2309     Glucose 239 (H) mg/dL     Narrative:       Meter: [id number] : [id number] Brianne Muse RN    POC Glucose Once [[id number]]  (Abnormal) Collected:  10/22/18 2156    Specimen:  Blood Updated:  10/22/18 2202     Glucose 230 (H) mg/dL     Narrative:       Meter: [id number] : [id number] Will Lewis RN    POC Glucose Once [[id number]]  (Abnormal) Collected:  10/22/18 7894     Specimen:  Blood Updated:  10/22/18 2106     Glucose 241 (H) mg/dL     Narrative:       Meter: [id number] : [id number] Will Lewis RN    Phosphorus [[id number]]  (Normal) Collected:  10/22/18 2000    Specimen:  Blood from Arm, Left Updated:  10/22/18 2024     Phosphorus 3.7 mg/dL     Basic Metabolic Panel [[id number]]  (Abnormal) Collected:  10/22/18 2000    Specimen:  Blood from Arm, Left Updated:  10/22/18 2024     Glucose 256 (H) mg/dL      BUN 8 mg/dL      Creatinine 0.64 mg/dL      Sodium 138 mmol/L      Potassium 4.9 mmol/L      Chloride 107 mmol/L      CO2 19.2 (L) mmol/L      Calcium 7.7 (L) mg/dL      eGFR Non African Amer 91 mL/min/1.73      BUN/Creatinine Ratio 12.5     Anion Gap 11.8 mmol/L     Narrative:       The MDRD GFR formula is only valid for adults with stable renal function between ages 18 and 70.    Magnesium [[id number]]  (Normal) Collected:  10/22/18 2000    Specimen:  Blood from Arm, Left Updated:  10/22/18 2024     Magnesium 2.5 mg/dL     POC Glucose Once [[id number]]  (Abnormal) Collected:  10/22/18 1959    Specimen:  Blood Updated:  10/22/18 2018     Glucose 251 (H) mg/dL     Narrative:       Meter: [id number] : [id number] Will Lewis RN    POC Glucose Once [[id number]]  (Abnormal) Collected:  10/22/18 1800    Specimen:  Blood Updated:  10/22/18 1816     Glucose 199 (H) mg/dL     Narrative:       Meter: [id number] : [id number] Alexander Serrano RN Validator    Phosphorus [[id number]]  (Normal) Collected:  10/22/18 1620    Specimen:  Blood Updated:  10/22/18 1656     Phosphorus 3.5 mg/dL     Magnesium [[id number]]  (Abnormal) Collected:  10/22/18 1620    Specimen:  Blood Updated:  10/22/18 1656     Magnesium 2.8 (H) mg/dL     Basic Metabolic Panel [[id number]]  (Abnormal) Collected:  10/22/18 1620    Specimen:  Blood Updated:  10/22/18 1656     Glucose 205 (H) mg/dL      BUN 10 mg/dL      Creatinine 0.65 mg/dL      Sodium 140 mmol/L      Potassium 4.8 mmol/L      Chloride 109 (H)  mmol/L      CO2 20.2 (L) mmol/L      Calcium 7.8 (L) mg/dL      eGFR Non African Amer 90 mL/min/1.73      BUN/Creatinine Ratio 15.4     Anion Gap 10.8 mmol/L     Narrative:       The MDRD GFR formula is only valid for adults with stable renal function between ages 18 and 70.    POC Glucose Once [[id number]]  (Abnormal) Collected:  10/22/18 1602    Specimen:  Blood Updated:  10/22/18 1619     Glucose 189 (H) mg/dL     Narrative:       Meter: [id number] : [id number] Alexander Serrano RN Validator    POC Glucose Once [[id number]]  (Abnormal) Collected:  10/22/18 1502    Specimen:  Blood Updated:  10/22/18 1519     Glucose 155 (H) mg/dL     Narrative:       Meter: [id number] : [id number] Alexander Serrano RN Validator    POC Glucose Once [[id number]]  (Abnormal) Collected:  10/22/18 1405    Specimen:  Blood Updated:  10/22/18 1428     Glucose 167 (H) mg/dL     Narrative:       Meter: [id number] : [id number] Alexander Serrano RN Validator    POC Glucose Once [[id number]]  (Abnormal) Collected:  10/22/18 1311    Specimen:  Blood Updated:  10/22/18 1325     Glucose 177 (H) mg/dL     Narrative:       Meter: [id number] : [id number] Alexander Serrano RN Validator    Phosphorus [[id number]]  (Abnormal) Collected:  10/22/18 1159    Specimen:  Blood Updated:  10/22/18 1229     Phosphorus 2.0 (L) mg/dL     Magnesium [[id number]]  (Normal) Collected:  10/22/18 1159    Specimen:  Blood Updated:  10/22/18 1229     Magnesium 2.2 mg/dL     Basic Metabolic Panel [[id number]]  (Abnormal) Collected:  10/22/18 1159    Specimen:  Blood Updated:  10/22/18 1228     Glucose 154 (H) mg/dL      BUN 12 mg/dL      Creatinine 0.69 mg/dL      Sodium 138 mmol/L      Potassium 4.1 mmol/L      Chloride 108 (H) mmol/L      CO2 19.8 (L) mmol/L      Calcium 7.8 (L) mg/dL      eGFR Non African Amer 84 mL/min/1.73      BUN/Creatinine Ratio 17.4     Anion Gap 10.2 mmol/L     Narrative:       The MDRD GFR formula is only valid for adults with stable renal function  "between ages 18 and 70.    Folate [[id number]]  (Abnormal) Collected:  10/21/18 1029    Specimen:  Blood Updated:  10/22/18 1224     Folate >20.00 (H) ng/mL     Vitamin B12 [[id number]]  (Normal) Collected:  10/21/18 1029    Specimen:  Blood Updated:  10/22/18 1224     Vitamin B-12 627 pg/mL     POC Glucose Once [[id number]]  (Abnormal) Collected:  10/22/18 1158    Specimen:  Blood Updated:  10/22/18 1205     Glucose 148 (H) mg/dL     Narrative:       Meter: [id number] : [id number] Winsome Fernandez RN    POC Glucose Once [[id number]]  (Abnormal) Collected:  10/22/18 1104    Specimen:  Blood Updated:  10/22/18 1121     Glucose 147 (H) mg/dL     Narrative:       Meter: [id number] : [id number] Alexander Serrano RN Validator    POC Glucose Once [[id number]]  (Abnormal) Collected:  10/22/18 1000    Specimen:  Blood Updated:  10/22/18 1006     Glucose 150 (H) mg/dL     Narrative:       Meter: [id number] : [id number] Alexander Serrano RN Validator             Physician Progress Notes (last 72 hours) (Notes from 10/20/2018 10:00 AM through 10/23/2018 10:00 AM)      Flavia Maddox APRN at 10/22/2018  6:56 AM          SERVICE: [facility] HOSPITALIST    CONSULTANTS:     CHIEF COMPLAINT: Follow up DKA    SUBJECTIVE: Patient notes return of nausea this morning with elevated glucose.  Noted gap opening overnight, back on insulin drip.  She notes feeling dehydrated.  She reports she did eat food last night. Otherwise Denies f/c/cough/soa/chest pain/n/v/d/abdominal pain or other new concerns.    OBJECTIVE:    /58   Pulse 67   Temp 97.6 °F (36.4 °C) (Oral)   Resp 18   Ht 162.6 cm (64\")   Wt 56.3 kg (124 lb 3.2 oz)   SpO2 98%   BMI 21.32 kg/m²      MEDS/LABS REVIEWED AND ORDERED    atorvastatin 20 mg Oral Daily   enoxaparin 40 mg Subcutaneous Q24H   levothyroxine 75 mcg Oral Daily   sodium chloride 3 mL Intravenous Q12H     Physical Exam   Constitutional: She is oriented to person, place, and " time. She appears well-developed and well-nourished.   HENT:   Head: Normocephalic and atraumatic.   Eyes: Pupils are equal, round, and reactive to light. EOM are normal.   Cardiovascular: Normal rate and regular rhythm.    Pulmonary/Chest: Effort normal and breath sounds normal.   Abdominal: Soft. Bowel sounds are normal.   Musculoskeletal: She exhibits no edema.   Neurological: She is alert and oriented to person, place, and time.   Skin: Skin is warm and dry. No erythema.   Psychiatric: She has a normal mood and affect. Her behavior is normal.   Vitals reviewed.    LAB/DIAGNOSTICS:    Lab Results (last 24 hours)     Procedure Component Value Units Date/Time    POC Glucose Once [[id number]]  (Abnormal) Collected:  10/22/18 1000    Specimen:  Blood Updated:  10/22/18 1006     Glucose 150 (H) mg/dL     Narrative:       Meter: [id number] : [id number] Alexander Serrano RN Validator    POC Glucose Once [[id number]]  (Abnormal) Collected:  10/22/18 0847    Specimen:  Blood Updated:  10/22/18 0853     Glucose 199 (H) mg/dL     Narrative:       Meter: [id number] : [id number] Willy Hall RN    Phosphorus [[id number]]  (Abnormal) Collected:  10/22/18 0743    Specimen:  Blood from Arm, Right Updated:  10/22/18 0816     Phosphorus 1.0 (L) mg/dL     Basic Metabolic Panel [[id number]]  (Abnormal) Collected:  10/22/18 0743    Specimen:  Blood from Arm, Right Updated:  10/22/18 0816     Glucose 267 (H) mg/dL      BUN 15 mg/dL      Creatinine 0.77 mg/dL      Sodium 138 mmol/L      Potassium 3.6 mmol/L      Chloride 107 mmol/L      CO2 13.4 (L) mmol/L      Calcium 7.8 (L) mg/dL      eGFR Non African Amer 74 mL/min/1.73      BUN/Creatinine Ratio 19.5     Anion Gap 17.6 mmol/L     Narrative:       The MDRD GFR formula is only valid for adults with stable renal function between ages 18 and 70.    Magnesium [[id number]]  (Normal) Collected:  10/22/18 0743    Specimen:  Blood from Arm, Right Updated:  10/22/18 0816     Magnesium 1.7  mg/dL     POC Glucose Once [[id number]]  (Abnormal) Collected:  10/22/18 0724    Specimen:  Blood Updated:  10/22/18 0742     Glucose 254 (H) mg/dL     Narrative:       Meter: [id number] : [id number] Alexander Serrano RN Validator    POC Glucose Once [[id number]]  (Abnormal) Collected:  10/22/18 0627    Specimen:  Blood Updated:  10/22/18 0652     Glucose 329 (H) mg/dL     Narrative:       Meter: [id number] : [id number] Will Lewis RN    Basic Metabolic Panel [[id number]]  (Abnormal) Collected:  10/22/18 0527    Specimen:  Blood from Arm, Right Updated:  10/22/18 0559     Glucose 341 (H) mg/dL      BUN 16 mg/dL      Creatinine 0.75 mg/dL      Sodium 138 mmol/L      Potassium 4.6 mmol/L      Chloride 104 mmol/L      CO2 13.1 (L) mmol/L      Calcium 8.3 (L) mg/dL      eGFR Non African Amer 76 mL/min/1.73      BUN/Creatinine Ratio 21.3     Anion Gap 20.9 mmol/L     Narrative:       The MDRD GFR formula is only valid for adults with stable renal function between ages 18 and 70.    POC Glucose Once [[id number]]  (Abnormal) Collected:  10/22/18 0526    Specimen:  Blood Updated:  10/22/18 0545     Glucose 318 (H) mg/dL     Narrative:       Meter: [id number] : [id number] Will Lewis RN    POC Glucose Once [[id number]]  (Normal) Collected:  10/21/18 2336    Specimen:  Blood Updated:  10/21/18 2344     Glucose 116 mg/dL     Narrative:       Meter: [id number] : [id number] Will Lewis RN    POC Glucose Once [[id number]]  (Abnormal) Collected:  10/21/18 2006    Specimen:  Blood Updated:  10/21/18 2034     Glucose 161 (H) mg/dL     Narrative:       INSULIN  GIVEN Meter: [id number] : [id number] Jennifer Chiu RN    POC Glucose Once [[id number]]  (Abnormal) Collected:  10/21/18 1827    Specimen:  Blood Updated:  10/21/18 1833     Glucose 151 (H) mg/dL     Narrative:       Meter: [id number] : [id number] Bronson Thomas RN    Basic Metabolic Panel [[id number]]  (Abnormal) Collected:  10/21/18 4047    Specimen:  Blood  from Arm, Right Updated:  10/21/18 1753     Glucose 92 mg/dL      BUN 18 mg/dL      Creatinine 0.72 mg/dL      Sodium 137 mmol/L      Potassium 3.3 (L) mmol/L      Chloride 105 mmol/L      CO2 22.4 mmol/L      Calcium 8.1 (L) mg/dL      eGFR Non African Amer 80 mL/min/1.73      BUN/Creatinine Ratio 25.0     Anion Gap 9.6 mmol/L     Narrative:       The MDRD GFR formula is only valid for adults with stable renal function between ages 18 and 70.    Acetone [[id number]]  (Normal) Collected:  10/21/18 1715    Specimen:  Blood from Arm, Right Updated:  10/21/18 1743     Acetone Negative    POC Glucose Once [[id number]]  (Normal) Collected:  10/21/18 1719    Specimen:  Blood Updated:  10/21/18 1724     Glucose 78 mg/dL     Narrative:       Meter: [id number] : [id number] Bronson Minersa RN    POC Glucose Once [[id number]]  (Normal) Collected:  10/21/18 1616    Specimen:  Blood Updated:  10/21/18 1622     Glucose 118 mg/dL     Narrative:       Meter: [id number] : [id number] Dobson Martha RN    POC Glucose Once [[id number]]  (Abnormal) Collected:  10/21/18 1509    Specimen:  Blood Updated:  10/21/18 1526     Glucose 170 (H) mg/dL     Narrative:       Meter: [id number] : [id number] Dobson Martha RN    POC Glucose Once [[id number]]  (Abnormal) Collected:  10/21/18 1431    Specimen:  Blood Updated:  10/21/18 1437     Glucose 199 (H) mg/dL     Narrative:       Meter: [id number] : [id number] [employer]    POC Glucose Once [[id number]]  (Abnormal) Collected:  10/21/18 1300    Specimen:  Blood Updated:  10/21/18 1306     Glucose 309 (H) mg/dL     Narrative:       Meter: [id number] : [id number] [employer]    POC Glucose Once [[id number]]  (Abnormal) Collected:  10/21/18 1215    Specimen:  Blood Updated:  10/21/18 1221     Glucose 305 (H) mg/dL     Narrative:       Meter: [id number] : [id number] McEndre Rufina NURSING ASSISTANT    Blood Gas, Arterial [[id number]]  (Abnormal) Collected:   10/21/18 1150    Specimen:  Arterial Blood Updated:  10/21/18 1200     Site Left Brachial     Will's Test N/A     pH, Arterial 7.311 (L) pH units      pCO2, Arterial 31.6 (L) mm Hg      pO2, Arterial 90.5 mm Hg      HCO3, Arterial 15.9 (L) mmol/L      Base Excess, Arterial -9.2 (L) mmol/L      O2 Saturation, Arterial 96.5 %      Hemoglobin, Blood Gas 12.5 (L) g/dL      Temperature 37.0 C      Barometric Pressure for Blood Gas 748 mmHg      Modality Room Air     Ventilator Mode NA     pCO2, Temperature Corrected 31.6 (L) mm Hg      pH, Temp Corrected 7.311 (L) pH Units      pO2, Temperature Corrected 90.5 mm Hg     Ketone Bodies, Serum (Not performed at [facility]) [[id number]] Collected:  10/21/18 1029    Specimen:  Blood Updated:  10/21/18 1134    Narrative:       The following orders were created for panel order Ketone Bodies, Serum (Not performed at [facility]).  Procedure                               Abnormality         Status                     ---------                               -----------         ------                     Acetone[[id number]]                      Abnormal            Final result                 Please view results for these tests on the individual orders.    Acetone [[id number]]  (Abnormal) Collected:  10/21/18 1029    Specimen:  Blood Updated:  10/21/18 1134     Acetone Trace (A)    BNP [[id number]]  (Normal) Collected:  10/21/18 1029    Specimen:  Blood Updated:  10/21/18 1128     proBNP 166.9 pg/mL     Narrative:       Among patients with dyspnea, NT-proBNP is highly sensitive for the detection of acute congestive heart failure. In addition NT-proBNP of <300 pg/ml effectively rules out acute congestive heart failure with 99% negative predictive value.    Troponin [[id number]]  (Normal) Collected:  10/21/18 1029    Specimen:  Blood Updated:  10/21/18 1120     Troponin T <0.010 ng/mL     Narrative:       Troponin T Reference Ranges:  Less than 0.03 ng/mL:    Negative for AMI  0.03 to 0.09  ng/mL:      Indeterminant for AMI  Greater than 0.09 ng/mL: Positive for AMI    Comprehensive Metabolic Panel [[id number]]  (Abnormal) Collected:  10/21/18 1029    Specimen:  Blood Updated:  10/21/18 1118     Glucose 388 (H) mg/dL      BUN 22 mg/dL      Creatinine 0.83 mg/dL      Sodium 132 (L) mmol/L      Potassium 4.6 mmol/L      Chloride 91 (L) mmol/L      CO2 18.6 (L) mmol/L      Calcium 9.1 mg/dL      Total Protein 6.6 g/dL      Albumin 4.30 g/dL      ALT (SGPT) 92 (H) U/L      AST (SGOT) 86 (H) U/L      Alkaline Phosphatase 95 U/L      Total Bilirubin 1.5 (H) mg/dL      eGFR Non African Amer 68 mL/min/1.73      Globulin 2.3 gm/dL      A/G Ratio 1.9 g/dL      BUN/Creatinine Ratio 26.5 (H)     Anion Gap 22.4 mmol/L     Narrative:       The MDRD GFR formula is only valid for adults with stable renal function between ages 18 and 70.        ECG 12 Lead   ED Interpretation   Skyler Stephen MD     10/21/2018  1:36 PM   ECG 12 Lead      Date/Time: 10/21/2018 9:44 AM   Performed by: SKYLER STEPHEN   Authorized by: SKYLER STEPHEN    Comments: EKG performed 09:44, EKG read 09:46.  Normal sinus rhythms rate    91, left axis deviation, no acute ST segment elevation or depression    consistent with ischemia, he weighs in leads 3 and aVF suggestive of a    prior inferior MI, no ectopy, normal TN and QT intervals, poor R-wave    progression.  No old tracings available for comparison.         Final Result   RR Interval= 659 ms   TN Interval= 151 ms   QRSD Interval= 82 ms   QT Interval= 371 ms   QTc Interval= 457 ms   Heart Rate= 91 ms   P Axis= 55 deg   QRS Axis= -85 deg   T Wave Axis= 56 deg   I: 40 Axis= -21 deg   T: 40 Axis= 254 deg   ST Axis= 55 deg   Sinus rhythm   Probable left atrial enlargement   Abnormal R-wave progression, late transition   Inferior infarct, old   c/w prior ecg, inferior q waves are now seen   Electronically Signed by:  Danna Akers ([facility]) 22-Oct-2018 09:53:56   Date and Time of Study:  2018-10-21 09:44:10           Xr Chest 2 View    Result Date: 10/22/2018  Negative chest.  This report was finalized on 10/22/2018 7:11 AM by Dr. Jaiden Brantley MD.      ASSESSMENT/PLAN:  DKA/DM1 with hyperglycemia: A1c 7.2%  Electrolyte imbalance:  Resume DKA protocol   Anion gap reopened with discontinuation of insulin drip and oral diet overnight, CO2 down to 13.1  Interestingly serum acetone and urine ketones are now negative  Resume insulin drip, IV hydration, nothing by mouth  Monitor, diabetic educator and nutrition to see    Macrocytosis: Check B12 and folate    Hypothyroidism: No acute issues on levothyroxin 75 µg daily    Hypertension: Blood pressure at goal, no acute issues here, noted in past on Lotensin HCT  Hyperlipidemia: No acute issues on Lipitor 20 mg daily      Electronically signed by Flavia Maddox APRN at 10/22/2018 11:04 AM           Discharge Order     Start     Ordered    10/23/18 0943  Discharge patient  Once     Expected Discharge Date:  10/23/18    Expected Discharge Time:  Midday    Discharge Disposition:  Home or Self Care    Physician of Record for Attribution - Please select from Treatment Team:  GISSELL TUCKER [[id number]]    Review needed by CMO to determine Physician of Record:  No    Please choose which facility the patient is currently admitted if they are being discharged to another facility or unit.:   [facility]    Mode:  Family       Question Answer Comment   Physician of Record for Attribution - Please select from Treatment Team GISSELL TUCKER    Review needed by CMO to determine Physician of Record No    Please choose which facility the patient is currently admitted if they are being discharged to another facility or unit.  [facility]    Mode: Family        10/23/18 0946

## 2018-10-24 ENCOUNTER — READMISSION MANAGEMENT (OUTPATIENT)
Dept: CALL CENTER | Facility: HOSPITAL | Age: 73
End: 2018-10-24

## 2018-10-24 NOTE — OUTREACH NOTE
Prep Survey      Responses   Facility patient discharged from?  [facility]   Is LACE score < 7 ?  Yes   Is patient eligible?  Yes   Discharge diagnosis  DKA/DM1 with hyperglycemia   Does the patient have one of the following disease processes/diagnoses(primary or secondary)?  Other   Does the patient have Home health ordered?  No   Is there a DME ordered?  No   Prep survey completed?  Yes          Naina Cespedes RN

## 2018-10-25 ENCOUNTER — READMISSION MANAGEMENT (OUTPATIENT)
Dept: CALL CENTER | Facility: HOSPITAL | Age: 73
End: 2018-10-25

## 2018-10-25 NOTE — OUTREACH NOTE
LAG < 7 Survey      Responses   Facility patient discharged from?  [facility]   Does the patient have one of the following disease processes/diagnoses(primary or secondary)?  Other   Is there a successful TCM telephone encounter documented?  No   BHLAG <7 Attempt successful?  Yes   Call start time  1146   Call end time  1152   Discharge diagnosis  DKA/DM1 with hyperglycemia   Meds reviewed with patient/caregiver?  Yes   Is the patient having any side effects they believe may be caused by any medication additions or changes?  No   Does the patient have all medications ordered at discharge?  Yes   Is the patient taking all medications as directed (includes completed medication regime)?  No   What is preventing the patient from taking all medications as directed?  Desires to consult PCP first   Medication comments  Patient did not stop b/p medication as per AVS.  She will discuss with MD at [facility] on Monday   Does the patient have a primary care provider?   Yes   Does the patient have an appointment with their PCP within 7 days of discharge?  Yes   Has the patient kept scheduled appointments due by today?  Yes   Comments  Monday 10/29    Endocrinologist 11/05   Psychosocial issues?  No   Did the patient receive a copy of their discharge instructions?  Yes   Nursing interventions  Reviewed instructions with patient, Educated on MyChart [Has My Chart]   What is the patient's perception of their health status since discharge?  Improving   Is the patient/caregiver able to teach back signs and symptoms related to disease process for when to call PCP?  Yes   Is the patient/caregiver able to teach back signs and symptoms related to disease process for when to call 911?  Yes   Is the patient/caregiver able to teach back the hierarchy of who to call/visit for symptoms/problems? PCP, Specialist, Home health nurse, Urgent Care, ED, 911  Yes   Graduated  Yes [Lace<7]   Wrap up additional comments  Care was exceptional          Ro  Omkar, RN

## 2018-10-29 ENCOUNTER — OFFICE VISIT (OUTPATIENT)
Dept: INTERNAL MEDICINE | Facility: CLINIC | Age: 73
End: 2018-10-29

## 2018-10-29 VITALS
WEIGHT: 124 LBS | HEIGHT: 64 IN | RESPIRATION RATE: 18 BRPM | HEART RATE: 67 BPM | TEMPERATURE: 98 F | SYSTOLIC BLOOD PRESSURE: 132 MMHG | BODY MASS INDEX: 21.17 KG/M2 | OXYGEN SATURATION: 99 % | DIASTOLIC BLOOD PRESSURE: 64 MMHG

## 2018-10-29 DIAGNOSIS — R74.01 ELEVATED TRANSAMINASE LEVEL: ICD-10-CM

## 2018-10-29 DIAGNOSIS — K21.9 GASTROESOPHAGEAL REFLUX DISEASE WITHOUT ESOPHAGITIS: ICD-10-CM

## 2018-10-29 DIAGNOSIS — Z00.00 ROUTINE ADULT HEALTH MAINTENANCE: ICD-10-CM

## 2018-10-29 DIAGNOSIS — R00.2 HEART PALPITATIONS: ICD-10-CM

## 2018-10-29 DIAGNOSIS — Z12.39 ENCOUNTER FOR SCREENING FOR MALIGNANT NEOPLASM OF BREAST: ICD-10-CM

## 2018-10-29 DIAGNOSIS — I10 ESSENTIAL HYPERTENSION: ICD-10-CM

## 2018-10-29 DIAGNOSIS — E10.9 TYPE 1 DIABETES MELLITUS WITHOUT COMPLICATION (HCC): Primary | ICD-10-CM

## 2018-10-29 DIAGNOSIS — E03.9 ACQUIRED HYPOTHYROIDISM: ICD-10-CM

## 2018-10-29 DIAGNOSIS — E55.9 VITAMIN D DEFICIENCY: ICD-10-CM

## 2018-10-29 PROCEDURE — 99214 OFFICE O/P EST MOD 30 MIN: CPT | Performed by: FAMILY MEDICINE

## 2018-10-29 NOTE — PROGRESS NOTES
"Parul Greco is a 73 y.o. female, who presents with a chief complaint of   Chief Complaint   Patient presents with   • Diabetes   • Follow-up     hospital     Diabetes     Hypertension     Weight Loss     Hypothyroidism       1. Diabetes mellitus.  Hospitalized for DKA d/t kink in insulin pump.  Feeling well now.  Has f/u with Dr. Rogers next week.    2. HTN.  Tolerating benazepril-hctz.  Home blood pressures at goal    3. Palpitations.  Heart feels \"out of sync\" sometimes in the evenings lying in bed.  Denies chest pain, dizziness.    The following portions of the patient's history were reviewed and updated as appropriate: allergies, current medications, past family history, past medical history, past social history, past surgical history and problem list.    Allergies: Aspirin and Epinephrine    Review of Systems   Constitutional: Negative.    HENT: Negative.    Eyes: Negative.    Respiratory: Negative.    Cardiovascular: Negative.    Gastrointestinal: Negative.    Endocrine: Negative.    Genitourinary: Negative.    Musculoskeletal: Negative.    Skin: Negative.    Allergic/Immunologic: Negative.    Neurological: Negative.    Hematological: Negative.    Psychiatric/Behavioral: Negative.      Objective     Wt Readings from Last 3 Encounters:   10/29/18 56.2 kg (124 lb)   10/23/18 57.5 kg (126 lb 12.8 oz)   09/18/18 57.2 kg (126 lb)     Temp Readings from Last 3 Encounters:   10/29/18 98 °F (36.7 °C)   10/23/18 96.4 °F (35.8 °C) (Oral)   04/25/18 98.1 °F (36.7 °C)     BP Readings from Last 3 Encounters:   10/29/18 132/64   10/23/18 158/76   09/18/18 122/64     Pulse Readings from Last 3 Encounters:   10/29/18 67   10/23/18 67   09/18/18 74     Body mass index is 21.28 kg/m².  SpO2 Readings from Last 3 Encounters:   01/17/18 99%   10/11/17 98%   07/13/17 98%       Physical Exam   Constitutional: She is oriented to person, place, and time. She appears well-developed and well-nourished.   HENT:   Head: " Normocephalic and atraumatic.   Mouth/Throat: Oropharynx is clear and moist.   Eyes: Conjunctivae and EOM are normal.   Neck: Neck supple. No thyromegaly present.   Cardiovascular: Normal rate, regular rhythm and normal heart sounds.    Pulmonary/Chest: Effort normal and breath sounds normal.   Abdominal: Soft. Bowel sounds are normal. There is no hepatosplenomegaly.   Musculoskeletal: Normal range of motion. She exhibits no edema.   Neurological: She is alert and oriented to person, place, and time.   Skin: Skin is warm and dry. No rash noted.   Psychiatric: She has a normal mood and affect. Her behavior is normal.   Nursing note and vitals reviewed.      Results for orders placed or performed during the hospital encounter of 10/21/18   Comprehensive Metabolic Panel   Result Value Ref Range    Glucose 388 (H) 65 - 99 mg/dL    BUN 22 8 - 23 mg/dL    Creatinine 0.83 0.57 - 1.00 mg/dL    Sodium 132 (L) 136 - 145 mmol/L    Potassium 4.6 3.5 - 5.2 mmol/L    Chloride 91 (L) 98 - 107 mmol/L    CO2 18.6 (L) 22.0 - 29.0 mmol/L    Calcium 9.1 8.8 - 10.5 mg/dL    Total Protein 6.6 6.0 - 8.5 g/dL    Albumin 4.30 3.50 - 5.20 g/dL    ALT (SGPT) 92 (H) 5 - 33 U/L    AST (SGOT) 86 (H) 5 - 32 U/L    Alkaline Phosphatase 95 40 - 129 U/L    Total Bilirubin 1.5 (H) 0.2 - 1.2 mg/dL    eGFR Non African Amer 68 >60 mL/min/1.73    Globulin 2.3 gm/dL    A/G Ratio 1.9 g/dL    BUN/Creatinine Ratio 26.5 (H) 7.0 - 25.0    Anion Gap 22.4 mmol/L   Urinalysis With Microscopic If Indicated (No Culture) - Urine, Clean Catch   Result Value Ref Range    Color, UA Yellow Yellow, Straw    Appearance, UA Clear Clear    pH, UA 5.5 4.5 - 8.0    Specific Gravity, UA 1.015 1.003 - 1.030    Glucose,  mg/dL (2+) (A) Negative    Ketones, UA >=160 mg/dL (4+) Negative, 80 mg/dL (3+), >=160 mg/dL (4+)    Bilirubin, UA Negative Negative    Blood, UA Negative Negative    Protein, UA Negative Negative    Leuk Esterase, UA Negative Negative    Nitrite, UA  Negative Negative    Urobilinogen, UA 0.2 E.U./dL 0.2 - 1.0 E.U./dL   Troponin   Result Value Ref Range    Troponin T <0.010 0.000 - 0.030 ng/mL   BNP   Result Value Ref Range    proBNP 166.9 0.0 - 900.0 pg/mL   Hemoglobin A1c   Result Value Ref Range    Hemoglobin A1C 7.20 (H) 4.80 - 5.60 %   CBC Auto Differential   Result Value Ref Range    WBC 5.56 4.80 - 10.80 10*3/mm3    RBC 4.07 (L) 4.20 - 5.40 10*6/mm3    Hemoglobin 13.3 12.0 - 16.0 g/dL    Hematocrit 40.5 37.0 - 47.0 %    MCV 99.5 (H) 81.0 - 99.0 fL    MCH 32.7 (H) 27.0 - 31.0 pg    MCHC 32.8 31.0 - 37.0 g/dL    RDW 12.7 11.5 - 14.5 %    RDW-SD 46.7 37.0 - 54.0 fl    MPV 10.0 7.4 - 10.4 fL    Platelets 191 140 - 500 10*3/mm3    Neutrophil % 67.3 45.0 - 70.0 %    Lymphocyte % 22.7 20.0 - 45.0 %    Monocyte % 6.7 3.0 - 8.0 %    Eosinophil % 2.3 0.0 - 4.0 %    Basophil % 0.5 0.0 - 2.0 %    Immature Grans % 0.5 0.0 - 0.5 %    Neutrophils, Absolute 3.74 1.50 - 8.30 10*3/mm3    Lymphocytes, Absolute 1.26 0.60 - 4.80 10*3/mm3    Monocytes, Absolute 0.37 0.00 - 1.00 10*3/mm3    Eosinophils, Absolute 0.13 0.10 - 0.30 10*3/mm3    Basophils, Absolute 0.03 0.00 - 0.20 10*3/mm3    Immature Grans, Absolute 0.03 0.00 - 0.03 10*3/mm3    nRBC 0.0 0.0 - 0.0 /100 WBC   Acetone   Result Value Ref Range    Acetone Trace (A) Negative   Blood Gas, Arterial   Result Value Ref Range    Site Left Brachial     Will's Test N/A     pH, Arterial 7.311 (L) 7.350 - 7.450 pH units    pCO2, Arterial 31.6 (L) 35.0 - 45.0 mm Hg    pO2, Arterial 90.5 83.0 - 108.0 mm Hg    HCO3, Arterial 15.9 (L) 20.0 - 26.0 mmol/L    Base Excess, Arterial -9.2 (L) 0.0 - 2.0 mmol/L    O2 Saturation, Arterial 96.5 94.0 - 99.0 %    Hemoglobin, Blood Gas 12.5 (L) 13.5 - 17.5 g/dL    Temperature 37.0 C    Barometric Pressure for Blood Gas 748 mmHg    Modality Room Air     Ventilator Mode NA     pCO2, Temperature Corrected 31.6 (L) 35 - 45 mm Hg    pH, Temp Corrected 7.311 (L) 7.350 - 7.450 pH Units    pO2,  Temperature Corrected 90.5 83 - 108 mm Hg   Basic Metabolic Panel   Result Value Ref Range    Glucose 92 65 - 99 mg/dL    BUN 18 8 - 23 mg/dL    Creatinine 0.72 0.57 - 1.00 mg/dL    Sodium 137 136 - 145 mmol/L    Potassium 3.3 (L) 3.5 - 5.2 mmol/L    Chloride 105 98 - 107 mmol/L    CO2 22.4 22.0 - 29.0 mmol/L    Calcium 8.1 (L) 8.8 - 10.5 mg/dL    eGFR Non African Amer 80 >60 mL/min/1.73    BUN/Creatinine Ratio 25.0 7.0 - 25.0    Anion Gap 9.6 mmol/L   Acetone   Result Value Ref Range    Acetone Negative Negative   Basic Metabolic Panel   Result Value Ref Range    Glucose 341 (H) 65 - 99 mg/dL    BUN 16 8 - 23 mg/dL    Creatinine 0.75 0.57 - 1.00 mg/dL    Sodium 138 136 - 145 mmol/L    Potassium 4.6 3.5 - 5.2 mmol/L    Chloride 104 98 - 107 mmol/L    CO2 13.1 (L) 22.0 - 29.0 mmol/L    Calcium 8.3 (L) 8.8 - 10.5 mg/dL    eGFR Non African Amer 76 >60 mL/min/1.73    BUN/Creatinine Ratio 21.3 7.0 - 25.0    Anion Gap 20.9 mmol/L   Phosphorus   Result Value Ref Range    Phosphorus 1.0 (L) 2.7 - 4.5 mg/dL   Phosphorus   Result Value Ref Range    Phosphorus 2.0 (L) 2.7 - 4.5 mg/dL   Basic Metabolic Panel   Result Value Ref Range    Glucose 267 (H) 65 - 99 mg/dL    BUN 15 8 - 23 mg/dL    Creatinine 0.77 0.57 - 1.00 mg/dL    Sodium 138 136 - 145 mmol/L    Potassium 3.6 3.5 - 5.2 mmol/L    Chloride 107 98 - 107 mmol/L    CO2 13.4 (L) 22.0 - 29.0 mmol/L    Calcium 7.8 (L) 8.8 - 10.5 mg/dL    eGFR Non African Amer 74 >60 mL/min/1.73    BUN/Creatinine Ratio 19.5 7.0 - 25.0    Anion Gap 17.6 mmol/L   Basic Metabolic Panel   Result Value Ref Range    Glucose 154 (H) 65 - 99 mg/dL    BUN 12 8 - 23 mg/dL    Creatinine 0.69 0.57 - 1.00 mg/dL    Sodium 138 136 - 145 mmol/L    Potassium 4.1 3.5 - 5.2 mmol/L    Chloride 108 (H) 98 - 107 mmol/L    CO2 19.8 (L) 22.0 - 29.0 mmol/L    Calcium 7.8 (L) 8.8 - 10.5 mg/dL    eGFR Non African Amer 84 >60 mL/min/1.73    BUN/Creatinine Ratio 17.4 7.0 - 25.0    Anion Gap 10.2 mmol/L   Magnesium    Result Value Ref Range    Magnesium 1.7 1.7 - 2.5 mg/dL   Magnesium   Result Value Ref Range    Magnesium 2.2 1.7 - 2.5 mg/dL   Phosphorus   Result Value Ref Range    Phosphorus 3.5 2.7 - 4.5 mg/dL   Basic Metabolic Panel   Result Value Ref Range    Glucose 205 (H) 65 - 99 mg/dL    BUN 10 8 - 23 mg/dL    Creatinine 0.65 0.57 - 1.00 mg/dL    Sodium 140 136 - 145 mmol/L    Potassium 4.8 3.5 - 5.2 mmol/L    Chloride 109 (H) 98 - 107 mmol/L    CO2 20.2 (L) 22.0 - 29.0 mmol/L    Calcium 7.8 (L) 8.8 - 10.5 mg/dL    eGFR Non African Amer 90 >60 mL/min/1.73    BUN/Creatinine Ratio 15.4 7.0 - 25.0    Anion Gap 10.8 mmol/L   Magnesium   Result Value Ref Range    Magnesium 2.8 (H) 1.7 - 2.5 mg/dL   Vitamin B12   Result Value Ref Range    Vitamin B-12 627 232-1,245 pg/mL   Folate   Result Value Ref Range    Folate >20.00 (H) 4.78 - 20.00 ng/mL   Phosphorus   Result Value Ref Range    Phosphorus 3.7 2.7 - 4.5 mg/dL   Basic Metabolic Panel   Result Value Ref Range    Glucose 256 (H) 65 - 99 mg/dL    BUN 8 8 - 23 mg/dL    Creatinine 0.64 0.57 - 1.00 mg/dL    Sodium 138 136 - 145 mmol/L    Potassium 4.9 3.5 - 5.2 mmol/L    Chloride 107 98 - 107 mmol/L    CO2 19.2 (L) 22.0 - 29.0 mmol/L    Calcium 7.7 (L) 8.8 - 10.5 mg/dL    eGFR Non African Amer 91 >60 mL/min/1.73    BUN/Creatinine Ratio 12.5 7.0 - 25.0    Anion Gap 11.8 mmol/L   Magnesium   Result Value Ref Range    Magnesium 2.5 1.7 - 2.5 mg/dL   Phosphorus   Result Value Ref Range    Phosphorus 2.0 (L) 2.7 - 4.5 mg/dL   Basic Metabolic Panel   Result Value Ref Range    Glucose 232 (H) 65 - 99 mg/dL    BUN 6 (L) 8 - 23 mg/dL    Creatinine 0.73 0.57 - 1.00 mg/dL    Sodium 140 136 - 145 mmol/L    Potassium 4.3 3.5 - 5.2 mmol/L    Chloride 109 (H) 98 - 107 mmol/L    CO2 21.3 (L) 22.0 - 29.0 mmol/L    Calcium 8.2 (L) 8.8 - 10.5 mg/dL    eGFR Non African Amer 79 >60 mL/min/1.73    BUN/Creatinine Ratio 8.2 7.0 - 25.0    Anion Gap 9.7 mmol/L   Magnesium   Result Value Ref Range     Magnesium 2.4 1.7 - 2.5 mg/dL   Phosphorus   Result Value Ref Range    Phosphorus 2.9 2.7 - 4.5 mg/dL   Basic Metabolic Panel   Result Value Ref Range    Glucose 100 (H) 65 - 99 mg/dL    BUN 5 (L) 8 - 23 mg/dL    Creatinine 0.57 0.57 - 1.00 mg/dL    Sodium 143 136 - 145 mmol/L    Potassium 4.1 3.5 - 5.2 mmol/L    Chloride 113 (H) 98 - 107 mmol/L    CO2 21.7 (L) 22.0 - 29.0 mmol/L    Calcium 7.7 (L) 8.8 - 10.5 mg/dL    eGFR Non African Amer 105 >60 mL/min/1.73    BUN/Creatinine Ratio 8.8 7.0 - 25.0    Anion Gap 8.3 mmol/L   Magnesium   Result Value Ref Range    Magnesium 2.1 1.7 - 2.5 mg/dL   Basic Metabolic Panel   Result Value Ref Range    Glucose 298 (H) 65 - 99 mg/dL    BUN 4 (L) 8 - 23 mg/dL    Creatinine 0.64 0.57 - 1.00 mg/dL    Sodium 139 136 - 145 mmol/L    Potassium 4.2 3.5 - 5.2 mmol/L    Chloride 105 98 - 107 mmol/L    CO2 23.5 22.0 - 29.0 mmol/L    Calcium 8.3 (L) 8.8 - 10.5 mg/dL    eGFR Non African Amer 91 >60 mL/min/1.73    BUN/Creatinine Ratio 6.3 (L) 7.0 - 25.0    Anion Gap 10.5 mmol/L   POC Glucose Once   Result Value Ref Range    Glucose 374 (H) 70 - 130 mg/dL   POC Glucose Once   Result Value Ref Range    Glucose 305 (H) 70 - 130 mg/dL   POC Glucose Once   Result Value Ref Range    Glucose 309 (H) 70 - 130 mg/dL   POC Glucose Once   Result Value Ref Range    Glucose 199 (H) 70 - 130 mg/dL   POC Glucose Once   Result Value Ref Range    Glucose 170 (H) 70 - 130 mg/dL   POC Glucose Once   Result Value Ref Range    Glucose 118 70 - 130 mg/dL   POC Glucose Once   Result Value Ref Range    Glucose 78 70 - 130 mg/dL   POC Glucose Once   Result Value Ref Range    Glucose 151 (H) 70 - 130 mg/dL   POC Glucose Once   Result Value Ref Range    Glucose 161 (H) 70 - 130 mg/dL   POC Glucose Once   Result Value Ref Range    Glucose 116 70 - 130 mg/dL   POC Glucose Once   Result Value Ref Range    Glucose 318 (H) 70 - 130 mg/dL   POC Glucose Once   Result Value Ref Range    Glucose 329 (H) 70 - 130 mg/dL   POC  Glucose Once   Result Value Ref Range    Glucose 254 (H) 70 - 130 mg/dL   POC Glucose Once   Result Value Ref Range    Glucose 199 (H) 70 - 130 mg/dL   POC Glucose Once   Result Value Ref Range    Glucose 150 (H) 70 - 130 mg/dL   POC Glucose Once   Result Value Ref Range    Glucose 147 (H) 70 - 130 mg/dL   POC Glucose Once   Result Value Ref Range    Glucose 148 (H) 70 - 130 mg/dL   POC Glucose Once   Result Value Ref Range    Glucose 177 (H) 70 - 130 mg/dL   POC Glucose Once   Result Value Ref Range    Glucose 167 (H) 70 - 130 mg/dL   POC Glucose Once   Result Value Ref Range    Glucose 155 (H) 70 - 130 mg/dL   POC Glucose Once   Result Value Ref Range    Glucose 189 (H) 70 - 130 mg/dL   POC Glucose Once   Result Value Ref Range    Glucose 199 (H) 70 - 130 mg/dL   POC Glucose Once   Result Value Ref Range    Glucose 251 (H) 70 - 130 mg/dL   POC Glucose Once   Result Value Ref Range    Glucose 241 (H) 70 - 130 mg/dL   POC Glucose Once   Result Value Ref Range    Glucose 230 (H) 70 - 130 mg/dL   POC Glucose Once   Result Value Ref Range    Glucose 239 (H) 70 - 130 mg/dL   POC Glucose Once   Result Value Ref Range    Glucose 230 (H) 70 - 130 mg/dL   POC Glucose Once   Result Value Ref Range    Glucose 205 (H) 70 - 130 mg/dL   POC Glucose Once   Result Value Ref Range    Glucose 150 (H) 70 - 130 mg/dL   POC Glucose Once   Result Value Ref Range    Glucose 119 70 - 130 mg/dL   POC Glucose Once   Result Value Ref Range    Glucose 102 70 - 130 mg/dL   POC Glucose Once   Result Value Ref Range    Glucose 89 70 - 130 mg/dL   POC Glucose Once   Result Value Ref Range    Glucose 141 (H) 70 - 130 mg/dL   POC Glucose Once   Result Value Ref Range    Glucose 152 (H) 70 - 130 mg/dL   POC Glucose Once   Result Value Ref Range    Glucose 167 (H) 70 - 130 mg/dL   POC Glucose Once   Result Value Ref Range    Glucose 267 (H) 70 - 130 mg/dL   POC Glucose Once   Result Value Ref Range    Glucose 303 (H) 70 - 130 mg/dL      Assessment/Plan   Olga was seen today for diabetes and follow-up.    Diagnoses and all orders for this visit:    Type 1 diabetes mellitus without complication (CMS/HCC)  -     Vitamin B12; Future  -     Hemoglobin A1c; Future  -     Microalbumin / Creatinine Urine Ratio - Urine, Clean Catch; Future    Essential hypertension  -     Comprehensive Metabolic Panel; Future  -     Lipid Panel With / Chol / HDL Ratio; Future  -     TSH; Future    Acquired hypothyroidism    Vitamin D deficiency  -     Vitamin D 25 Hydroxy; Future    Gastroesophageal reflux disease without esophagitis  -     CBC & Differential; Future    Elevated transaminase level    Heart palpitations  -     Holter monitor - 24 hour; Future    Routine adult health maintenance    Encounter for screening for malignant neoplasm of breast  -     Mammo Screening Bilateral With CAD; Future    1. DMI.  Hospital records reviewed.  She is on ACE-I in ASA, and statin.  Continue per Dr. Rogers.  Eye exam due in April.    2. HTN.  Controlled.  Continue benazepril-hctz.  Monitor home blood pressures.    3. Hypothyroidism.  On replacement.    4. Vitamin D deficiency.  Continue supplement.    5. GERD.  Doing well off omeprazole.    6. Elevated transaminases.  Hep b, c negative.  Liver u/s negative.  Monitor.  May need GI consult.    7. Palpitations.  Check holter monitor.    8. RHM.  Mammogram ordered.  Flu vaccine UTD.      Outpatient Medications Prior to Visit   Medication Sig Dispense Refill   • Biotin 63734 MCG tablet Take 1 tablet by mouth Daily.     • Blood Glucose Monitoring Suppl (ACCU-CHEK RICHARD SMARTVIEW) w/Device kit Use to check blood sugar 4 times daily  E10.9 1 kit 0   • Calcium Carb-Cholecalciferol (CALCIUM 600+D3 PO) Take 500 Units by mouth Daily.     • Chromium Picolinate 1000 MCG tablet Take 1,000 mcg by mouth daily.     • glucagon (GLUCAGON EMERGENCY) 1 MG injection Inject 1 mg under the skin into the appropriate area as directed 1 (One) Time As  Needed for Low Blood Sugar for up to 1 dose. 1 kit 12   • insulin aspart (NOVOLOG) 100 UNIT/ML injection 50 units daily VIA insulin Pump (Patient taking differently: Inject 1 Units under the skin into the appropriate area as directed 3 (Three) Times a Day Before Meals. 50 units daily VIA insulin Pump) 20 mL 6   • levothyroxine (SYNTHROID, LEVOTHROID) 75 MCG tablet Take 1 tablet by mouth Daily. (Patient taking differently: Take 75 mcg by mouth Every Night.) 30 tablet 11   • ACCU-CHEK FASTCLIX LANCETS misc Use to test blood sugar 4 times daily e 10.9 200 each 11   • Blood Glucose Monitoring Suppl (ACCU-CHEK COMPACT CARE KIT) kit 1 kit 4 (Four) Times a Day. ICD CODE E10.9 1 each 1   • ACCU-CHEK SOFTCLIX LANCETS lancets USE TO TEST BLOOD SUGAR 4 TIMES DAILY 200 each 5   • atorvastatin (LIPITOR) 20 MG tablet Take 1 tablet by mouth Daily. 30 tablet 11   • B Complex Vitamins (VITAMIN B COMPLEX) capsule capsule Take 1 capsule by mouth Daily.     • Insulin Pen Needle (BD PEN NEEDLE RICHARD U/F) 32G X 4 MM misc USE TO INJECT INSULIN 400 each 3     No facility-administered medications prior to visit.      No orders of the defined types were placed in this encounter.    [unfilled]  Medications Discontinued During This Encounter   Medication Reason   • ACCU-CHEK FASTCLIX LANCETS misc *Error   • Blood Glucose Monitoring Suppl (ACCU-CHEK COMPACT CARE KIT) kit *Therapy completed   • Insulin Pen Needle (BD PEN NEEDLE RICHARD U/F) 32G X 4 MM misc *Therapy completed         Return in about 3 months (around 1/29/2019).

## 2018-11-01 ENCOUNTER — HOSPITAL ENCOUNTER (OUTPATIENT)
Dept: CARDIOLOGY | Facility: HOSPITAL | Age: 73
Discharge: HOME | End: 2018-11-01
Attending: FAMILY MEDICINE

## 2018-11-01 ENCOUNTER — HOSPITAL ENCOUNTER (OUTPATIENT)
Dept: MAMMOGRAPHY | Facility: HOSPITAL | Age: 73
Discharge: HOME | End: 2018-11-01
Attending: FAMILY MEDICINE | Admitting: FAMILY MEDICINE

## 2018-11-01 DIAGNOSIS — R00.2 HEART PALPITATIONS: ICD-10-CM

## 2018-11-01 DIAGNOSIS — Z12.39 ENCOUNTER FOR SCREENING FOR MALIGNANT NEOPLASM OF BREAST: ICD-10-CM

## 2018-11-01 PROCEDURE — 77067 SCR MAMMO BI INCL CAD: CPT

## 2018-11-01 PROCEDURE — 93226 XTRNL ECG REC<48 HR SCAN A/R: CPT

## 2018-11-01 PROCEDURE — 77063 BREAST TOMOSYNTHESIS BI: CPT

## 2018-11-01 PROCEDURE — 93225 XTRNL ECG REC<48 HRS REC: CPT

## 2018-11-03 ENCOUNTER — RESULTS ENCOUNTER (OUTPATIENT)
Dept: INTERNAL MEDICINE | Facility: CLINIC | Age: 73
End: 2018-11-03

## 2018-11-03 DIAGNOSIS — K21.9 GASTROESOPHAGEAL REFLUX DISEASE WITHOUT ESOPHAGITIS: ICD-10-CM

## 2018-11-03 DIAGNOSIS — E55.9 VITAMIN D DEFICIENCY: ICD-10-CM

## 2018-11-03 DIAGNOSIS — I10 ESSENTIAL HYPERTENSION: ICD-10-CM

## 2018-11-03 DIAGNOSIS — E10.9 TYPE 1 DIABETES MELLITUS WITHOUT COMPLICATION (HCC): ICD-10-CM

## 2018-11-05 ENCOUNTER — OFFICE VISIT (OUTPATIENT)
Dept: ENDOCRINOLOGY | Age: 73
End: 2018-11-05

## 2018-11-05 VITALS
WEIGHT: 127.4 LBS | BODY MASS INDEX: 21.75 KG/M2 | HEART RATE: 73 BPM | DIASTOLIC BLOOD PRESSURE: 80 MMHG | SYSTOLIC BLOOD PRESSURE: 128 MMHG | HEIGHT: 64 IN

## 2018-11-05 DIAGNOSIS — E78.2 MIXED HYPERLIPIDEMIA: ICD-10-CM

## 2018-11-05 DIAGNOSIS — E03.9 HYPOTHYROIDISM, UNSPECIFIED TYPE: ICD-10-CM

## 2018-11-05 DIAGNOSIS — E10.9 TYPE 1 DIABETES MELLITUS WITHOUT COMPLICATION (HCC): Primary | ICD-10-CM

## 2018-11-05 PROCEDURE — 99214 OFFICE O/P EST MOD 30 MIN: CPT | Performed by: INTERNAL MEDICINE

## 2018-11-05 RX ORDER — INFLUENZA A VIRUS A/MICHIGAN/45/2015 X-275 (H1N1) ANTIGEN (FORMALDEHYDE INACTIVATED), INFLUENZA A VIRUS A/SINGAPORE/INFIMH-16-0019/2016 IVR-186 (H3N2) ANTIGEN (FORMALDEHYDE INACTIVATED), AND INFLUENZA B VIRUS B/MARYLAND/15/2016 BX-69A (A B/COLORADO/6/2017-LIKE VIRUS) ANTIGEN (FORMALDEHYDE INACTIVATED) 60; 60; 60 UG/.5ML; UG/.5ML; UG/.5ML
INJECTION, SUSPENSION INTRAMUSCULAR
COMMUNITY
Start: 2018-09-21 | End: 2019-01-28

## 2018-11-05 NOTE — PROGRESS NOTES
73 y.o.    Patient Care Team:  Yair Zapata MD as PCP - General    Chief Complaint:    2 WEEK HOSPITAL FOLLOW UP/ TYPE 1 DIABETES MELLITUS     Subjective     HPI    Olga Greco 72 y.o. WF presents with Type 1 dm as a follow up patient. Consulted by Dr. Zapata.      Type 1 dm - Diagnosed in 2003 was initially thought to be type 2 dm. Was started on oral agents initially. Insulin was started on April 24th 2017.    When she came in in February 2018 she was still on oral hypoglycemic agents but due to her phenotype and family history of type 1 diabetes, her antibiotics were checked which were positive.  Patient had low C-peptide levels, positive james 65 and insulin auto bodies.  She has been started on insulin pump for about 2-3 months now.  She is currently on 630 G Medtronic insulin pump.  She checks her blood sugars at least 6-8 times a day.  She does have significantly elevated blood sugars in the last 2 - 3 months  Most of her blood sugars are running around 100-to 50 mg/dL range.  Recently she was hospitalized with DKA, elevated blood sugar above 400 mg/dL when she had insulin pump thinking.  No blood sugar which is less than 60 in the last 1 month.  Highest blood sugar in the last 1 month was around 400 mg/dL range.  Last eye examination was in April 2018.  No history of diabetic neuropathy.  No history of CAD, CK D, CVA.  She is physically active and exercises at least for 3-4 times a week.  On Ace inhibitor.  Last DKA episode was in October 2018.       We are still working to get the dexcom approved for the patient.     Hyperlipidemia  On Lipitor 20 mg oral daily.     Hypothyroidism    On levothyroxine 75 µg oral daily.    Reviewed primary care physician's/consulting physician documentation and lab results :     Interval History      The following portions of the patient's history were reviewed and updated as appropriate: allergies, current medications, past family history, past medical history,  past social history, past surgical history and problem list.    Past Medical History:   Diagnosis Date   • Diabetes mellitus (CMS/HCC)    • Hypertension    • Hypothyroidism    • Type 2 diabetes mellitus (CMS/HCC) 04/2018     Family History   Problem Relation Age of Onset   • Heart disease Father    • Diabetes Sister    • Hypertension Mother    • Breast cancer Neg Hx      Social History     Social History   • Marital status:      Spouse name: N/A   • Number of children: N/A   • Years of education: N/A     Occupational History   • Not on file.     Social History Main Topics   • Smoking status: Never Smoker   • Smokeless tobacco: Never Used   • Alcohol use 1.2 oz/week     2 Glasses of wine per week      Comment: daily   • Drug use: No   • Sexual activity: Yes     Partners: Male     Other Topics Concern   • Not on file     Social History Narrative   • No narrative on file     Allergies   Allergen Reactions   • Aspirin      GI distress   • Epinephrine      Tachycardia       Current Outpatient Prescriptions:   •  ACCU-CHEK SOFTCLIX LANCETS lancets, USE TO TEST BLOOD SUGAR 4 TIMES DAILY, Disp: 200 each, Rfl: 5  •  atorvastatin (LIPITOR) 20 MG tablet, Take 1 tablet by mouth Daily., Disp: 30 tablet, Rfl: 11  •  B Complex Vitamins (VITAMIN B COMPLEX) capsule capsule, Take 1 capsule by mouth Daily., Disp: , Rfl:   •  benazepril 20 MG tablet 20 mg, hydrochlorothiazide 25 MG tablet 12.5 mg, Take 1 tablet by mouth Daily., Disp: , Rfl:   •  Biotin 73332 MCG tablet, Take 1 tablet by mouth Daily., Disp: , Rfl:   •  Blood Glucose Monitoring Suppl (ACCU-CHEK RICHARD SMARTVIEW) w/Device kit, Use to check blood sugar 4 times daily  E10.9, Disp: 1 kit, Rfl: 0  •  Calcium Carb-Cholecalciferol (CALCIUM 600+D3 PO), Take 500 Units by mouth Daily., Disp: , Rfl:   •  Chromium Picolinate 1000 MCG tablet, Take 1,000 mcg by mouth daily., Disp: , Rfl:   •  glucagon (GLUCAGON EMERGENCY) 1 MG injection, Inject 1 mg under the skin into the  "appropriate area as directed 1 (One) Time As Needed for Low Blood Sugar for up to 1 dose., Disp: 1 kit, Rfl: 12  •  insulin aspart (NOVOLOG) 100 UNIT/ML injection, 50 units daily VIA insulin Pump (Patient taking differently: Inject 1 Units under the skin into the appropriate area as directed 3 (Three) Times a Day Before Meals. 50 units daily VIA insulin Pump), Disp: 20 mL, Rfl: 6  •  levothyroxine (SYNTHROID, LEVOTHROID) 75 MCG tablet, Take 1 tablet by mouth Daily. (Patient taking differently: Take 75 mcg by mouth Every Night.), Disp: 30 tablet, Rfl: 11  •  FLUZONE HIGH-DOSE 0.5 ML suspension prefilled syringe injection, , Disp: , Rfl:         Review of Systems   Constitutional: Negative for chills, fatigue and fever.   Cardiovascular: Negative for chest pain.   Gastrointestinal: Negative for abdominal pain, constipation, diarrhea, nausea and vomiting.   Endocrine: Positive for heat intolerance. Negative for cold intolerance.       Objective       Vitals:    11/05/18 0944   BP: 128/80   Pulse: 73   Weight: 57.8 kg (127 lb 6.4 oz)   Height: 162.6 cm (64\")     Body mass index is 21.87 kg/m².      Physical Exam   Constitutional: She is oriented to person, place, and time. She appears well-nourished.   HENT:   Head: Normocephalic and atraumatic.   Eyes: Conjunctivae and EOM are normal. No scleral icterus.   Neck: Normal range of motion. Neck supple. No thyromegaly present.   Cardiovascular: Normal rate and normal heart sounds.  Exam reveals no friction rub.    No murmur heard.  Pulmonary/Chest: Effort normal and breath sounds normal. No stridor. She has no wheezes. She has no rales.   Abdominal: Soft. Bowel sounds are normal. She exhibits no distension. There is no tenderness.   Musculoskeletal: She exhibits no edema or tenderness.   Lymphadenopathy:     She has no cervical adenopathy.   Neurological: She is alert and oriented to person, place, and time.   Skin: Skin is warm and dry. She is not diaphoretic. " "  Psychiatric: She has a normal mood and affect.   Vitals reviewed.    Results Review:     I reviewed the patient's new clinical results and mentioned them above in HPI and in plan as well.    Medical records reviewed  Summary: done      Admission on 10/21/2018, Discharged on 10/23/2018   No results displayed because visit has over 200 results.        Lab Results   Component Value Date    HGBA1C 7.20 (H) 10/21/2018    HGBA1C 6.90 (H) 09/05/2018    HGBA1C 7.11 (H) 07/10/2018     Lab Results   Component Value Date    MICROALBUR 10.6 10/04/2017    CREATININE 0.64 10/23/2018     Imaging Results (most recent)     None                Assessment and Plan:    Olga was seen today for diabetes.    Diagnoses and all orders for this visit:    Type 1 diabetes mellitus without complication (CMS/[facility])    Hypothyroidism, unspecified type    Mixed hyperlipidemia      Type 1 diabetes mellitus-extremely uncontrolled blood sugars  Noted that patient's blood sugars are extremely variable  Will continue the current basal rate  Will change the bolus settings  Bolus settings  Breakfast- 12g  Lunch- 12  Dinner-14 g.  Sensitivity-45  Active insulin time-3.5    Hypothyroidism  Continue levothyroxine 75 µg oral daily.    Hyperlipidemia  Continue Lipitor 20 mg oral daily.    Reviewed Lab results with the patient.             Angi Rogers MD  11/05/18    EMR Dragon / transcription disclaimer:     \"Dictated utilizing Dragon dictation\".  "

## 2018-11-07 DIAGNOSIS — I47.1 SVT (SUPRAVENTRICULAR TACHYCARDIA) (HCC): Primary | ICD-10-CM

## 2018-11-07 NOTE — PROGRESS NOTES
Spoke with pt.  Only a 4 beat run of SVT that didn't correspond to her symptoms.  Given her DMI, etc, we'll have her see cardiology.  She requests Dr. Gonzalez--has see him previously.

## 2018-11-20 ENCOUNTER — TELEPHONE (OUTPATIENT)
Dept: INTERNAL MEDICINE | Facility: CLINIC | Age: 73
End: 2018-11-20

## 2018-11-20 NOTE — TELEPHONE ENCOUNTER
11/20/2018 Spoke with patient and she did not receive the kit for cologuard. They say they sent it so I will reorder today.dg

## 2018-11-26 ENCOUNTER — TELEPHONE (OUTPATIENT)
Dept: ENDOCRINOLOGY | Age: 73
End: 2018-11-26

## 2018-11-26 NOTE — TELEPHONE ENCOUNTER
SPOKE WITH PATIENT TO LET HER KNOW THAT I HAVE RECEIVED THE PAPERWORK FROM [employer] EVERYTHING HAD BEEN FILLED IN AND CORRECTED AND HAS BEEN FAXED BACK TO [employer] PATIENT VOICE UNDERSTANDING        ----- Message from Jacey Gonzales sent at 11/26/2018 11:01 AM EST -----  Contact: Patient  Patient said she called last Wednesday about a [employer] form that needed to be filled out and sent back in for her CGM, she said she has been dealing with this for 9 months and that last time it was sent in it was filled out wrong. She wants to be called when doing the form so she can make sure it is done right.    Pt # [phone]

## 2018-11-30 ENCOUNTER — TRANSCRIBE ORDERS (OUTPATIENT)
Dept: ADMINISTRATIVE | Facility: HOSPITAL | Age: 73
End: 2018-11-30

## 2018-11-30 DIAGNOSIS — I25.9 CHRONIC ISCHEMIC HEART DISEASE, UNSPECIFIED: Primary | ICD-10-CM

## 2018-12-06 ENCOUNTER — LAB (OUTPATIENT)
Dept: LAB | Facility: HOSPITAL | Age: 73
End: 2018-12-06

## 2018-12-06 DIAGNOSIS — E10.9 TYPE 1 DIABETES MELLITUS WITHOUT COMPLICATION (HCC): ICD-10-CM

## 2018-12-06 DIAGNOSIS — E03.9 HYPOTHYROIDISM, UNSPECIFIED TYPE: ICD-10-CM

## 2018-12-06 DIAGNOSIS — E78.2 MIXED HYPERLIPIDEMIA: ICD-10-CM

## 2018-12-06 LAB
ANION GAP SERPL CALCULATED.3IONS-SCNC: 11.1 MMOL/L
BUN BLD-MCNC: 13 MG/DL (ref 8–23)
BUN/CREAT SERPL: 18.6 (ref 7–25)
CALCIUM SPEC-SCNC: 9.2 MG/DL (ref 8.8–10.5)
CHLORIDE SERPL-SCNC: 104 MMOL/L (ref 98–107)
CHOLEST SERPL-MCNC: 132 MG/DL (ref 0–200)
CO2 SERPL-SCNC: 27.9 MMOL/L (ref 22–29)
CREAT BLD-MCNC: 0.7 MG/DL (ref 0.57–1)
FOLATE SERPL-MCNC: >20 NG/ML (ref 4.78–20)
GFR SERPL CREATININE-BSD FRML MDRD: 82 ML/MIN/1.73
GLUCOSE BLD-MCNC: 110 MG/DL (ref 65–99)
HBA1C MFR BLD: 6.7 % (ref 4.8–5.6)
HDLC SERPL-MCNC: 104 MG/DL (ref 40–60)
LDLC SERPL CALC-MCNC: 23 MG/DL (ref 0–100)
LDLC/HDLC SERPL: 0.22 {RATIO}
POTASSIUM BLD-SCNC: 4.2 MMOL/L (ref 3.5–5.2)
SODIUM BLD-SCNC: 143 MMOL/L (ref 136–145)
TRIGL SERPL-MCNC: 26 MG/DL (ref 0–150)
TSH SERPL DL<=0.05 MIU/L-ACNC: 1.5 MIU/ML (ref 0.27–4.2)
VIT B12 BLD-MCNC: 482 PG/ML (ref 232–1245)
VLDLC SERPL-MCNC: 5.2 MG/DL (ref 7–27)

## 2018-12-06 PROCEDURE — 80061 LIPID PANEL: CPT

## 2018-12-06 PROCEDURE — 82607 VITAMIN B-12: CPT

## 2018-12-06 PROCEDURE — 36415 COLL VENOUS BLD VENIPUNCTURE: CPT

## 2018-12-06 PROCEDURE — 83036 HEMOGLOBIN GLYCOSYLATED A1C: CPT

## 2018-12-06 PROCEDURE — 84443 ASSAY THYROID STIM HORMONE: CPT

## 2018-12-06 PROCEDURE — 82746 ASSAY OF FOLIC ACID SERUM: CPT

## 2018-12-06 PROCEDURE — 80048 BASIC METABOLIC PNL TOTAL CA: CPT

## 2018-12-10 ENCOUNTER — HOSPITAL ENCOUNTER (OUTPATIENT)
Dept: CARDIOLOGY | Facility: HOSPITAL | Age: 73
Discharge: HOME | End: 2018-12-10
Attending: INTERNAL MEDICINE

## 2018-12-10 ENCOUNTER — HOSPITAL ENCOUNTER (OUTPATIENT)
Dept: NUCLEAR MEDICINE | Facility: HOSPITAL | Age: 73
Discharge: HOME | End: 2018-12-10
Attending: INTERNAL MEDICINE

## 2018-12-10 DIAGNOSIS — I25.9 CHRONIC ISCHEMIC HEART DISEASE, UNSPECIFIED: ICD-10-CM

## 2018-12-10 LAB
BH CV NUCLEAR PRIOR STUDY: 3
BH CV STRESS BP STAGE 1: NORMAL
BH CV STRESS BP STAGE 2: NORMAL
BH CV STRESS BP STAGE 3: NORMAL
BH CV STRESS DURATION MIN STAGE 1: 3
BH CV STRESS DURATION MIN STAGE 2: 3
BH CV STRESS DURATION SEC STAGE 1: 0
BH CV STRESS DURATION SEC STAGE 2: 0
BH CV STRESS DURATION SEC STAGE 3: 56
BH CV STRESS GRADE STAGE 1: 10
BH CV STRESS GRADE STAGE 2: 12
BH CV STRESS GRADE STAGE 3: 14
BH CV STRESS HR STAGE 1: 100
BH CV STRESS HR STAGE 2: 129
BH CV STRESS HR STAGE 3: 146
BH CV STRESS METS STAGE 1: 5
BH CV STRESS METS STAGE 2: 7.5
BH CV STRESS METS STAGE 3: 10
BH CV STRESS PROTOCOL 1: NORMAL
BH CV STRESS RECOVERY BP: NORMAL MMHG
BH CV STRESS RECOVERY HR: 85 BPM
BH CV STRESS SPEED STAGE 1: 1.7
BH CV STRESS SPEED STAGE 2: 2.5
BH CV STRESS SPEED STAGE 3: 3.4
BH CV STRESS STAGE 1: 1
BH CV STRESS STAGE 2: 2
BH CV STRESS STAGE 3: 3
LV EF NUC BP: 70 %
MAXIMAL PREDICTED HEART RATE: 149 BPM
PERCENT MAX PREDICTED HR: 99.33 %
STRESS BASELINE BP: NORMAL MMHG
STRESS BASELINE HR: 71 BPM
STRESS O2 SAT REST: 99 %
STRESS PERCENT HR: 117 %
STRESS POST ESTIMATED WORKLOAD: 8.5 METS
STRESS POST EXERCISE DUR MIN: 6 MIN
STRESS POST EXERCISE DUR SEC: 56 SEC
STRESS POST O2 SAT PEAK: 99 %
STRESS POST PEAK BP: NORMAL MMHG
STRESS POST PEAK HR: 148 BPM
STRESS TARGET HR: 127 BPM

## 2018-12-10 PROCEDURE — 0 TECHNETIUM SESTAMIBI: Performed by: INTERNAL MEDICINE

## 2018-12-10 PROCEDURE — 93017 CV STRESS TEST TRACING ONLY: CPT

## 2018-12-10 PROCEDURE — 93018 CV STRESS TEST I&R ONLY: CPT | Performed by: INTERNAL MEDICINE

## 2018-12-10 PROCEDURE — A9500 TC99M SESTAMIBI: HCPCS | Performed by: INTERNAL MEDICINE

## 2018-12-10 PROCEDURE — 78452 HT MUSCLE IMAGE SPECT MULT: CPT | Performed by: INTERNAL MEDICINE

## 2018-12-10 PROCEDURE — 78452 HT MUSCLE IMAGE SPECT MULT: CPT

## 2018-12-10 PROCEDURE — 93016 CV STRESS TEST SUPVJ ONLY: CPT | Performed by: INTERNAL MEDICINE

## 2018-12-10 RX ADMIN — TECHNETIUM TC 99M SESTAMIBI 1 DOSE: 1 INJECTION INTRAVENOUS at 07:53

## 2018-12-10 RX ADMIN — TECHNETIUM TC 99M SESTAMIBI 1 DOSE: 1 INJECTION INTRAVENOUS at 09:23

## 2018-12-20 ENCOUNTER — OFFICE VISIT (OUTPATIENT)
Dept: ENDOCRINOLOGY | Age: 73
End: 2018-12-20

## 2018-12-20 VITALS
OXYGEN SATURATION: 100 % | WEIGHT: 129 LBS | SYSTOLIC BLOOD PRESSURE: 110 MMHG | HEIGHT: 64 IN | HEART RATE: 67 BPM | DIASTOLIC BLOOD PRESSURE: 64 MMHG | BODY MASS INDEX: 22.02 KG/M2

## 2018-12-20 DIAGNOSIS — E78.2 MIXED HYPERLIPIDEMIA: ICD-10-CM

## 2018-12-20 DIAGNOSIS — E10.9 TYPE 1 DIABETES MELLITUS WITHOUT COMPLICATION (HCC): Primary | ICD-10-CM

## 2018-12-20 DIAGNOSIS — E03.9 HYPOTHYROIDISM, UNSPECIFIED TYPE: ICD-10-CM

## 2018-12-20 PROCEDURE — 95251 CONT GLUC MNTR ANALYSIS I&R: CPT | Performed by: INTERNAL MEDICINE

## 2018-12-20 PROCEDURE — 99215 OFFICE O/P EST HI 40 MIN: CPT | Performed by: INTERNAL MEDICINE

## 2018-12-20 NOTE — PROGRESS NOTES
73 y.o.    Patient Care Team:  Yair Zapata MD as PCP - General    Chief Complaint:    3 MONTH FOLLOW UP/ TYPE 2 DIABETES MELLITUS  Subjective     HPI    Olga Greco 72 y.o. WF presents with Type 1 dm as a follow up patient. Consulted by Dr. Zapata.      Type 1 dm - Diagnosed in 2003 was initially thought to be type 2 dm. Was started on oral agents initially. Insulin was started on April 24th 2017.    When she came in in February 2018 she was still on oral hypoglycemic agents but due to her phenotype and family history of type 1 diabetes, her antibiotics were checked which were positive.  Patient had low C-peptide levels, positive james 65 and insulin auto bodies. She is currently on 630 G Medtronic insulin pump.   Patient got her dexcom 2 months ago.  She is very happy with her and notes that she's been receiving with the sensor.  Downloaded insulin pump and sensor information.  Patient has been checking her blood sugars at least 2 to-3 times a day.  Average blood sugars around 1 55 mg/dL.  No significant low blood sugar episodes since patient has been started and the sensor.  She did have a high blood sugar of around 400 mg/dL when she had 3 slices of these.  Patient does have concern that she has to boluses about 2-3 hours after she eats as blood sugars tend to run high after the meals.  Last eye examination was in April 2018.  No history of diabetic neuropathy.  No history of CAD, CK D, CVA.  She is physically active and exercises at least for 3-4 times a week.  On Ace inhibitor.  Last DKA episode was in October 2018.     Hyperlipidemia  On Lipitor 20 mg oral daily.     Hypothyroidism    On levothyroxine 75 µg oral daily.    Insulin pump settings  Basal  12 AM-0.4  9 am-0.5  5 PM-0.6  5 PM-0.55    Bolus settings  Activity insulin time-3  Carbohydrate ratio  12 AM-15  7 AM-13- >12  12 PM-15- >13  6 PM-14- >12    Sensitivity  50- >45    Blood glucose target  110-120  Reviewed primary care  physician's/consulting physician documentation and lab results :     Interval History      The following portions of the patient's history were reviewed and updated as appropriate: allergies, current medications, past family history, past medical history, past social history, past surgical history and problem list.    Past Medical History:   Diagnosis Date   • Diabetes mellitus (CMS/[facility])    • Hypertension    • Hypothyroidism    • Type 2 diabetes mellitus (CMS/HCC) 04/2018     Family History   Problem Relation Age of Onset   • Heart disease Father    • Diabetes Sister    • Hypertension Mother    • Breast cancer Neg Hx      Social History     Socioeconomic History   • Marital status:      Spouse name: Not on file   • Number of children: Not on file   • Years of education: Not on file   • Highest education level: Not on file   Social Needs   • Financial resource strain: Not on file   • Food insecurity - worry: Not on file   • Food insecurity - inability: Not on file   • Transportation needs - medical: Not on file   • Transportation needs - non-medical: Not on file   Occupational History   • Not on file   Tobacco Use   • Smoking status: Never Smoker   • Smokeless tobacco: Never Used   Substance and Sexual Activity   • Alcohol use: Yes     Alcohol/week: 1.2 oz     Types: 2 Glasses of wine per week     Comment: daily   • Drug use: No   • Sexual activity: Yes     Partners: Male   Other Topics Concern   • Not on file   Social History Narrative   • Not on file     Allergies   Allergen Reactions   • Aspirin      GI distress   • Epinephrine      Tachycardia       Current Outpatient Medications:   •  ACCU-CHEK SOFTCLIX LANCETS lancets, USE TO TEST BLOOD SUGAR 4 TIMES DAILY, Disp: 200 each, Rfl: 5  •  atorvastatin (LIPITOR) 20 MG tablet, Take 1 tablet by mouth Daily., Disp: 30 tablet, Rfl: 11  •  B Complex Vitamins (VITAMIN B COMPLEX) capsule capsule, Take 1 capsule by mouth Daily., Disp: , Rfl:   •  benazepril 20 MG  tablet 20 mg, hydrochlorothiazide 25 MG tablet 12.5 mg, Take 1 tablet by mouth Daily., Disp: , Rfl:   •  Biotin 81687 MCG tablet, Take 1 tablet by mouth Daily., Disp: , Rfl:   •  Calcium Carb-Cholecalciferol (CALCIUM 600+D3 PO), Take 500 Units by mouth Daily., Disp: , Rfl:   •  Chromium Picolinate 1000 MCG tablet, Take 1,000 mcg by mouth daily., Disp: , Rfl:   •  FLUZONE HIGH-DOSE 0.5 ML suspension prefilled syringe injection, , Disp: , Rfl:   •  glucagon (GLUCAGON EMERGENCY) 1 MG injection, Inject 1 mg under the skin into the appropriate area as directed 1 (One) Time As Needed for Low Blood Sugar for up to 1 dose., Disp: 1 kit, Rfl: 12  •  insulin aspart (NOVOLOG) 100 UNIT/ML injection, 50 units daily VIA insulin Pump (Patient taking differently: Inject 1 Units under the skin into the appropriate area as directed 3 (Three) Times a Day Before Meals. 60 units daily VIA insulin Pump), Disp: 20 mL, Rfl: 6  •  levothyroxine (SYNTHROID, LEVOTHROID) 75 MCG tablet, Take 1 tablet by mouth Daily. (Patient taking differently: Take 75 mcg by mouth Every Night.), Disp: 30 tablet, Rfl: 11  •  Blood Glucose Monitoring Suppl (ACCU-CHEK RICHARD SMARTVIEW) w/Device kit, Use to check blood sugar 4 times daily  E10.9, Disp: 1 kit, Rfl: 0        Review of Systems   Constitutional: Negative for appetite change, fatigue and fever.   Eyes: Negative for visual disturbance.   Respiratory: Positive for shortness of breath.    Cardiovascular: Positive for palpitations. Negative for leg swelling.   Gastrointestinal: Negative for abdominal pain and vomiting.   Endocrine: Positive for polydipsia. Negative for polyuria.   Musculoskeletal: Positive for joint swelling. Negative for neck pain.   Skin: Negative for rash.   Neurological: Negative for weakness and numbness.   Psychiatric/Behavioral: Negative for behavioral problems.       Objective       Vitals:    12/20/18 1229   BP: 110/64   Pulse: 67   SpO2: 100%   Weight: 58.5 kg (129 lb)   Height:  "162.6 cm (64\")     Body mass index is 22.14 kg/m².      Physical Exam   Constitutional: She is oriented to person, place, and time. She appears well-nourished.   HENT:   Head: Normocephalic and atraumatic.   Eyes: Conjunctivae and EOM are normal. No scleral icterus.   Neck: Normal range of motion. Neck supple. No thyromegaly present.   Cardiovascular: Normal rate and normal heart sounds. Exam reveals no friction rub.   No murmur heard.  Pulmonary/Chest: Effort normal and breath sounds normal. No stridor. She has no wheezes. She has no rales.   Abdominal: Soft. Bowel sounds are normal. She exhibits no distension. There is no tenderness.   Musculoskeletal: She exhibits no edema or tenderness.   Lymphadenopathy:     She has no cervical adenopathy.   Neurological: She is alert and oriented to person, place, and time.   Hammer toes   Skin: Skin is warm and dry. She is not diaphoretic.   Psychiatric: She has a normal mood and affect.   Vitals reviewed.    Results Review:     I reviewed the patient's new clinical results and mentioned them above in HPI and in plan as well.    Medical records reviewed  Summary: done      Hospital Outpatient Visit on 12/10/2018   Component Date Value Ref Range Status   • BH CV STRESS PROTOCOL 1 12/10/2018 Solitario   Final   • Stage 1 12/10/2018 1   Final   • HR Stage 1 12/10/2018 100   Final   • BP Stage 1 12/10/2018 156/69   Final   • Duration Min Stage 1 12/10/2018 3   Final   • Duration Sec Stage 1 12/10/2018 0   Final   • Grade Stage 1 12/10/2018 10   Final   • Speed Stage 1 12/10/2018 1.7   Final   • BH CV STRESS METS STAGE 1 12/10/2018 5   Final   • Stage 2 12/10/2018 2   Final   • HR Stage 2 12/10/2018 129   Final   • BP Stage 2 12/10/2018 166/68   Final   • Duration Min Stage 2 12/10/2018 3   Final   • Duration Sec Stage 2 12/10/2018 0   Final   • Grade Stage 2 12/10/2018 12   Final   • Speed Stage 2 12/10/2018 2.5   Final   • BH CV STRESS METS STAGE 2 12/10/2018 7.5   Final   • Stage 3 " 12/10/2018 3   Final   • HR Stage 3 12/10/2018 146   Final   • BP Stage 3 12/10/2018 197/71   Final   • Duration Sec Stage 3 12/10/2018 56   Final   • Grade Stage 3 12/10/2018 14   Final   • Speed Stage 3 12/10/2018 3.4   Final   • BH CV STRESS METS STAGE 3 12/10/2018 10.0   Final   • Baseline HR 12/10/2018 71  bpm Final   • Baseline BP 12/10/2018 151/77  mmHg Final   • O2 sat rest 12/10/2018 99  % Final   • Peak HR 12/10/2018 148  bpm Final   • Percent Max Pred HR 12/10/2018 99.33  % Final   • Percent Target HR 12/10/2018 117  % Final   • Peak BP 12/10/2018 204/77  mmHg Final   • O2 sat peak 12/10/2018 99  % Final   • Recovery HR 12/10/2018 85  bpm Final   • Recovery BP 12/10/2018 165/77  mmHg Final   • Target HR (85%) 12/10/2018 127  bpm Final   • Max. Pred. HR (100%) 12/10/2018 149  bpm Final   • Exercise duration (min) 12/10/2018 6  min Final   • Exercise duration (sec) 12/10/2018 56  sec Final   • Estimated workload 12/10/2018 8.5  METS Final   • Nuclear Prior Study 12/10/2018 3   Final   • Nuc Stress EF 12/10/2018 70  % Final     Lab Results   Component Value Date    HGBA1C 6.70 (H) 12/06/2018    HGBA1C 7.20 (H) 10/21/2018    HGBA1C 6.90 (H) 09/05/2018     Lab Results   Component Value Date    MICROALBUR 10.6 10/04/2017    CREATININE 0.70 12/06/2018     Imaging Results (most recent)     None                Assessment and Plan:    Olga was seen today for diabetes.    Diagnoses and all orders for this visit:    Type 1 diabetes mellitus without complication (CMS/HCC)  -     Hemoglobin A1c; Future  -     Comprehensive Metabolic Panel; Future  -     Lipid Panel; Future  -     TSH; Future  -     Vitamin B12 & Folate; Future  -     T4, Free; Future    Hypothyroidism, unspecified type  -     Hemoglobin A1c; Future  -     Comprehensive Metabolic Panel; Future  -     Lipid Panel; Future  -     TSH; Future  -     Vitamin B12 & Folate; Future  -     T4, Free; Future    Mixed hyperlipidemia  -     Hemoglobin A1c; Future  -   "   Comprehensive Metabolic Panel; Future  -     Lipid Panel; Future  -     TSH; Future  -     Vitamin B12 & Folate; Future  -     T4, Free; Future        Type 1 diabetes mellitus-uncontrolled  Made the insulin pump changes as mentioned above in history of present illness  Downloaded the dexcom information and make the above insulin pump changes  Discuss the sensor information with the patient  Advised the patient that currently we are making insulin pump changes in order to prevent her bolus requirements at random times based on her blood glucose levels as she was only supposed to bolus at the time of her meals.    Hyperlipidemia  Continue Lipitor    Hypothyroidism  Continue levothyroxine 75 µg oral daily.    22 min   ( greater than 50% of the time) out of  40 minutes face-to-face spent counseling the patient on insulin pump changes, sensor information.    Reviewed Lab results with the patient.             Angi Rogers MD  12/20/18    EMR Dragon / transcription disclaimer:     \"Dictated utilizing Dragon dictation\".  "

## 2018-12-31 RX ORDER — BENAZEPRIL HYDROCHLORIDE AND HYDROCHLOROTHIAZIDE 20; 12.5 MG/1; MG/1
TABLET ORAL
Qty: 90 TABLET | Refills: 0 | Status: SHIPPED | OUTPATIENT
Start: 2018-12-31 | End: 2019-04-12 | Stop reason: SDUPTHER

## 2019-01-21 ENCOUNTER — LAB (OUTPATIENT)
Dept: INTERNAL MEDICINE | Facility: CLINIC | Age: 74
End: 2019-01-21

## 2019-01-21 DIAGNOSIS — I10 ESSENTIAL HYPERTENSION: ICD-10-CM

## 2019-01-21 DIAGNOSIS — E10.9 TYPE 1 DIABETES MELLITUS WITHOUT COMPLICATION (HCC): ICD-10-CM

## 2019-01-21 DIAGNOSIS — E55.9 VITAMIN D DEFICIENCY: ICD-10-CM

## 2019-01-21 DIAGNOSIS — E78.2 MIXED HYPERLIPIDEMIA: ICD-10-CM

## 2019-01-21 DIAGNOSIS — E10.9 TYPE 1 DIABETES MELLITUS WITHOUT COMPLICATION (HCC): Primary | ICD-10-CM

## 2019-01-21 DIAGNOSIS — E03.9 HYPOTHYROIDISM, UNSPECIFIED TYPE: ICD-10-CM

## 2019-01-22 LAB
25(OH)D3+25(OH)D2 SERPL-MCNC: 30.4 NG/ML
ALBUMIN SERPL-MCNC: 4.3 G/DL (ref 3.5–5.2)
ALBUMIN/CREAT UR: 3.4 MG/G CREAT (ref 0–30)
ALBUMIN/GLOB SERPL: 2.3 G/DL
ALP SERPL-CCNC: 71 U/L (ref 40–129)
ALT SERPL-CCNC: 36 U/L (ref 5–33)
AST SERPL-CCNC: 29 U/L (ref 5–32)
BASOPHILS # BLD AUTO: 0.04 10*3/MM3 (ref 0–0.2)
BASOPHILS NFR BLD AUTO: 0.8 % (ref 0–2)
BILIRUB SERPL-MCNC: 0.9 MG/DL (ref 0.2–1.2)
BUN SERPL-MCNC: 16 MG/DL (ref 8–23)
BUN/CREAT SERPL: 20.3 (ref 7–25)
CALCIUM SERPL-MCNC: 9.3 MG/DL (ref 8.8–10.5)
CHLORIDE SERPL-SCNC: 103 MMOL/L (ref 98–107)
CO2 SERPL-SCNC: 28.2 MMOL/L (ref 22–29)
CREAT SERPL-MCNC: 0.79 MG/DL (ref 0.57–1)
CREAT UR-MCNC: 142.7 MG/DL
EOSINOPHIL # BLD AUTO: 0.23 10*3/MM3 (ref 0.1–0.3)
EOSINOPHIL NFR BLD AUTO: 4.8 % (ref 0–4)
ERYTHROCYTE [DISTWIDTH] IN BLOOD BY AUTOMATED COUNT: 12.1 % (ref 11.5–14.5)
GLOBULIN SER CALC-MCNC: 1.9 GM/DL
GLUCOSE SERPL-MCNC: 170 MG/DL (ref 65–99)
HCT VFR BLD AUTO: 40.3 % (ref 37–47)
HGB BLD-MCNC: 13.2 G/DL (ref 12–16)
IMM GRANULOCYTES # BLD AUTO: 0.02 10*3/MM3 (ref 0–0.03)
IMM GRANULOCYTES NFR BLD AUTO: 0.4 % (ref 0–0.5)
LYMPHOCYTES # BLD AUTO: 1.48 10*3/MM3 (ref 0.6–4.8)
LYMPHOCYTES NFR BLD AUTO: 30.8 % (ref 20–45)
MCH RBC QN AUTO: 32.9 PG (ref 27–31)
MCHC RBC AUTO-ENTMCNC: 32.8 G/DL (ref 31–37)
MCV RBC AUTO: 100.5 FL (ref 81–99)
MICROALBUMIN UR-MCNC: 4.8 UG/ML
MONOCYTES # BLD AUTO: 0.33 10*3/MM3 (ref 0–1)
MONOCYTES NFR BLD AUTO: 6.9 % (ref 3–8)
NEUTROPHILS # BLD AUTO: 2.71 10*3/MM3 (ref 1.5–8.3)
NEUTROPHILS NFR BLD AUTO: 56.3 % (ref 45–70)
NRBC BLD AUTO-RTO: 0 /100 WBC (ref 0–0)
PLATELET # BLD AUTO: 225 10*3/MM3 (ref 140–500)
POTASSIUM SERPL-SCNC: 4.2 MMOL/L (ref 3.5–5.2)
PROT SERPL-MCNC: 6.2 G/DL (ref 6–8.5)
RBC # BLD AUTO: 4.01 10*6/MM3 (ref 4.2–5.4)
SODIUM SERPL-SCNC: 144 MMOL/L (ref 136–145)
WBC # BLD AUTO: 4.81 10*3/MM3 (ref 4.8–10.8)

## 2019-01-28 ENCOUNTER — OFFICE VISIT (OUTPATIENT)
Dept: INTERNAL MEDICINE | Facility: CLINIC | Age: 74
End: 2019-01-28

## 2019-01-28 ENCOUNTER — RESULTS ENCOUNTER (OUTPATIENT)
Dept: INTERNAL MEDICINE | Facility: CLINIC | Age: 74
End: 2019-01-28

## 2019-01-28 VITALS
TEMPERATURE: 97.9 F | BODY MASS INDEX: 22.02 KG/M2 | DIASTOLIC BLOOD PRESSURE: 78 MMHG | HEART RATE: 74 BPM | SYSTOLIC BLOOD PRESSURE: 140 MMHG | HEIGHT: 64 IN | OXYGEN SATURATION: 98 % | WEIGHT: 129 LBS | RESPIRATION RATE: 16 BRPM

## 2019-01-28 DIAGNOSIS — Z12.11 ENCOUNTER FOR SCREENING FOR MALIGNANT NEOPLASM OF COLON: ICD-10-CM

## 2019-01-28 DIAGNOSIS — E03.9 ACQUIRED HYPOTHYROIDISM: ICD-10-CM

## 2019-01-28 DIAGNOSIS — I10 ESSENTIAL HYPERTENSION: ICD-10-CM

## 2019-01-28 DIAGNOSIS — K21.9 GASTROESOPHAGEAL REFLUX DISEASE WITHOUT ESOPHAGITIS: ICD-10-CM

## 2019-01-28 DIAGNOSIS — E55.9 VITAMIN D DEFICIENCY: ICD-10-CM

## 2019-01-28 DIAGNOSIS — R74.01 ELEVATED TRANSAMINASE LEVEL: ICD-10-CM

## 2019-01-28 DIAGNOSIS — E10.9 TYPE 1 DIABETES MELLITUS WITHOUT COMPLICATION (HCC): Primary | ICD-10-CM

## 2019-01-28 PROBLEM — Z00.00 ROUTINE HEALTH MAINTENANCE: Status: ACTIVE | Noted: 2019-01-28

## 2019-01-28 PROCEDURE — 99214 OFFICE O/P EST MOD 30 MIN: CPT | Performed by: FAMILY MEDICINE

## 2019-01-28 NOTE — PROGRESS NOTES
Subjective     Olga Greco is a 73 y.o. female, who presents with a chief complaint of   Chief Complaint   Patient presents with   • Diabetes   • Pain     left thumb  x 2 wks     Diabetes     Hypertension     Hypothyroidism       1. Diabetes mellitus.  Dexcom working well.  Sees Dr. Rogers.    2. HTN.  Tolerating benazepril-hctz.  Home blood pressures at goal    3. Hypothyroidism.  She has been stable on levothyroxine 75 mcg for several months.    The following portions of the patient's history were reviewed and updated as appropriate: allergies, current medications, past family history, past medical history, past social history, past surgical history and problem list.    Allergies: Aspirin and Epinephrine    Review of Systems   HENT: Negative.    Eyes: Negative.    Respiratory: Negative.    Cardiovascular: Negative.    Gastrointestinal: Negative.    Endocrine: Negative.    Genitourinary: Negative.    Musculoskeletal: Negative.    Skin: Negative.    Allergic/Immunologic: Negative.    Neurological: Negative.    Hematological: Negative.    Psychiatric/Behavioral: Negative.      Objective     Wt Readings from Last 3 Encounters:   01/28/19 58.5 kg (129 lb)   12/20/18 58.5 kg (129 lb)   11/05/18 57.8 kg (127 lb 6.4 oz)     Temp Readings from Last 3 Encounters:   01/28/19 97.9 °F (36.6 °C)   10/29/18 98 °F (36.7 °C)   10/23/18 96.4 °F (35.8 °C) (Oral)     BP Readings from Last 3 Encounters:   01/28/19 140/78   12/20/18 110/64   11/05/18 128/80     Pulse Readings from Last 3 Encounters:   01/28/19 74   12/20/18 67   11/05/18 73     Body mass index is 22.14 kg/m².  SpO2 Readings from Last 3 Encounters:   01/17/18 99%   10/11/17 98%   07/13/17 98%       Physical Exam   Constitutional: She is oriented to person, place, and time. She appears well-developed and well-nourished.   HENT:   Head: Normocephalic and atraumatic.   Mouth/Throat: Oropharynx is clear and moist.   Eyes: Conjunctivae and EOM are normal.   Neck: Neck  supple. No thyromegaly present.   Cardiovascular: Normal rate, regular rhythm and normal heart sounds.   Pulmonary/Chest: Effort normal and breath sounds normal.   Abdominal: Soft. Bowel sounds are normal. There is no hepatosplenomegaly.   Musculoskeletal: Normal range of motion. She exhibits no edema.   Neurological: She is alert and oriented to person, place, and time.   Skin: Skin is warm and dry. No rash noted.   Psychiatric: She has a normal mood and affect. Her behavior is normal.   Nursing note and vitals reviewed.      Results for orders placed or performed in visit on 01/21/19   Comprehensive metabolic panel   Result Value Ref Range    Glucose 170 (H) 65 - 99 mg/dL    BUN 16 8 - 23 mg/dL    Creatinine 0.79 0.57 - 1.00 mg/dL    eGFR Non African Am 72 >60 mL/min/1.73    eGFR African Am 87 >60 mL/min/1.73    BUN/Creatinine Ratio 20.3 7.0 - 25.0    Sodium 144 136 - 145 mmol/L    Potassium 4.2 3.5 - 5.2 mmol/L    Chloride 103 98 - 107 mmol/L    Total CO2 28.2 22.0 - 29.0 mmol/L    Calcium 9.3 8.8 - 10.5 mg/dL    Total Protein 6.2 6.0 - 8.5 g/dL    Albumin 4.30 3.50 - 5.20 g/dL    Globulin 1.9 gm/dL    A/G Ratio 2.3 g/dL    Total Bilirubin 0.9 0.2 - 1.2 mg/dL    Alkaline Phosphatase 71 40 - 129 U/L    AST (SGOT) 29 5 - 32 U/L    ALT (SGPT) 36 (H) 5 - 33 U/L   Vitamin D 25 hydroxy   Result Value Ref Range    25 Hydroxy, Vitamin D 30.4 ng/ml   Microalbumin / Creatinine Urine Ratio - Urine, Clean Catch   Result Value Ref Range    Creatinine, Urine 142.7 Not Estab. mg/dL    Microalbumin, Urine 4.8 Not Estab. ug/mL    Microalbumin/Creatinine Ratio 3.4 0.0 - 30.0 mg/g creat   CBC & Differential   Result Value Ref Range    WBC 4.81 4.80 - 10.80 10*3/mm3    RBC 4.01 (L) 4.20 - 5.40 10*6/mm3    Hemoglobin 13.2 12.0 - 16.0 g/dL    Hematocrit 40.3 37.0 - 47.0 %    .5 (H) 81.0 - 99.0 fL    MCH 32.9 (H) 27.0 - 31.0 pg    MCHC 32.8 31.0 - 37.0 g/dL    RDW 12.1 11.5 - 14.5 %    Platelets 225 140 - 500 10*3/mm3     Neutrophil Rel % 56.3 45.0 - 70.0 %    Lymphocyte Rel % 30.8 20.0 - 45.0 %    Monocyte Rel % 6.9 3.0 - 8.0 %    Eosinophil Rel % 4.8 (H) 0.0 - 4.0 %    Basophil Rel % 0.8 0.0 - 2.0 %    Neutrophils Absolute 2.71 1.50 - 8.30 10*3/mm3    Lymphocytes Absolute 1.48 0.60 - 4.80 10*3/mm3    Monocytes Absolute 0.33 0.00 - 1.00 10*3/mm3    Eosinophils Absolute 0.23 0.10 - 0.30 10*3/mm3    Basophils Absolute 0.04 0.00 - 0.20 10*3/mm3    Immature Granulocyte Rel % 0.4 0.0 - 0.5 %    Immature Grans Absolute 0.02 0.00 - 0.03 10*3/mm3    nRBC 0.0 0.0 - 0.0 /100 WBC     Assessment/Plan   Olga was seen today for diabetes and pain.    Diagnoses and all orders for this visit:    Type 1 diabetes mellitus without complication (CMS/[facility])  -     Hemoglobin A1c; Future  -     Lipid Panel With / Chol / HDL Ratio; Future  -     Vitamin B12; Future    Essential hypertension  -     Comprehensive Metabolic Panel; Future    Acquired hypothyroidism  -     TSH; Future  -     T4, Free; Future    Vitamin D deficiency  -     Vitamin D 25 Hydroxy; Future    Gastroesophageal reflux disease without esophagitis  -     CBC & Differential; Future    Elevated transaminase level    Encounter for screening for malignant neoplasm of colon  -     Cologuard - Stool, Per Rectum; Future    1. DMI. A1c 6.7. Managed by Dr. Rogers. Eye exam UTD.    2. HTN.  Controlled.  Continue benazepril-hctz.  Monitor home blood pressures.    3. Hypothyroidism.  On replacement.    4. Vitamin D deficiency.  Continue supplement.    5. GERD.  Doing well off omeprazole.    6. Elevated transaminases.  Hep b, c negative.  Liver u/s negative.  Borderline.    7. RHM.  Mammogram UTD.  Flu vaccine UTD.  Cologuard ordered.      Outpatient Medications Prior to Visit   Medication Sig Dispense Refill   • ACCU-CHEK SOFTCLIX LANCETS lancets USE TO TEST BLOOD SUGAR 4 TIMES DAILY 200 each 5   • atorvastatin (LIPITOR) 20 MG tablet Take 1 tablet by mouth Daily. 30 tablet 11   • B Complex Vitamins  (VITAMIN B COMPLEX) capsule capsule Take 1 capsule by mouth Daily.     • benazepril-hydrochlorthiazide (LOTENSIN HCT) 20-12.5 MG per tablet TAKE ONE TABLET BY MOUTH DAILY 90 tablet 0   • Biotin 63703 MCG tablet Take 1 tablet by mouth Daily.     • Blood Glucose Monitoring Suppl (ACCU-CHEK RICHARD SMARTVIEW) w/Device kit Use to check blood sugar 4 times daily  E10.9 1 kit 0   • Calcium Carb-Cholecalciferol (CALCIUM 600+D3 PO) Take 500 Units by mouth Daily.     • Chromium Picolinate 1000 MCG tablet Take 1,000 mcg by mouth daily.     • glucagon (GLUCAGON EMERGENCY) 1 MG injection Inject 1 mg under the skin into the appropriate area as directed 1 (One) Time As Needed for Low Blood Sugar for up to 1 dose. 1 kit 12   • insulin aspart (NOVOLOG) 100 UNIT/ML injection 50 units daily VIA insulin Pump (Patient taking differently: Inject 1 Units under the skin into the appropriate area as directed 3 (Three) Times a Day Before Meals. 60 units daily VIA insulin Pump) 20 mL 6   • levothyroxine (SYNTHROID, LEVOTHROID) 75 MCG tablet Take 1 tablet by mouth Daily. (Patient taking differently: Take 75 mcg by mouth Every Night.) 30 tablet 11   • FLUZONE HIGH-DOSE 0.5 ML suspension prefilled syringe injection        No facility-administered medications prior to visit.      No orders of the defined types were placed in this encounter.    [unfilled]  Medications Discontinued During This Encounter   Medication Reason   • FLUZONE HIGH-DOSE 0.5 ML suspension prefilled syringe injection *Therapy completed         Return in about 6 months (around 7/28/2019).

## 2019-03-04 RX ORDER — ATORVASTATIN CALCIUM 20 MG/1
TABLET, FILM COATED ORAL
Qty: 90 TABLET | Refills: 3 | Status: SHIPPED | OUTPATIENT
Start: 2019-03-04 | End: 2019-03-05 | Stop reason: SDUPTHER

## 2019-03-05 RX ORDER — ATORVASTATIN CALCIUM 20 MG/1
20 TABLET, FILM COATED ORAL DAILY
Qty: 90 TABLET | Refills: 3 | Status: SHIPPED | OUTPATIENT
Start: 2019-03-05 | End: 2020-04-21

## 2019-03-07 ENCOUNTER — TELEPHONE (OUTPATIENT)
Dept: INTERNAL MEDICINE | Facility: CLINIC | Age: 74
End: 2019-03-07

## 2019-03-07 DIAGNOSIS — M65.319 TRIGGER FINGER OF THUMB, UNSPECIFIED LATERALITY: Primary | ICD-10-CM

## 2019-03-07 NOTE — TELEPHONE ENCOUNTER
Pt   Aware         ----- Message from Yair Zapata MD sent at 3/7/2019 12:44 PM EST -----  I think it's time for her to see a hand specialist to get this treated.  I ordered this.  ----- Message -----  From: Samantha Street MA  Sent: 3/7/2019   9:29 AM  To: Yair Zapata MD        ----- Message -----  From: Linh Palomino  Sent: 3/7/2019   9:20 AM  To: [facility] Clinical Pool    PT STATES SHE SAW ROBERTA ABOUT A MONTH AGO AND WAS DIAGNOSED WITH TRIGGER THUMB, SHE THOUGHT IT WOULD GET BETTER ON ITS OWN, BUT IT HASNT. WANTS TO KNOW IF SHE NEEDS TO SEE ROBERTA AGAIN OR IF WE CAN REFER HER SOMEWHERE FOR THIS. CALL BACK [phone].

## 2019-03-15 ENCOUNTER — APPOINTMENT (OUTPATIENT)
Dept: LAB | Facility: HOSPITAL | Age: 74
End: 2019-03-15

## 2019-03-15 LAB
ALBUMIN SERPL-MCNC: 4.1 G/DL (ref 3.5–5.2)
ALBUMIN/GLOB SERPL: 2 G/DL
ALP SERPL-CCNC: 60 U/L (ref 39–117)
ALT SERPL W P-5'-P-CCNC: 52 U/L (ref 1–33)
ANION GAP SERPL CALCULATED.3IONS-SCNC: 9.2 MMOL/L
AST SERPL-CCNC: 36 U/L (ref 1–32)
BASOPHILS # BLD AUTO: 0.03 10*3/MM3 (ref 0–0.2)
BASOPHILS NFR BLD AUTO: 0.7 % (ref 0–1.5)
BILIRUB SERPL-MCNC: 0.8 MG/DL (ref 0.1–1.2)
BUN BLD-MCNC: 20 MG/DL (ref 8–23)
BUN/CREAT SERPL: 26 (ref 7–25)
CALCIUM SPEC-SCNC: 9.2 MG/DL (ref 8.6–10.5)
CHLORIDE SERPL-SCNC: 101 MMOL/L (ref 98–107)
CHOLEST SERPL-MCNC: 136 MG/DL (ref 0–200)
CO2 SERPL-SCNC: 27.8 MMOL/L (ref 22–29)
CREAT BLD-MCNC: 0.77 MG/DL (ref 0.57–1)
DEPRECATED RDW RBC AUTO: 45.4 FL (ref 37–54)
EOSINOPHIL # BLD AUTO: 0.23 10*3/MM3 (ref 0–0.4)
EOSINOPHIL NFR BLD AUTO: 5.7 % (ref 0.3–6.2)
ERYTHROCYTE [DISTWIDTH] IN BLOOD BY AUTOMATED COUNT: 12.2 % (ref 12.3–15.4)
GFR SERPL CREATININE-BSD FRML MDRD: 74 ML/MIN/1.73
GLOBULIN UR ELPH-MCNC: 2.1 GM/DL
GLUCOSE BLD-MCNC: 122 MG/DL (ref 65–99)
HBA1C MFR BLD: 7.07 % (ref 4.8–5.6)
HCT VFR BLD AUTO: 39.9 % (ref 34–46.6)
HCV AB SER DONR QL: NORMAL
HDLC SERPL-MCNC: 94 MG/DL (ref 40–60)
HGB BLD-MCNC: 12.8 G/DL (ref 12–15.9)
IMM GRANULOCYTES # BLD AUTO: 0.01 10*3/MM3 (ref 0–0.05)
IMM GRANULOCYTES NFR BLD AUTO: 0.2 % (ref 0–0.5)
LDLC SERPL CALC-MCNC: 35 MG/DL (ref 0–100)
LDLC/HDLC SERPL: 0.37 {RATIO}
LYMPHOCYTES # BLD AUTO: 1.83 10*3/MM3 (ref 0.7–3.1)
LYMPHOCYTES NFR BLD AUTO: 45.3 % (ref 19.6–45.3)
MCH RBC QN AUTO: 32.3 PG (ref 26.6–33)
MCHC RBC AUTO-ENTMCNC: 32.1 G/DL (ref 31.5–35.7)
MCV RBC AUTO: 100.8 FL (ref 79–97)
MONOCYTES # BLD AUTO: 0.27 10*3/MM3 (ref 0.1–0.9)
MONOCYTES NFR BLD AUTO: 6.7 % (ref 5–12)
NEUTROPHILS # BLD AUTO: 1.67 10*3/MM3 (ref 1.4–7)
NEUTROPHILS NFR BLD AUTO: 41.4 % (ref 42.7–76)
NRBC BLD AUTO-RTO: 0 /100 WBC (ref 0–0)
PLATELET # BLD AUTO: 198 10*3/MM3 (ref 140–450)
PMV BLD AUTO: 9.8 FL (ref 6–12)
POTASSIUM BLD-SCNC: 4.4 MMOL/L (ref 3.5–5.2)
PROT SERPL-MCNC: 6.2 G/DL (ref 6–8.5)
RBC # BLD AUTO: 3.96 10*6/MM3 (ref 3.77–5.28)
SODIUM BLD-SCNC: 138 MMOL/L (ref 136–145)
TRIGL SERPL-MCNC: 35 MG/DL (ref 0–150)
VIT B12 BLD-MCNC: 527 PG/ML (ref 211–946)
VLDLC SERPL-MCNC: 7 MG/DL (ref 5–40)
WBC NRBC COR # BLD: 4.04 10*3/MM3 (ref 3.4–10.8)

## 2019-03-15 PROCEDURE — 82607 VITAMIN B-12: CPT | Performed by: FAMILY MEDICINE

## 2019-03-15 PROCEDURE — 85025 COMPLETE CBC W/AUTO DIFF WBC: CPT | Performed by: FAMILY MEDICINE

## 2019-03-15 PROCEDURE — 80061 LIPID PANEL: CPT | Performed by: FAMILY MEDICINE

## 2019-03-15 PROCEDURE — 80053 COMPREHEN METABOLIC PANEL: CPT | Performed by: FAMILY MEDICINE

## 2019-03-15 PROCEDURE — 83036 HEMOGLOBIN GLYCOSYLATED A1C: CPT | Performed by: FAMILY MEDICINE

## 2019-03-15 PROCEDURE — 86803 HEPATITIS C AB TEST: CPT | Performed by: FAMILY MEDICINE

## 2019-03-21 ENCOUNTER — OFFICE VISIT (OUTPATIENT)
Dept: ENDOCRINOLOGY | Age: 74
End: 2019-03-21

## 2019-03-21 VITALS
DIASTOLIC BLOOD PRESSURE: 64 MMHG | HEIGHT: 64 IN | BODY MASS INDEX: 22.26 KG/M2 | OXYGEN SATURATION: 99 % | SYSTOLIC BLOOD PRESSURE: 110 MMHG | HEART RATE: 73 BPM | WEIGHT: 130.4 LBS

## 2019-03-21 DIAGNOSIS — E78.2 MIXED HYPERLIPIDEMIA: ICD-10-CM

## 2019-03-21 DIAGNOSIS — E10.9 TYPE 1 DIABETES MELLITUS WITHOUT COMPLICATION (HCC): Primary | ICD-10-CM

## 2019-03-21 DIAGNOSIS — E03.9 HYPOTHYROIDISM, UNSPECIFIED TYPE: ICD-10-CM

## 2019-03-21 PROCEDURE — 99215 OFFICE O/P EST HI 40 MIN: CPT | Performed by: INTERNAL MEDICINE

## 2019-03-21 PROCEDURE — 95251 CONT GLUC MNTR ANALYSIS I&R: CPT | Performed by: INTERNAL MEDICINE

## 2019-03-21 NOTE — PROGRESS NOTES
73 y.o.    Patient Care Team:  Yair Zapata MD as PCP - General    Chief Complaint:    3 MONTH FOLLOW UP/ TYPE 1 DIABETES MELLITUS  WITH INSULIN PUMP DEXCOM  Subjective     Olga Greco 73 y.o. WF presents with Type 1 dm as a follow up patient. Consulted by Dr. Zapata.      Type 1 dm - Diagnosed in 2003 was initially thought to be type 2 dm. Was started on oral agents initially. Insulin was started on April 24th 2017.    When she came in in February 2018 she was still on oral hypoglycemic agents but due to her phenotype and family history of type 1 diabetes, her antibiotics were checked which were positive.  Patient had low C-peptide levels, positive james 65 and insulin auto bodies. She is currently on 630 G Medtronic insulin pump.   Patient has been using dexcom for the last 5-6 months.  Downloaded the insulin pump and the sensor information.  Average blood sugars are around 140 mg/dL.  She reports that she has been having low blood sugars in the later part of the night after she corrects for mildly elevated blood sugars at around 9 PM.  No significantly high blood sugars but she does show the trends where her blood sugars are elevated immediately after she eats.  She also admits that she does not bolus herself 10-15 minutes before eating that she does that immediately after she has 2-3 bites.  Last eye examination was in April 2018  No history of diabetic neuropathy  No history of CAD, CK D, CVA.  She is physically active and exercises at least for 3-4 times a week.  On Ace inhibitor.  Last DKA episode was in October 2018.     Hyperlipidemia  On Lipitor 20 mg oral daily.     Hypothyroidism    On levothyroxine 75 µg oral daily.       Reviewed primary care physician's/consulting physician documentation and lab results :     Interval History      The following portions of the patient's history were reviewed and updated as appropriate: allergies, current medications, past family history, past medical  history, past social history, past surgical history and problem list.    Past Medical History:   Diagnosis Date   • Diabetes mellitus (CMS/HCC)    • Hypertension    • Hypothyroidism    • Type 2 diabetes mellitus (CMS/HCC) 04/2018     Family History   Problem Relation Age of Onset   • Heart disease Father    • Diabetes Sister    • Hypertension Mother    • Breast cancer Neg Hx      Social History     Socioeconomic History   • Marital status:      Spouse name: Not on file   • Number of children: Not on file   • Years of education: Not on file   • Highest education level: Not on file   Tobacco Use   • Smoking status: Never Smoker   • Smokeless tobacco: Never Used   Substance and Sexual Activity   • Alcohol use: Yes     Alcohol/week: 1.2 oz     Types: 2 Glasses of wine per week     Comment: daily   • Drug use: No   • Sexual activity: Yes     Partners: Male     Allergies   Allergen Reactions   • Aspirin      GI distress   • Epinephrine      Tachycardia       Current Outpatient Medications:   •  atorvastatin (LIPITOR) 20 MG tablet, Take 1 tablet by mouth Daily., Disp: 90 tablet, Rfl: 3  •  B Complex Vitamins (VITAMIN B COMPLEX) capsule capsule, Take 1 capsule by mouth Daily., Disp: , Rfl:   •  benazepril-hydrochlorthiazide (LOTENSIN HCT) 20-12.5 MG per tablet, TAKE ONE TABLET BY MOUTH DAILY, Disp: 90 tablet, Rfl: 0  •  Biotin 37790 MCG tablet, Take 1 tablet by mouth Daily., Disp: , Rfl:   •  Calcium Carb-Cholecalciferol (CALCIUM 600+D3 PO), Take 500 Units by mouth Daily., Disp: , Rfl:   •  Chromium Picolinate 1000 MCG tablet, Take 1,000 mcg by mouth daily., Disp: , Rfl:   •  glucagon (GLUCAGON EMERGENCY) 1 MG injection, Inject 1 mg under the skin into the appropriate area as directed 1 (One) Time As Needed for Low Blood Sugar for up to 1 dose., Disp: 1 kit, Rfl: 12  •  insulin aspart (NOVOLOG) 100 UNIT/ML injection, 50 units daily VIA insulin Pump (Patient taking differently: Inject 1 Units under the skin into the  "appropriate area as directed 3 (Three) Times a Day Before Meals. 60 units daily VIA insulin Pump), Disp: 20 mL, Rfl: 6  •  levothyroxine (SYNTHROID, LEVOTHROID) 75 MCG tablet, Take 1 tablet by mouth Daily. (Patient taking differently: Take 75 mcg by mouth Every Night.), Disp: 30 tablet, Rfl: 11  •  ACCU-CHEK SOFTCLIX LANCETS lancets, USE TO TEST BLOOD SUGAR 4 TIMES DAILY, Disp: 200 each, Rfl: 5  •  Blood Glucose Monitoring Suppl (ACCU-CHEK RICHARD SMARTVIEW) w/Device kit, Use to check blood sugar 4 times daily  E10.9, Disp: 1 kit, Rfl: 0        Review of Systems   Constitutional: Negative for appetite change, fatigue and fever.   Eyes: Negative for visual disturbance.   Respiratory: Negative for shortness of breath.    Cardiovascular: Positive for palpitations. Negative for leg swelling.   Gastrointestinal: Negative for abdominal pain and vomiting.   Endocrine: Negative for polydipsia and polyuria.   Musculoskeletal: Positive for neck pain. Negative for joint swelling.   Skin: Negative for rash.   Neurological: Negative for weakness and numbness.   Psychiatric/Behavioral: Negative for behavioral problems.       Objective       Vitals:    03/21/19 1354   BP: 110/64   Pulse: 73   SpO2: 99%   Weight: 59.1 kg (130 lb 6.4 oz)   Height: 162.6 cm (64\")     Body mass index is 22.38 kg/m².      Physical Exam   Constitutional: She is oriented to person, place, and time. She appears well-nourished.   HENT:   Head: Normocephalic and atraumatic.   Eyes: Conjunctivae and EOM are normal. No scleral icterus.   Neck: Normal range of motion. Neck supple. No thyromegaly present.   Cardiovascular: Normal rate and normal heart sounds. Exam reveals no friction rub.   No murmur heard.  Pulmonary/Chest: Effort normal and breath sounds normal. No stridor. She has no wheezes. She has no rales.   Abdominal: Soft. Bowel sounds are normal. She exhibits no distension. There is no tenderness.   Pump site intact   Musculoskeletal: She exhibits no " edema or tenderness.   Lymphadenopathy:     She has no cervical adenopathy.   Neurological: She is alert and oriented to person, place, and time.   Skin: Skin is warm and dry. She is not diaphoretic.   Psychiatric: She has a normal mood and affect.   Vitals reviewed.    Results Review:     I reviewed the patient's new clinical results and mentioned them above in HPI and in plan as well.    Medical records reviewed  Summary: done      Orders Only on 01/21/2019   Component Date Value Ref Range Status   • Glucose 01/21/2019 170* 65 - 99 mg/dL Final   • BUN 01/21/2019 16  8 - 23 mg/dL Final   • Creatinine 01/21/2019 0.79  0.57 - 1.00 mg/dL Final   • eGFR Non  Am 01/21/2019 72  >60 mL/min/1.73 Final    Comment: The MDRD GFR formula is only valid for adults with stable  renal function between ages 18 and 70.     • eGFR  Am 01/21/2019 87  >60 mL/min/1.73 Final   • BUN/Creatinine Ratio 01/21/2019 20.3  7.0 - 25.0 Final   • Sodium 01/21/2019 144  136 - 145 mmol/L Final   • Potassium 01/21/2019 4.2  3.5 - 5.2 mmol/L Final   • Chloride 01/21/2019 103  98 - 107 mmol/L Final   • Total CO2 01/21/2019 28.2  22.0 - 29.0 mmol/L Final   • Calcium 01/21/2019 9.3  8.8 - 10.5 mg/dL Final   • Total Protein 01/21/2019 6.2  6.0 - 8.5 g/dL Final   • Albumin 01/21/2019 4.30  3.50 - 5.20 g/dL Final   • Globulin 01/21/2019 1.9  gm/dL Final   • A/G Ratio 01/21/2019 2.3  g/dL Final   • Total Bilirubin 01/21/2019 0.9  0.2 - 1.2 mg/dL Final   • Alkaline Phosphatase 01/21/2019 71  40 - 129 U/L Final   • AST (SGOT) 01/21/2019 29  5 - 32 U/L Final   • ALT (SGPT) 01/21/2019 36* 5 - 33 U/L Final   • WBC 01/21/2019 4.81  4.80 - 10.80 10*3/mm3 Final   • RBC 01/21/2019 4.01* 4.20 - 5.40 10*6/mm3 Final   • Hemoglobin 01/21/2019 13.2  12.0 - 16.0 g/dL Final   • Hematocrit 01/21/2019 40.3  37.0 - 47.0 % Final   • MCV 01/21/2019 100.5* 81.0 - 99.0 fL Final   • MCH 01/21/2019 32.9* 27.0 - 31.0 pg Final   • MCHC 01/21/2019 32.8  31.0 - 37.0 g/dL  Final   • RDW 01/21/2019 12.1  11.5 - 14.5 % Final   • Platelets 01/21/2019 225  140 - 500 10*3/mm3 Final   • Neutrophil Rel % 01/21/2019 56.3  45.0 - 70.0 % Final   • Lymphocyte Rel % 01/21/2019 30.8  20.0 - 45.0 % Final   • Monocyte Rel % 01/21/2019 6.9  3.0 - 8.0 % Final   • Eosinophil Rel % 01/21/2019 4.8* 0.0 - 4.0 % Final   • Basophil Rel % 01/21/2019 0.8  0.0 - 2.0 % Final   • Neutrophils Absolute 01/21/2019 2.71  1.50 - 8.30 10*3/mm3 Final   • Lymphocytes Absolute 01/21/2019 1.48  0.60 - 4.80 10*3/mm3 Final   • Monocytes Absolute 01/21/2019 0.33  0.00 - 1.00 10*3/mm3 Final   • Eosinophils Absolute 01/21/2019 0.23  0.10 - 0.30 10*3/mm3 Final   • Basophils Absolute 01/21/2019 0.04  0.00 - 0.20 10*3/mm3 Final   • Immature Granulocyte Rel % 01/21/2019 0.4  0.0 - 0.5 % Final   • Immature Grans Absolute 01/21/2019 0.02  0.00 - 0.03 10*3/mm3 Final   • nRBC 01/21/2019 0.0  0.0 - 0.0 /100 WBC Final   • 25 Hydroxy, Vitamin D 01/21/2019 30.4  ng/ml Final    Comment: Reference Range for Total Vitamin D 25(OH)  Deficiency <20.0 ng/mL  Insufficiency 21-29 ng/mL  Sufficiency  ng/mL  Toxicity >100 ng/mL     • Creatinine, Urine 01/21/2019 142.7  Not Estab. mg/dL Final   • Microalbumin, Urine 01/21/2019 4.8  Not Estab. ug/mL Final   • Microalbumin/Creatinine Ratio 01/21/2019 3.4  0.0 - 30.0 mg/g creat Final    Comment:                      Normal:                0.0 -  30.0                       Albuminuria:          31.0 - 300.0                       Clinical albuminuria:       >300.0       Lab Results   Component Value Date    HGBA1C 7.07 (H) 03/15/2019    HGBA1C 6.70 (H) 12/06/2018    HGBA1C 7.20 (H) 10/21/2018     Lab Results   Component Value Date    MICROALBUR 4.8 01/21/2019    CREATININE 0.77 03/15/2019     Imaging Results (most recent)     None                                      Assessment and Plan:    Olga was seen today for diabetes.    Diagnoses and all orders for this visit:    Type 1 diabetes mellitus  "without complication (CMS/[facility])  -     Comprehensive Metabolic Panel; Future  -     Basic Metabolic Panel; Future  -     Hemoglobin A1c; Future  -     Lipid Panel; Future  -     Vitamin B12 & Folate; Future  -     TSH; Future    Hypothyroidism, unspecified type  -     Comprehensive Metabolic Panel; Future  -     Basic Metabolic Panel; Future  -     Hemoglobin A1c; Future  -     Lipid Panel; Future  -     Vitamin B12 & Folate; Future  -     TSH; Future    Mixed hyperlipidemia  -     Comprehensive Metabolic Panel; Future  -     Basic Metabolic Panel; Future  -     Hemoglobin A1c; Future  -     Lipid Panel; Future  -     Vitamin B12 & Folate; Future  -     TSH; Future        Type 1 diabetes mellitus-uncontrolled with variable blood sugars  Continue the current basal settings  Change to sensitivity at 8 PM-12 AM to 60.  Change the boluses for breakfast, lunch, dinner such that patient gets more insulin with each of her meals.    Hypothyroidism  Continue levothyroxine    Hyperlipidemia  Continue Lipitor    Good that patient's liver function tests are mildly abnormal, will repeat the workup in 2-3 months if the levels do not improve might consider referring the patient to gastroenterologist.    Reviewed Lab results with the patient.     22   ( greater than 50% of the time) out of  40 minutes face-to-face spent counseling the patient on discussing the pump changes, insulin pump titration, review of the sensor information.              Angi Rogers MD  03/21/19    [facility] Dragon / transcription disclaimer:     \"Dictated utilizing Dragon dictation\".  "

## 2019-04-15 RX ORDER — BENAZEPRIL HYDROCHLORIDE AND HYDROCHLOROTHIAZIDE 20; 12.5 MG/1; MG/1
TABLET ORAL
Qty: 90 TABLET | Refills: 1 | Status: SHIPPED | OUTPATIENT
Start: 2019-04-15 | End: 2019-10-08 | Stop reason: SDUPTHER

## 2019-05-21 ENCOUNTER — LAB (OUTPATIENT)
Dept: LAB | Facility: HOSPITAL | Age: 74
End: 2019-05-21

## 2019-05-21 ENCOUNTER — RESULTS ENCOUNTER (OUTPATIENT)
Dept: ENDOCRINOLOGY | Age: 74
End: 2019-05-21

## 2019-05-21 DIAGNOSIS — E78.2 MIXED HYPERLIPIDEMIA: ICD-10-CM

## 2019-05-21 DIAGNOSIS — E10.9 TYPE 1 DIABETES MELLITUS WITHOUT COMPLICATION (HCC): ICD-10-CM

## 2019-05-21 DIAGNOSIS — E03.9 HYPOTHYROIDISM, UNSPECIFIED TYPE: ICD-10-CM

## 2019-05-21 LAB
ALBUMIN SERPL-MCNC: 4.6 G/DL (ref 3.5–5.2)
ALBUMIN/GLOB SERPL: 2.3 G/DL
ALP SERPL-CCNC: 58 U/L (ref 39–117)
ALT SERPL W P-5'-P-CCNC: 43 U/L (ref 1–33)
ANION GAP SERPL CALCULATED.3IONS-SCNC: 10.9 MMOL/L
AST SERPL-CCNC: 34 U/L (ref 1–32)
BILIRUB SERPL-MCNC: 0.9 MG/DL (ref 0.2–1.2)
BUN BLD-MCNC: 18 MG/DL (ref 8–23)
BUN/CREAT SERPL: 20.9 (ref 7–25)
CALCIUM SPEC-SCNC: 9.5 MG/DL (ref 8.6–10.5)
CHLORIDE SERPL-SCNC: 105 MMOL/L (ref 98–107)
CHOLEST SERPL-MCNC: 149 MG/DL (ref 0–200)
CO2 SERPL-SCNC: 28.1 MMOL/L (ref 22–29)
CREAT BLD-MCNC: 0.86 MG/DL (ref 0.57–1)
FOLATE SERPL-MCNC: >20 NG/ML (ref 4.78–24.2)
GFR SERPL CREATININE-BSD FRML MDRD: 65 ML/MIN/1.73
GLOBULIN UR ELPH-MCNC: 2 GM/DL
GLUCOSE BLD-MCNC: 166 MG/DL (ref 65–99)
HBA1C MFR BLD: 6.6 % (ref 4.8–5.6)
HDLC SERPL-MCNC: 108 MG/DL (ref 40–60)
LDLC SERPL CALC-MCNC: 32 MG/DL (ref 0–100)
LDLC/HDLC SERPL: 0.3 {RATIO}
POTASSIUM BLD-SCNC: 4.7 MMOL/L (ref 3.5–5.2)
PROT SERPL-MCNC: 6.6 G/DL (ref 6–8.5)
SODIUM BLD-SCNC: 144 MMOL/L (ref 136–145)
TRIGL SERPL-MCNC: 44 MG/DL (ref 0–150)
TSH SERPL DL<=0.05 MIU/L-ACNC: 12 MIU/ML (ref 0.27–4.2)
VIT B12 BLD-MCNC: 527 PG/ML (ref 211–946)
VLDLC SERPL-MCNC: 8.8 MG/DL (ref 5–40)

## 2019-05-21 PROCEDURE — 83036 HEMOGLOBIN GLYCOSYLATED A1C: CPT

## 2019-05-21 PROCEDURE — 82607 VITAMIN B-12: CPT

## 2019-05-21 PROCEDURE — 80053 COMPREHEN METABOLIC PANEL: CPT | Performed by: INTERNAL MEDICINE

## 2019-05-21 PROCEDURE — 80061 LIPID PANEL: CPT

## 2019-05-21 PROCEDURE — 82746 ASSAY OF FOLIC ACID SERUM: CPT

## 2019-05-21 PROCEDURE — 84443 ASSAY THYROID STIM HORMONE: CPT

## 2019-05-21 PROCEDURE — 36415 COLL VENOUS BLD VENIPUNCTURE: CPT

## 2019-05-21 RX ORDER — LEVOTHYROXINE SODIUM 0.1 MG/1
100 TABLET ORAL DAILY
Qty: 30 TABLET | Refills: 11 | Status: SHIPPED | OUTPATIENT
Start: 2019-05-21 | End: 2019-07-22

## 2019-05-24 NOTE — PROGRESS NOTES
Patient results have been mailed left detailed voice  Mail for patient about lab results and medication change

## 2019-06-14 ENCOUNTER — LAB (OUTPATIENT)
Dept: INTERNAL MEDICINE | Facility: CLINIC | Age: 74
End: 2019-06-14

## 2019-06-14 DIAGNOSIS — K21.9 GASTROESOPHAGEAL REFLUX DISEASE WITHOUT ESOPHAGITIS: ICD-10-CM

## 2019-06-14 DIAGNOSIS — E10.9 TYPE 1 DIABETES MELLITUS WITHOUT COMPLICATION (HCC): ICD-10-CM

## 2019-06-14 DIAGNOSIS — E55.9 VITAMIN D DEFICIENCY: ICD-10-CM

## 2019-06-14 DIAGNOSIS — I10 ESSENTIAL HYPERTENSION: ICD-10-CM

## 2019-06-14 DIAGNOSIS — E03.9 ACQUIRED HYPOTHYROIDISM: ICD-10-CM

## 2019-06-14 LAB
25(OH)D3+25(OH)D2 SERPL-MCNC: 42.8 NG/ML (ref 30–100)
ALBUMIN SERPL-MCNC: 4.3 G/DL (ref 3.5–5.2)
ALBUMIN/GLOB SERPL: 2.3 G/DL
ALP SERPL-CCNC: 60 U/L (ref 39–117)
ALT SERPL-CCNC: 37 U/L (ref 1–33)
AST SERPL-CCNC: 32 U/L (ref 1–32)
BASOPHILS # BLD AUTO: 0.03 10*3/MM3 (ref 0–0.2)
BASOPHILS NFR BLD AUTO: 0.8 % (ref 0–1.5)
BILIRUB SERPL-MCNC: 1 MG/DL (ref 0.2–1.2)
BUN SERPL-MCNC: 16 MG/DL (ref 8–23)
BUN/CREAT SERPL: 19.8 (ref 7–25)
CALCIUM SERPL-MCNC: 9.5 MG/DL (ref 8.6–10.5)
CHLORIDE SERPL-SCNC: 102 MMOL/L (ref 98–107)
CHOLEST SERPL-MCNC: 143 MG/DL (ref 0–200)
CHOLEST/HDLC SERPL: 1.38 {RATIO}
CO2 SERPL-SCNC: 27.4 MMOL/L (ref 22–29)
CREAT SERPL-MCNC: 0.81 MG/DL (ref 0.57–1)
EOSINOPHIL # BLD AUTO: 0.2 10*3/MM3 (ref 0–0.4)
EOSINOPHIL NFR BLD AUTO: 5.4 % (ref 0.3–6.2)
ERYTHROCYTE [DISTWIDTH] IN BLOOD BY AUTOMATED COUNT: 12.2 % (ref 12.3–15.4)
GLOBULIN SER CALC-MCNC: 1.9 GM/DL
GLUCOSE SERPL-MCNC: 153 MG/DL (ref 65–99)
HBA1C MFR BLD: 6.4 % (ref 4.8–5.6)
HCT VFR BLD AUTO: 41.2 % (ref 34–46.6)
HDLC SERPL-MCNC: 104 MG/DL (ref 40–60)
HGB BLD-MCNC: 13.1 G/DL (ref 12–15.9)
IMM GRANULOCYTES # BLD AUTO: 0.01 10*3/MM3 (ref 0–0.05)
IMM GRANULOCYTES NFR BLD AUTO: 0.3 % (ref 0–0.5)
LDLC SERPL CALC-MCNC: 31 MG/DL (ref 0–100)
LYMPHOCYTES # BLD AUTO: 1.43 10*3/MM3 (ref 0.7–3.1)
LYMPHOCYTES NFR BLD AUTO: 38.6 % (ref 19.6–45.3)
MCH RBC QN AUTO: 32.3 PG (ref 26.6–33)
MCHC RBC AUTO-ENTMCNC: 31.8 G/DL (ref 31.5–35.7)
MCV RBC AUTO: 101.7 FL (ref 79–97)
MONOCYTES # BLD AUTO: 0.28 10*3/MM3 (ref 0.1–0.9)
MONOCYTES NFR BLD AUTO: 7.6 % (ref 5–12)
NEUTROPHILS # BLD AUTO: 1.75 10*3/MM3 (ref 1.7–7)
NEUTROPHILS NFR BLD AUTO: 47.3 % (ref 42.7–76)
NRBC BLD AUTO-RTO: 0 /100 WBC (ref 0–0.2)
PLATELET # BLD AUTO: 180 10*3/MM3 (ref 140–450)
POTASSIUM SERPL-SCNC: 4.6 MMOL/L (ref 3.5–5.2)
PROT SERPL-MCNC: 6.2 G/DL (ref 6–8.5)
RBC # BLD AUTO: 4.05 10*6/MM3 (ref 3.77–5.28)
SODIUM SERPL-SCNC: 143 MMOL/L (ref 136–145)
T4 FREE SERPL-MCNC: 1.82 NG/DL (ref 0.93–1.7)
TRIGL SERPL-MCNC: 42 MG/DL (ref 0–150)
TSH SERPL DL<=0.005 MIU/L-ACNC: 0.39 MIU/ML (ref 0.27–4.2)
VIT B12 SERPL-MCNC: 417 PG/ML (ref 211–946)
VLDLC SERPL CALC-MCNC: 8.4 MG/DL
WBC # BLD AUTO: 3.7 10*3/MM3 (ref 3.4–10.8)

## 2019-06-19 ENCOUNTER — RESULTS ENCOUNTER (OUTPATIENT)
Dept: ENDOCRINOLOGY | Age: 74
End: 2019-06-19

## 2019-06-19 ENCOUNTER — OFFICE VISIT (OUTPATIENT)
Dept: INTERNAL MEDICINE | Facility: CLINIC | Age: 74
End: 2019-06-19

## 2019-06-19 VITALS
SYSTOLIC BLOOD PRESSURE: 124 MMHG | HEIGHT: 64 IN | TEMPERATURE: 98.6 F | WEIGHT: 129 LBS | BODY MASS INDEX: 22.02 KG/M2 | RESPIRATION RATE: 16 BRPM | HEART RATE: 70 BPM | OXYGEN SATURATION: 98 % | DIASTOLIC BLOOD PRESSURE: 68 MMHG

## 2019-06-19 DIAGNOSIS — E78.2 MIXED HYPERLIPIDEMIA: ICD-10-CM

## 2019-06-19 DIAGNOSIS — K21.9 GASTROESOPHAGEAL REFLUX DISEASE WITHOUT ESOPHAGITIS: ICD-10-CM

## 2019-06-19 DIAGNOSIS — E55.9 VITAMIN D DEFICIENCY: ICD-10-CM

## 2019-06-19 DIAGNOSIS — E10.9 TYPE 1 DIABETES MELLITUS WITHOUT COMPLICATION (HCC): Primary | ICD-10-CM

## 2019-06-19 DIAGNOSIS — E03.9 ACQUIRED HYPOTHYROIDISM: ICD-10-CM

## 2019-06-19 DIAGNOSIS — I10 ESSENTIAL HYPERTENSION: ICD-10-CM

## 2019-06-19 DIAGNOSIS — Z00.00 ROUTINE HEALTH MAINTENANCE: ICD-10-CM

## 2019-06-19 DIAGNOSIS — E03.9 HYPOTHYROIDISM, UNSPECIFIED TYPE: ICD-10-CM

## 2019-06-19 DIAGNOSIS — E10.9 TYPE 1 DIABETES MELLITUS WITHOUT COMPLICATION (HCC): ICD-10-CM

## 2019-06-19 DIAGNOSIS — R74.01 ELEVATED TRANSAMINASE LEVEL: ICD-10-CM

## 2019-06-19 PROCEDURE — 99214 OFFICE O/P EST MOD 30 MIN: CPT | Performed by: FAMILY MEDICINE

## 2019-06-19 NOTE — PROGRESS NOTES
Subjective     Olga Greco is a 73 y.o. female, who presents with a chief complaint of   Chief Complaint   Patient presents with   • Follow-up     6 x month f/u, lab results   • Diabetes     Diabetes     Hypertension     Hypothyroidism       1. Diabetes mellitus.  Pt happy with Dexcom and pump.  A few episodes of mild hypoglycemia overnight, treated with snacks.  Sees Dr. Rogers.    2. HTN.  Tolerating benazepril-hctz.  Home blood pressures at goal.    3. Hypothyroidism.  Dr. Rogers increased her dose from 75 to 100 mcg.    The following portions of the patient's history were reviewed and updated as appropriate: allergies, current medications, past family history, past medical history, past social history, past surgical history and problem list.    Allergies: Aspirin and Epinephrine    Review of Systems   Constitutional: Negative.    HENT: Negative.    Eyes: Negative.    Respiratory: Negative.    Cardiovascular: Negative.    Gastrointestinal: Negative.    Endocrine: Negative.    Genitourinary: Negative.    Musculoskeletal: Negative.    Skin: Negative.    Allergic/Immunologic: Negative.    Neurological: Negative.    Hematological: Negative.    Psychiatric/Behavioral: Negative.      Objective     Wt Readings from Last 3 Encounters:   06/19/19 58.5 kg (129 lb)   03/21/19 59.1 kg (130 lb 6.4 oz)   01/28/19 58.5 kg (129 lb)     Temp Readings from Last 3 Encounters:   06/19/19 98.6 °F (37 °C) (Oral)   01/28/19 97.9 °F (36.6 °C)   10/29/18 98 °F (36.7 °C)     BP Readings from Last 3 Encounters:   06/19/19 124/68   03/21/19 110/64   01/28/19 140/78     Pulse Readings from Last 3 Encounters:   06/19/19 70   03/21/19 73   01/28/19 74     Body mass index is 22.14 kg/m².  SpO2 Readings from Last 3 Encounters:   01/17/18 99%   10/11/17 98%   07/13/17 98%       Physical Exam   Constitutional: She is oriented to person, place, and time. She appears well-developed and well-nourished.   HENT:   Head: Normocephalic and atraumatic.    Mouth/Throat: Oropharynx is clear and moist.   Eyes: Conjunctivae and EOM are normal.   Neck: Neck supple. No thyromegaly present.   Cardiovascular: Normal rate, regular rhythm and normal heart sounds.   Pulmonary/Chest: Effort normal and breath sounds normal.   Abdominal: Soft. Bowel sounds are normal. There is no hepatosplenomegaly.   Musculoskeletal: Normal range of motion. She exhibits no edema.   Neurological: She is alert and oriented to person, place, and time.   Skin: Skin is warm and dry. No rash noted.   Psychiatric: She has a normal mood and affect. Her behavior is normal.   Nursing note and vitals reviewed.      Results for orders placed or performed in visit on 06/14/19   Vitamin D 25 Hydroxy   Result Value Ref Range    25 Hydroxy, Vitamin D 42.8 30.0 - 100.0 ng/ml   Vitamin B12   Result Value Ref Range    Vitamin B-12 417 211 - 946 pg/mL   T4, Free   Result Value Ref Range    Free T4 1.82 (H) 0.93 - 1.70 ng/dL   TSH   Result Value Ref Range    TSH 0.389 0.270 - 4.200 mIU/mL   Lipid Panel With / Chol / HDL Ratio   Result Value Ref Range    Total Cholesterol 143 0 - 200 mg/dL    Triglycerides 42 0 - 150 mg/dL    HDL Cholesterol 104 (H) 40 - 60 mg/dL    VLDL Cholesterol 8.4 mg/dL    LDL Cholesterol  31 0 - 100 mg/dL    Chol/HDL Ratio 1.38    Hemoglobin A1c   Result Value Ref Range    Hemoglobin A1C 6.40 (H) 4.80 - 5.60 %   Comprehensive Metabolic Panel   Result Value Ref Range    Glucose 153 (H) 65 - 99 mg/dL    BUN 16 8 - 23 mg/dL    Creatinine 0.81 0.57 - 1.00 mg/dL    eGFR Non African Am 70 >60 mL/min/1.73    eGFR African Am 84 >60 mL/min/1.73    BUN/Creatinine Ratio 19.8 7.0 - 25.0    Sodium 143 136 - 145 mmol/L    Potassium 4.6 3.5 - 5.2 mmol/L    Chloride 102 98 - 107 mmol/L    Total CO2 27.4 22.0 - 29.0 mmol/L    Calcium 9.5 8.6 - 10.5 mg/dL    Total Protein 6.2 6.0 - 8.5 g/dL    Albumin 4.30 3.50 - 5.20 g/dL    Globulin 1.9 gm/dL    A/G Ratio 2.3 g/dL    Total Bilirubin 1.0 0.2 - 1.2 mg/dL     Alkaline Phosphatase 60 39 - 117 U/L    AST (SGOT) 32 1 - 32 U/L    ALT (SGPT) 37 (H) 1 - 33 U/L   CBC & Differential   Result Value Ref Range    WBC 3.70 3.40 - 10.80 10*3/mm3    RBC 4.05 3.77 - 5.28 10*6/mm3    Hemoglobin 13.1 12.0 - 15.9 g/dL    Hematocrit 41.2 34.0 - 46.6 %    .7 (H) 79.0 - 97.0 fL    MCH 32.3 26.6 - 33.0 pg    MCHC 31.8 31.5 - 35.7 g/dL    RDW 12.2 (L) 12.3 - 15.4 %    Platelets 180 140 - 450 10*3/mm3    Neutrophil Rel % 47.3 42.7 - 76.0 %    Lymphocyte Rel % 38.6 19.6 - 45.3 %    Monocyte Rel % 7.6 5.0 - 12.0 %    Eosinophil Rel % 5.4 0.3 - 6.2 %    Basophil Rel % 0.8 0.0 - 1.5 %    Neutrophils Absolute 1.75 1.70 - 7.00 10*3/mm3    Lymphocytes Absolute 1.43 0.70 - 3.10 10*3/mm3    Monocytes Absolute 0.28 0.10 - 0.90 10*3/mm3    Eosinophils Absolute 0.20 0.00 - 0.40 10*3/mm3    Basophils Absolute 0.03 0.00 - 0.20 10*3/mm3    Immature Granulocyte Rel % 0.3 0.0 - 0.5 %    Immature Grans Absolute 0.01 0.00 - 0.05 10*3/mm3    nRBC 0.0 0.0 - 0.2 /100 WBC     Assessment/Plan   Olga was seen today for follow-up and diabetes.    Diagnoses and all orders for this visit:    Type 1 diabetes mellitus without complication (CMS/HCC)  -     Hemoglobin A1c; Future  -     Lipid Panel With / Chol / HDL Ratio; Future  -     Vitamin B12; Future    Essential hypertension  -     Comprehensive Metabolic Panel; Future    Acquired hypothyroidism  -     CBC & Differential; Future  -     TSH; Future  -     T4, Free; Future    Vitamin D deficiency    Gastroesophageal reflux disease without esophagitis    Elevated transaminase level  -     Ambulatory Referral to Gastroenterology    Routine health maintenance    1. DMI. A1c 6.4. Managed by Dr. Rogers. Eye exam UTD.    2. HTN.  Controlled.  Continue benazepril-hctz.  Monitor home blood pressures.    3. Hypothyroidism.  On replacement.  Free T4 mildly elevated.  TSH not suppressed.  Defer to Dr. Rogers.    4. Vitamin D deficiency.  Continue supplement.    5. GERD.   Doing well off omeprazole.    6. Elevated transaminases.  Hep b, c negative.  Liver u/s negative.  Borderline.  GI consult.    7. RHM.  Mammogram UTD.  Cologuard negative.  Shingrix at pharmacy.      Outpatient Medications Prior to Visit   Medication Sig Dispense Refill   • ACCU-CHEK SOFTCLIX LANCETS lancets USE TO TEST BLOOD SUGAR 4 TIMES DAILY 200 each 5   • atorvastatin (LIPITOR) 20 MG tablet Take 1 tablet by mouth Daily. 90 tablet 3   • B Complex Vitamins (VITAMIN B COMPLEX) capsule capsule Take 1 capsule by mouth Daily.     • benazepril-hydrochlorthiazide (LOTENSIN HCT) 20-12.5 MG per tablet TAKE ONE TABLET BY MOUTH DAILY 90 tablet 1   • Biotin 24449 MCG tablet Take 1 tablet by mouth Daily.     • Blood Glucose Monitoring Suppl (ACCU-CHEK RICHARD SMARTVIEW) w/Device kit Use to check blood sugar 4 times daily  E10.9 1 kit 0   • Calcium Carb-Cholecalciferol (CALCIUM 600+D3 PO) Take 500 Units by mouth Daily.     • Chromium Picolinate 1000 MCG tablet Take 1,000 mcg by mouth daily.     • glucagon (GLUCAGON EMERGENCY) 1 MG injection Inject 1 mg under the skin into the appropriate area as directed 1 (One) Time As Needed for Low Blood Sugar for up to 1 dose. 1 kit 12   • insulin aspart (NOVOLOG) 100 UNIT/ML injection 50 units daily VIA insulin Pump (Patient taking differently: Inject 1 Units under the skin into the appropriate area as directed 3 (Three) Times a Day Before Meals. 60 units daily VIA insulin Pump) 20 mL 6   • levothyroxine (SYNTHROID, LEVOTHROID) 100 MCG tablet Take 1 tablet by mouth Daily. 30 tablet 11     No facility-administered medications prior to visit.      No orders of the defined types were placed in this encounter.    [unfilled]  There are no discontinued medications.      Return in about 6 months (around 12/19/2019).

## 2019-07-16 ENCOUNTER — LAB (OUTPATIENT)
Dept: LAB | Facility: HOSPITAL | Age: 74
End: 2019-07-16

## 2019-07-16 DIAGNOSIS — I10 ESSENTIAL HYPERTENSION: ICD-10-CM

## 2019-07-16 DIAGNOSIS — E03.9 ACQUIRED HYPOTHYROIDISM: ICD-10-CM

## 2019-07-16 DIAGNOSIS — E10.9 TYPE 1 DIABETES MELLITUS WITHOUT COMPLICATION (HCC): ICD-10-CM

## 2019-07-16 LAB
ALBUMIN SERPL-MCNC: 4.2 G/DL (ref 3.5–5.2)
ALBUMIN/GLOB SERPL: 2.1 G/DL
ALP SERPL-CCNC: 62 U/L (ref 39–117)
ALT SERPL W P-5'-P-CCNC: 32 U/L (ref 1–33)
ANION GAP SERPL CALCULATED.3IONS-SCNC: 10.4 MMOL/L (ref 5–15)
AST SERPL-CCNC: 28 U/L (ref 1–32)
BASOPHILS # BLD AUTO: 0.03 10*3/MM3 (ref 0–0.2)
BASOPHILS NFR BLD AUTO: 0.7 % (ref 0–1.5)
BILIRUB SERPL-MCNC: 0.7 MG/DL (ref 0.2–1.2)
BUN BLD-MCNC: 19 MG/DL (ref 8–23)
BUN/CREAT SERPL: 25.3 (ref 7–25)
CALCIUM SPEC-SCNC: 9.3 MG/DL (ref 8.6–10.5)
CHLORIDE SERPL-SCNC: 104 MMOL/L (ref 98–107)
CHOLEST SERPL-MCNC: 129 MG/DL (ref 0–200)
CO2 SERPL-SCNC: 27.6 MMOL/L (ref 22–29)
CREAT BLD-MCNC: 0.75 MG/DL (ref 0.57–1)
DEPRECATED RDW RBC AUTO: 43.4 FL (ref 37–54)
EOSINOPHIL # BLD AUTO: 0.21 10*3/MM3 (ref 0–0.4)
EOSINOPHIL NFR BLD AUTO: 4.6 % (ref 0.3–6.2)
ERYTHROCYTE [DISTWIDTH] IN BLOOD BY AUTOMATED COUNT: 11.9 % (ref 12.3–15.4)
GFR SERPL CREATININE-BSD FRML MDRD: 76 ML/MIN/1.73
GLOBULIN UR ELPH-MCNC: 2 GM/DL
GLUCOSE BLD-MCNC: 141 MG/DL (ref 65–99)
HBA1C MFR BLD: 6.7 % (ref 4.8–5.6)
HCT VFR BLD AUTO: 39.2 % (ref 34–46.6)
HDLC SERPL QL: 1.54
HDLC SERPL-MCNC: 84 MG/DL (ref 40–60)
HGB BLD-MCNC: 12.5 G/DL (ref 12–15.9)
IMM GRANULOCYTES # BLD AUTO: 0.01 10*3/MM3 (ref 0–0.05)
IMM GRANULOCYTES NFR BLD AUTO: 0.2 % (ref 0–0.5)
LDLC SERPL CALC-MCNC: 37 MG/DL (ref 0–100)
LYMPHOCYTES # BLD AUTO: 1.53 10*3/MM3 (ref 0.7–3.1)
LYMPHOCYTES NFR BLD AUTO: 33.7 % (ref 19.6–45.3)
MCH RBC QN AUTO: 31.9 PG (ref 26.6–33)
MCHC RBC AUTO-ENTMCNC: 31.9 G/DL (ref 31.5–35.7)
MCV RBC AUTO: 100 FL (ref 79–97)
MONOCYTES # BLD AUTO: 0.4 10*3/MM3 (ref 0.1–0.9)
MONOCYTES NFR BLD AUTO: 8.8 % (ref 5–12)
NEUTROPHILS # BLD AUTO: 2.36 10*3/MM3 (ref 1.7–7)
NEUTROPHILS NFR BLD AUTO: 52 % (ref 42.7–76)
NRBC BLD AUTO-RTO: 0 /100 WBC (ref 0–0.2)
PLATELET # BLD AUTO: 195 10*3/MM3 (ref 140–450)
PMV BLD AUTO: 10 FL (ref 6–12)
POTASSIUM BLD-SCNC: 4.5 MMOL/L (ref 3.5–5.2)
PROT SERPL-MCNC: 6.2 G/DL (ref 6–8.5)
RBC # BLD AUTO: 3.92 10*6/MM3 (ref 3.77–5.28)
SODIUM BLD-SCNC: 142 MMOL/L (ref 136–145)
T4 FREE SERPL-MCNC: 1.71 NG/DL (ref 0.93–1.7)
TRIGL SERPL-MCNC: 42 MG/DL (ref 0–150)
TSH SERPL DL<=0.05 MIU/L-ACNC: 0.31 MIU/ML (ref 0.27–4.2)
VIT B12 BLD-MCNC: 471 PG/ML (ref 211–946)
VLDLC SERPL-MCNC: 8.4 MG/DL (ref 5–40)
WBC NRBC COR # BLD: 4.54 10*3/MM3 (ref 3.4–10.8)

## 2019-07-16 PROCEDURE — 82607 VITAMIN B-12: CPT

## 2019-07-16 PROCEDURE — 83036 HEMOGLOBIN GLYCOSYLATED A1C: CPT

## 2019-07-16 PROCEDURE — 80053 COMPREHEN METABOLIC PANEL: CPT

## 2019-07-16 PROCEDURE — 84439 ASSAY OF FREE THYROXINE: CPT

## 2019-07-16 PROCEDURE — 85025 COMPLETE CBC W/AUTO DIFF WBC: CPT

## 2019-07-16 PROCEDURE — 80061 LIPID PANEL: CPT

## 2019-07-16 PROCEDURE — 84443 ASSAY THYROID STIM HORMONE: CPT

## 2019-07-16 RX ORDER — LEVOTHYROXINE SODIUM 100 MCG
TABLET ORAL
Qty: 90 TABLET | Refills: 3 | Status: SHIPPED | OUTPATIENT
Start: 2019-07-16 | End: 2019-07-22

## 2019-07-17 PROBLEM — C44.621 SQUAMOUS CELL CARCINOMA OF SKIN OF ARM: Status: ACTIVE | Noted: 2019-07-17

## 2019-07-22 ENCOUNTER — OFFICE VISIT (OUTPATIENT)
Dept: ENDOCRINOLOGY | Age: 74
End: 2019-07-22

## 2019-07-22 VITALS
BODY MASS INDEX: 21.85 KG/M2 | HEIGHT: 64 IN | SYSTOLIC BLOOD PRESSURE: 128 MMHG | WEIGHT: 128 LBS | HEART RATE: 70 BPM | DIASTOLIC BLOOD PRESSURE: 70 MMHG | OXYGEN SATURATION: 100 %

## 2019-07-22 DIAGNOSIS — E78.2 MIXED HYPERLIPIDEMIA: ICD-10-CM

## 2019-07-22 DIAGNOSIS — E03.9 HYPOTHYROIDISM, UNSPECIFIED TYPE: ICD-10-CM

## 2019-07-22 DIAGNOSIS — E10.9 TYPE 1 DIABETES MELLITUS WITHOUT COMPLICATION (HCC): Primary | ICD-10-CM

## 2019-07-22 PROCEDURE — 99214 OFFICE O/P EST MOD 30 MIN: CPT | Performed by: INTERNAL MEDICINE

## 2019-07-22 RX ORDER — LEVOTHYROXINE SODIUM 88 MCG
88 TABLET ORAL DAILY
Qty: 30 TABLET | Refills: 11 | Status: SHIPPED | OUTPATIENT
Start: 2019-07-22 | End: 2020-05-21 | Stop reason: SDUPTHER

## 2019-07-22 NOTE — PROGRESS NOTES
73 y.o.    Patient Care Team:  Yair Zapata MD as PCP - General    Chief Complaint:    FOLLOW UP/ TYPE 1 DIABETES MELLITUS  Subjective     HPI    Olga Greco 73 y.o. WF presents with Type 1 dm as a follow up patient. Consulted by Dr. Zapata.      Type 1 dm - Diagnosed in 2003 was initially thought to be type 2 dm. Was started on oral agents initially. Insulin was started on April 24th 2017.    When she came in in February 2018 she was still on oral hypoglycemic agents but due to her phenotype and family history of type 1 diabetes,  antibodies were checked and they were positive.    Today in the clinic patient is on Medtronic insulin pump 630 G along with the Dexcom sensor.  Downloaded the insulin pump and the sensor information.  Average blood sugars are around 171 mg deciliter range.  She continues to have occasional low blood sugar episodes especially in the middle of the night, she has been alerted by the Dexcom for these.  Highest blood sugar was around 300.  Last eye examination was in April 2019.  No history of diabetic retinopathy.  No history of diabetic neuropathy.   No history of CAD, CK D, CVA.  She is physically active and exercises at least for 3-4 times a week.  On Ace inhibitor.  Last DKA episode was in October 2018.     Hyperlipidemia  On Lipitor 20 mg oral daily.     Hypothyroidism    On Synthroid 100 mcg oral daily.    Reviewed primary care physician's/consulting physician documentation and lab results :     Interval History      The following portions of the patient's history were reviewed and updated as appropriate: allergies, current medications, past family history, past medical history, past social history, past surgical history and problem list.    Past Medical History:   Diagnosis Date   • Diabetes mellitus (CMS/HCC)    • Hypertension    • Hypothyroidism    • Type 2 diabetes mellitus (CMS/HCC) 04/2018     Family History   Problem Relation Age of Onset   • Heart disease  Father    • Diabetes Sister    • Hypertension Mother    • Breast cancer Neg Hx      Social History     Socioeconomic History   • Marital status:      Spouse name: Not on file   • Number of children: Not on file   • Years of education: Not on file   • Highest education level: Not on file   Tobacco Use   • Smoking status: Never Smoker   • Smokeless tobacco: Never Used   Substance and Sexual Activity   • Alcohol use: Yes     Alcohol/week: 1.2 oz     Types: 2 Glasses of wine per week     Comment: daily   • Drug use: No   • Sexual activity: Yes     Partners: Male     Allergies   Allergen Reactions   • Aspirin      GI distress   • Epinephrine      Tachycardia       Current Outpatient Medications:   •  atorvastatin (LIPITOR) 20 MG tablet, Take 1 tablet by mouth Daily., Disp: 90 tablet, Rfl: 3  •  B Complex Vitamins (VITAMIN B COMPLEX) capsule capsule, Take 1 capsule by mouth Daily., Disp: , Rfl:   •  benazepril-hydrochlorthiazide (LOTENSIN HCT) 20-12.5 MG per tablet, TAKE ONE TABLET BY MOUTH DAILY, Disp: 90 tablet, Rfl: 1  •  Biotin 20385 MCG tablet, Take 1 tablet by mouth Daily., Disp: , Rfl:   •  Calcium Carb-Cholecalciferol (CALCIUM 600+D3 PO), Take 500 Units by mouth Daily., Disp: , Rfl:   •  Chromium Picolinate 1000 MCG tablet, Take 1,000 mcg by mouth daily., Disp: , Rfl:   •  glucagon (GLUCAGON EMERGENCY) 1 MG injection, Inject 1 mg under the skin into the appropriate area as directed 1 (One) Time As Needed for Low Blood Sugar for up to 1 dose., Disp: 1 kit, Rfl: 12  •  insulin aspart (NOVOLOG) 100 UNIT/ML injection, 50 units daily VIA insulin Pump (Patient taking differently: Inject 1 Units under the skin into the appropriate area as directed 3 (Three) Times a Day Before Meals. 60 units daily VIA insulin Pump), Disp: 20 mL, Rfl: 6  •  ACCU-CHEK SOFTCLIX LANCETS lancets, USE TO TEST BLOOD SUGAR 4 TIMES DAILY, Disp: 200 each, Rfl: 5  •  Blood Glucose Monitoring Suppl (ACCU-CHEK RICHARD SMARTVIEW) w/Device kit, Use  "to check blood sugar 4 times daily  E10.9, Disp: 1 kit, Rfl: 0  •  SYNTHROID 88 MCG tablet, Take 1 tablet by mouth Daily., Disp: 30 tablet, Rfl: 11        Review of Systems   Constitutional: Negative for appetite change, fatigue and fever.   Eyes: Negative for visual disturbance.   Respiratory: Negative for shortness of breath.    Cardiovascular: Negative for palpitations and leg swelling.   Gastrointestinal: Negative for abdominal pain and vomiting.   Endocrine: Negative for polydipsia and polyuria.   Musculoskeletal: Positive for joint swelling. Negative for neck pain.   Skin: Negative for rash.   Neurological: Negative for weakness and numbness.   Psychiatric/Behavioral: Negative for behavioral problems.       Objective       Vitals:    07/22/19 1118   BP: 128/70   Pulse: 70   SpO2: 100%   Weight: 58.1 kg (128 lb)   Height: 162.6 cm (64\")     Body mass index is 21.97 kg/m².      Physical Exam   Constitutional: She is oriented to person, place, and time. She appears well-nourished.   HENT:   Head: Normocephalic and atraumatic.   Eyes: Conjunctivae and EOM are normal. No scleral icterus.   Neck: Normal range of motion. Neck supple. No thyromegaly present.   Cardiovascular: Normal rate and normal heart sounds. Exam reveals no friction rub.   No murmur heard.  Pulmonary/Chest: Effort normal and breath sounds normal. No stridor. She has no wheezes. She has no rales.   Abdominal: Soft. Bowel sounds are normal. She exhibits no distension. There is no tenderness.   Musculoskeletal: She exhibits no edema or tenderness.   Lymphadenopathy:     She has no cervical adenopathy.   Neurological: She is alert and oriented to person, place, and time.   Skin: Skin is warm and dry. She is not diaphoretic.   Psychiatric: She has a normal mood and affect.   Vitals reviewed.    Results Review:     I reviewed the patient's new clinical results and mentioned them above in HPI and in plan as well.    Medical records " reviewed  Summary:done      Lab on 07/16/2019   Component Date Value Ref Range Status   • Glucose 07/16/2019 141* 65 - 99 mg/dL Final   • BUN 07/16/2019 19  8 - 23 mg/dL Final   • Creatinine 07/16/2019 0.75  0.57 - 1.00 mg/dL Final   • Sodium 07/16/2019 142  136 - 145 mmol/L Final   • Potassium 07/16/2019 4.5  3.5 - 5.2 mmol/L Final   • Chloride 07/16/2019 104  98 - 107 mmol/L Final   • CO2 07/16/2019 27.6  22.0 - 29.0 mmol/L Final   • Calcium 07/16/2019 9.3  8.6 - 10.5 mg/dL Final   • Total Protein 07/16/2019 6.2  6.0 - 8.5 g/dL Final   • Albumin 07/16/2019 4.20  3.50 - 5.20 g/dL Final   • ALT (SGPT) 07/16/2019 32  1 - 33 U/L Final   • AST (SGOT) 07/16/2019 28  1 - 32 U/L Final   • Alkaline Phosphatase 07/16/2019 62  39 - 117 U/L Final   • Total Bilirubin 07/16/2019 0.7  0.2 - 1.2 mg/dL Final   • eGFR Non African Amer 07/16/2019 76  >60 mL/min/1.73 Final   • Globulin 07/16/2019 2.0  gm/dL Final   • A/G Ratio 07/16/2019 2.1  g/dL Final   • BUN/Creatinine Ratio 07/16/2019 25.3* 7.0 - 25.0 Final   • Anion Gap 07/16/2019 10.4  5.0 - 15.0 mmol/L Final   • Hemoglobin A1C 07/16/2019 6.70* 4.80 - 5.60 % Final   • Total Cholesterol 07/16/2019 129  0 - 200 mg/dL Final   • Triglycerides 07/16/2019 42  0 - 150 mg/dL Final   • HDL Cholesterol 07/16/2019 84* 40 - 60 mg/dL Final   • LDL Cholesterol  07/16/2019 37  0 - 100 mg/dL Final   • VLDL Cholesterol 07/16/2019 8.4  5 - 40 mg/dL Final   • Chol/HDL Ratio 07/16/2019 1.54   Final   • TSH 07/16/2019 0.312  0.270 - 4.200 mIU/mL Final   • Free T4 07/16/2019 1.71* 0.93 - 1.70 ng/dL Final   • Vitamin B-12 07/16/2019 471  211 - 946 pg/mL Final   • WBC 07/16/2019 4.54  3.40 - 10.80 10*3/mm3 Final   • RBC 07/16/2019 3.92  3.77 - 5.28 10*6/mm3 Final   • Hemoglobin 07/16/2019 12.5  12.0 - 15.9 g/dL Final   • Hematocrit 07/16/2019 39.2  34.0 - 46.6 % Final   • MCV 07/16/2019 100.0* 79.0 - 97.0 fL Final   • MCH 07/16/2019 31.9  26.6 - 33.0 pg Final   • MCHC 07/16/2019 31.9  31.5 - 35.7 g/dL  Final   • RDW 07/16/2019 11.9* 12.3 - 15.4 % Final   • RDW-SD 07/16/2019 43.4  37.0 - 54.0 fl Final   • MPV 07/16/2019 10.0  6.0 - 12.0 fL Final   • Platelets 07/16/2019 195  140 - 450 10*3/mm3 Final   • Neutrophil % 07/16/2019 52.0  42.7 - 76.0 % Final   • Lymphocyte % 07/16/2019 33.7  19.6 - 45.3 % Final   • Monocyte % 07/16/2019 8.8  5.0 - 12.0 % Final   • Eosinophil % 07/16/2019 4.6  0.3 - 6.2 % Final   • Basophil % 07/16/2019 0.7  0.0 - 1.5 % Final   • Immature Grans % 07/16/2019 0.2  0.0 - 0.5 % Final   • Neutrophils, Absolute 07/16/2019 2.36  1.70 - 7.00 10*3/mm3 Final   • Lymphocytes, Absolute 07/16/2019 1.53  0.70 - 3.10 10*3/mm3 Final   • Monocytes, Absolute 07/16/2019 0.40  0.10 - 0.90 10*3/mm3 Final   • Eosinophils, Absolute 07/16/2019 0.21  0.00 - 0.40 10*3/mm3 Final   • Basophils, Absolute 07/16/2019 0.03  0.00 - 0.20 10*3/mm3 Final   • Immature Grans, Absolute 07/16/2019 0.01  0.00 - 0.05 10*3/mm3 Final   • nRBC 07/16/2019 0.0  0.0 - 0.2 /100 WBC Final     Lab Results   Component Value Date    HGBA1C 6.70 (H) 07/16/2019    HGBA1C 6.40 (H) 06/14/2019    HGBA1C 6.60 (H) 05/21/2019     Lab Results   Component Value Date    MICROALBUR 4.8 01/21/2019    CREATININE 0.75 07/16/2019     Imaging Results (most recent)     None                                                    Assessment and Plan:    Olga was seen today for diabetes.    Diagnoses and all orders for this visit:    Type 1 diabetes mellitus without complication (CMS/HCC)    Hypothyroidism, unspecified type    Mixed hyperlipidemia    Other orders  -     SYNTHROID 88 MCG tablet; Take 1 tablet by mouth Daily.    Type 1 diabetes [id number] with low BG.   Downloaded the sensor information and made the current changes  Continue the current insulin pump settings  Change the bolus settings at dinnertime from 11 g to 10.5 g.    Hyperlipidemia  Continue Lipitor    Hypothyroidism  Noted that thyroid levels are not stable  Change  "Synthroid to 88 mcg oral daily.    Reviewed Lab results with the patient.             Angi Rogers MD  07/22/19    [facility] Dragon / transcription disclaimer:     \"Dictated utilizing Dragon dictation\".  "

## 2019-08-15 ENCOUNTER — OFFICE VISIT (OUTPATIENT)
Dept: GASTROENTEROLOGY | Facility: CLINIC | Age: 74
End: 2019-08-15

## 2019-08-15 ENCOUNTER — APPOINTMENT (OUTPATIENT)
Dept: LAB | Facility: HOSPITAL | Age: 74
End: 2019-08-15

## 2019-08-15 VITALS
SYSTOLIC BLOOD PRESSURE: 122 MMHG | BODY MASS INDEX: 21.99 KG/M2 | HEIGHT: 64 IN | DIASTOLIC BLOOD PRESSURE: 66 MMHG | WEIGHT: 128.8 LBS

## 2019-08-15 DIAGNOSIS — R74.8 ELEVATED LIVER ENZYMES: Primary | ICD-10-CM

## 2019-08-15 DIAGNOSIS — Z11.59 ENCOUNTER FOR SCREENING FOR OTHER VIRAL DISEASES: ICD-10-CM

## 2019-08-15 LAB
ALPHA1 GLOB MFR UR ELPH: 139 MG/DL (ref 90–200)
FERRITIN SERPL-MCNC: 119 NG/ML (ref 13–150)
HBV SURFACE AG SERPL QL IA: NORMAL
IRON 24H UR-MRATE: 118 MCG/DL (ref 37–145)
IRON SATN MFR SERPL: 33 % (ref 20–50)
TIBC SERPL-MCNC: 361 MCG/DL (ref 298–536)
TRANSFERRIN SERPL-MCNC: 242 MG/DL (ref 200–360)

## 2019-08-15 PROCEDURE — 82103 ALPHA-1-ANTITRYPSIN TOTAL: CPT | Performed by: INTERNAL MEDICINE

## 2019-08-15 PROCEDURE — 83516 IMMUNOASSAY NONANTIBODY: CPT | Performed by: INTERNAL MEDICINE

## 2019-08-15 PROCEDURE — 82728 ASSAY OF FERRITIN: CPT | Performed by: INTERNAL MEDICINE

## 2019-08-15 PROCEDURE — 84466 ASSAY OF TRANSFERRIN: CPT | Performed by: INTERNAL MEDICINE

## 2019-08-15 PROCEDURE — 99203 OFFICE O/P NEW LOW 30 MIN: CPT | Performed by: INTERNAL MEDICINE

## 2019-08-15 PROCEDURE — 83540 ASSAY OF IRON: CPT | Performed by: INTERNAL MEDICINE

## 2019-08-15 PROCEDURE — 87340 HEPATITIS B SURFACE AG IA: CPT | Performed by: INTERNAL MEDICINE

## 2019-08-15 PROCEDURE — 36415 COLL VENOUS BLD VENIPUNCTURE: CPT | Performed by: INTERNAL MEDICINE

## 2019-08-15 PROCEDURE — 86038 ANTINUCLEAR ANTIBODIES: CPT | Performed by: INTERNAL MEDICINE

## 2019-08-15 NOTE — PROGRESS NOTES
PATIENT INFORMATION  Olga Greco       - 1945    CHIEF COMPLAINT  Chief Complaint   Patient presents with   • Abnormal Lab     elevated transaminase       HISTORY OF PRESENT ILLNESS  HPI    74 yo female sent to me for elevated liver enzymes for about 1.5 years. Numbers noted initially to be high in 2018. She is insulin dependent diabetic under the care of an endocrinologist.   She takes 2-3 aleve weekly.  Lipitor was started in 2018.  Tracing back her numbers they would fluctuate. Six months ago they started to trend down and last month was normal. She denies any changes in medication.  US last year:  ULTRASOUND ABDOMEN, LIMITED, 2018:     HISTORY:  72-year-old female with elevated liver enzymes. Prior cholecystectomy.     TECHNIQUE:  Grayscale ultrasound imaging of the right upper quadrant was performed.     FINDINGS:  The gallbladder is surgically absent. There is no intrahepatic or extra  hepatic bile duct dilatation (CBD 4 mm).      The liver is normal in size and appearance. No mass or other focal liver  lesion is identified. Visualized pancreas appears normal. Right kidney  is negative with no hydronephrosis.     IMPRESSION:  1. Cholecystectomy. No bile duct dilatation.  2. Liver is normal in ultrasound appearance.      Wine 2 glasses nightly. No family history of liver disease or autoimmune issues. No blood transfusions.  No herbal supplements. She is on biotin supplements/chromium/b complex for many years.  cologuard negative in 2019.    Recent labs with:  ALT (SGPT)  1 - 33 U/L 32    AST (SGOT)  1 - 32 U/L 28    Alkaline Phosphatase  39 - 117 U/L 62    Total Bilirubin  0.2 - 1.2 mg/dL 0.7      Hepatitis c ab negative.    REVIEW OF SYSTEMS  Review of Systems   HENT: Positive for postnasal drip and rhinorrhea.    Endocrine: Positive for cold intolerance and heat intolerance.   Musculoskeletal: Positive for arthralgias and back pain.   All other systems reviewed and are  negative.        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]   • Routine health maintenance [Z00.00]   • Heart palpitations [R00.2]   • Diabetic ketoacidosis without coma associated with type 1 diabetes mellitus (CMS/HCC) [E10.10]   • Elevated transaminase level [R74.0]   • Type 1 diabetes mellitus (CMS/HCC) [E10.9]   • Gastroesophageal reflux disease [K21.9]   • Hypertension [I10]   • Hypothyroidism [E03.9]   • Menopausal symptom [N95.1]   • Vitamin D deficiency [E55.9]         PAST MEDICAL HISTORY  Past Medical History:   Diagnosis Date   • Colon polyp    • Diabetes mellitus (CMS/HCC)    • Hypertension    • Hypothyroidism    • Type 2 diabetes mellitus (CMS/HCC) 04/2018         SURGICAL HISTORY  Past Surgical History:   Procedure Laterality Date   • BREAST BIOPSY Left    • CHOLECYSTECTOMY     • COLONOSCOPY     • HYSTERECTOMY     • TONSILLECTOMY     • TUBAL ABDOMINAL LIGATION           FAMILY HISTORY  Family History   Problem Relation Age of Onset   • Heart disease Father    • Diabetes Sister    • Hypertension Mother    • Breast cancer Neg Hx    • Colon cancer Neg Hx    • Colon polyps Neg Hx          SOCIAL HISTORY  Social History     Occupational History   • Not on file   Tobacco Use   • Smoking status: Never Smoker   • Smokeless tobacco: Never Used   Substance and Sexual Activity   • Alcohol use: Yes     Alcohol/week: 1.2 oz     Types: 2 Glasses of wine per week     Comment: daily   • Drug use: No   • Sexual activity: Yes     Partners: Male       Debilities/Disabilities Identified: None    Emotional Behavior: Appropriate    CURRENT MEDICATIONS    Current Outpatient Medications:   •  ACCU-CHEK SOFTCLIX LANCETS lancets, USE TO TEST BLOOD SUGAR 4 TIMES DAILY, Disp: 200 each, Rfl: 5  •  atorvastatin (LIPITOR) 20 MG tablet, Take 1 tablet by mouth Daily., Disp: 90 tablet, Rfl: 3  •  B Complex Vitamins (VITAMIN B COMPLEX) capsule capsule, Take 1 capsule  "by mouth Daily., Disp: , Rfl:   •  benazepril-hydrochlorthiazide (LOTENSIN HCT) 20-12.5 MG per tablet, TAKE ONE TABLET BY MOUTH DAILY, Disp: 90 tablet, Rfl: 1  •  Biotin 75161 MCG tablet, Take 1 tablet by mouth Daily., Disp: , Rfl:   •  Blood Glucose Monitoring Suppl (ACCU-CHEK RICHARD SMARTVIEW) w/Device kit, Use to check blood sugar 4 times daily  E10.9, Disp: 1 kit, Rfl: 0  •  Calcium Carb-Cholecalciferol (CALCIUM 600+D3 PO), Take 500 Units by mouth Daily., Disp: , Rfl:   •  Chromium Picolinate 1000 MCG tablet, Take 1,000 mcg by mouth daily., Disp: , Rfl:   •  glucagon (GLUCAGON EMERGENCY) 1 MG injection, Inject 1 mg under the skin into the appropriate area as directed 1 (One) Time As Needed for Low Blood Sugar for up to 1 dose., Disp: 1 kit, Rfl: 12  •  insulin aspart (NOVOLOG) 100 UNIT/ML injection, 50 units daily VIA insulin Pump (Patient taking differently: Inject 1 Units under the skin into the appropriate area as directed 3 (Three) Times a Day Before Meals. 60 units daily VIA insulin Pump), Disp: 20 mL, Rfl: 6  •  SYNTHROID 88 MCG tablet, Take 1 tablet by mouth Daily., Disp: 30 tablet, Rfl: 11    ALLERGIES  Aspirin and Epinephrine    VITALS  Vitals:    08/15/19 1059   BP: 122/66   Weight: 58.4 kg (128 lb 12.8 oz)   Height: 162.6 cm (64.02\")       LAST RESULTS   Lab on 07/16/2019   Component Date Value Ref Range Status   • Glucose 07/16/2019 141* 65 - 99 mg/dL Final   • BUN 07/16/2019 19  8 - 23 mg/dL Final   • Creatinine 07/16/2019 0.75  0.57 - 1.00 mg/dL Final   • Sodium 07/16/2019 142  136 - 145 mmol/L Final   • Potassium 07/16/2019 4.5  3.5 - 5.2 mmol/L Final   • Chloride 07/16/2019 104  98 - 107 mmol/L Final   • CO2 07/16/2019 27.6  22.0 - 29.0 mmol/L Final   • Calcium 07/16/2019 9.3  8.6 - 10.5 mg/dL Final   • Total Protein 07/16/2019 6.2  6.0 - 8.5 g/dL Final   • Albumin 07/16/2019 4.20  3.50 - 5.20 g/dL Final   • ALT (SGPT) 07/16/2019 32  1 - 33 U/L Final   • AST (SGOT) 07/16/2019 28  1 - 32 U/L Final "   • Alkaline Phosphatase 07/16/2019 62  39 - 117 U/L Final   • Total Bilirubin 07/16/2019 0.7  0.2 - 1.2 mg/dL Final   • eGFR Non African Amer 07/16/2019 76  >60 mL/min/1.73 Final   • Globulin 07/16/2019 2.0  gm/dL Final   • A/G Ratio 07/16/2019 2.1  g/dL Final   • BUN/Creatinine Ratio 07/16/2019 25.3* 7.0 - 25.0 Final   • Anion Gap 07/16/2019 10.4  5.0 - 15.0 mmol/L Final   • Hemoglobin A1C 07/16/2019 6.70* 4.80 - 5.60 % Final   • Total Cholesterol 07/16/2019 129  0 - 200 mg/dL Final   • Triglycerides 07/16/2019 42  0 - 150 mg/dL Final   • HDL Cholesterol 07/16/2019 84* 40 - 60 mg/dL Final   • LDL Cholesterol  07/16/2019 37  0 - 100 mg/dL Final   • VLDL Cholesterol 07/16/2019 8.4  5 - 40 mg/dL Final   • Chol/HDL Ratio 07/16/2019 1.54   Final   • TSH 07/16/2019 0.312  0.270 - 4.200 mIU/mL Final   • Free T4 07/16/2019 1.71* 0.93 - 1.70 ng/dL Final   • Vitamin B-12 07/16/2019 471  211 - 946 pg/mL Final   • WBC 07/16/2019 4.54  3.40 - 10.80 10*3/mm3 Final   • RBC 07/16/2019 3.92  3.77 - 5.28 10*6/mm3 Final   • Hemoglobin 07/16/2019 12.5  12.0 - 15.9 g/dL Final   • Hematocrit 07/16/2019 39.2  34.0 - 46.6 % Final   • MCV 07/16/2019 100.0* 79.0 - 97.0 fL Final   • MCH 07/16/2019 31.9  26.6 - 33.0 pg Final   • MCHC 07/16/2019 31.9  31.5 - 35.7 g/dL Final   • RDW 07/16/2019 11.9* 12.3 - 15.4 % Final   • RDW-SD 07/16/2019 43.4  37.0 - 54.0 fl Final   • MPV 07/16/2019 10.0  6.0 - 12.0 fL Final   • Platelets 07/16/2019 195  140 - 450 10*3/mm3 Final   • Neutrophil % 07/16/2019 52.0  42.7 - 76.0 % Final   • Lymphocyte % 07/16/2019 33.7  19.6 - 45.3 % Final   • Monocyte % 07/16/2019 8.8  5.0 - 12.0 % Final   • Eosinophil % 07/16/2019 4.6  0.3 - 6.2 % Final   • Basophil % 07/16/2019 0.7  0.0 - 1.5 % Final   • Immature Grans % 07/16/2019 0.2  0.0 - 0.5 % Final   • Neutrophils, Absolute 07/16/2019 2.36  1.70 - 7.00 10*3/mm3 Final   • Lymphocytes, Absolute 07/16/2019 1.53  0.70 - 3.10 10*3/mm3 Final   • Monocytes, Absolute 07/16/2019  0.40  0.10 - 0.90 10*3/mm3 Final   • Eosinophils, Absolute 07/16/2019 0.21  0.00 - 0.40 10*3/mm3 Final   • Basophils, Absolute 07/16/2019 0.03  0.00 - 0.20 10*3/mm3 Final   • Immature Grans, Absolute 07/16/2019 0.01  0.00 - 0.05 10*3/mm3 Final   • nRBC 07/16/2019 0.0  0.0 - 0.2 /100 WBC Final     No results found.    PHYSICAL EXAM  Physical Exam   Constitutional: She is oriented to person, place, and time. She appears well-developed and well-nourished. No distress.   HENT:   Head: Normocephalic and atraumatic.   Mouth/Throat: Oropharynx is clear and moist.   Eyes: EOM are normal. Pupils are equal, round, and reactive to light.   Neck: Normal range of motion. No tracheal deviation present.   Cardiovascular: Normal rate, regular rhythm, normal heart sounds and intact distal pulses. Exam reveals no gallop and no friction rub.   No murmur heard.  Pulmonary/Chest: Effort normal and breath sounds normal. No stridor. No respiratory distress. She has no wheezes. She has no rales. She exhibits no tenderness.   Abdominal: Soft. Bowel sounds are normal. She exhibits no distension. There is no tenderness. There is no rebound and no guarding.   No hsm, no ascites   Musculoskeletal: She exhibits no edema.   Lymphadenopathy:     She has no cervical adenopathy.   Neurological: She is alert and oriented to person, place, and time.   Skin: Skin is warm. She is not diaphoretic.   Psychiatric: She has a normal mood and affect. Her behavior is normal. Judgment and thought content normal.   Nursing note and vitals reviewed.      ASSESSMENT  Diagnoses and all orders for this visit:    Elevated liver enzymes  -     Alpha-1-antitrypsin  -     Anti-smooth muscle antibody titer  -     LUCY  -     Ferritin  -     Iron and TIBC  -     Mitochondrial antibodies, M2  -     Ultrasound liver; Future  -     Hepatitis B Surface Antigen    Encounter for screening for other viral diseases   -     Hepatitis B Surface Antigen          PLAN  No Follow-up on  file.    Do above workup. Trend is back down. Will repeat us.  Minimize nsaid  Eliminate etoh

## 2019-08-16 ENCOUNTER — HOSPITAL ENCOUNTER (OUTPATIENT)
Dept: ULTRASOUND IMAGING | Facility: HOSPITAL | Age: 74
Discharge: HOME | End: 2019-08-16
Admitting: INTERNAL MEDICINE

## 2019-08-16 DIAGNOSIS — R74.8 ELEVATED LIVER ENZYMES: ICD-10-CM

## 2019-08-16 LAB
ACTIN IGG SERPL-ACNC: 7 UNITS (ref 0–19)
ANA SER QL: NEGATIVE
DEPRECATED MITOCHONDRIA M2 IGG SER-ACNC: <20 UNITS (ref 0–20)

## 2019-08-16 PROCEDURE — 76705 ECHO EXAM OF ABDOMEN: CPT

## 2019-09-24 ENCOUNTER — APPOINTMENT (OUTPATIENT)
Dept: LAB | Facility: HOSPITAL | Age: 74
End: 2019-09-24

## 2019-09-24 ENCOUNTER — OFFICE VISIT (OUTPATIENT)
Dept: GASTROENTEROLOGY | Facility: CLINIC | Age: 74
End: 2019-09-24

## 2019-09-24 VITALS
SYSTOLIC BLOOD PRESSURE: 124 MMHG | BODY MASS INDEX: 22.16 KG/M2 | DIASTOLIC BLOOD PRESSURE: 78 MMHG | WEIGHT: 129.8 LBS | HEIGHT: 64 IN

## 2019-09-24 DIAGNOSIS — R74.8 ELEVATED LIVER ENZYMES: Primary | ICD-10-CM

## 2019-09-24 LAB
ALBUMIN SERPL-MCNC: 4.7 G/DL (ref 3.5–5.2)
ALP SERPL-CCNC: 70 U/L (ref 39–117)
ALT SERPL W P-5'-P-CCNC: 31 U/L (ref 1–33)
AST SERPL-CCNC: 25 U/L (ref 1–32)
BILIRUB CONJ SERPL-MCNC: 0.2 MG/DL (ref 0.2–0.3)
BILIRUB INDIRECT SERPL-MCNC: 0.3 MG/DL
BILIRUB SERPL-MCNC: 0.5 MG/DL (ref 0.2–1.2)
PROT SERPL-MCNC: 6.5 G/DL (ref 6–8.5)

## 2019-09-24 PROCEDURE — 80076 HEPATIC FUNCTION PANEL: CPT | Performed by: INTERNAL MEDICINE

## 2019-09-24 PROCEDURE — 36415 COLL VENOUS BLD VENIPUNCTURE: CPT | Performed by: INTERNAL MEDICINE

## 2019-09-24 PROCEDURE — 99213 OFFICE O/P EST LOW 20 MIN: CPT | Performed by: INTERNAL MEDICINE

## 2019-09-24 NOTE — PROGRESS NOTES
PATIENT INFORMATION  Olga Greco       - 1945    CHIEF COMPLAINT  Chief Complaint   Patient presents with   • Follow-up     1 mo follow up on Elevated LFT's       HISTORY OF PRESENT ILLNESS  HPI    She is here in follow up for elevated liver enzymes. The workup was negative including US and autoimmune workup and iron workup.  Her numbers had normalized prior to her coming to see me.  She is controlling her blood sugars.  She has decreased wine to 1.5 glass nightly.    REVIEW OF SYSTEMS  Review of Systems   All other systems reviewed and are negative.        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]   • Routine health maintenance [Z00.00]   • Heart palpitations [R00.2]   • Diabetic ketoacidosis without coma associated with type 1 diabetes mellitus (CMS/HCC) [E10.10]   • Elevated transaminase level [R74.0]   • Type 1 diabetes mellitus (CMS/HCC) [E10.9]   • Gastroesophageal reflux disease [K21.9]   • Hypertension [I10]   • Hypothyroidism [E03.9]   • Menopausal symptom [N95.1]   • Vitamin D deficiency [E55.9]         PAST MEDICAL HISTORY  Past Medical History:   Diagnosis Date   • Colon polyp    • Diabetes mellitus (CMS/HCC)    • Hypertension    • Hypothyroidism    • Type 2 diabetes mellitus (CMS/HCC) 2018         SURGICAL HISTORY  Past Surgical History:   Procedure Laterality Date   • BREAST BIOPSY Left    • CHOLECYSTECTOMY     • COLONOSCOPY     • HYSTERECTOMY     • TONSILLECTOMY     • TUBAL ABDOMINAL LIGATION           FAMILY HISTORY  Family History   Problem Relation Age of Onset   • Heart disease Father    • Diabetes Sister    • Hypertension Mother    • Breast cancer Neg Hx    • Colon cancer Neg Hx    • Colon polyps Neg Hx          SOCIAL HISTORY  Social History     Occupational History   • Not on file   Tobacco Use   • Smoking status: Never Smoker   • Smokeless tobacco: Never Used   Substance and Sexual Activity   • Alcohol use:  "Yes     Alcohol/week: 1.2 oz     Types: 2 Glasses of wine per week     Comment: daily   • Drug use: No   • Sexual activity: Yes     Partners: Male       Debilities/Disabilities Identified: None    Emotional Behavior: Appropriate    CURRENT MEDICATIONS    Current Outpatient Medications:   •  ACCU-CHEK SOFTCLIX LANCETS lancets, USE TO TEST BLOOD SUGAR 4 TIMES DAILY, Disp: 200 each, Rfl: 5  •  atorvastatin (LIPITOR) 20 MG tablet, Take 1 tablet by mouth Daily., Disp: 90 tablet, Rfl: 3  •  B Complex Vitamins (VITAMIN B COMPLEX) capsule capsule, Take 1 capsule by mouth Daily., Disp: , Rfl:   •  benazepril-hydrochlorthiazide (LOTENSIN HCT) 20-12.5 MG per tablet, TAKE ONE TABLET BY MOUTH DAILY, Disp: 90 tablet, Rfl: 1  •  Biotin 84251 MCG tablet, Take 1 tablet by mouth Daily., Disp: , Rfl:   •  Blood Glucose Monitoring Suppl (ACCU-CHEK RICHARD SMARTVIEW) w/Device kit, Use to check blood sugar 4 times daily  E10.9, Disp: 1 kit, Rfl: 0  •  Calcium Carb-Cholecalciferol (CALCIUM 600+D3 PO), Take 500 Units by mouth Daily., Disp: , Rfl:   •  Chromium Picolinate 1000 MCG tablet, Take 1,000 mcg by mouth daily., Disp: , Rfl:   •  glucagon (GLUCAGON EMERGENCY) 1 MG injection, Inject 1 mg under the skin into the appropriate area as directed 1 (One) Time As Needed for Low Blood Sugar for up to 1 dose., Disp: 1 kit, Rfl: 12  •  SYNTHROID 88 MCG tablet, Take 1 tablet by mouth Daily., Disp: 30 tablet, Rfl: 11    ALLERGIES  Aspirin and Epinephrine    VITALS  Vitals:    09/24/19 1433   BP: 124/78   Weight: 58.9 kg (129 lb 12.8 oz)   Height: 162.6 cm (64.02\")       LAST RESULTS   Office Visit on 08/15/2019   Component Date Value Ref Range Status   • ALPHA -1 ANTITRYPSIN 08/15/2019 139  90 - 200 mg/dL Final   • Smooth Muscle Ab 08/15/2019 7  0 - 19 Units Final                     Negative                     0 - 19                   Weak positive               20 - 30                   Moderate to strong positive     >30   Actin Antibodies are " found in 52-85% of patients with   autoimmune hepatitis or chronic active hepatitis and   in 22% of patients with primary biliary cirrhosis.   • LUCY Direct 08/15/2019 Negative  Negative Final   • Ferritin 08/15/2019 119.00  13.00 - 150.00 ng/mL Final   • Iron 08/15/2019 118  37 - 145 mcg/dL Final   • Iron Saturation 08/15/2019 33  20 - 50 % Final   • Transferrin 08/15/2019 242  200 - 360 mg/dL Final   • TIBC 08/15/2019 361  298 - 536 mcg/dL Final   • Mitochondrial Ab 08/15/2019 <20.0  0.0 - 20.0 Units Final                                    Negative    0.0 - 20.0                                  Equivocal  20.1 - 24.9                                  Positive         >24.9  Mitochondrial (M2) Antibodies are found in 90-96% of  patients with primary biliary cirrhosis.   • Hepatitis B Surface Ag 08/15/2019 Non-Reactive  Non-Reactive Final     No results found.    PHYSICAL EXAM  Physical Exam   Constitutional: She is oriented to person, place, and time. She appears well-developed and well-nourished. No distress.   HENT:   Head: Normocephalic and atraumatic.   Mouth/Throat: Oropharynx is clear and moist.   Eyes: EOM are normal. Pupils are equal, round, and reactive to light.   Neck: Normal range of motion. No tracheal deviation present.   Cardiovascular: Normal rate, regular rhythm, normal heart sounds and intact distal pulses. Exam reveals no gallop and no friction rub.   No murmur heard.  Pulmonary/Chest: Effort normal and breath sounds normal. No stridor. No respiratory distress. She has no wheezes. She has no rales. She exhibits no tenderness.   Abdominal: Soft. Bowel sounds are normal. She exhibits no distension. There is no tenderness. There is no rebound and no guarding.   Musculoskeletal: She exhibits no edema.   Lymphadenopathy:     She has no cervical adenopathy.   Neurological: She is alert and oriented to person, place, and time.   Skin: Skin is warm. She is not diaphoretic.   Psychiatric: She has a normal  mood and affect. Her behavior is normal. Judgment and thought content normal.   Nursing note and vitals reviewed.      ASSESSMENT  Diagnoses and all orders for this visit:    Elevated liver enzymes  -     Hepatic Function Panel          PLAN  No Follow-up on file.      Recheck numbers today  Conservative management.   Minimize or eliminate etoh.

## 2019-09-25 ENCOUNTER — OFFICE VISIT (OUTPATIENT)
Dept: ORTHOPEDIC SURGERY | Facility: CLINIC | Age: 74
End: 2019-09-25

## 2019-09-25 VITALS
SYSTOLIC BLOOD PRESSURE: 147 MMHG | HEIGHT: 64 IN | HEART RATE: 76 BPM | BODY MASS INDEX: 21.34 KG/M2 | WEIGHT: 125 LBS | DIASTOLIC BLOOD PRESSURE: 72 MMHG

## 2019-09-25 DIAGNOSIS — R52 PAIN: Primary | ICD-10-CM

## 2019-09-25 DIAGNOSIS — M65.312 TRIGGER THUMB OF LEFT HAND: ICD-10-CM

## 2019-09-25 PROCEDURE — 99213 OFFICE O/P EST LOW 20 MIN: CPT | Performed by: NURSE PRACTITIONER

## 2019-09-25 PROCEDURE — 73130 X-RAY EXAM OF HAND: CPT | Performed by: NURSE PRACTITIONER

## 2019-09-25 PROCEDURE — 20600 DRAIN/INJ JOINT/BURSA W/O US: CPT | Performed by: NURSE PRACTITIONER

## 2019-09-25 RX ORDER — BETAMETHASONE SODIUM PHOSPHATE AND BETAMETHASONE ACETATE 3; 3 MG/ML; MG/ML
6 INJECTION, SUSPENSION INTRA-ARTICULAR; INTRALESIONAL; INTRAMUSCULAR; SOFT TISSUE
Status: COMPLETED | OUTPATIENT
Start: 2019-09-25 | End: 2019-09-25

## 2019-09-25 RX ORDER — LIDOCAINE HYDROCHLORIDE 10 MG/ML
1 INJECTION, SOLUTION EPIDURAL; INFILTRATION; INTRACAUDAL; PERINEURAL
Status: COMPLETED | OUTPATIENT
Start: 2019-09-25 | End: 2019-09-25

## 2019-09-25 RX ADMIN — BETAMETHASONE SODIUM PHOSPHATE AND BETAMETHASONE ACETATE 6 MG: 3; 3 INJECTION, SUSPENSION INTRA-ARTICULAR; INTRALESIONAL; INTRAMUSCULAR; SOFT TISSUE at 11:22

## 2019-09-25 RX ADMIN — LIDOCAINE HYDROCHLORIDE 1 ML: 10 INJECTION, SOLUTION EPIDURAL; INFILTRATION; INTRACAUDAL; PERINEURAL at 11:22

## 2019-09-25 NOTE — PROGRESS NOTES
Subjective:     Patient ID: Olga Greco is a 74 y.o. female.    Chief Complaint:  Left thumb pain   History of Present Illness  Olga Greco presents to clinic for evaluation of the left thumb.  She is been seen outside provider diagnosed with trigger thumb received corticosteroid injection in April 2019 has done extremely well up into the last 1 month the 6 weeks when pain has returned.  She is noticing that the finger is locking at night and she has to attempt to extend the thumb when she wakes has began noticing pain at the palmar aspect of the hand.  Again she received corticosteroid injection in April which did significantly help with symptoms.  Rates discomfort today at a 3 out of a 10 mainly aching and burning in nature palmar surface increased symptoms noted with range of motion.  She has continued with bracing of the left thumb.  Denies presence of numbness or tingling left upper extremity.  Denies other concerns present this time.    Social History     Occupational History   • Not on file   Tobacco Use   • Smoking status: Never Smoker   • Smokeless tobacco: Never Used   Substance and Sexual Activity   • Alcohol use: Yes     Alcohol/week: 1.2 oz     Types: 2 Glasses of wine per week     Comment: daily   • Drug use: No   • Sexual activity: Yes     Partners: Male      Past Medical History:   Diagnosis Date   • Colon polyp    • Diabetes mellitus (CMS/HCC)    • Hypertension    • Hypothyroidism    • Type 2 diabetes mellitus (CMS/HCC) 04/2018     Past Surgical History:   Procedure Laterality Date   • BREAST BIOPSY Left    • CHOLECYSTECTOMY     • COLONOSCOPY     • HYSTERECTOMY     • TONSILLECTOMY     • TUBAL ABDOMINAL LIGATION         Family History   Problem Relation Age of Onset   • Heart disease Father    • Diabetes Sister    • Hypertension Mother    • Breast cancer Neg Hx    • Colon cancer Neg Hx    • Colon polyps Neg Hx          Review of Systems   Constitutional: Negative for chills, diaphoresis, fever and  "unexpected weight change.   HENT: Negative for hearing loss, nosebleeds, sore throat and tinnitus.    Eyes: Negative for pain and visual disturbance.   Respiratory: Negative for cough, shortness of breath and wheezing.    Cardiovascular: Positive for palpitations. Negative for chest pain.   Gastrointestinal: Negative for abdominal pain, diarrhea, nausea and vomiting.   Endocrine: Positive for cold intolerance and heat intolerance. Negative for polydipsia.   Genitourinary: Negative for difficulty urinating, dysuria and hematuria.   Musculoskeletal: Positive for arthralgias and myalgias. Negative for joint swelling.   Skin: Negative for rash and wound.   Allergic/Immunologic: Negative for environmental allergies.   Neurological: Negative for dizziness, syncope and numbness.   Hematological: Does not bruise/bleed easily.   Psychiatric/Behavioral: Negative for dysphoric mood and sleep disturbance. The patient is not nervous/anxious.            Objective:  Physical Exam    Vital signs reviewed.   General: No acute distress.  Eyes: conjunctiva clear; pupils equally round and reactive  ENT: external ears and nose atraumatic; oropharynx clear  CV: no peripheral edema  Resp: normal respiratory effort  Skin: no rashes or wounds; normal turgor  Psych: mood and affect appropriate; recent and remote memory intact    Vitals:    09/25/19 1101   BP: 147/72   BP Location: Right arm   Pulse: 76   Weight: 56.7 kg (125 lb)   Height: 162.6 cm (64\")         09/25/19  1101   Weight: 56.7 kg (125 lb)     Body mass index is 21.46 kg/m².      Ortho Exam     Left hand examined:  Maximal tenderness palmar surface hand, first digit  Positive sensation light touch all distributions of the left hand, palmar and dorsal surface  tenderness to palpation over A1 pulley   palpable bump may be present near the same location  2+ distal radial pulse  Flex/extend all digits left hand, tenderness with ROM thumb     Imaging:  Left Hand X-Ray  Indication: " Pain  AP, Lateral, and Oblique views    Findings:  No fracture  No bony lesion  Normal soft tissues  Normal joint spaces  No prior studies were available for comparison.    Assessment:        1. Pain    2. Trigger thumb of left hand           Plan:  1.  Discussed plan of care with patient.  Wish to proceed with corticosteroid injection palmar surface trigger thumb, left.  Plan to see her back when symptoms return.  Patient verbalized understanding of all information agrees with plan of care.  Denies other concerns present at this time.  Small Joint Arthrocentesis  Consent given by: patient  Site marked: site marked  Timeout: Immediately prior to procedure a time out was called to verify the correct patient, procedure, equipment, support staff and site/side marked as required   Supporting Documentation  Indications: pain   Procedure Details  Location: thumb - Thumb joint: trigger   Preparation: Patient was prepped and draped in the usual sterile fashion  Needle size: 22 G  Approach: palm.  Medications administered: 1 mL lidocaine PF 1% 1 %; 6 mg betamethasone acetate-betamethasone sodium phosphate 6 (3-3) MG/ML  Patient tolerance: patient tolerated the procedure well with no immediate complications          Orders:  Orders Placed This Encounter   Procedures   • Small Joint Arthrocentesis   • XR Hand 3+ View Left       I ordered and reviewed the JACKIE today.     Dictated utilizing Dragon dictation

## 2019-09-26 ENCOUNTER — TELEPHONE (OUTPATIENT)
Dept: ORTHOPEDIC SURGERY | Facility: CLINIC | Age: 74
End: 2019-09-26

## 2019-09-30 ENCOUNTER — TRANSCRIBE ORDERS (OUTPATIENT)
Dept: ADMINISTRATIVE | Facility: HOSPITAL | Age: 74
End: 2019-09-30

## 2019-09-30 DIAGNOSIS — Z12.39 SCREENING BREAST EXAMINATION: Primary | ICD-10-CM

## 2019-10-08 RX ORDER — BENAZEPRIL HYDROCHLORIDE AND HYDROCHLOROTHIAZIDE 20; 12.5 MG/1; MG/1
TABLET ORAL
Qty: 90 TABLET | Refills: 0 | Status: SHIPPED | OUTPATIENT
Start: 2019-10-08 | End: 2019-12-17 | Stop reason: SDUPTHER

## 2019-11-22 ENCOUNTER — APPOINTMENT (OUTPATIENT)
Dept: MAMMOGRAPHY | Facility: HOSPITAL | Age: 74
End: 2019-11-22

## 2019-12-16 ENCOUNTER — APPOINTMENT (OUTPATIENT)
Dept: LAB | Facility: HOSPITAL | Age: 74
End: 2019-12-16

## 2019-12-16 LAB
ALBUMIN SERPL-MCNC: 4.2 G/DL (ref 3.5–5.2)
ALBUMIN/GLOB SERPL: 1.8 G/DL
ALP SERPL-CCNC: 70 U/L (ref 39–117)
ALT SERPL W P-5'-P-CCNC: 36 U/L (ref 1–33)
ANION GAP SERPL CALCULATED.3IONS-SCNC: 13 MMOL/L (ref 5–15)
AST SERPL-CCNC: 32 U/L (ref 1–32)
BILIRUB SERPL-MCNC: 0.8 MG/DL (ref 0.2–1.2)
BUN BLD-MCNC: 22 MG/DL (ref 8–23)
BUN/CREAT SERPL: 25.3 (ref 7–25)
CALCIUM SPEC-SCNC: 9.4 MG/DL (ref 8.6–10.5)
CHLORIDE SERPL-SCNC: 99 MMOL/L (ref 98–107)
CHOLEST SERPL-MCNC: 157 MG/DL (ref 0–200)
CO2 SERPL-SCNC: 27 MMOL/L (ref 22–29)
CREAT BLD-MCNC: 0.87 MG/DL (ref 0.57–1)
FOLATE SERPL-MCNC: >20 NG/ML (ref 4.78–24.2)
GFR SERPL CREATININE-BSD FRML MDRD: 64 ML/MIN/1.73
GLOBULIN UR ELPH-MCNC: 2.3 GM/DL
GLUCOSE BLD-MCNC: 167 MG/DL (ref 65–99)
HBA1C MFR BLD: 7.01 % (ref 4.8–5.6)
HDLC SERPL-MCNC: 88 MG/DL (ref 40–60)
LDLC SERPL CALC-MCNC: 60 MG/DL (ref 0–100)
LDLC/HDLC SERPL: 0.68 {RATIO}
POTASSIUM BLD-SCNC: 4.6 MMOL/L (ref 3.5–5.2)
PROT SERPL-MCNC: 6.5 G/DL (ref 6–8.5)
SODIUM BLD-SCNC: 139 MMOL/L (ref 136–145)
T4 FREE SERPL-MCNC: 1.39 NG/DL (ref 0.93–1.7)
TRIGL SERPL-MCNC: 46 MG/DL (ref 0–150)
TSH SERPL DL<=0.05 MIU/L-ACNC: 1.69 UIU/ML (ref 0.27–4.2)
VIT B12 BLD-MCNC: 468 PG/ML (ref 211–946)
VLDLC SERPL-MCNC: 9.2 MG/DL (ref 5–40)

## 2019-12-16 PROCEDURE — 82746 ASSAY OF FOLIC ACID SERUM: CPT | Performed by: INTERNAL MEDICINE

## 2019-12-16 PROCEDURE — 80053 COMPREHEN METABOLIC PANEL: CPT | Performed by: INTERNAL MEDICINE

## 2019-12-16 PROCEDURE — 80061 LIPID PANEL: CPT | Performed by: INTERNAL MEDICINE

## 2019-12-16 PROCEDURE — 84443 ASSAY THYROID STIM HORMONE: CPT | Performed by: INTERNAL MEDICINE

## 2019-12-16 PROCEDURE — 83036 HEMOGLOBIN GLYCOSYLATED A1C: CPT | Performed by: INTERNAL MEDICINE

## 2019-12-16 PROCEDURE — 84439 ASSAY OF FREE THYROXINE: CPT | Performed by: INTERNAL MEDICINE

## 2019-12-16 PROCEDURE — 82607 VITAMIN B-12: CPT | Performed by: INTERNAL MEDICINE

## 2019-12-17 ENCOUNTER — OFFICE VISIT (OUTPATIENT)
Dept: ORTHOPEDIC SURGERY | Facility: CLINIC | Age: 74
End: 2019-12-17

## 2019-12-17 VITALS
WEIGHT: 126 LBS | SYSTOLIC BLOOD PRESSURE: 132 MMHG | HEART RATE: 68 BPM | BODY MASS INDEX: 21.51 KG/M2 | DIASTOLIC BLOOD PRESSURE: 75 MMHG | HEIGHT: 64 IN

## 2019-12-17 DIAGNOSIS — M75.52 SUBACROMIAL BURSITIS OF LEFT SHOULDER JOINT: ICD-10-CM

## 2019-12-17 DIAGNOSIS — M75.42 SUBACROMIAL IMPINGEMENT OF LEFT SHOULDER: ICD-10-CM

## 2019-12-17 DIAGNOSIS — M67.912 TENDINOPATHY OF ROTATOR CUFF, LEFT: ICD-10-CM

## 2019-12-17 DIAGNOSIS — R52 PAIN: Primary | ICD-10-CM

## 2019-12-17 DIAGNOSIS — S42.255A CLOSED NONDISPLACED FRACTURE OF GREATER TUBEROSITY OF LEFT HUMERUS, INITIAL ENCOUNTER: ICD-10-CM

## 2019-12-17 PROCEDURE — 99213 OFFICE O/P EST LOW 20 MIN: CPT | Performed by: NURSE PRACTITIONER

## 2019-12-17 PROCEDURE — 73030 X-RAY EXAM OF SHOULDER: CPT | Performed by: NURSE PRACTITIONER

## 2019-12-17 NOTE — PROGRESS NOTES
Subjective:     Patient ID: Olga Greco is a 74 y.o. female.    Chief Complaint:  Left shoulder injury 12/09/2019  History of Present Illness  Olga Greco 34-year-old female who presents to clinic for evaluation of her left shoulder.  Approximately 8 days ago she was ambulating down steps when she tripped this to stop caught herself on her left upper extremity.  Immediately began experiencing maximal tenderness lateral aspect of the acromion pain radiating inferiorly and over the last 8 days is noted significant bruising at the humerus.  Within the last 24 hours pain is actually decreased however she is experiencing significant difficulty when reaching out to the front and reaching out to the side.  She is been unable to lift the left upper extremity without assistance secondary to weakness and severe pain.  She is been taking Aleve alternating with Advil with minimal symptom relief.  Rates discomfort with use at a 6-7 out of the 10 at rest 5 out of a 10 aching stabbing in nature.  Denies previous injury to the left shoulder in the past.  She has been seen in clinic by this APRN for separate issue denies any previous pain at the left shoulder.  Denies any previous x-ray, MRI, CT.  Denies presence of numbness or tingling radiating down the left upper extremity.  Denies other concerns present this time.    Social History     Occupational History   • Not on file   Tobacco Use   • Smoking status: Never Smoker   • Smokeless tobacco: Never Used   Substance and Sexual Activity   • Alcohol use: Yes     Alcohol/week: 2.0 standard drinks     Types: 2 Glasses of wine per week     Comment: daily   • Drug use: No   • Sexual activity: Yes     Partners: Male      Past Medical History:   Diagnosis Date   • Colon polyp    • Diabetes mellitus (CMS/HCC)    • Hypertension    • Hypothyroidism    • Type 2 diabetes mellitus (CMS/HCC) 04/2018     Past Surgical History:   Procedure Laterality Date   • BREAST BIOPSY Left    •  "CHOLECYSTECTOMY     • COLONOSCOPY     • HYSTERECTOMY     • TONSILLECTOMY     • TUBAL ABDOMINAL LIGATION         Family History   Problem Relation Age of Onset   • Heart disease Father    • Diabetes Sister    • Hypertension Mother    • Breast cancer Neg Hx    • Colon cancer Neg Hx    • Colon polyps Neg Hx          Review of Systems   Constitutional: Negative for chills, diaphoresis, fever and unexpected weight change.   HENT: Negative for hearing loss, nosebleeds, sore throat and tinnitus.    Eyes: Negative for pain and visual disturbance.   Respiratory: Negative for cough, shortness of breath and wheezing.    Cardiovascular: Negative for chest pain and palpitations.   Gastrointestinal: Negative for abdominal pain, diarrhea, nausea and vomiting.   Endocrine: Negative for cold intolerance, heat intolerance and polydipsia.   Genitourinary: Negative for difficulty urinating, dysuria and hematuria.   Musculoskeletal: Positive for arthralgias and myalgias. Negative for joint swelling.   Skin: Negative for rash and wound.   Allergic/Immunologic: Negative for environmental allergies.   Neurological: Negative for dizziness, syncope and numbness.   Hematological: Does not bruise/bleed easily.   Psychiatric/Behavioral: Negative for dysphoric mood and sleep disturbance. The patient is not nervous/anxious.            Objective:  Physical Exam    Vital signs reviewed.   General: No acute distress.  Eyes: conjunctiva clear; pupils equally round and reactive  ENT: external ears and nose atraumatic; oropharynx clear  CV: no peripheral edema  Resp: normal respiratory effort  Skin: no rashes or wounds; normal turgor  Psych: mood and affect appropriate; recent and remote memory intact    Vitals:    12/17/19 1259   BP: 132/75   BP Location: Left arm   Patient Position: Sitting   Cuff Size: Adult   Pulse: 68   Weight: 57.2 kg (126 lb)   Height: 162.6 cm (64\")         12/17/19  1259   Weight: 57.2 kg (126 lb)     Body mass index is 21.63 " kg/m².      Left Shoulder Exam     Tenderness   The patient is experiencing tenderness in the acromion.    Range of Motion   External rotation: 40   Forward flexion: 170   Internal rotation 0 degrees: L1     Muscle Strength   Internal rotation: 4/5   External rotation: 3/5   Supraspinatus: 3/5   Subscapularis: 4/5   Biceps: 4/5     Tests   Barker test: positive  Impingement: positive  Drop arm: negative    Other   Erythema: absent  Scars: absent  Sensation: normal  Pulse: present     Comments:  Positive empty can  negative [place]'s  negative Speed's  negative bear hug exam                   Imaging:  Left Shoulder X-Ray  Indication: Pain  AP Internal and External Rotation views    Findings:  Nondisplaced fracture greater tuberosity  No bony lesion  Normal soft tissues  Normal joint spaces   No prior studies were available for comparison.    Assessment:        1. Pain    2. Tendinopathy of rotator cuff, left    3. Subacromial bursitis of left shoulder joint    4. Subacromial impingement of left shoulder           Plan:  1.  Discussed plan of care with patient.  We will proceed with MRI left shoulder to evaluate rotator cuff.  Plan to follow-up with her back in clinic to discuss results and further plan of care.  She verbalized understanding of all information agrees with plan of care.  Denies other concerns present this time.  Orders:  Orders Placed This Encounter   Procedures   • XR Shoulder 2+ View Left   • MRI Shoulder Left Without Contrast         I ordered and reviewed the JACKIE today.     Dictated utilizing Dragon dictation

## 2019-12-18 ENCOUNTER — HOSPITAL ENCOUNTER (OUTPATIENT)
Dept: MAMMOGRAPHY | Facility: HOSPITAL | Age: 74
Discharge: HOME | End: 2019-12-18
Admitting: FAMILY MEDICINE

## 2019-12-18 DIAGNOSIS — Z12.39 SCREENING BREAST EXAMINATION: ICD-10-CM

## 2019-12-18 PROCEDURE — 77063 BREAST TOMOSYNTHESIS BI: CPT

## 2019-12-18 PROCEDURE — 77067 SCR MAMMO BI INCL CAD: CPT

## 2019-12-18 RX ORDER — BENAZEPRIL HYDROCHLORIDE AND HYDROCHLOROTHIAZIDE 20; 12.5 MG/1; MG/1
TABLET ORAL
Qty: 90 TABLET | Refills: 0 | Status: SHIPPED | OUTPATIENT
Start: 2019-12-18 | End: 2020-04-28 | Stop reason: SDUPTHER

## 2019-12-19 ENCOUNTER — OFFICE VISIT (OUTPATIENT)
Dept: INTERNAL MEDICINE | Facility: CLINIC | Age: 74
End: 2019-12-19

## 2019-12-19 ENCOUNTER — HOSPITAL ENCOUNTER (OUTPATIENT)
Dept: MRI IMAGING | Facility: HOSPITAL | Age: 74
Discharge: HOME | End: 2019-12-19
Admitting: NURSE PRACTITIONER

## 2019-12-19 VITALS
RESPIRATION RATE: 16 BRPM | WEIGHT: 130 LBS | OXYGEN SATURATION: 98 % | SYSTOLIC BLOOD PRESSURE: 138 MMHG | HEIGHT: 64 IN | DIASTOLIC BLOOD PRESSURE: 78 MMHG | BODY MASS INDEX: 22.2 KG/M2 | TEMPERATURE: 97.6 F | HEART RATE: 75 BPM

## 2019-12-19 DIAGNOSIS — M75.42 SUBACROMIAL IMPINGEMENT OF LEFT SHOULDER: ICD-10-CM

## 2019-12-19 DIAGNOSIS — E03.9 ACQUIRED HYPOTHYROIDISM: ICD-10-CM

## 2019-12-19 DIAGNOSIS — K21.9 GASTROESOPHAGEAL REFLUX DISEASE WITHOUT ESOPHAGITIS: ICD-10-CM

## 2019-12-19 DIAGNOSIS — Z00.00 ROUTINE HEALTH MAINTENANCE: ICD-10-CM

## 2019-12-19 DIAGNOSIS — E55.9 VITAMIN D DEFICIENCY: ICD-10-CM

## 2019-12-19 DIAGNOSIS — M67.912 TENDINOPATHY OF ROTATOR CUFF, LEFT: ICD-10-CM

## 2019-12-19 DIAGNOSIS — E10.9 TYPE 1 DIABETES MELLITUS WITHOUT COMPLICATION (HCC): ICD-10-CM

## 2019-12-19 DIAGNOSIS — R74.01 ELEVATED TRANSAMINASE LEVEL: ICD-10-CM

## 2019-12-19 DIAGNOSIS — Z23 NEED FOR PNEUMOCOCCAL VACCINATION: Primary | ICD-10-CM

## 2019-12-19 DIAGNOSIS — M75.52 SUBACROMIAL BURSITIS OF LEFT SHOULDER JOINT: ICD-10-CM

## 2019-12-19 DIAGNOSIS — I10 ESSENTIAL HYPERTENSION: ICD-10-CM

## 2019-12-19 PROCEDURE — 99214 OFFICE O/P EST MOD 30 MIN: CPT | Performed by: FAMILY MEDICINE

## 2019-12-19 PROCEDURE — G0009 ADMIN PNEUMOCOCCAL VACCINE: HCPCS | Performed by: FAMILY MEDICINE

## 2019-12-19 PROCEDURE — 90732 PPSV23 VACC 2 YRS+ SUBQ/IM: CPT | Performed by: FAMILY MEDICINE

## 2019-12-19 PROCEDURE — 73221 MRI JOINT UPR EXTREM W/O DYE: CPT

## 2019-12-19 NOTE — PROGRESS NOTES
Subjective     Olga Greco is a 74 y.o. female, who presents with a chief complaint of   Chief Complaint   Patient presents with   • Diabetes   • Hyperlipidemia   • Hypothyroidism   • Vitamin D Deficiency     Diabetes     Hypertension     Hypothyroidism     Hyperlipidemia   Exacerbating diseases include hypothyroidism.     1. Diabetes mellitus.  Pt doing well with Dexcom and pump.  A few episodes of mild hypoglycemia overnight, treated with snacks.  Sees Dr. Rogers.    2. HTN.  Tolerating benazepril-hctz.  Home blood pressures at goal.    3. Elevated liver enzymes.  She had negative workup with Dr. Weir.  Has f/u with her in the spring.    The following portions of the patient's history were reviewed and updated as appropriate: allergies, current medications, past family history, past medical history, past social history, past surgical history and problem list.    Allergies: Aspirin and Epinephrine    Review of Systems   Constitutional: Negative.    HENT: Negative.    Eyes: Negative.    Respiratory: Negative.    Cardiovascular: Negative.    Gastrointestinal: Negative.    Endocrine: Negative.    Genitourinary: Negative.    Musculoskeletal: Negative.    Skin: Negative.    Allergic/Immunologic: Negative.    Neurological: Negative.    Hematological: Negative.    Psychiatric/Behavioral: Negative.      Objective     Wt Readings from Last 3 Encounters:   12/19/19 59 kg (130 lb)   12/17/19 57.2 kg (126 lb)   09/25/19 56.7 kg (125 lb)     Temp Readings from Last 3 Encounters:   12/19/19 97.6 °F (36.4 °C) (Oral)   06/19/19 98.6 °F (37 °C) (Oral)   01/28/19 97.9 °F (36.6 °C)     BP Readings from Last 3 Encounters:   12/19/19 138/78   12/17/19 132/75   09/25/19 147/72     Pulse Readings from Last 3 Encounters:   12/19/19 75   12/17/19 68   09/25/19 76     Body mass index is 22.31 kg/m².  SpO2 Readings from Last 3 Encounters:   01/17/18 99%   10/11/17 98%   07/13/17 98%       Physical Exam   Constitutional: She is oriented to  person, place, and time. She appears well-developed and well-nourished.   HENT:   Head: Normocephalic and atraumatic.   Mouth/Throat: Oropharynx is clear and moist.   Eyes: Conjunctivae and EOM are normal.   Neck: Neck supple. No thyromegaly present.   Cardiovascular: Normal rate, regular rhythm and normal heart sounds.   Pulmonary/Chest: Effort normal and breath sounds normal.   Abdominal: Soft. Bowel sounds are normal. There is no hepatosplenomegaly.   Musculoskeletal: Normal range of motion. She exhibits no edema.   Neurological: She is alert and oriented to person, place, and time.   Skin: Skin is warm and dry. No rash noted.   Psychiatric: She has a normal mood and affect. Her behavior is normal.   Nursing note and vitals reviewed.      Results for orders placed or performed in visit on 09/24/19   Hepatic Function Panel   Result Value Ref Range    Total Protein 6.5 6.0 - 8.5 g/dL    Albumin 4.70 3.50 - 5.20 g/dL    ALT (SGPT) 31 1 - 33 U/L    AST (SGOT) 25 1 - 32 U/L    Alkaline Phosphatase 70 39 - 117 U/L    Total Bilirubin 0.5 0.2 - 1.2 mg/dL    Bilirubin, Direct 0.2 0.2 - 0.3 mg/dL    Bilirubin, Indirect 0.3 mg/dL     Assessment/Plan   Olga was seen today for diabetes, hyperlipidemia, hypothyroidism and vitamin d deficiency.    Diagnoses and all orders for this visit:    Need for pneumococcal vaccination  -     Pneumococcal Polysaccharide Vaccine 23-Valent (PPSV23) Greater Than or Equal To 1yo Subcutaneous / IM    Type 1 diabetes mellitus without complication (CMS/HCC)  -     Hemoglobin A1c; Future  -     Vitamin B12; Future  -     Lipid Panel With / Chol / HDL Ratio; Future  -     Folate; Future    Essential hypertension  -     Comprehensive Metabolic Panel; Future    Acquired hypothyroidism  -     CBC & Differential; Future  -     TSH; Future  -     T4, Free; Future    Vitamin D deficiency  -     Vitamin D 25 Hydroxy; Future    Gastroesophageal reflux disease without esophagitis    Elevated transaminase  level    Routine health maintenance    1. DMI. A1c 7.0, up from 6.4. Managed by Dr. Rogers. Eye exam UTD.    2. HTN.  Controlled.  Continue benazepril-hctz.  Monitor home blood pressures.    3. Hypothyroidism.  Adequate replacement.    4. Vitamin D deficiency.  Continue supplement.    5. GERD.  Doing well off omeprazole.    6. Elevated transaminases.  Mild.  Workup negative so far per Dr. Weir.    7. RHM.  Mammogram UTD.  Cologuard negative 2/2019.  Shingrix at pharmacy.  Pneumovax today.      Outpatient Medications Prior to Visit   Medication Sig Dispense Refill   • ACCU-CHEK SOFTCLIX LANCETS lancets USE TO TEST BLOOD SUGAR 4 TIMES DAILY 200 each 5   • atorvastatin (LIPITOR) 20 MG tablet Take 1 tablet by mouth Daily. 90 tablet 3   • B Complex Vitamins (VITAMIN B COMPLEX) capsule capsule Take 1 capsule by mouth Daily.     • benazepril-hydrochlorthiazide (LOTENSIN HCT) 20-12.5 MG per tablet TAKE ONE TABLET BY MOUTH DAILY 90 tablet 0   • Biotin 62026 MCG tablet Take 1 tablet by mouth Daily.     • Blood Glucose Monitoring Suppl (ACCU-CHEK RICHARD SMARTVIEW) w/Device kit Use to check blood sugar 4 times daily  E10.9 1 kit 0   • Calcium Carb-Cholecalciferol (CALCIUM 600+D3 PO) Take 500 Units by mouth Daily.     • Chromium Picolinate 1000 MCG tablet Take 1,000 mcg by mouth daily.     • glucagon (GLUCAGON EMERGENCY) 1 MG injection Inject 1 mg under the skin into the appropriate area as directed 1 (One) Time As Needed for Low Blood Sugar for up to 1 dose. 1 kit 12   • NOVOLOG 100 UNIT/ML injection USE 50 UNITS DAILY VIA INSULIN PUMP 20 mL 3   • SYNTHROID 88 MCG tablet Take 1 tablet by mouth Daily. 30 tablet 11     No facility-administered medications prior to visit.      No orders of the defined types were placed in this encounter.    [unfilled]  There are no discontinued medications.      Return in about 6 months (around 6/19/2020).

## 2019-12-23 ENCOUNTER — TELEPHONE (OUTPATIENT)
Dept: ORTHOPEDIC SURGERY | Facility: CLINIC | Age: 74
End: 2019-12-23

## 2019-12-23 NOTE — TELEPHONE ENCOUNTER
Attempted to call patient with MRI results x2 message left to return call to clinic.  MRI Shoulder LT WO     INDICATION:    74-year-old female with left shoulder pain since falling injury 9 days ago. Lateral shoulder pain extending to the mid humerus. Limited range of motion. Evaluate for osseous or soft tissue abnormality.     TECHNIQUE:   MRI of the  left shoulder without contrast. T1 and T2-weighted images were acquired in multiple imaging planes.     COMPARISON:   None.     FINDINGS:   Rotator cuff: Mild tendinopathy in the supraspinatus and infraspinatus tendons, and signal abnormality in the region of the junction of the supraspinatus and infraspinatus tendon suggesting partial intrasubstance tearing. There is no definite disruption.  No tendon retraction and tendon and no atrophic changes in the muscles. Small, elongated fluid collection in the superior fibers of the distal infraspinatus muscle, and elongated fluid collection along the superior margin of the supraspinatus muscle  medial to the acromioclavicular joint. This may reflect synovial or ganglion cyst.     The subscapularis muscle and tendon and the teres minor muscle and tendon are intact.     Glenoid labrum and biceps tendon: Probable sublabral foramen (normal variant). Mild signal abnormality at the base of the superior labrum suggests fraying or surface tearing. The labrum otherwise appears grossly intact.     The long head of the biceps tendon is seen in the bicipital groove and is intact. Signal abnormality in the intra-articular portion the biceps tendon compatible tendinopathy/tendinitis.     AC joint: Mild degenerative changes in the acromioclavicular joint. The coracoacromial and coracoclavicular ligaments are intact.     Small to moderate amount of subacromial and subdeltoid fluid.      Articular Cartilage: The articular cartilage appears intact. No focal defects visualized.     Bone: The humeral head is seated in the glenoid fossa. There  is a nondisplaced displaced fracture in the greater tuberosity, with diffuse marrow edema in the humeral head and neck, greatest laterally. The remaining osseous structures demonstrate normal  marrow signal intensity.     There is a small glenohumeral joint effusion. There is some thickening and signal abnormality in the inferior glenohumeral ligament, suggesting a partial tear or sprain. There is also focal attenuation of the posterior band of the inferior glenohumeral  ligament at its humeral attachment.     IMPRESSION:     1. Nondisplaced fracture in the greater tuberosity at the attachment of the supraspinatus tendon. Diffuse marrow edema in the humeral head and neck, greatest laterally.  2. Tendinopathy in the supraspinatus and infraspinatus tendons, and possible partial intrasubstance tearing near the junction of the distal supraspinatus and infraspinatus tendons. No evidence of disruption.  3. Signal abnormality in the inferior glenohumeral ligament, as well as attenuation of the humeral attachment of the posterior band of the inferior glenohumeral ligament suggesting a partial tear or sprain.  4. Small to moderate amount of subacromial and subdeltoid fluid.  5. Limited evaluation of the labrum. Probable fraying or surface tearing at the base of the superior labrum. The labrum otherwise appears intact.  6. Mild degenerative changes in the acromioclavicular joint.  7. Intra-articular long head biceps tendinopathy/tendinitis.           Signer Name: Latha Kinney MD   Signed: 12/19/2019 12:35 PM   Workstation Name: [id number]    [facility]

## 2019-12-26 ENCOUNTER — OFFICE VISIT (OUTPATIENT)
Dept: ORTHOPEDIC SURGERY | Facility: CLINIC | Age: 74
End: 2019-12-26

## 2019-12-26 ENCOUNTER — OFFICE VISIT (OUTPATIENT)
Dept: ENDOCRINOLOGY | Age: 74
End: 2019-12-26

## 2019-12-26 VITALS
BODY MASS INDEX: 21.68 KG/M2 | HEIGHT: 64 IN | OXYGEN SATURATION: 98 % | DIASTOLIC BLOOD PRESSURE: 62 MMHG | SYSTOLIC BLOOD PRESSURE: 116 MMHG | WEIGHT: 127 LBS | HEART RATE: 80 BPM

## 2019-12-26 VITALS — WEIGHT: 126 LBS | BODY MASS INDEX: 21.51 KG/M2 | HEIGHT: 64 IN

## 2019-12-26 DIAGNOSIS — S42.255D CLOSED NONDISPLACED FRACTURE OF GREATER TUBEROSITY OF LEFT HUMERUS WITH ROUTINE HEALING, SUBSEQUENT ENCOUNTER: ICD-10-CM

## 2019-12-26 DIAGNOSIS — E10.9 TYPE 1 DIABETES MELLITUS WITHOUT COMPLICATION (HCC): Primary | ICD-10-CM

## 2019-12-26 DIAGNOSIS — E03.9 HYPOTHYROIDISM, UNSPECIFIED TYPE: ICD-10-CM

## 2019-12-26 DIAGNOSIS — E78.2 MIXED HYPERLIPIDEMIA: ICD-10-CM

## 2019-12-26 DIAGNOSIS — M75.22 BICEPS TENDINITIS OF LEFT UPPER EXTREMITY: ICD-10-CM

## 2019-12-26 DIAGNOSIS — M19.019 AC JOINT ARTHROPATHY: ICD-10-CM

## 2019-12-26 DIAGNOSIS — M67.912 TENDINOPATHY OF ROTATOR CUFF, LEFT: Primary | ICD-10-CM

## 2019-12-26 PROCEDURE — 99213 OFFICE O/P EST LOW 20 MIN: CPT | Performed by: NURSE PRACTITIONER

## 2019-12-26 PROCEDURE — 95251 CONT GLUC MNTR ANALYSIS I&R: CPT | Performed by: INTERNAL MEDICINE

## 2019-12-26 PROCEDURE — 99214 OFFICE O/P EST MOD 30 MIN: CPT | Performed by: INTERNAL MEDICINE

## 2019-12-26 NOTE — PROGRESS NOTES
74 y.o.    Patient Care Team:  Yair Zapata MD as PCP - General    Chief Complaint:    FOLLOW UP/ TYPE 2 DIABETES MELLITUS   Subjective     HPI    Olga Greco 74 y.o. WF presents with Type 1 dm as a follow up patient. Consulted by Dr. Zapata.      Type 1 dm - Diagnosed in 2003 was initially thought to be type 2 dm. Was started on oral agents initially. Insulin was started on April 24th 2017.    When she came in in February 2018 she was still on oral hypoglycemic agents but due to her phenotype and family history of type 1 diabetes,  antibodies were checked and they were positive.     Today in clinic patient is on Medtronic insulin pump 630 G along with the Dexcom sensor.  Average blood sugars are around 137 mg/dL on the Dexcom download.  Patient continues to have issues with low blood sugars at 3 AM and high blood sugars at 12 AM.  She tries to keep her bedtime blood sugars over 150 mg/dL range in order to avoid the low blood sugars at 3 AM.  Up-to-date with her eye examination, last exam was in April 2019.  No history of diabetic retinopathy  No history of diabetic neuropathy.   No history of CAD, CK D, CVA.  She is physically active and exercises at least for 3-4 times a week.  On Ace inhibitor.  Last DKA episode was in October 2018.     Hyperlipidemia  On Lipitor 20 mg oral daily.     Hypothyroidism    On Synthroid 88 mcg oral daily.    Reviewed primary care physician's/consulting physician documentation and lab results :     Interval History      The following portions of the patient's history were reviewed and updated as appropriate: allergies, current medications, past family history, past medical history, past social history, past surgical history and problem list.    Past Medical History:   Diagnosis Date   • Colon polyp    • Diabetes mellitus (CMS/HCC)    • Hypertension    • Hypothyroidism    • Type 2 diabetes mellitus (CMS/HCC) 04/2018     Family History   Problem Relation Age of Onset   •  Heart disease Father    • Diabetes Sister    • Hypertension Mother    • Breast cancer Neg Hx    • Colon cancer Neg Hx    • Colon polyps Neg Hx      Social History     Socioeconomic History   • Marital status:      Spouse name: Not on file   • Number of children: Not on file   • Years of education: Not on file   • Highest education level: Not on file   Tobacco Use   • Smoking status: Never Smoker   • Smokeless tobacco: Never Used   Substance and Sexual Activity   • Alcohol use: Yes     Alcohol/week: 2.0 standard drinks     Types: 2 Glasses of wine per week     Comment: daily   • Drug use: No   • Sexual activity: Yes     Partners: Male     Allergies   Allergen Reactions   • Aspirin GI Bleeding     GI distress   • Epinephrine Unknown - High Severity     Tachycardia       Current Outpatient Medications:   •  ACCU-CHEK SOFTCLIX LANCETS lancets, USE TO TEST BLOOD SUGAR 4 TIMES DAILY, Disp: 200 each, Rfl: 5  •  atorvastatin (LIPITOR) 20 MG tablet, Take 1 tablet by mouth Daily., Disp: 90 tablet, Rfl: 3  •  B Complex Vitamins (VITAMIN B COMPLEX) capsule capsule, Take 1 capsule by mouth Daily., Disp: , Rfl:   •  benazepril-hydrochlorthiazide (LOTENSIN HCT) 20-12.5 MG per tablet, TAKE ONE TABLET BY MOUTH DAILY, Disp: 90 tablet, Rfl: 0  •  Biotin 72099 MCG tablet, Take 1 tablet by mouth Daily., Disp: , Rfl:   •  Blood Glucose Monitoring Suppl (ACCU-CHEK RICHARD SMARTVIEW) w/Device kit, Use to check blood sugar 4 times daily  E10.9, Disp: 1 kit, Rfl: 0  •  Calcium Carb-Cholecalciferol (CALCIUM 600+D3 PO), Take 500 Units by mouth Daily., Disp: , Rfl:   •  Chromium Picolinate 1000 MCG tablet, Take 1,000 mcg by mouth daily., Disp: , Rfl:   •  diclofenac (VOLTAREN) 1 % gel gel, Apply 4 g topically to the appropriate area as directed 4 (Four) Times a Day., Disp: 100 g, Rfl: 5  •  glucagon (GLUCAGON EMERGENCY) 1 MG injection, Inject 1 mg under the skin into the appropriate area as directed 1 (One) Time As Needed for Low Blood  "Sugar for up to 1 dose., Disp: 1 kit, Rfl: 12  •  NOVOLOG 100 UNIT/ML injection, USE 50 UNITS DAILY VIA INSULIN PUMP, Disp: 20 mL, Rfl: 3  •  SYNTHROID 88 MCG tablet, Take 1 tablet by mouth Daily., Disp: 30 tablet, Rfl: 11        Review of Systems   Constitutional: Negative for appetite change, fatigue and fever.   Eyes: Negative for visual disturbance.   Respiratory: Negative for shortness of breath.    Cardiovascular: Negative for palpitations and leg swelling.   Gastrointestinal: Negative for abdominal pain and vomiting.   Endocrine: Negative for polydipsia and polyuria.   Musculoskeletal: Negative for joint swelling and neck pain.   Skin: Negative for rash.   Neurological: Negative for weakness and numbness.   Psychiatric/Behavioral: Negative for behavioral problems.     I have reviewed the ROS as documented by the MA; Angi Rogers MD.      Objective       Vitals:    12/26/19 1443   BP: 116/62   Pulse: 80   SpO2: 98%   Weight: 57.6 kg (127 lb)   Height: 162.6 cm (64\")     Body mass index is 21.8 kg/m².      Physical Exam   Constitutional: She is oriented to person, place, and time. She appears well-nourished.   HENT:   Head: Normocephalic and atraumatic.   Eyes: Conjunctivae and EOM are normal. No scleral icterus.   Neck: Normal range of motion. Neck supple. No thyromegaly present.   Cardiovascular: Normal rate and normal heart sounds. Exam reveals no friction rub.   No murmur heard.  Pulmonary/Chest: Effort normal and breath sounds normal. No stridor. She has no wheezes. She has no rales.   Abdominal: Soft. Bowel sounds are normal. She exhibits no distension. There is no tenderness.   Musculoskeletal: She exhibits no edema or tenderness.   Lymphadenopathy:     She has no cervical adenopathy.   Neurological: She is alert and oriented to person, place, and time.   Skin: Skin is warm and dry. She is not diaphoretic.   Psychiatric: She has a normal mood and affect.   Vitals reviewed.    Results Review:     I " reviewed the patient's new clinical results and mentioned them above in HPI and in plan as well.     Medical records reviewed  Summary:Done      Office Visit on 09/24/2019   Component Date Value Ref Range Status   • Total Protein 09/24/2019 6.5  6.0 - 8.5 g/dL Final   • Albumin 09/24/2019 4.70  3.50 - 5.20 g/dL Final   • ALT (SGPT) 09/24/2019 31  1 - 33 U/L Final   • AST (SGOT) 09/24/2019 25  1 - 32 U/L Final   • Alkaline Phosphatase 09/24/2019 70  39 - 117 U/L Final   • Total Bilirubin 09/24/2019 0.5  0.2 - 1.2 mg/dL Final   • Bilirubin, Direct 09/24/2019 0.2  0.2 - 0.3 mg/dL Final   • Bilirubin, Indirect 09/24/2019 0.3  mg/dL Final     Lab Results   Component Value Date    HGBA1C 7.01 (H) 12/16/2019    HGBA1C 6.70 (H) 07/16/2019    HGBA1C 6.40 (H) 06/14/2019     Lab Results   Component Value Date    MICROALBUR 4.8 01/21/2019    CREATININE 0.87 12/16/2019     Imaging Results (Most Recent)     None                  Assessment and Plan:    Olga was seen today for diabetes.    Diagnoses and all orders for this visit:    Type 1 diabetes mellitus without complication (CMS/[facility])  -     Hemoglobin A1c; Future  -     TSH; Future  -     T4, Free; Future  -     Comprehensive Metabolic Panel; Future    Hypothyroidism, unspecified type  -     Hemoglobin A1c; Future  -     TSH; Future  -     T4, Free; Future    Mixed hyperlipidemia  -     Hemoglobin A1c; Future  -     TSH; Future  -     T4, Free; Future        Type 1 diabetes mellitus-uncontrolled  HbA1c slightly worse than last time  Made insulin pump adjustments at 12 AM, 3 AM and 6 AM  Advised the patient to upload sensor details in about a week from now to review any further changes that are necessary    Hyperlipidemia  Continue Lipitor    Hypothyroidism  Continue Synthroid 88 mcg oral daily    Downloaded the sensor information and reviewed the data with the patient and made the insulin pump changes accordingly.      Reviewed Lab results with the patient.             Angi  "MD Ken  12/26/19    EM[facility] Dragon / transcription disclaimer:     \"Dictated utilizing Dragon dictation\".  "

## 2019-12-26 NOTE — PROGRESS NOTES
Subjective:     Patient ID: Olga Greco is a 74 y.o. female.    Chief Complaint:  Follow-up left shoulder, nondisplaced fracture greater tuberosity  Rotator cuff tendinopathy, left shoulder  History of Present Illness  Olga Greco returns to clinic for follow-up left shoulder.  She has completed MRI presents to discuss results.  Initially presented to clinic on 12/17/2019 with an 8-day history of injury after she tripped falling down the steps and use of left upper extremity to catch herself.Immediately began experiencing maximal tenderness lateral aspect of the acromion pain radiating inferiorly and over the last 8 days is noted significant bruising at the humerus.  Within the last 24 hours pain is actually decreased however she is experiencing significant difficulty when reaching out to the front and reaching out to the side.  She is been unable to lift the left upper extremity without assistance secondary to weakness and severe pain.  She is been taking Aleve alternating with Advil with minimal symptom relief.  Rates discomfort with use at a 6-7 out of the 10 at rest 5 out of a 10 aching stabbing in nature.  Denies previous injury to the left shoulder in the past.  She has been seen in clinic by this APRN for separate issue denies any previous pain at the left shoulder.  Denies presence of numbness or tingling radiating down left upper extremity.  Denies other concerns present this time.    Social History     Occupational History   • Not on file   Tobacco Use   • Smoking status: Never Smoker   • Smokeless tobacco: Never Used   Substance and Sexual Activity   • Alcohol use: Yes     Alcohol/week: 2.0 standard drinks     Types: 2 Glasses of wine per week     Comment: daily   • Drug use: No   • Sexual activity: Yes     Partners: Male      Past Medical History:   Diagnosis Date   • Colon polyp    • Diabetes mellitus (CMS/HCC)    • Hypertension    • Hypothyroidism    • Type 2 diabetes mellitus (CMS/HCC) 04/2018  "    Past Surgical History:   Procedure Laterality Date   • BREAST BIOPSY Left    • CHOLECYSTECTOMY     • COLONOSCOPY     • HYSTERECTOMY     • TONSILLECTOMY     • TUBAL ABDOMINAL LIGATION         Family History   Problem Relation Age of Onset   • Heart disease Father    • Diabetes Sister    • Hypertension Mother    • Breast cancer Neg Hx    • Colon cancer Neg Hx    • Colon polyps Neg Hx          Review of Systems   Constitutional: Negative for chills, diaphoresis, fever and unexpected weight change.   HENT: Negative for hearing loss, nosebleeds, sore throat and tinnitus.    Eyes: Negative for pain and visual disturbance.   Respiratory: Negative for cough, shortness of breath and wheezing.    Cardiovascular: Negative for chest pain and palpitations.   Gastrointestinal: Negative for abdominal pain, diarrhea, nausea and vomiting.   Endocrine: Negative for cold intolerance, heat intolerance and polydipsia.   Genitourinary: Negative for difficulty urinating, dysuria and hematuria.   Musculoskeletal: Positive for arthralgias and myalgias. Negative for joint swelling.   Skin: Negative for rash and wound.   Allergic/Immunologic: Negative for environmental allergies.   Neurological: Negative for dizziness, syncope and numbness.   Hematological: Does not bruise/bleed easily.   Psychiatric/Behavioral: Negative for dysphoric mood and sleep disturbance. The patient is not nervous/anxious.            Objective:  Physical Exam    General: No acute distress.  Eyes: conjunctiva clear; pupils equally round and reactive  ENT: external ears and nose atraumatic; oropharynx clear  CV: no peripheral edema  Resp: normal respiratory effort  Skin: no rashes or wounds; normal turgor  Psych: mood and affect appropriate; recent and remote memory intact    Vitals:    12/26/19 1127   Weight: 57.2 kg (126 lb)   Height: 162.6 cm (64\")         12/26/19  1127   Weight: 57.2 kg (126 lb)     Body mass index is 21.63 kg/m².      Ortho Exam     Left " Shoulder Exam      Tenderness   The patient is experiencing tenderness in the acromion.     Range of Motion   External rotation: 40   Forward flexion: 170   Internal rotation 0 degrees: L1      Muscle Strength   Internal rotation: 4/5   External rotation: 3/5   Supraspinatus: 3/5   Subscapularis: 4/5   Biceps: 4/5      Tests   Barker test: positive  Impingement: positive  Drop arm: negative     Other   Erythema: absent  Scars: absent  Sensation: normal  Pulse: present      Comments:  Positive empty can  negative Stovall's  negative Speed's  negative bear hug exam    Imaging:  Independently reviewed MRI results with patient left shoulder  MRI Shoulder LT WO     INDICATION:    74-year-old female with left shoulder pain since falling injury 9 days ago. Lateral shoulder pain extending to the mid humerus. Limited range of motion. Evaluate for osseous or soft tissue abnormality.     TECHNIQUE:   MRI of the  left shoulder without contrast. T1 and T2-weighted images were acquired in multiple imaging planes.     COMPARISON:   None.     FINDINGS:   Rotator cuff: Mild tendinopathy in the supraspinatus and infraspinatus tendons, and signal abnormality in the region of the junction of the supraspinatus and infraspinatus tendon suggesting partial intrasubstance tearing. There is no definite disruption.  No tendon retraction and tendon and no atrophic changes in the muscles. Small, elongated fluid collection in the superior fibers of the distal infraspinatus muscle, and elongated fluid collection along the superior margin of the supraspinatus muscle  medial to the acromioclavicular joint. This may reflect synovial or ganglion cyst.     The subscapularis muscle and tendon and the teres minor muscle and tendon are intact.     Glenoid labrum and biceps tendon: Probable sublabral foramen (normal variant). Mild signal abnormality at the base of the superior labrum suggests fraying or surface tearing. The labrum otherwise appears  grossly intact.     The long head of the biceps tendon is seen in the bicipital groove and is intact. Signal abnormality in the intra-articular portion the biceps tendon compatible tendinopathy/tendinitis.     AC joint: Mild degenerative changes in the acromioclavicular joint. The coracoacromial and coracoclavicular ligaments are intact.     Small to moderate amount of subacromial and subdeltoid fluid.      Articular Cartilage: The articular cartilage appears intact. No focal defects visualized.     Bone: The humeral head is seated in the glenoid fossa. There is a nondisplaced displaced fracture in the greater tuberosity, with diffuse marrow edema in the humeral head and neck, greatest laterally. The remaining osseous structures demonstrate normal  marrow signal intensity.     There is a small glenohumeral joint effusion. There is some thickening and signal abnormality in the inferior glenohumeral ligament, suggesting a partial tear or sprain. There is also focal attenuation of the posterior band of the inferior glenohumeral  ligament at its humeral attachment.     IMPRESSION:     1. Nondisplaced fracture in the greater tuberosity at the attachment of the supraspinatus tendon. Diffuse marrow edema in the humeral head and neck, greatest laterally.  2. Tendinopathy in the supraspinatus and infraspinatus tendons, and possible partial intrasubstance tearing near the junction of the distal supraspinatus and infraspinatus tendons. No evidence of disruption.  3. Signal abnormality in the inferior glenohumeral ligament, as well as attenuation of the humeral attachment of the posterior band of the inferior glenohumeral ligament suggesting a partial tear or sprain.  4. Small to moderate amount of subacromial and subdeltoid fluid.  5. Limited evaluation of the labrum. Probable fraying or surface tearing at the base of the superior labrum. The labrum otherwise appears intact.  6. Mild degenerative changes in the  acromioclavicular joint.  7. Intra-articular long head biceps tendinopathy/tendinitis.           Signer Name: Latha Kinney MD   Signed: 12/19/2019 12:35 PM   Workstation Name: [id number]    [facility]  Assessment:        1. Tendinopathy of rotator cuff, left    2. Closed nondisplaced fracture of greater tuberosity of left humerus with routine healing, subsequent encounter    3. Biceps tendinitis of left upper extremity    4. AC joint arthropathy           Plan:  1.  Discussed plan of care with patient.  I do recommend physical therapy she is leaving for [place] provided her with prescription that she can actually be physical therapy down in [place].  Discussed range of motion exercises for her to complete.  We will plan to see her back in clinic after she returns back to [place].  Encouraged to call with any concerns she may have between now and that time.  She verbalized understanding of all information agrees with plan of care.  Denies other concerns present this time.  Orders:  Orders Placed This Encounter   Procedures   • Ambulatory Referral to Physical Therapy       Dictated utilizing Dragon dictation

## 2020-02-27 NOTE — H&P
HISTORY AND PHYSICAL      Patient Care Team:  Yair Zapata MD as PCP - General    CHIEF COMPLAINT: Blood sugar, chest tightness, weakness, nausea    HISTORY OF PRESENT ILLNESS:    .  A very pleasant 73-year-old white female patient Dr. Polk who presented emergency room with complaints of elevated blood sugar.  Patient's monitors her blood sugars 7 times a day and has been on insulin pump since August 2018.  Last 48 hours her blood sugars are ranging persistently over 250-300 and was not able to get them down.  She has given herself several extra insulin boluses better sugars remain high.  She said she did eat more than she normally does last night and some Mexican food but they went up even before she went out to dinner.  She will not sensitivity to cats and came back inside feels some chest tightness across her entire chest felt tired and weak like her blood sugars were low but then checked her blood sugars but they're actually high.  She denies any chest pain no radiation of pain no diaphoresis  She denies any fever chills dysuria frequency of bowel pain nausea vomiting    Past Medical History:   Diagnosis Date   • Diabetes mellitus (CMS/HCC)    • Hypertension    • Hypothyroidism      Past Surgical History:   Procedure Laterality Date   • BREAST BIOPSY     • CHOLECYSTECTOMY     • HYSTERECTOMY     • TONSILLECTOMY     • TUBAL ABDOMINAL LIGATION       Family History   Problem Relation Age of Onset   • Heart disease Father    • Diabetes Sister    • Hypertension Mother        Social history   has never smoked.  She does consume 1-2 glasses of wine daily.  No drug use she is  lives with her   Prescriptions Prior to Admission   Medication Sig Dispense Refill Last Dose   • atorvastatin (LIPITOR) 20 MG tablet Take 1 tablet by mouth Daily. 30 tablet 11 10/20/2018 at Unknown time   • B Complex Vitamins (VITAMIN B COMPLEX) capsule capsule Take 1 capsule by mouth Daily.   10/20/2018 at  Unknown time   • benazepril-hydrochlorthiazide (LOTENSIN HCT) 20-12.5 MG per tablet Take 1 tablet by mouth Daily.      • Biotin 31702 MCG tablet Take 1 tablet by mouth Daily.   10/20/2018 at Unknown time   • Blood Glucose Monitoring Suppl (ACCU-CHEK COMPACT CARE KIT) kit 1 kit 4 (Four) Times a Day. ICD CODE E10.9 1 each 1 10/21/2018 at Unknown time   • Calcium Carb-Cholecalciferol (CALCIUM 600+D3 PO) Take 500 Units by mouth Daily.   10/20/2018 at Unknown time   • Chromium Picolinate 1000 MCG tablet Take 1,000 mcg by mouth daily.   10/20/2018 at Unknown time   • insulin aspart (NOVOLOG) 100 UNIT/ML injection 50 units daily VIA insulin Pump (Patient taking differently: Inject 1 Units under the skin into the appropriate area as directed 3 (Three) Times a Day Before Meals. 50 units daily VIA insulin Pump) 20 mL 6 10/21/2018 at Unknown time   • levothyroxine (SYNTHROID, LEVOTHROID) 75 MCG tablet Take 1 tablet by mouth Daily. (Patient taking differently: Take 75 mcg by mouth Every Night.) 30 tablet 11 10/20/2018 at Unknown time   • ACCU-CHEK FASTCLIX LANCETS misc Use to test blood sugar 4 times daily e 10.9 200 each 11 Not Taking   • ACCU-CHEK SOFTCLIX LANCETS lancets USE TO TEST BLOOD SUGAR 4 TIMES DAILY 200 each 5 Not Taking   • Blood Glucose Monitoring Suppl (ACCU-CHEK RICHARD SMARTVIEW) w/Device kit Use to check blood sugar 4 times daily  E10.9 1 kit 0 Not Taking   • CONTOUR NEXT TEST test strip Use to test blood sugar 7 times daily (Patient taking differently: Use to test blood sugar 10 times daily) 300 each 12 Taking   • glucagon (GLUCAGON EMERGENCY) 1 MG injection Inject 1 mg under the skin into the appropriate area as directed 1 (One) Time As Needed for Low Blood Sugar for up to 1 dose. 1 kit 12 Unknown at Unknown time   • Insulin Pen Needle (BD PEN NEEDLE RICHARD U/F) 32G X 4 MM misc USE TO INJECT INSULIN 400 each 3 Taking     Allergies:  Aspirin and Epinephrine     Review of Systems   Constitutional: Positive for  "fatigue. Negative for activity change and appetite change.   HENT: Negative for congestion.    Respiratory: Negative for cough, chest tightness, shortness of breath and wheezing.    Cardiovascular: Positive for chest pain.   Gastrointestinal: Negative for abdominal distention, abdominal pain, diarrhea, nausea and vomiting.   Endocrine: Negative for polyphagia and polyuria.   Genitourinary: Negative for frequency.   Skin: Negative for rash.   Neurological: Negative for light-headedness.   Hematological: Does not bruise/bleed easily.   Psychiatric/Behavioral: Negative for agitation and behavioral problems.       Debilities/Disabilities Identified: None     Emotional Behavior: Appropriate    Vital Signs  Temp:  [97.7 °F (36.5 °C)-97.8 °F (36.6 °C)] 97.7 °F (36.5 °C)  Heart Rate:  [] 76  Resp:  [18] 18  BP: (130-168)/(66-99) 131/73    Flowsheet Rows      First Filed Value   Admission Height  162.6 cm (64\") Documented at 10/21/2018 0938   Admission Weight  56.2 kg (124 lb) Documented at 10/21/2018 0938           Physical Exam   Constitutional: She appears well-developed and well-nourished.   HENT:   Head: Normocephalic.   Mouth/Throat: Oropharynx is clear and moist.   Eyes: Conjunctivae are normal.   Neck: Normal range of motion. No JVD present. No thyromegaly present.   Cardiovascular: Normal rate, regular rhythm and normal heart sounds.    No murmur heard.  Pulmonary/Chest: Effort normal and breath sounds normal. No respiratory distress. She has no wheezes. She has no rales.   Abdominal: Soft. Bowel sounds are normal. She exhibits no distension. There is no tenderness. There is no guarding.   Neurological: She is alert.   Skin: Skin is warm and dry. No rash noted.   Nursing note and vitals reviewed.     Results Review:    I reviewed the patient's new clinical results.  Lab Results (most recent)     Procedure Component Value Units Date/Time    POC Glucose Once [[id number]]  (Normal) Collected:  10/21/18 1616    " Specimen:  Blood Updated:  10/21/18 1622     Glucose 118 mg/dL     Narrative:       Meter: [id number] : [id number] Bronson Thomas RN    POC Glucose Once [[id number]]  (Abnormal) Collected:  10/21/18 1509    Specimen:  Blood Updated:  10/21/18 1526     Glucose 170 (H) mg/dL     Narrative:       Meter: [id number] : [id number] Bronson Thomas RN    POC Glucose Once [[id number]]  (Abnormal) Collected:  10/21/18 1431    Specimen:  Blood Updated:  10/21/18 1437     Glucose 199 (H) mg/dL     Narrative:       Meter: [id number] : [id number] [employer]    POC Glucose Once [[id number]]  (Abnormal) Collected:  10/21/18 1300    Specimen:  Blood Updated:  10/21/18 1306     Glucose 309 (H) mg/dL     Narrative:       Meter: [id number] : [id number] [employer]    POC Glucose Once [[id number]]  (Abnormal) Collected:  10/21/18 1215    Specimen:  Blood Updated:  10/21/18 1221     Glucose 305 (H) mg/dL     Narrative:       Meter: [id number] : [id number] James Almaraz NURSING ASSISTANT    Blood Gas, Arterial [[id number]]  (Abnormal) Collected:  10/21/18 1150    Specimen:  Arterial Blood Updated:  10/21/18 1200     Site Left Brachial     Will's Test N/A     pH, Arterial 7.311 (L) pH units      pCO2, Arterial 31.6 (L) mm Hg      pO2, Arterial 90.5 mm Hg      HCO3, Arterial 15.9 (L) mmol/L      Base Excess, Arterial -9.2 (L) mmol/L      O2 Saturation, Arterial 96.5 %      Hemoglobin, Blood Gas 12.5 (L) g/dL      Temperature 37.0 C      Barometric Pressure for Blood Gas 748 mmHg      Modality Room Air     Ventilator Mode NA     pCO2, Temperature Corrected 31.6 (L) mm Hg      pH, Temp Corrected 7.311 (L) pH Units      pO2, Temperature Corrected 90.5 mm Hg     Ketone Bodies, Serum (Not performed at [facility]) [[id number]] Collected:  10/21/18 1029    Specimen:  Blood Updated:  10/21/18 1134    Narrative:       The following orders were created for panel order Ketone Bodies, Serum (Not performed at  [facility].  Procedure                               Abnormality         Status                     ---------                               -----------         ------                     Acetone[[id number]]                      Abnormal            Final result                 Please view results for these tests on the individual orders.    Acetone [[id number]]  (Abnormal) Collected:  10/21/18 1029    Specimen:  Blood Updated:  10/21/18 1134     Acetone Trace (A)    BNP [[id number]]  (Normal) Collected:  10/21/18 1029    Specimen:  Blood Updated:  10/21/18 1128     proBNP 166.9 pg/mL     Narrative:       Among patients with dyspnea, NT-proBNP is highly sensitive for the detection of acute congestive heart failure. In addition NT-proBNP of <300 pg/ml effectively rules out acute congestive heart failure with 99% negative predictive value.    Troponin [[id number]]  (Normal) Collected:  10/21/18 1029    Specimen:  Blood Updated:  10/21/18 1120     Troponin T <0.010 ng/mL     Narrative:       Troponin T Reference Ranges:  Less than 0.03 ng/mL:    Negative for AMI  0.03 to 0.09 ng/mL:      Indeterminant for AMI  Greater than 0.09 ng/mL: Positive for AMI    Comprehensive Metabolic Panel [[id number]]  (Abnormal) Collected:  10/21/18 1029    Specimen:  Blood Updated:  10/21/18 1118     Glucose 388 (H) mg/dL      BUN 22 mg/dL      Creatinine 0.83 mg/dL      Sodium 132 (L) mmol/L      Potassium 4.6 mmol/L      Chloride 91 (L) mmol/L      CO2 18.6 (L) mmol/L      Calcium 9.1 mg/dL      Total Protein 6.6 g/dL      Albumin 4.30 g/dL      ALT (SGPT) 92 (H) U/L      AST (SGOT) 86 (H) U/L      Alkaline Phosphatase 95 U/L      Total Bilirubin 1.5 (H) mg/dL      eGFR Non African Amer 68 mL/min/1.73      Globulin 2.3 gm/dL      A/G Ratio 1.9 g/dL      BUN/Creatinine Ratio 26.5 (H)     Anion Gap 22.4 mmol/L     Narrative:       The MDRD GFR formula is only valid for adults with stable renal function between ages 18 and 70.    Hemoglobin A1c  [[id number]]  (Abnormal) Collected:  10/21/18 1029    Specimen:  Blood Updated:  10/21/18 1051     Hemoglobin A1C 7.20 (H) %     Narrative:       Hemoglobin A1C Ranges:    Increased Risk for Diabetes  5.7% to 6.4%  Diabetes                     >= 6.5%  Diabetic Goal                < 7.0%    CBC & Differential [[id number]] Collected:  10/21/18 1029    Specimen:  Blood Updated:  10/21/18 1050    Narrative:       The following orders were created for panel order CBC & Differential.  Procedure                               Abnormality         Status                     ---------                               -----------         ------                     CBC Auto Differential[[id number]]        Abnormal            Final result                 Please view results for these tests on the individual orders.    CBC Auto Differential [[id number]]  (Abnormal) Collected:  10/21/18 1029    Specimen:  Blood Updated:  10/21/18 1050     WBC 5.56 10*3/mm3      RBC 4.07 (L) 10*6/mm3      Hemoglobin 13.3 g/dL      Hematocrit 40.5 %      MCV 99.5 (H) fL      MCH 32.7 (H) pg      MCHC 32.8 g/dL      RDW 12.7 %      RDW-SD 46.7 fl      MPV 10.0 fL      Platelets 191 10*3/mm3      Neutrophil % 67.3 %      Lymphocyte % 22.7 %      Monocyte % 6.7 %      Eosinophil % 2.3 %      Basophil % 0.5 %      Immature Grans % 0.5 %      Neutrophils, Absolute 3.74 10*3/mm3      Lymphocytes, Absolute 1.26 10*3/mm3      Monocytes, Absolute 0.37 10*3/mm3      Eosinophils, Absolute 0.13 10*3/mm3      Basophils, Absolute 0.03 10*3/mm3      Immature Grans, Absolute 0.03 10*3/mm3      nRBC 0.0 /100 WBC     Urinalysis With Microscopic If Indicated (No Culture) - Urine, Clean Catch [[id number]]  (Abnormal) Collected:  10/21/18 1030    Specimen:  Urine from Urine, Clean Catch Updated:  10/21/18 1038     Color, UA Yellow     Appearance, UA Clear     pH, UA 5.5     Specific Gravity, UA 1.015     Glucose,  mg/dL (2+) (A)     Ketones, UA >=160 mg/dL (4+)      Bilirubin, UA Negative     Blood, UA Negative     Protein, UA Negative     Leuk Esterase, UA Negative     Nitrite, UA Negative     Urobilinogen, UA 0.2 E.U./dL    Narrative:       Urine microscopic not indicated.    POC Glucose Once [[id number]]  (Abnormal) Collected:  10/21/18 0935    Specimen:  Blood Updated:  10/21/18 0942     Glucose 374 (H) mg/dL     Narrative:       Meter: [id number] : [id number] James Almaraz NURSING ASSISTANT          Imaging Results (most recent)     Procedure Component Value Units Date/Time    XR Chest 2 View [[id number]] Resulted:  10/21/18 1211     Updated:  10/21/18 1211        reviewed    ECG/EMG Results (most recent)     Procedure Component Value Units Date/Time    ECG 12 Lead [[id number]] Collected:  10/21/18 0944     Updated:  10/21/18 1336        reviewed    Assessment/Plan       1.  Diabetic ketoacidosis patient will be continued on IV fluids and insulin drip.  Her pH is 7.31 and likely will improve within the next 12-14 hours.    2.  Hyponatremia due to hyperglycemia continue with IV normal saline    3.  Hypothyroidism stable nothing acute    4.  Hypertension nothing acute continue home ACE inhibitor and thiazide      5.  DVT prophylaxis Lovenox and SCDs have been ordered          I discussed the patients findings and my recommendations with patient     Melissa Contreras MD  10/21/18  5:02 PM       Terbinafine Counseling: Patient counseling regarding adverse effects of terbinafine including but not limited to headache, diarrhea, rash, upset stomach, liver function test abnormalities, itching, taste/smell disturbance, nausea, abdominal pain, and flatulence.  There is a rare possibility of liver failure that can occur when taking terbinafine.  The patient understands that a baseline LFT and kidney function test may be required. The patient verbalized understanding of the proper use and possible adverse effects of terbinafine.  All of the patient's questions and concerns were addressed. Dapsone Counseling: I discussed with the patient the risks of dapsone including but not limited to hemolytic anemia, agranulocytosis, rashes, methemoglobinemia, kidney failure, peripheral neuropathy, headaches, GI upset, and liver toxicity.  Patients who start dapsone require monitoring including baseline LFTs and weekly CBCs for the first month, then every month thereafter.  The patient verbalized understanding of the proper use and possible adverse effects of dapsone.  All of the patient's questions and concerns were addressed. Fluconazole Counseling:  Patient counseled regarding adverse effects of fluconazole including but not limited to headache, diarrhea, nausea, upset stomach, liver function test abnormalities, taste disturbance, and stomach pain.  There is a rare possibility of liver failure that can occur when taking fluconazole.  The patient understands that monitoring of LFTs and kidney function test may be required, especially at baseline. The patient verbalized understanding of the proper use and possible adverse effects of fluconazole.  All of the patient's questions and concerns were addressed. Glycopyrrolate Counseling:  I discussed with the patient the risks of glycopyrrolate including but not limited to skin rash, drowsiness, dry mouth, difficulty urinating, and blurred vision. Infliximab Pregnancy And Lactation Text: This medication is Pregnancy Category B and is considered safe during pregnancy. It is unknown if this medication is excreted in breast milk. Protopic Counseling: Patient may experience a mild burning sensation during topical application. Protopic is not approved in children less than 2 years of age. There have been case reports of hematologic and skin malignancies in patients using topical calcineurin inhibitors although causality is questionable. Xolair Pregnancy And Lactation Text: This medication is Pregnancy Category B and is considered safe during pregnancy. This medication is excreted in breast milk. Eucrisa Counseling: Patient may experience a mild burning sensation during topical application. Eucrisa is not approved in children less than 2 years of age. Minocycline Counseling: Patient advised regarding possible photosensitivity and discoloration of the teeth, skin, lips, tongue and gums.  Patient instructed to avoid sunlight, if possible.  When exposed to sunlight, patients should wear protective clothing, sunglasses, and sunscreen.  The patient was instructed to call the office immediately if the following severe adverse effects occur:  hearing changes, easy bruising/bleeding, severe headache, or vision changes.  The patient verbalized understanding of the proper use and possible adverse effects of minocycline.  All of the patient's questions and concerns were addressed. SSKI Counseling:  I discussed with the patient the risks of SSKI including but not limited to thyroid abnormalities, metallic taste, GI upset, fever, headache, acne, arthralgias, paraesthesias, lymphadenopathy, easy bleeding, arrhythmias, and allergic reaction. Dapsone Pregnancy And Lactation Text: This medication is Pregnancy Category C and is not considered safe during pregnancy or breast feeding. Use Enhanced Medication Counseling?: No Erivedge Pregnancy And Lactation Text: This medication is Pregnancy Category X and is absolutely contraindicated during pregnancy. It is unknown if it is excreted in breast milk. Dupixent Counseling: I discussed with the patient the risks of dupilumab including but not limited to eye infection and irritation, cold sores, injection site reactions, worsening of asthma, allergic reactions and increased risk of parasitic infection.  Live vaccines should be avoided while taking dupilumab. Dupilumab will also interact with certain medications such as warfarin and cyclosporine. The patient understands that monitoring is required and they must alert us or the primary physician if symptoms of infection or other concerning signs are noted. Valtrex Counseling: I discussed with the patient the risks of valacyclovir including but not limited to kidney damage, nausea, vomiting and severe allergy.  The patient understands that if the infection seems to be worsening or is not improving, they are to call. Xelclevelandz Pregnancy And Lactation Text: This medication is Pregnancy Category D and is not considered safe during pregnancy.  The risk during breast feeding is also uncertain. Ilumya Pregnancy And Lactation Text: The risk during pregnancy and breastfeeding is uncertain with this medication. Picato Pregnancy And Lactation Text: This medication is Pregnancy Category C. It is unknown if this medication is excreted in breast milk. Cimzia Counseling:  I discussed with the patient the risks of Cimzia including but not limited to immunosuppression, allergic reactions and infections.  The patient understands that monitoring is required including a PPD at baseline and must alert us or the primary physician if symptoms of infection or other concerning signs are noted. Cellcept Counseling:  I discussed with the patient the risks of mycophenolate mofetil including but not limited to infection/immunosuppression, GI upset, hypokalemia, hypercholesterolemia, bone marrow suppression, lymphoproliferative disorders, malignancy, GI ulceration/bleed/perforation, colitis, interstitial lung disease, kidney failure, progressive multifocal leukoencephalopathy, and birth defects.  The patient understands that monitoring is required including a baseline creatinine and regular CBC testing. In addition, patient must alert us immediately if symptoms of infection or other concerning signs are noted. Nsaids Counseling: NSAID Counseling: I discussed with the patient that NSAIDs should be taken with food. Prolonged use of NSAIDs can result in the development of stomach ulcers.  Patient advised to stop taking NSAIDs if abdominal pain occurs.  The patient verbalized understanding of the proper use and possible adverse effects of NSAIDs.  All of the patient's questions and concerns were addressed. Azathioprine Counseling:  I discussed with the patient the risks of azathioprine including but not limited to myelosuppression, immunosuppression, hepatotoxicity, lymphoma, and infections.  The patient understands that monitoring is required including baseline LFTs, Creatinine, possible TPMP genotyping and weekly CBCs for the first month and then every 2 weeks thereafter.  The patient verbalized understanding of the proper use and possible adverse effects of azathioprine.  All of the patient's questions and concerns were addressed. Hydroxyzine Pregnancy And Lactation Text: This medication is not safe during pregnancy and should not be taken. It is also excreted in breast milk and breast feeding isn't recommended. Propranolol Counseling:  I discussed with the patient the risks of propranolol including but not limited to low heart rate, low blood pressure, low blood sugar, restlessness and increased cold sensitivity. They should call the office if they experience any of these side effects. Metronidazole Counseling:  I discussed with the patient the risks of metronidazole including but not limited to seizures, nausea/vomiting, a metallic taste in the mouth, nausea/vomiting and severe allergy. Cephalexin Counseling: I counseled the patient regarding use of cephalexin as an antibiotic for prophylactic and/or therapeutic purposes. Cephalexin (commonly prescribed under brand name Keflex) is a cephalosporin antibiotic which is active against numerous classes of bacteria, including most skin bacteria. Side effects may include nausea, diarrhea, gastrointestinal upset, rash, hives, yeast infections, and in rare cases, hepatitis, kidney disease, seizures, fever, confusion, neurologic symptoms, and others. Patients with severe allergies to penicillin medications are cautioned that there is about a 10% incidence of cross-reactivity with cephalosporins. When possible, patients with penicillin allergies should use alternatives to cephalosporins for antibiotic therapy. Drysol Pregnancy And Lactation Text: This medication is considered safe during pregnancy and breast feeding. Elidel Counseling: Patient may experience a mild burning sensation during topical application. Elidel is not approved in children less than 2 years of age. There have been case reports of hematologic and skin malignancies in patients using topical calcineurin inhibitors although causality is questionable. Eucrisa Pregnancy And Lactation Text: This medication has not been assigned a Pregnancy Risk Category but animal studies failed to show danger with the topical medication. It is unknown if the medication is excreted in breast milk. Ilumya Counseling: I discussed with the patient the risks of tildrakizumab including but not limited to immunosuppression, malignancy, posterior leukoencephalopathy syndrome, and serious infections.  The patient understands that monitoring is required including a PPD at baseline and must alert us or the primary physician if symptoms of infection or other concerning signs are noted. Cellcept Pregnancy And Lactation Text: This medication is Pregnancy Category D and isn't considered safe during pregnancy. It is unknown if this medication is excreted in breast milk. Imiquimod Counseling:  I discussed with the patient the risks of imiquimod including but not limited to erythema, scaling, itching, weeping, crusting, and pain.  Patient understands that the inflammatory response to imiquimod is variable from person to person and was educated regarded proper titration schedule.  If flu-like symptoms develop, patient knows to discontinue the medication and contact us. Humira Counseling:  I discussed with the patient the risks of adalimumab including but not limited to myelosuppression, immunosuppression, autoimmune hepatitis, demyelinating diseases, lymphoma, and serious infections.  The patient understands that monitoring is required including a PPD at baseline and must alert us or the primary physician if symptoms of infection or other concerning signs are noted. Erythromycin Pregnancy And Lactation Text: This medication is Pregnancy Category B and is considered safe during pregnancy. It is also excreted in breast milk. Cyclophosphamide Pregnancy And Lactation Text: This medication is Pregnancy Category D and it isn't considered safe during pregnancy. This medication is excreted in breast milk. Protopic Pregnancy And Lactation Text: This medication is Pregnancy Category C. It is unknown if this medication is excreted in breast milk when applied topically. Tetracycline Counseling: Patient counseled regarding possible photosensitivity and increased risk for sunburn.  Patient instructed to avoid sunlight, if possible.  When exposed to sunlight, patients should wear protective clothing, sunglasses, and sunscreen.  The patient was instructed to call the office immediately if the following severe adverse effects occur:  hearing changes, easy bruising/bleeding, severe headache, or vision changes.  The patient verbalized understanding of the proper use and possible adverse effects of tetracycline.  All of the patient's questions and concerns were addressed. Patient understands to avoid pregnancy while on therapy due to potential birth defects. Doxycycline Pregnancy And Lactation Text: This medication is Pregnancy Category D and not consider safe during pregnancy. It is also excreted in breast milk but is considered safe for shorter treatment courses. Rituxan Counseling:  I discussed with the patient the risks of Rituxan infusions. Side effects can include infusion reactions, severe drug rashes including mucocutaneous reactions, reactivation of latent hepatitis and other infections and rarely progressive multifocal leukoencephalopathy.  All of the patient's questions and concerns were addressed. Clofazimine Pregnancy And Lactation Text: This medication is Pregnancy Category C and isn't considered safe during pregnancy. It is excreted in breast milk. Clofazimine Counseling:  I discussed with the patient the risks of clofazimine including but not limited to skin and eye pigmentation, liver damage, nausea/vomiting, gastrointestinal bleeding and allergy. Odomzo Counseling- I discussed with the patient the risks of Odomzo including but not limited to nausea, vomiting, diarrhea, constipation, weight loss, changes in the sense of taste, decreased appetite, muscle spasms, and hair loss.  The patient verbalized understanding of the proper use and possible adverse effects of Odomzo.  All of the patient's questions and concerns were addressed. Wartpeel Pregnancy And Lactation Text: This medication is Pregnancy Category X and contraindicated in pregnancy and in women who may become pregnant. It is unknown if this medication is excreted in breast milk. Thalidomide Counseling: I discussed with the patient the risks of thalidomide including but not limited to birth defects, anxiety, weakness, chest pain, dizziness, cough and severe allergy. Topical Sulfur Applications Pregnancy And Lactation Text: This medication is Pregnancy Category C and has an unknown safety profile during pregnancy. It is unknown if this topical medication is excreted in breast milk. Nsaids Pregnancy And Lactation Text: These medications are considered safe up to 30 weeks gestation. It is excreted in breast milk. Hydroxyzine Counseling: Patient advised that the medication is sedating and not to drive a car after taking this medication.  Patient informed of potential adverse effects including but not limited to dry mouth, urinary retention, and blurry vision.  The patient verbalized understanding of the proper use and possible adverse effects of hydroxyzine.  All of the patient's questions and concerns were addressed. Albendazole Pregnancy And Lactation Text: This medication is Pregnancy Category C and it isn't known if it is safe during pregnancy. It is also excreted in breast milk. Ketoconazole Counseling:   Patient counseled regarding improving absorption with orange juice.  Adverse effects include but are not limited to breast enlargement, headache, diarrhea, nausea, upset stomach, liver function test abnormalities, taste disturbance, and stomach pain.  There is a rare possibility of liver failure that can occur when taking ketoconazole. The patient understands that monitoring of LFTs may be required, especially at baseline. The patient verbalized understanding of the proper use and possible adverse effects of ketoconazole.  All of the patient's questions and concerns were addressed. Benzoyl Peroxide Counseling: Patient counseled that medicine may cause skin irritation and bleach clothing.  In the event of skin irritation, the patient was advised to reduce the amount of the drug applied or use it less frequently.   The patient verbalized understanding of the proper use and possible adverse effects of benzoyl peroxide.  All of the patient's questions and concerns were addressed. Terbinafine Pregnancy And Lactation Text: This medication is Pregnancy Category B and is considered safe during pregnancy. It is also excreted in breast milk and breast feeding isn't recommended. Itraconazole Counseling:  I discussed with the patient the risks of itraconazole including but not limited to liver damage, nausea/vomiting, neuropathy, and severe allergy.  The patient understands that this medication is best absorbed when taken with acidic beverages such as non-diet cola or ginger ale.  The patient understands that monitoring is required including baseline LFTs and repeat LFTs at intervals.  The patient understands that they are to contact us or the primary physician if concerning signs are noted. Spironolactone Counseling: Patient advised regarding risks of diarrhea, abdominal pain, hyperkalemia, birth defects (for female patients), liver toxicity and renal toxicity. The patient may need blood work to monitor liver and kidney function and potassium levels while on therapy. The patient verbalized understanding of the proper use and possible adverse effects of spironolactone.  All of the patient's questions and concerns were addressed. Xeljanz Counseling: I discussed with the patient the risks of Xeljanz therapy including increased risk of infection, liver issues, headache, diarrhea, or cold symptoms. Live vaccines should be avoided. They were instructed to call if they have any problems. Methotrexate Pregnancy And Lactation Text: This medication is Pregnancy Category X and is known to cause fetal harm. This medication is excreted in breast milk. Doxepin Pregnancy And Lactation Text: This medication is Pregnancy Category C and it isn't known if it is safe during pregnancy. It is also excreted in breast milk and breast feeding isn't recommended. Stelara Counseling:  I discussed with the patient the risks of ustekinumab including but not limited to immunosuppression, malignancy, posterior leukoencephalopathy syndrome, and serious infections.  The patient understands that monitoring is required including a PPD at baseline and must alert us or the primary physician if symptoms of infection or other concerning signs are noted. Rituxan Pregnancy And Lactation Text: This medication is Pregnancy Category C and it isn't know if it is safe during pregnancy. It is unknown if this medication is excreted in breast milk but similar antibodies are known to be excreted. Prednisone Pregnancy And Lactation Text: This medication is Pregnancy Category C and it isn't know if it is safe during pregnancy. This medication is excreted in breast milk. Opioid Pregnancy And Lactation Text: These medications can lead to premature delivery and should be avoided during pregnancy. These medications are also present in breast milk in small amounts. Benzoyl Peroxide Pregnancy And Lactation Text: This medication is Pregnancy Category C. It is unknown if benzoyl peroxide is excreted in breast milk. Tazorac Counseling:  Patient advised that medication is irritating and drying.  Patient may need to apply sparingly and wash off after an hour before eventually leaving it on overnight.  The patient verbalized understanding of the proper use and possible adverse effects of tazorac.  All of the patient's questions and concerns were addressed. Azithromycin Counseling:  I discussed with the patient the risks of azithromycin including but not limited to GI upset, allergic reaction, drug rash, diarrhea, and yeast infections. Ketoconazole Pregnancy And Lactation Text: This medication is Pregnancy Category C and it isn't know if it is safe during pregnancy. It is also excreted in breast milk and breast feeding isn't recommended. Clindamycin Pregnancy And Lactation Text: This medication can be used in pregnancy if certain situations. Clindamycin is also present in breast milk. Erythromycin Counseling:  I discussed with the patient the risks of erythromycin including but not limited to GI upset, allergic reaction, drug rash, diarrhea, increase in liver enzymes, and yeast infections. Colchicine Counseling:  Patient counseled regarding adverse effects including but not limited to stomach upset (nausea, vomiting, stomach pain, or diarrhea).  Patient instructed to limit alcohol consumption while taking this medication.  Colchicine may reduce blood counts especially with prolonged use.  The patient understands that monitoring of kidney function and blood counts may be required, especially at baseline. The patient verbalized understanding of the proper use and possible adverse effects of colchicine.  All of the patient's questions and concerns were addressed. Carac Counseling:  I discussed with the patient the risks of Carac including but not limited to erythema, scaling, itching, weeping, crusting, and pain. Solaraze Counseling:  I discussed with the patient the risks of Solaraze including but not limited to erythema, scaling, itching, weeping, crusting, and pain. Finasteride Male Counseling: Finasteride Counseling:  I discussed with the patient the risks of use of finasteride including but not limited to decreased libido, decreased ejaculate volume, gynecomastia, and depression. Women should not handle medication.  All of the patient's questions and concerns were addressed. Tazorac Pregnancy And Lactation Text: This medication is not safe during pregnancy. It is unknown if this medication is excreted in breast milk. 5-Fu Counseling: 5-Fluorouracil Counseling:  I discussed with the patient the risks of 5-fluorouracil including but not limited to erythema, scaling, itching, weeping, crusting, and pain. Quinolones Counseling:  I discussed with the patient the risks of fluoroquinolones including but not limited to GI upset, allergic reaction, drug rash, diarrhea, dizziness, photosensitivity, yeast infections, liver function test abnormalities, tendonitis/tendon rupture. Acitretin Pregnancy And Lactation Text: This medication is Pregnancy Category X and should not be given to women who are pregnant or may become pregnant in the future. This medication is excreted in breast milk. Finasteride Pregnancy And Lactation Text: This medication is absolutely contraindicated during pregnancy. It is unknown if it is excreted in breast milk. Cephalexin Pregnancy And Lactation Text: This medication is Pregnancy Category B and considered safe during pregnancy.  It is also excreted in breast milk but can be used safely for shorter doses. High Dose Vitamin A Pregnancy And Lactation Text: High dose vitamin A therapy is contraindicated during pregnancy and breast feeding. Topical Clindamycin Pregnancy And Lactation Text: This medication is Pregnancy Category B and is considered safe during pregnancy. It is unknown if it is excreted in breast milk. Birth Control Pills Pregnancy And Lactation Text: This medication should be avoided if pregnant and for the first 30 days post-partum. Valtrex Pregnancy And Lactation Text: this medication is Pregnancy Category B and is considered safe during pregnancy. This medication is not directly found in breast milk but it's metabolite acyclovir is present. Bactrim Pregnancy And Lactation Text: This medication is Pregnancy Category D and is known to cause fetal risk.  It is also excreted in breast milk. Simponi Counseling:  I discussed with the patient the risks of golimumab including but not limited to myelosuppression, immunosuppression, autoimmune hepatitis, demyelinating diseases, lymphoma, and serious infections.  The patient understands that monitoring is required including a PPD at baseline and must alert us or the primary physician if symptoms of infection or other concerning signs are noted. Methotrexate Counseling:  Patient counseled regarding adverse effects of methotrexate including but not limited to nausea, vomiting, abnormalities in liver function tests. Patients may develop mouth sores, rash, diarrhea, and abnormalities in blood counts. The patient understands that monitoring is required including LFT's and blood counts.  There is a rare possibility of scarring of the liver and lung problems that can occur when taking methotrexate. Persistent nausea, loss of appetite, pale stools, dark urine, cough, and shortness of breath should be reported immediately. Patient advised to discontinue methotrexate treatment at least three months before attempting to become pregnant.  I discussed the need for folate supplements while taking methotrexate.  These supplements can decrease side effects during methotrexate treatment. The patient verbalized understanding of the proper use and possible adverse effects of methotrexate.  All of the patient's questions and concerns were addressed. Bexarotene Counseling:  I discussed with the patient the risks of bexarotene including but not limited to hair loss, dry lips/skin/eyes, liver abnormalities, hyperlipidemia, pancreatitis, depression/suicidal ideation, photosensitivity, drug rash/allergic reactions, hypothyroidism, anemia, leukopenia, infection, cataracts, and teratogenicity.  Patient understands that they will need regular blood tests to check lipid profile, liver function tests, white blood cell count, thyroid function tests and pregnancy test if applicable. Wartpeel Counseling:  I discussed with the patient the risks of Wartpeel including but not limited to erythema, scaling, itching, weeping, crusting, and pain. Cimetidine Counseling:  I discussed with the patient the risks of Cimetidine including but not limited to gynecomastia, headache, diarrhea, nausea, drowsiness, arrhythmias, pancreatitis, skin rashes, psychosis, bone marrow suppression and kidney toxicity. Mirvaso Counseling: Mirvaso is a topical medication which can decrease superficial blood flow where applied. Side effects are uncommon and include stinging, redness and allergic reactions. Acitretin Counseling:  I discussed with the patient the risks of acitretin including but not limited to hair loss, dry lips/skin/eyes, liver damage, hyperlipidemia, depression/suicidal ideation, photosensitivity.  Serious rare side effects can include but are not limited to pancreatitis, pseudotumor cerebri, bony changes, clot formation/stroke/heart attack.  Patient understands that alcohol is contraindicated since it can result in liver toxicity and significantly prolong the elimination of the drug by many years. Doxepin Counseling:  Patient advised that the medication is sedating and not to drive a car after taking this medication. Patient informed of potential adverse effects including but not limited to dry mouth, urinary retention, and blurry vision.  The patient verbalized understanding of the proper use and possible adverse effects of doxepin.  All of the patient's questions and concerns were addressed. Infliximab Counseling:  I discussed with the patient the risks of infliximab including but not limited to myelosuppression, immunosuppression, autoimmune hepatitis, demyelinating diseases, lymphoma, and serious infections.  The patient understands that monitoring is required including a PPD at baseline and must alert us or the primary physician if symptoms of infection or other concerning signs are noted. Dupixent Pregnancy And Lactation Text: This medication likely crosses the placenta but the risk for the fetus is uncertain. This medication is excreted in breast milk. Tetracycline Pregnancy And Lactation Text: This medication is Pregnancy Category D and not consider safe during pregnancy. It is also excreted in breast milk. Glycopyrrolate Pregnancy And Lactation Text: This medication is Pregnancy Category B and is considered safe during pregnancy. It is unknown if it is excreted breast milk. Rhofade Counseling: Rhofade is a topical medication which can decrease superficial blood flow where applied. Side effects are uncommon and include stinging, redness and allergic reactions. Detail Level: Simple Oxybutynin Counseling:  I discussed with the patient the risks of oxybutynin including but not limited to skin rash, drowsiness, dry mouth, difficulty urinating, and blurred vision. Arava Counseling:  Patient counseled regarding adverse effects of Arava including but not limited to nausea, vomiting, abnormalities in liver function tests. Patients may develop mouth sores, rash, diarrhea, and abnormalities in blood counts. The patient understands that monitoring is required including LFTs and blood counts.  There is a rare possibility of scarring of the liver and lung problems that can occur when taking methotrexate. Persistent nausea, loss of appetite, pale stools, dark urine, cough, and shortness of breath should be reported immediately. Patient advised to discontinue Arava treatment and consult with a physician prior to attempting conception. The patient will have to undergo a treatment to eliminate Arava from the body prior to conception. Hydroxychloroquine Counseling:  I discussed with the patient that a baseline ophthalmologic exam is needed at the start of therapy and every year thereafter while on therapy. A CBC may also be warranted for monitoring.  The side effects of this medication were discussed with the patient, including but not limited to agranulocytosis, aplastic anemia, seizures, rashes, retinopathy, and liver toxicity. Patient instructed to call the office should any adverse effect occur.  The patient verbalized understanding of the proper use and possible adverse effects of Plaquenil.  All the patient's questions and concerns were addressed. Topical Sulfur Applications Counseling: Topical Sulfur Counseling: Patient counseled that this medication may cause skin irritation or allergic reactions.  In the event of skin irritation, the patient was advised to reduce the amount of the drug applied or use it less frequently.   The patient verbalized understanding of the proper use and possible adverse effects of topical sulfur application.  All of the patient's questions and concerns were addressed. Quinolones Pregnancy And Lactation Text: This medication is Pregnancy Category C and it isn't know if it is safe during pregnancy. It is also excreted in breast milk. Rifampin Pregnancy And Lactation Text: This medication is Pregnancy Category C and it isn't know if it is safe during pregnancy. It is also excreted in breast milk and should not be used if you are breast feeding. Metronidazole Pregnancy And Lactation Text: This medication is Pregnancy Category B and considered safe during pregnancy.  It is also excreted in breast milk. Erivedge Counseling- I discussed with the patient the risks of Erivedge including but not limited to nausea, vomiting, diarrhea, constipation, weight loss, changes in the sense of taste, decreased appetite, muscle spasms, and hair loss.  The patient verbalized understanding of the proper use and possible adverse effects of Erivedge.  All of the patient's questions and concerns were addressed. Xolair Counseling:  Patient informed of potential adverse effects including but not limited to fever, muscle aches, rash and allergic reactions.  The patient verbalized understanding of the proper use and possible adverse effects of Xolair.  All of the patient's questions and concerns were addressed. Otezla Pregnancy And Lactation Text: This medication is Pregnancy Category C and it isn't known if it is safe during pregnancy. It is unknown if it is excreted in breast milk. Propranolol Pregnancy And Lactation Text: This medication is Pregnancy Category C and it isn't known if it is safe during pregnancy. It is excreted in breast milk. Hydroquinone Counseling:  Patient advised that medication may result in skin irritation, lightening (hypopigmentation), dryness, and burning.  In the event of skin irritation, the patient was advised to reduce the amount of the drug applied or use it less frequently.  Rarely, spots that are treated with hydroquinone can become darker (pseudoochronosis).  Should this occur, patient instructed to stop medication and call the office. The patient verbalized understanding of the proper use and possible adverse effects of hydroquinone.  All of the patient's questions and concerns were addressed. Topical Retinoid counseling:  Patient advised to apply a pea-sized amount only at bedtime and wait 30 minutes after washing their face before applying.  If too drying, patient may add a non-comedogenic moisturizer. The patient verbalized understanding of the proper use and possible adverse effects of retinoids.  All of the patient's questions and concerns were addressed. Doxycycline Counseling:  Patient counseled regarding possible photosensitivity and increased risk for sunburn.  Patient instructed to avoid sunlight, if possible.  When exposed to sunlight, patients should wear protective clothing, sunglasses, and sunscreen.  The patient was instructed to call the office immediately if the following severe adverse effects occur:  hearing changes, easy bruising/bleeding, severe headache, or vision changes.  The patient verbalized understanding of the proper use and possible adverse effects of doxycycline.  All of the patient's questions and concerns were addressed. Bexarotene Pregnancy And Lactation Text: This medication is Pregnancy Category X and should not be given to women who are pregnant or may become pregnant. This medication should not be used if you are breast feeding. Otezla Counseling: The side effects of Otezla were discussed with the patient, including but not limited to worsening or new depression, weight loss, diarrhea, nausea, upper respiratory tract infection, and headache. Patient instructed to call the office should any adverse effect occur.  The patient verbalized understanding of the proper use and possible adverse effects of Otezla.  All the patient's questions and concerns were addressed. Spironolactone Pregnancy And Lactation Text: This medication can cause feminization of the male fetus and should be avoided during pregnancy. The active metabolite is also found in breast milk. Clindamycin Counseling: I counseled the patient regarding use of clindamycin as an antibiotic for prophylactic and/or therapeutic purposes. Clindamycin is active against numerous classes of bacteria, including skin bacteria. Side effects may include nausea, diarrhea, gastrointestinal upset, rash, hives, yeast infections, and in rare cases, colitis. Sski Pregnancy And Lactation Text: This medication is Pregnancy Category D and isn't considered safe during pregnancy. It is excreted in breast milk. Minoxidil Counseling: Minoxidil is a topical medication which can increase blood flow where it is applied. It is uncertain how this medication increases hair growth. Side effects are uncommon and include stinging and allergic reactions. Topical Clindamycin Counseling: Patient counseled that this medication may cause skin irritation or allergic reactions.  In the event of skin irritation, the patient was advised to reduce the amount of the drug applied or use it less frequently.   The patient verbalized understanding of the proper use and possible adverse effects of clindamycin.  All of the patient's questions and concerns were addressed. Cimzia Pregnancy And Lactation Text: This medication crosses the placenta but can be considered safe in certain situations. Cimzia may be excreted in breast milk. Siliq Counseling:  I discussed with the patient the risks of Siliq including but not limited to new or worsening depression, suicidal thoughts and behavior, immunosuppression, malignancy, posterior leukoencephalopathy syndrome, and serious infections.  The patient understands that monitoring is required including a PPD at baseline and must alert us or the primary physician if symptoms of infection or other concerning signs are noted. There is also a special program designed to monitor depression which is required with Siliq. Albendazole Counseling:  I discussed with the patient the risks of albendazole including but not limited to cytopenia, kidney damage, nausea/vomiting and severe allergy.  The patient understands that this medication is being used in an off-label manner. Picato Counseling:  I discussed with the patient the risks of Picato including but not limited to erythema, scaling, itching, weeping, crusting, and pain. Niacinamide Pregnancy And Lactation Text: These medications are considered safe during pregnancy. Drysol Counseling:  I discussed with the patient the risks of drysol/aluminum chloride including but not limited to skin rash, itching, irritation, burning. Hydroxychloroquine Pregnancy And Lactation Text: This medication has been shown to cause fetal harm but it isn't assigned a Pregnancy Risk Category. There are small amounts excreted in breast milk. High Dose Vitamin A Counseling: Side effects reviewed, pt to contact office should one occur. Tremfya Counseling: I discussed with the patient the risks of guselkumab including but not limited to immunosuppression, serious infections, worsening of inflammatory bowel disease and drug reactions.  The patient understands that monitoring is required including a PPD at baseline and must alert us or the primary physician if symptoms of infection or other concerning signs are noted. Prednisone Counseling:  I discussed with the patient the risks of prolonged use of prednisone including but not limited to weight gain, insomnia, osteoporosis, mood changes, diabetes, susceptibility to infection, glaucoma and high blood pressure.  In cases where prednisone use is prolonged, patients should be monitored with blood pressure checks, serum glucose levels and an eye exam.  Additionally, the patient may need to be placed on GI prophylaxis, PCP prophylaxis, and calcium and vitamin D supplementation and/or a bisphosphonate.  The patient verbalized understanding of the proper use and the possible adverse effects of prednisone.  All of the patient's questions and concerns were addressed. Cosentyx Counseling:  I discussed with the patient the risks of Cosentyx including but not limited to worsening of Crohn's disease, immunosuppression, allergic reactions and infections.  The patient understands that monitoring is required including a PPD at baseline and must alert us or the primary physician if symptoms of infection or other concerning signs are noted. Griseofulvin Counseling:  I discussed with the patient the risks of griseofulvin including but not limited to photosensitivity, cytopenia, liver damage, nausea/vomiting and severe allergy.  The patient understands that this medication is best absorbed when taken with a fatty meal (e.g., ice cream or french fries). Birth Control Pills Counseling: Birth Control Pill Counseling: I discussed with the patient the potential side effects of OCPs including but not limited to increased risk of stroke, heart attack, thrombophlebitis, deep venous thrombosis, hepatic adenomas, breast changes, GI upset, headaches, and depression.  The patient verbalized understanding of the proper use and possible adverse effects of OCPs. All of the patient's questions and concerns were addressed. Gabapentin Counseling: I discussed with the patient the risks of gabapentin including but not limited to dizziness, somnolence, fatigue and ataxia. Rhofade Pregnancy And Lactation Text: This medication has not been assigned a Pregnancy Risk Category. It is unknown if the medication is excreted in breast milk. Bactrim Counseling:  I discussed with the patient the risks of sulfa antibiotics including but not limited to GI upset, allergic reaction, drug rash, diarrhea, dizziness, photosensitivity, and yeast infections.  Rarely, more serious reactions can occur including but not limited to aplastic anemia, agranulocytosis, methemoglobinemia, blood dyscrasias, liver or kidney failure, lung infiltrates or desquamative/blistering drug rashes. Tranexamic Acid Pregnancy And Lactation Text: It is unknown if this medication is safe during pregnancy or breast feeding. Tranexamic Acid Counseling:  Patient advised of the small risk of bleeding problems with tranexamic acid. They were also instructed to call if they developed any nausea, vomiting or diarrhea. All of the patient's questions and concerns were addressed. Zyclara Counseling:  I discussed with the patient the risks of imiquimod including but not limited to erythema, scaling, itching, weeping, crusting, and pain.  Patient understands that the inflammatory response to imiquimod is variable from person to person and was educated regarded proper titration schedule.  If flu-like symptoms develop, patient knows to discontinue the medication and contact us. Cyclophosphamide Counseling:  I discussed with the patient the risks of cyclophosphamide including but not limited to hair loss, hormonal abnormalities, decreased fertility, abdominal pain, diarrhea, nausea and vomiting, bone marrow suppression and infection. The patient understands that monitoring is required while taking this medication. Isotretinoin Counseling: Patient should get monthly blood tests, not donate blood, not drive at night if vision affected, not share medication, and not undergo elective surgery for 6 months after tx completed. Side effects reviewed, pt to contact office should one occur. Taltz Counseling: I discussed with the patient the risks of ixekizumab including but not limited to immunosuppression, serious infections, worsening of inflammatory bowel disease and drug reactions.  The patient understands that monitoring is required including a PPD at baseline and must alert us or the primary physician if symptoms of infection or other concerning signs are noted. Cyclosporine Counseling:  I discussed with the patient the risks of cyclosporine including but not limited to hypertension, gingival hyperplasia,myelosuppression, immunosuppression, liver damage, kidney damage, neurotoxicity, lymphoma, and serious infections. The patient understands that monitoring is required including baseline blood pressure, CBC, CMP, lipid panel and uric acid, and then 1-2 times monthly CMP and blood pressure. Azithromycin Pregnancy And Lactation Text: This medication is considered safe during pregnancy and is also secreted in breast milk. Rifampin Counseling: I discussed with the patient the risks of rifampin including but not limited to liver damage, kidney damage, red-orange body fluids, nausea/vomiting and severe allergy. Enbrel Counseling:  I discussed with the patient the risks of etanercept including but not limited to myelosuppression, immunosuppression, autoimmune hepatitis, demyelinating diseases, lymphoma, and infections.  The patient understands that monitoring is required including a PPD at baseline and must alert us or the primary physician if symptoms of infection or other concerning signs are noted. Isotretinoin Pregnancy And Lactation Text: This medication is Pregnancy Category X and is considered extremely dangerous during pregnancy. It is unknown if it is excreted in breast milk. Solaraze Pregnancy And Lactation Text: This medication is Pregnancy Category B and is considered safe. There is some data to suggest avoiding during the third trimester. It is unknown if this medication is excreted in breast milk. Ivermectin Counseling:  Patient instructed to take medication on an empty stomach with a full glass of water.  Patient informed of potential adverse effects including but not limited to nausea, diarrhea, dizziness, itching, and swelling of the extremities or lymph nodes.  The patient verbalized understanding of the proper use and possible adverse effects of ivermectin.  All of the patient's questions and concerns were addressed. Opioid Counseling: I discussed with the patient the potential side effects of opioids including but not limited to addiction, altered mental status, and depression. I stressed avoiding alcohol, benzodiazepines, muscle relaxants and sleep aids unless specifically okayed by a physician. The patient verbalized understanding of the proper use and possible adverse effects of opioids. All of the patient's questions and concerns were addressed. They were instructed to flush the remaining pills down the toilet if they did not need them for pain. Niacinamide Counseling: I recommended taking niacin or niacinamide, also know as vitamin B3, twice daily. Recent evidence suggests that taking vitamin B3 (500 mg twice daily) can reduce the risk of actinic keratoses and non-melanoma skin cancers. Side effects of vitamin B3 include flushing and headache. Skyrizi Counseling: I discussed with the patient the risks of risankizumab-rzaa including but not limited to immunosuppression, and serious infections.  The patient understands that monitoring is required including a PPD at baseline and must alert us or the primary physician if symptoms of infection or other concerning signs are noted. Griseofulvin Pregnancy And Lactation Text: This medication is Pregnancy Category X and is known to cause serious birth defects. It is unknown if this medication is excreted in breast milk but breast feeding should be avoided.

## 2020-03-25 ENCOUNTER — RESULTS ENCOUNTER (OUTPATIENT)
Dept: ENDOCRINOLOGY | Age: 75
End: 2020-03-25

## 2020-03-25 DIAGNOSIS — E78.2 MIXED HYPERLIPIDEMIA: ICD-10-CM

## 2020-03-25 DIAGNOSIS — E03.9 HYPOTHYROIDISM, UNSPECIFIED TYPE: ICD-10-CM

## 2020-03-25 DIAGNOSIS — E10.9 TYPE 1 DIABETES MELLITUS WITHOUT COMPLICATION (HCC): ICD-10-CM

## 2020-04-02 ENCOUNTER — OFFICE VISIT (OUTPATIENT)
Dept: GASTROENTEROLOGY | Facility: CLINIC | Age: 75
End: 2020-04-02

## 2020-04-02 DIAGNOSIS — K21.9 GASTROESOPHAGEAL REFLUX DISEASE WITHOUT ESOPHAGITIS: ICD-10-CM

## 2020-04-02 DIAGNOSIS — R74.01 ELEVATED TRANSAMINASE LEVEL: ICD-10-CM

## 2020-04-02 DIAGNOSIS — K58.0 IRRITABLE BOWEL SYNDROME WITH DIARRHEA: Primary | ICD-10-CM

## 2020-04-02 PROCEDURE — 99213 OFFICE O/P EST LOW 20 MIN: CPT | Performed by: INTERNAL MEDICINE

## 2020-04-02 RX ORDER — MONTELUKAST SODIUM 4 MG/1
2 TABLET, CHEWABLE ORAL NIGHTLY
Qty: 60 TABLET | Refills: 11 | Status: SHIPPED | OUTPATIENT
Start: 2020-04-02 | End: 2021-01-25

## 2020-04-02 NOTE — PROGRESS NOTES
Due to connectivity issues, a telephone visit was necessary    PATIENT INFORMATION  Olga Greco       - 1945    CHIEF COMPLAINT  Chief Complaint   Patient presents with   • Diarrhea       HISTORY OF PRESENT ILLNESS  Is doing well wrt Covid 19 , But has IBS symptoms for Decades  Moves bowels 0-6 and will not skip more than one day in a row. No Abx this year     Has hand surgery for a trigger thumb this march. So probably one dose of Abx then     She has been trying a gluten free diet w/o any change. She uses one imodium frequently and rarely will take two and sometimes it doesn't help much.NO real nocturnal issues but occasionally urgent BMs in the AM.    Feels she got worse after her Cholecystectomy but was never treated for bile acid diarrhea nd also doesn't have anyother SIBO symptoms despite her rare exposure only to Abx              REVIEW OF SYSTEMS  Review of Systems   Gastrointestinal: Positive for diarrhea.   All other systems reviewed and are negative.        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]   • Routine health maintenance [Z00.00]   • Heart palpitations [R00.2]   • Diabetic ketoacidosis without coma associated with type 1 diabetes mellitus (CMS/HCC) [E10.10]   • Elevated transaminase level [R74.0]   • Type 1 diabetes mellitus (CMS/HCC) [E10.9]   • Gastroesophageal reflux disease [K21.9]   • Hypertension [I10]   • Hypothyroidism [E03.9]   • Menopausal symptom [N95.1]   • Vitamin D deficiency [E55.9]         PAST MEDICAL HISTORY  Past Medical History:   Diagnosis Date   • Colon polyp    • Diabetes mellitus (CMS/HCC)    • Hypertension    •  Hypothyroidism    • Type 2 diabetes mellitus (CMS/HCC) 04/2018         SURGICAL HISTORY  Past Surgical History:   Procedure Laterality Date   • BREAST BIOPSY Left    • CHOLECYSTECTOMY     • COLONOSCOPY     • HYSTERECTOMY     • TONSILLECTOMY     • TUBAL ABDOMINAL LIGATION           FAMILY HISTORY  Family History   Problem Relation Age of Onset   • Heart disease Father    • Diabetes Sister    • Hypertension Mother    • Breast cancer Neg Hx    • Colon cancer Neg Hx    • Colon polyps Neg Hx          SOCIAL HISTORY  Social History     Occupational History   • Not on file   Tobacco Use   • Smoking status: Never Smoker   • Smokeless tobacco: Never Used   Substance and Sexual Activity   • Alcohol use: Yes     Alcohol/week: 2.0 standard drinks     Types: 2 Glasses of wine per week     Comment: daily   • Drug use: No   • Sexual activity: Yes     Partners: Male       Debilities/Disabilities Identified: None    Emotional Behavior: Appropriate    CURRENT MEDICATIONS    Current Outpatient Medications:   •  ACCU-CHEK SOFTCLIX LANCETS lancets, USE TO TEST BLOOD SUGAR 4 TIMES DAILY, Disp: 200 each, Rfl: 5  •  atorvastatin (LIPITOR) 20 MG tablet, Take 1 tablet by mouth Daily., Disp: 90 tablet, Rfl: 3  •  B Complex Vitamins (VITAMIN B COMPLEX) capsule capsule, Take 1 capsule by mouth Daily., Disp: , Rfl:   •  benazepril-hydrochlorthiazide (LOTENSIN HCT) 20-12.5 MG per tablet, TAKE ONE TABLET BY MOUTH DAILY, Disp: 90 tablet, Rfl: 0  •  Biotin 17346 MCG tablet, Take 1 tablet by mouth Daily., Disp: , Rfl:   •  Blood Glucose Monitoring Suppl (ACCU-CHEK RICHARD SMARTVIEW) w/Device kit, Use to check blood sugar 4 times daily  E10.9, Disp: 1 kit, Rfl: 0  •  Calcium Carb-Cholecalciferol (CALCIUM 600+D3 PO), Take 500 Units by mouth Daily., Disp: , Rfl:   •  Chromium Picolinate 1000 MCG tablet, Take 1,000 mcg by mouth daily., Disp: , Rfl:   •  colestipol (COLESTID) 1 g tablet, Take 2 tablets by mouth Every Night., Disp: 60 tablet, Rfl: 11  •   diclofenac (VOLTAREN) 1 % gel gel, Apply 4 g topically to the appropriate area as directed 4 (Four) Times a Day., Disp: 100 g, Rfl: 5  •  glucagon (GLUCAGON EMERGENCY) 1 MG injection, Inject 1 mg under the skin into the appropriate area as directed 1 (One) Time As Needed for Low Blood Sugar for up to 1 dose., Disp: 1 kit, Rfl: 12  •  NOVOLOG 100 UNIT/ML injection, USE 50 UNITS DAILY VIA INSULIN PUMP, Disp: 20 mL, Rfl: 3  •  SYNTHROID 88 MCG tablet, Take 1 tablet by mouth Daily., Disp: 30 tablet, Rfl: 11    ALLERGIES  Aspirin and Epinephrine    VITALS  There were no vitals filed for this visit.    LAST RESULTS   Office Visit on 09/24/2019   Component Date Value Ref Range Status   • Total Protein 09/24/2019 6.5  6.0 - 8.5 g/dL Final   • Albumin 09/24/2019 4.70  3.50 - 5.20 g/dL Final   • ALT (SGPT) 09/24/2019 31  1 - 33 U/L Final   • AST (SGOT) 09/24/2019 25  1 - 32 U/L Final   • Alkaline Phosphatase 09/24/2019 70  39 - 117 U/L Final   • Total Bilirubin 09/24/2019 0.5  0.2 - 1.2 mg/dL Final   • Bilirubin, Direct 09/24/2019 0.2  0.2 - 0.3 mg/dL Final   • Bilirubin, Indirect 09/24/2019 0.3  mg/dL Final     No results found.    PHYSICAL EXAM  Physical Exam    ASSESSMENT  Diagnoses and all orders for this visit:    Irritable bowel syndrome with diarrhea  -     Celiac Comprehensive Panel    Gastroesophageal reflux disease without esophagitis    Elevated transaminase level  -     Celiac Comprehensive Panel    Other orders  -     colestipol (COLESTID) 1 g tablet; Take 2 tablets by mouth Every Night.          PLAN  Regular diet and imodium is OK  Return in about 2 months (around 6/2/2020).

## 2020-04-04 ENCOUNTER — APPOINTMENT (OUTPATIENT)
Dept: LAB | Facility: HOSPITAL | Age: 75
End: 2020-04-04

## 2020-04-04 PROCEDURE — 83516 IMMUNOASSAY NONANTIBODY: CPT | Performed by: INTERNAL MEDICINE

## 2020-04-04 PROCEDURE — 36415 COLL VENOUS BLD VENIPUNCTURE: CPT | Performed by: INTERNAL MEDICINE

## 2020-04-04 PROCEDURE — 86255 FLUORESCENT ANTIBODY SCREEN: CPT | Performed by: INTERNAL MEDICINE

## 2020-04-04 PROCEDURE — 82784 ASSAY IGA/IGD/IGG/IGM EACH: CPT | Performed by: INTERNAL MEDICINE

## 2020-04-06 LAB
ENDOMYSIUM IGA SER QL: NEGATIVE
GLIADIN PEPTIDE IGA SER-ACNC: 4 UNITS (ref 0–19)
GLIADIN PEPTIDE IGG SER-ACNC: 2 UNITS (ref 0–19)
IGA SERPL-MCNC: 146 MG/DL (ref 64–422)
TTG IGA SER-ACNC: <2 U/ML (ref 0–3)
TTG IGG SER-ACNC: <2 U/ML (ref 0–5)

## 2020-04-21 RX ORDER — ATORVASTATIN CALCIUM 20 MG/1
TABLET, FILM COATED ORAL
Qty: 90 TABLET | Refills: 2 | Status: SHIPPED | OUTPATIENT
Start: 2020-04-21 | End: 2021-03-08

## 2020-04-29 RX ORDER — BENAZEPRIL HYDROCHLORIDE AND HYDROCHLOROTHIAZIDE 20; 12.5 MG/1; MG/1
1 TABLET ORAL DAILY
Qty: 90 TABLET | Refills: 1 | Status: SHIPPED | OUTPATIENT
Start: 2020-04-29 | End: 2020-11-23

## 2020-05-20 ENCOUNTER — TELEPHONE (OUTPATIENT)
Dept: ENDOCRINOLOGY | Age: 75
End: 2020-05-20

## 2020-05-20 NOTE — TELEPHONE ENCOUNTER
Pt called in wants to verify the dosage on her synthroid she has been on 88 mcg / but the pharmacy gave 100 mcg   Please call pt [phone]

## 2020-05-21 RX ORDER — LEVOTHYROXINE SODIUM 88 MCG
88 TABLET ORAL DAILY
Qty: 90 TABLET | Refills: 3 | Status: SHIPPED | OUTPATIENT
Start: 2020-05-21 | End: 2020-07-28

## 2020-06-15 ENCOUNTER — LAB (OUTPATIENT)
Dept: LAB | Facility: HOSPITAL | Age: 75
End: 2020-06-15

## 2020-06-15 PROCEDURE — 84439 ASSAY OF FREE THYROXINE: CPT | Performed by: FAMILY MEDICINE

## 2020-06-15 PROCEDURE — 83036 HEMOGLOBIN GLYCOSYLATED A1C: CPT | Performed by: FAMILY MEDICINE

## 2020-06-15 PROCEDURE — 80053 COMPREHEN METABOLIC PANEL: CPT | Performed by: FAMILY MEDICINE

## 2020-06-15 PROCEDURE — 85025 COMPLETE CBC W/AUTO DIFF WBC: CPT | Performed by: FAMILY MEDICINE

## 2020-06-15 PROCEDURE — 82746 ASSAY OF FOLIC ACID SERUM: CPT | Performed by: FAMILY MEDICINE

## 2020-06-15 PROCEDURE — 82607 VITAMIN B-12: CPT | Performed by: FAMILY MEDICINE

## 2020-06-15 PROCEDURE — 82306 VITAMIN D 25 HYDROXY: CPT | Performed by: FAMILY MEDICINE

## 2020-06-15 PROCEDURE — 80061 LIPID PANEL: CPT | Performed by: FAMILY MEDICINE

## 2020-06-15 PROCEDURE — 84443 ASSAY THYROID STIM HORMONE: CPT | Performed by: FAMILY MEDICINE

## 2020-06-18 ENCOUNTER — OFFICE VISIT (OUTPATIENT)
Dept: INTERNAL MEDICINE | Facility: CLINIC | Age: 75
End: 2020-06-18

## 2020-06-18 VITALS
HEIGHT: 63 IN | SYSTOLIC BLOOD PRESSURE: 124 MMHG | BODY MASS INDEX: 23.14 KG/M2 | HEART RATE: 65 BPM | OXYGEN SATURATION: 99 % | DIASTOLIC BLOOD PRESSURE: 64 MMHG | WEIGHT: 130.6 LBS | TEMPERATURE: 97.8 F | RESPIRATION RATE: 16 BRPM

## 2020-06-18 DIAGNOSIS — R74.01 ELEVATED TRANSAMINASE LEVEL: ICD-10-CM

## 2020-06-18 DIAGNOSIS — K21.9 GASTROESOPHAGEAL REFLUX DISEASE WITHOUT ESOPHAGITIS: ICD-10-CM

## 2020-06-18 DIAGNOSIS — Z00.00 MEDICARE ANNUAL WELLNESS VISIT, SUBSEQUENT: Primary | ICD-10-CM

## 2020-06-18 DIAGNOSIS — E55.9 VITAMIN D DEFICIENCY: ICD-10-CM

## 2020-06-18 DIAGNOSIS — E03.9 ACQUIRED HYPOTHYROIDISM: ICD-10-CM

## 2020-06-18 DIAGNOSIS — I10 ESSENTIAL HYPERTENSION: ICD-10-CM

## 2020-06-18 DIAGNOSIS — E10.9 TYPE 1 DIABETES MELLITUS WITHOUT COMPLICATION (HCC): ICD-10-CM

## 2020-06-18 PROCEDURE — 96160 PT-FOCUSED HLTH RISK ASSMT: CPT | Performed by: FAMILY MEDICINE

## 2020-06-18 PROCEDURE — G0439 PPPS, SUBSEQ VISIT: HCPCS | Performed by: FAMILY MEDICINE

## 2020-06-18 PROCEDURE — 99214 OFFICE O/P EST MOD 30 MIN: CPT | Performed by: FAMILY MEDICINE

## 2020-06-18 NOTE — PROGRESS NOTES
The ABCs of the Annual Wellness Visit  Subsequent Medicare Wellness Visit    Chief Complaint   Patient presents with   • Medicare Wellness-subsequent       Subjective   History of Present Illness:  Olga Greco is a 74 y.o. female who presents for a Subsequent Medicare Wellness Visit.    HEALTH RISK ASSESSMENT    Recent Hospitalizations:  No hospitalization(s) within the last year.    Current Medical Providers:  Patient Care Team:  Yair Zapata MD as PCP - General    Smoking Status:  Social History     Tobacco Use   Smoking Status Never Smoker   Smokeless Tobacco Never Used       Alcohol Consumption:  Social History     Substance and Sexual Activity   Alcohol Use Yes   • Alcohol/week: 2.0 standard drinks   • Types: 2 Glasses of wine per week    Comment: daily       Depression Screen:   PHQ-2/PHQ-9 Depression Screening 6/18/2020   Little interest or pleasure in doing things 0   Feeling down, depressed, or hopeless 0   Total Score 0       Fall Risk Screen:  HILDA Fall Risk Assessment was completed, and patient is at HIGH risk for falls. Assessment completed on:6/18/2020    Health Habits and Functional and Cognitive Screening:  Functional & Cognitive Status 6/18/2020   Do you have difficulty preparing food and eating? No   Do you have difficulty bathing yourself, getting dressed or grooming yourself? No   Do you have difficulty using the toilet? No   Do you have difficulty moving around from place to place? No   Do you have trouble with steps or getting out of a bed or a chair? No   Current Diet Well Balanced Diet   Dental Exam Up to date   Eye Exam Up to date   Exercise (times per week) 0 times per week   Current Exercise Activities Include None   Do you need help using the phone?  No   Are you deaf or do you have serious difficulty hearing?  No   Do you need help with transportation? No   Do you need help shopping? No   Do you need help preparing meals?  No   Do you need help with housework?  No   Do you  need help with laundry? No   Do you need help taking your medications? No   Do you need help managing money? No   Do you ever drive or ride in a car without wearing a seat belt? No   Have you felt unusual stress, anger or loneliness in the last month? No   Who do you live with? Spouse   If you need help, do you have trouble finding someone available to you? No   Have you been bothered in the last four weeks by sexual problems? No   Do you have difficulty concentrating, remembering or making decisions? Yes         Does the patient have evidence of cognitive impairment? No    Asprin use counseling:Contraindicated from taking ASA    Age-appropriate Screening Schedule:  Refer to the list below for future screening recommendations based on patient's age, sex and/or medical conditions. Orders for these recommended tests are listed in the plan section. The patient has been provided with a written plan.    Health Maintenance   Topic Date Due   • TDAP/TD VACCINES (1 - Tdap) 09/19/1956   • URINE MICROALBUMIN  01/21/2020   • ZOSTER VACCINE (2 of 2) 12/27/2020 (Originally 11/10/2008)   • DIABETIC EYE EXAM  07/22/2020   • INFLUENZA VACCINE  08/01/2020   • HEMOGLOBIN A1C  12/15/2020   • DIABETIC FOOT EXAM  12/19/2020   • LIPID PANEL  06/15/2021   • MAMMOGRAM  12/18/2021   • COLONOSCOPY  Discontinued          The following portions of the patient's history were reviewed and updated as appropriate: allergies, current medications, past family history, past medical history, past social history, past surgical history and problem list.    Outpatient Medications Prior to Visit   Medication Sig Dispense Refill   • ACCU-CHEK SOFTCLIX LANCETS lancets USE TO TEST BLOOD SUGAR 4 TIMES DAILY 200 each 5   • atorvastatin (LIPITOR) 20 MG tablet TAKE ONE TABLET BY MOUTH DAILY 90 tablet 2   • B Complex Vitamins (VITAMIN B COMPLEX) capsule capsule Take 1 capsule by mouth Daily.     • benazepril-hydrochlorthiazide (LOTENSIN HCT) 20-12.5 MG per tablet  Take 1 tablet by mouth Daily. 90 tablet 1   • Biotin 06872 MCG tablet Take 1 tablet by mouth Daily.     • Blood Glucose Monitoring Suppl (ACCU-CHEK RICHARD SMARTVIEW) w/Device kit Use to check blood sugar 4 times daily  E10.9 1 kit 0   • Calcium Carb-Cholecalciferol (CALCIUM 600+D3 PO) Take 500 Units by mouth Daily.     • Chromium Picolinate 1000 MCG tablet Take 1,000 mcg by mouth daily.     • colestipol (COLESTID) 1 g tablet Take 2 tablets by mouth Every Night. 60 tablet 11   • diclofenac (VOLTAREN) 1 % gel gel Apply 4 g topically to the appropriate area as directed 4 (Four) Times a Day. 100 g 5   • glucagon (GLUCAGON EMERGENCY) 1 MG injection Inject 1 mg under the skin into the appropriate area as directed 1 (One) Time As Needed for Low Blood Sugar for up to 1 dose. 1 kit 12   • NOVOLOG 100 UNIT/ML injection INJECT 50 UNITS DAILY VIA INSULIN PUMP  E10.65 50 mL 3   • Probiotic Product (PROBIOTIC DAILY PO) Take  by mouth.     • SYNTHROID 88 MCG tablet Take 1 tablet by mouth Daily. 90 tablet 3     No facility-administered medications prior to visit.        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 Patient has an advance directive in EMR which is still valid.     Review of Systems   Constitutional: Negative.    HENT: Negative.   "  Eyes: Negative.    Respiratory: Negative.    Cardiovascular: Negative.    Gastrointestinal: Negative.    Endocrine: Negative.    Genitourinary: Negative.    Musculoskeletal: Negative.    Skin: Negative.    Allergic/Immunologic: Negative.    Neurological: Negative.    Hematological: Negative.    Psychiatric/Behavioral: Negative.        Compared to one year ago, the patient feels her physical health is worse.  Compared to one year ago, the patient feels her mental health is worse.    Reviewed chart for potential of high risk medication in the elderly: yes  Reviewed chart for potential of harmful drug interactions in the elderly:yes    Objective         Vitals:    06/18/20 1103   BP: 124/64   Pulse: 65   Resp: 16   Temp: 97.8 °F (36.6 °C)   TempSrc: Oral   SpO2: 99%   Weight: 59.2 kg (130 lb 9.6 oz)   Height: 160 cm (63\")   PainSc: 0-No pain       Body mass index is 23.13 kg/m².  Discussed the patient's BMI with her. The BMI is in the acceptable range.    Physical Exam   Constitutional: She is oriented to person, place, and time. She appears well-developed and well-nourished.   HENT:   Head: Normocephalic and atraumatic.   Mouth/Throat: Oropharynx is clear and moist.   Eyes: Conjunctivae and EOM are normal.   Neck: Neck supple. No thyromegaly present.   Cardiovascular: Normal rate, regular rhythm and normal heart sounds.   Pulmonary/Chest: Effort normal and breath sounds normal.   Abdominal: Soft. Bowel sounds are normal. There is no hepatosplenomegaly.   Musculoskeletal: Normal range of motion. She exhibits no edema.   Neurological: She is alert and oriented to person, place, and time.   Skin: Skin is warm and dry. No rash noted.   Psychiatric: She has a normal mood and affect. Her behavior is normal.   Nursing note and vitals reviewed.      Lab Results   Component Value Date    TRIG 38 06/15/2020    HDL 94 (H) 06/15/2020    LDL 33 06/15/2020    VLDL 7.6 06/15/2020    HGBA1C 6.55 (H) 06/15/2020    "     Assessment/Plan   Medicare Risks and Personalized Health Plan  CMS Preventative Services Quick Reference  Immunizations Discussed/Encouraged (specific immunizations; Shingrix at pharmacy )    The above risks/problems have been discussed with the patient.  Pertinent information has been shared with the patient in the After Visit Summary.  Follow up plans and orders are seen below in the Assessment/Plan Section.    Diagnoses and all orders for this visit:    1. Medicare annual wellness visit, subsequent (Primary)    2. Type 1 diabetes mellitus without complication (CMS/[facility])    3. Essential hypertension    4. Acquired hypothyroidism    5. Vitamin D deficiency    6. Gastroesophageal reflux disease without esophagitis    7. Elevated transaminase level    Mammogram UTD.  Cologuard negative 2/2019.  Shingrix at pharmacy.  Pneumovax UTD.  Dexa scan normal 2016.    1. DMI. A1c 6.5, down from 7.0. Managed by Dr. Rogers. Eye exam UTD.     2. HTN.  Controlled.  Continue benazepril-hctz.  Monitor home blood pressures.     3. Hypothyroidism.  Adequate replacement.     4. Vitamin D deficiency.  Continue supplement.     5. GERD.  Doing well off omeprazole.     6. Elevated transaminases. Normal this time.  Workup negative so far per Dr. Weir.  Now seeing Dr. Fallon.     Follow Up:  Return in about 6 months (around 12/18/2020).     An After Visit Summary and PPPS were given to the patient.

## 2020-06-22 ENCOUNTER — OFFICE VISIT (OUTPATIENT)
Dept: ENDOCRINOLOGY | Age: 75
End: 2020-06-22

## 2020-06-22 VITALS
DIASTOLIC BLOOD PRESSURE: 68 MMHG | HEIGHT: 63 IN | WEIGHT: 132.4 LBS | OXYGEN SATURATION: 99 % | HEART RATE: 97 BPM | BODY MASS INDEX: 23.46 KG/M2 | SYSTOLIC BLOOD PRESSURE: 126 MMHG

## 2020-06-22 DIAGNOSIS — Z79.4 LONG-TERM INSULIN USE (HCC): ICD-10-CM

## 2020-06-22 DIAGNOSIS — E78.2 MIXED HYPERLIPIDEMIA: Primary | ICD-10-CM

## 2020-06-22 DIAGNOSIS — IMO0002 DM (DIABETES MELLITUS), TYPE 1, UNCONTROLLED W/NEUROLOGIC COMPLICATION: ICD-10-CM

## 2020-06-22 DIAGNOSIS — E03.9 ACQUIRED HYPOTHYROIDISM: ICD-10-CM

## 2020-06-22 PROCEDURE — 99214 OFFICE O/P EST MOD 30 MIN: CPT | Performed by: INTERNAL MEDICINE

## 2020-06-22 PROCEDURE — 95251 CONT GLUC MNTR ANALYSIS I&R: CPT | Performed by: INTERNAL MEDICINE

## 2020-06-22 NOTE — PROGRESS NOTES
74 y.o.    Patient Care Team:  Yair Zapata MD as PCP - General    Chief Complaint:    FOLLOW UP/ TYPE 1 DIABETES MELLITUS   Subjective     HPI    Olga Greco 74 y.o. WF presents with Type 1 dm as a follow up patient. Consulted by Dr. Zapata.      Type 1 dm - Diagnosed in 2003 was initially thought to be type 2 dm. Was started on oral agents initially. Insulin was started on April 24th 2017.    When she came in in February 2018 she was still on oral hypoglycemic agents but due to her phenotype and family history of type 1 diabetes,  antibodies were checked and they were positive      Today in clinic patient reports that she is on Medtronic insulin pump 630 G along with the Dexcom G6.  Average blood sugars are 134 mg/dL on the Dexcom download.  She has no significant issues with low blood sugars on the Dexcom in the last 2 weeks.  Her highest blood sugar was 196.  Due for her eye examination, no prior history of diabetic retinopathy  Does complain of mild tingling and numbness, no ulcers or amputations of her toes.  No history of CAD, CK D, CVA.  She is physically active and exercises at least for 3-4 times a week.  On Ace inhibitor.  Last DKA episode was in October 2018.  Continues to check her blood sugars at least 2-3 times a day apart from the Dexcom monitoring her blood sugars    Hyperlipidemia  On Lipitor 20 mg oral daily.     Hypothyroidism    On Synthroid 88 mcg oral daily.    Reviewed primary care physician's/consulting physician documentation and lab results :     Interval History      The following portions of the patient's history were reviewed and updated as appropriate: allergies, current medications, past family history, past medical history, past social history, past surgical history and problem list.    Past Medical History:   Diagnosis Date   • Colon polyp    • Diabetes mellitus (CMS/HCC)    • Hypertension    • Hypothyroidism    • Type 2 diabetes mellitus (CMS/HCC) 04/2018      Family History   Problem Relation Age of Onset   • Heart disease Father    • Diabetes Sister    • Hypertension Mother    • Breast cancer Neg Hx    • Colon cancer Neg Hx    • Colon polyps Neg Hx      Social History     Socioeconomic History   • Marital status:      Spouse name: Not on file   • Number of children: Not on file   • Years of education: Not on file   • Highest education level: Not on file   Tobacco Use   • Smoking status: Never Smoker   • Smokeless tobacco: Never Used   Substance and Sexual Activity   • Alcohol use: Yes     Alcohol/week: 2.0 standard drinks     Types: 2 Glasses of wine per week     Comment: daily   • Drug use: No   • Sexual activity: Yes     Partners: Male     Allergies   Allergen Reactions   • Aspirin GI Bleeding     GI distress   • Epinephrine Unknown - High Severity     Tachycardia       Current Outpatient Medications:   •  ACCU-CHEK SOFTCLIX LANCETS lancets, USE TO TEST BLOOD SUGAR 4 TIMES DAILY, Disp: 200 each, Rfl: 5  •  atorvastatin (LIPITOR) 20 MG tablet, TAKE ONE TABLET BY MOUTH DAILY, Disp: 90 tablet, Rfl: 2  •  B Complex Vitamins (VITAMIN B COMPLEX) capsule capsule, Take 1 capsule by mouth Daily., Disp: , Rfl:   •  benazepril-hydrochlorthiazide (LOTENSIN HCT) 20-12.5 MG per tablet, Take 1 tablet by mouth Daily., Disp: 90 tablet, Rfl: 1  •  Biotin 98276 MCG tablet, Take 1 tablet by mouth Daily., Disp: , Rfl:   •  Blood Glucose Monitoring Suppl (ACCU-CHEK RICHARD SMARTVIEW) w/Device kit, Use to check blood sugar 4 times daily  E10.9, Disp: 1 kit, Rfl: 0  •  Calcium Carb-Cholecalciferol (CALCIUM 600+D3 PO), Take 500 Units by mouth Daily., Disp: , Rfl:   •  Chromium Picolinate 1000 MCG tablet, Take 1,000 mcg by mouth daily., Disp: , Rfl:   •  colestipol (COLESTID) 1 g tablet, Take 2 tablets by mouth Every Night., Disp: 60 tablet, Rfl: 11  •  diclofenac (VOLTAREN) 1 % gel gel, Apply 4 g topically to the appropriate area as directed 4 (Four) Times a Day., Disp: 100 g, Rfl:  "5  •  glucagon (GLUCAGON EMERGENCY) 1 MG injection, Inject 1 mg under the skin into the appropriate area as directed 1 (One) Time As Needed for Low Blood Sugar for up to 1 dose., Disp: 1 kit, Rfl: 12  •  NOVOLOG 100 UNIT/ML injection, INJECT 50 UNITS DAILY VIA INSULIN PUMP  E10.65, Disp: 50 mL, Rfl: 3  •  Probiotic Product (PROBIOTIC DAILY PO), Take  by mouth., Disp: , Rfl:   •  SYNTHROID 88 MCG tablet, Take 1 tablet by mouth Daily., Disp: 90 tablet, Rfl: 3        Review of Systems   Constitutional: Negative for appetite change, fatigue and fever.   Eyes: Negative for visual disturbance.   Respiratory: Negative for shortness of breath.    Cardiovascular: Negative for palpitations and leg swelling.   Gastrointestinal: Negative for abdominal pain and vomiting.   Endocrine: Negative for polydipsia and polyuria.   Musculoskeletal: Negative for joint swelling and neck pain.   Skin: Negative for rash.   Neurological: Negative for weakness and numbness.   Psychiatric/Behavioral: Negative for behavioral problems.     I have reviewed the ROS as documented by the MA; Angi Rogers MD.      Objective       Vitals:    06/22/20 1110   BP: 126/68   Pulse: 97   SpO2: 99%   Weight: 60.1 kg (132 lb 6.4 oz)   Height: 160 cm (63\")     Body mass index is 23.45 kg/m².      Physical Exam   Constitutional: She is oriented to person, place, and time. She appears well-nourished.   HENT:   Head: Normocephalic and atraumatic.   Eyes: Conjunctivae and EOM are normal. No scleral icterus.   Neck: Normal range of motion. Neck supple. No thyromegaly present.   Cardiovascular: Normal rate and normal heart sounds. Exam reveals no friction rub.   No murmur heard.  Pulmonary/Chest: Effort normal and breath sounds normal. No stridor. She has no wheezes. She has no rales.   Abdominal: Soft. Bowel sounds are normal. She exhibits no distension. There is no tenderness.   Pump and sensor sites intact   Musculoskeletal: She exhibits no edema or tenderness. "   Lymphadenopathy:     She has no cervical adenopathy.   Neurological: She is alert and oriented to person, place, and time.   Skin: Skin is warm and dry. She is not diaphoretic.   Psychiatric: She has a normal mood and affect.   Vitals reviewed.      Results Review:     I reviewed the patient's new clinical results and mentioned them above in HPI and in plan as well.    Medical records reviewed  Summary:Done      Lab on 06/15/2020   Component Date Value Ref Range Status   • Folate 06/15/2020 >20.00  4.78 - 24.20 ng/mL Final   • 25 Hydroxy, Vitamin D 06/15/2020 47.5  30.0 - 100.0 ng/ml Final   • Total Cholesterol 06/15/2020 135  0 - 200 mg/dL Final   • Triglycerides 06/15/2020 38  0 - 150 mg/dL Final   • HDL Cholesterol 06/15/2020 94* 40 - 60 mg/dL Final   • LDL Cholesterol  06/15/2020 33  0 - 100 mg/dL Final   • VLDL Cholesterol 06/15/2020 7.6  5 - 40 mg/dL Final   • Chol/HDL Ratio 06/15/2020 1.44   Final   • Vitamin B-12 06/15/2020 367  211 - 946 pg/mL Final   • Free T4 06/15/2020 1.46  0.93 - 1.70 ng/dL Final   • TSH 06/15/2020 1.470  0.270 - 4.200 uIU/mL Final   • Hemoglobin A1C 06/15/2020 6.55* 4.80 - 5.60 % Final   • Glucose 06/15/2020 134* 65 - 99 mg/dL Final   • BUN 06/15/2020 16  8 - 23 mg/dL Final   • Creatinine 06/15/2020 0.86  0.57 - 1.00 mg/dL Final   • Sodium 06/15/2020 139  136 - 145 mmol/L Final   • Potassium 06/15/2020 4.4  3.5 - 5.2 mmol/L Final   • Chloride 06/15/2020 103  98 - 107 mmol/L Final   • CO2 06/15/2020 26.4  22.0 - 29.0 mmol/L Final   • Calcium 06/15/2020 9.2  8.6 - 10.5 mg/dL Final   • Total Protein 06/15/2020 6.3  6.0 - 8.5 g/dL Final   • Albumin 06/15/2020 4.50  3.50 - 5.20 g/dL Final   • ALT (SGPT) 06/15/2020 25  1 - 33 U/L Final   • AST (SGOT) 06/15/2020 25  1 - 32 U/L Final   • Alkaline Phosphatase 06/15/2020 56  39 - 117 U/L Final   • Total Bilirubin 06/15/2020 0.7  0.2 - 1.2 mg/dL Final   • eGFR Non African Amer 06/15/2020 65  >60 mL/min/1.73 Final   • Globulin 06/15/2020 1.8   gm/dL Final   • A/G Ratio 06/15/2020 2.5  g/dL Final   • BUN/Creatinine Ratio 06/15/2020 18.6  7.0 - 25.0 Final   • Anion Gap 06/15/2020 9.6  5.0 - 15.0 mmol/L Final   • WBC 06/15/2020 4.17  3.40 - 10.80 10*3/mm3 Final   • RBC 06/15/2020 3.99  3.77 - 5.28 10*6/mm3 Final   • Hemoglobin 06/15/2020 13.0  12.0 - 15.9 g/dL Final   • Hematocrit 06/15/2020 38.3  34.0 - 46.6 % Final   • MCV 06/15/2020 96.0  79.0 - 97.0 fL Final   • MCH 06/15/2020 32.6  26.6 - 33.0 pg Final   • MCHC 06/15/2020 33.9  31.5 - 35.7 g/dL Final   • RDW 06/15/2020 11.8* 12.3 - 15.4 % Final   • RDW-SD 06/15/2020 41.3  37.0 - 54.0 fl Final   • MPV 06/15/2020 9.9  6.0 - 12.0 fL Final   • Platelets 06/15/2020 198  140 - 450 10*3/mm3 Final   • Neutrophil % 06/15/2020 46.4  42.7 - 76.0 % Final   • Lymphocyte % 06/15/2020 38.1  19.6 - 45.3 % Final   • Monocyte % 06/15/2020 8.6  5.0 - 12.0 % Final   • Eosinophil % 06/15/2020 6.0  0.3 - 6.2 % Final   • Basophil % 06/15/2020 0.7  0.0 - 1.5 % Final   • Immature Grans % 06/15/2020 0.2  0.0 - 0.5 % Final   • Neutrophils, Absolute 06/15/2020 1.93  1.70 - 7.00 10*3/mm3 Final   • Lymphocytes, Absolute 06/15/2020 1.59  0.70 - 3.10 10*3/mm3 Final   • Monocytes, Absolute 06/15/2020 0.36  0.10 - 0.90 10*3/mm3 Final   • Eosinophils, Absolute 06/15/2020 0.25  0.00 - 0.40 10*3/mm3 Final   • Basophils, Absolute 06/15/2020 0.03  0.00 - 0.20 10*3/mm3 Final   • Immature Grans, Absolute 06/15/2020 0.01  0.00 - 0.05 10*3/mm3 Final   • nRBC 06/15/2020 0.0  0.0 - 0.2 /100 WBC Final     Lab Results   Component Value Date    HGBA1C 6.55 (H) 06/15/2020    HGBA1C 7.01 (H) 12/16/2019    HGBA1C 6.70 (H) 07/16/2019     Lab Results   Component Value Date    MICROALBUR 4.8 01/21/2019    CREATININE 0.86 06/15/2020     Imaging Results (Most Recent)     None                Assessment and Plan:    Olga was seen today for diabetes.    Diagnoses and all orders for this visit:    Mixed hyperlipidemia  -     Hemoglobin A1c; Future  -      "Creatinine, Serum; Future  -     eGFR-Glomerular Filtration; Future  -     Lipid Panel; Future  -     Microalbumin / Creatinine Urine Ratio - Urine, Clean Catch; Future  -     TSH; Future  -     T4, Free; Future  -     Vitamin B12 & Folate; Future    Long-term insulin use (CMS/[facility])  -     Hemoglobin A1c; Future  -     Creatinine, Serum; Future  -     eGFR-Glomerular Filtration; Future  -     Lipid Panel; Future  -     Microalbumin / Creatinine Urine Ratio - Urine, Clean Catch; Future  -     TSH; Future  -     T4, Free; Future  -     Vitamin B12 & Folate; Future    DM (diabetes mellitus), type 1, uncontrolled w/neurologic complication (CMS/[facility])  -     Hemoglobin A1c; Future  -     Creatinine, Serum; Future  -     eGFR-Glomerular Filtration; Future  -     Lipid Panel; Future  -     Microalbumin / Creatinine Urine Ratio - Urine, Clean Catch; Future  -     TSH; Future  -     T4, Free; Future  -     Vitamin B12 & Folate; Future    Acquired hypothyroidism  -     Hemoglobin A1c; Future  -     Creatinine, Serum; Future  -     eGFR-Glomerular Filtration; Future  -     Lipid Panel; Future  -     Microalbumin / Creatinine Urine Ratio - Urine, Clean Catch; Future  -     TSH; Future  -     T4, Free; Future  -     Vitamin B12 & Folate; Future      Type 1 diabetes mellitus-uncontrolled, complicated with extremely variable blood sugars  Continue the current insulin pump settings  Downloaded the insulin pump and the sensor information and reviewed the data with the patient    Hyperlipidemia  Continue Lipitor    Hypothyroidism  Continue levothyroxine 88 mcg oral daily.    Reviewed Lab results with the patient.           Angi Rogers MD  06/22/20    EMR Dragon / transcription disclaimer:     \"Dictated utilizing Dragon dictation\".      "

## 2020-06-23 RX ORDER — PROCHLORPERAZINE 25 MG/1
1 SUPPOSITORY RECTAL
Qty: 3 EACH | Refills: 3 | Status: SHIPPED | OUTPATIENT
Start: 2020-06-23 | End: 2020-07-06 | Stop reason: SDUPTHER

## 2020-06-23 RX ORDER — PROCHLORPERAZINE 25 MG/1
SUPPOSITORY RECTAL
Qty: 9 EACH | Refills: 3 | Status: SHIPPED | OUTPATIENT
Start: 2020-06-23 | End: 2020-07-06 | Stop reason: SDUPTHER

## 2020-06-23 NOTE — TELEPHONE ENCOUNTER
PATIENT WANTED HER DEXCOM SUPPLIES TO BE SENT TO [facility]'S PHARMACY THAT IS LISTED IN CHART

## 2020-07-01 ENCOUNTER — PREP FOR SURGERY (OUTPATIENT)
Dept: OTHER | Facility: HOSPITAL | Age: 75
End: 2020-07-01

## 2020-07-01 ENCOUNTER — OFFICE VISIT (OUTPATIENT)
Dept: GASTROENTEROLOGY | Facility: CLINIC | Age: 75
End: 2020-07-01

## 2020-07-01 VITALS — WEIGHT: 131.6 LBS | TEMPERATURE: 98.2 F | HEIGHT: 63 IN | BODY MASS INDEX: 23.32 KG/M2

## 2020-07-01 DIAGNOSIS — Z86.010 PERSONAL HISTORY OF COLONIC POLYPS: ICD-10-CM

## 2020-07-01 DIAGNOSIS — Z12.11 ENCOUNTER FOR SCREENING FOR MALIGNANT NEOPLASM OF COLON: Primary | ICD-10-CM

## 2020-07-01 DIAGNOSIS — Z12.11 ENCOUNTER FOR SCREENING FOR MALIGNANT NEOPLASM OF COLON: ICD-10-CM

## 2020-07-01 DIAGNOSIS — K21.9 GERD WITHOUT ESOPHAGITIS: ICD-10-CM

## 2020-07-01 DIAGNOSIS — R74.01 ELEVATED TRANSAMINASE LEVEL: ICD-10-CM

## 2020-07-01 DIAGNOSIS — K58.0 IRRITABLE BOWEL SYNDROME WITH DIARRHEA: Primary | ICD-10-CM

## 2020-07-01 PROCEDURE — 99214 OFFICE O/P EST MOD 30 MIN: CPT | Performed by: NURSE PRACTITIONER

## 2020-07-01 NOTE — PROGRESS NOTES
PATIENT INFORMATION  Olga Greco       - 1945    CHIEF COMPLAINT  Chief Complaint   Patient presents with   • Follow-up     6 week follow up on IBS-D       HISTORY OF PRESENT ILLNESS  F/u on last visit with Dr. Fallon: Is doing well wrt Covid 19 , But has IBS symptoms for Decades  Moves bowels 0-6 and will not skip more than one day in a row. No Abx this year      Has hand surgery for a trigger thumb this march. So probably one dose of Abx then      She has been trying a gluten free diet w/o any change. She uses one imodium frequently and rarely will take two and sometimes it doesn't help much.NO real nocturnal issues but occasionally urgent BMs in the AM.     Feels she got worse after her Cholecystectomy but was never treated for bile acid diarrhea nd also doesn't     Hx ibs-d improved on colestid is taking 2 tabs after dinner and is having a normal colon,  hx colon polyps did cologuard 19 negative but discussed the risks especially with personal hx of polyps and strongly urged to consider colonoscopy screening, gerd improved with diet.          REVIEW OF SYSTEMS  Review of Systems   Gastrointestinal: Positive for diarrhea.   All other systems reviewed and are negative.        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 [C44.621]   • Encounter for screening for malignant neoplasm of colon [Z12.11]   • Heart palpitations [R00.2]   • Diabetic ketoacidosis without coma associated with type 1 diabetes mellitus (CMS/HCC) [E10.10]   • Type 1 diabetes mellitus (CMS/HCC) [E10.9]   • Gastroesophageal reflux disease  [K21.9]   • Hypertension [I10]   • Hypothyroidism [E03.9]   • Menopausal symptom [N95.1]   • Vitamin D deficiency [E55.9]         PAST MEDICAL HISTORY  Past Medical History:   Diagnosis Date   • Colon polyp    • Diabetes mellitus (CMS/HCC)    • Hypertension    • Hypothyroidism    • Type 2 diabetes mellitus (CMS/HCC) 04/2018         SURGICAL HISTORY  Past Surgical History:   Procedure Laterality Date   • BREAST BIOPSY Left    • CHOLECYSTECTOMY     • COLONOSCOPY     • HYSTERECTOMY     • TONSILLECTOMY     • TUBAL ABDOMINAL LIGATION           FAMILY HISTORY  Family History   Problem Relation Age of Onset   • Heart disease Father    • Diabetes Sister    • Hypertension Mother    • Breast cancer Neg Hx    • Colon cancer Neg Hx    • Colon polyps Neg Hx          SOCIAL HISTORY  Social History     Occupational History   • Not on file   Tobacco Use   • Smoking status: Never Smoker   • Smokeless tobacco: Never Used   Substance and Sexual Activity   • Alcohol use: Yes     Alcohol/week: 2.0 standard drinks     Types: 2 Glasses of wine per week     Comment: daily   • Drug use: No   • Sexual activity: Yes     Partners: Male         CURRENT MEDICATIONS    Current Outpatient Medications:   •  ACCU-CHEK SOFTCLIX LANCETS lancets, USE TO TEST BLOOD SUGAR 4 TIMES DAILY, Disp: 200 each, Rfl: 5  •  atorvastatin (LIPITOR) 20 MG tablet, TAKE ONE TABLET BY MOUTH DAILY, Disp: 90 tablet, Rfl: 2  •  B Complex Vitamins (VITAMIN B COMPLEX) capsule capsule, Take 1 capsule by mouth Daily., Disp: , Rfl:   •  benazepril-hydrochlorthiazide (LOTENSIN HCT) 20-12.5 MG per tablet, Take 1 tablet by mouth Daily., Disp: 90 tablet, Rfl: 1  •  Biotin 82549 MCG tablet, Take 1 tablet by mouth Daily., Disp: , Rfl:   •  Blood Glucose Monitoring Suppl (ACCU-CHEK RICHARD SMARTVIEW) w/Device kit, Use to check blood sugar 4 times daily  E10.9, Disp: 1 kit, Rfl: 0  •  Calcium Carb-Cholecalciferol (CALCIUM 600+D3 PO), Take 500 Units by mouth Daily., Disp: , Rfl:   •   "Chromium Picolinate 1000 MCG tablet, Take 1,000 mcg by mouth daily., Disp: , Rfl:   •  colestipol (COLESTID) 1 g tablet, Take 2 tablets by mouth Every Night., Disp: 60 tablet, Rfl: 11  •  Continuous Blood Gluc Sensor (DEXCOM G6 SENSOR), Every 10 (Ten) Days. e10.65, Disp: 9 each, Rfl: 3  •  Continuous Blood Gluc Transmit (DEXCOM G6 TRANSMITTER) misc, 1 package Every 3 (Three) Months. e10.65, Disp: 3 each, Rfl: 3  •  diclofenac (VOLTAREN) 1 % gel gel, Apply 4 g topically to the appropriate area as directed 4 (Four) Times a Day., Disp: 100 g, Rfl: 5  •  glucagon (GLUCAGON EMERGENCY) 1 MG injection, Inject 1 mg under the skin into the appropriate area as directed 1 (One) Time As Needed for Low Blood Sugar for up to 1 dose., Disp: 1 kit, Rfl: 12  •  NOVOLOG 100 UNIT/ML injection, INJECT 50 UNITS DAILY VIA INSULIN PUMP  E10.65, Disp: 50 mL, Rfl: 3  •  Probiotic Product (PROBIOTIC DAILY PO), Take  by mouth., Disp: , Rfl:   •  SYNTHROID 88 MCG tablet, Take 1 tablet by mouth Daily., Disp: 90 tablet, Rfl: 3    ALLERGIES  Aspirin and Epinephrine    VITALS  Vitals:    07/01/20 1000   Temp: 98.2 °F (36.8 °C)   TempSrc: Temporal   Weight: 59.7 kg (131 lb 9.6 oz)   Height: 160 cm (62.99\")       LAST RESULTS   Lab on 06/15/2020   Component Date Value Ref Range Status   • Folate 06/15/2020 >20.00  4.78 - 24.20 ng/mL Final   • 25 Hydroxy, Vitamin D 06/15/2020 47.5  30.0 - 100.0 ng/ml Final   • Total Cholesterol 06/15/2020 135  0 - 200 mg/dL Final   • Triglycerides 06/15/2020 38  0 - 150 mg/dL Final   • HDL Cholesterol 06/15/2020 94* 40 - 60 mg/dL Final   • LDL Cholesterol  06/15/2020 33  0 - 100 mg/dL Final   • VLDL Cholesterol 06/15/2020 7.6  5 - 40 mg/dL Final   • Chol/HDL Ratio 06/15/2020 1.44   Final   • Vitamin B-12 06/15/2020 367  211 - 946 pg/mL Final   • Free T4 06/15/2020 1.46  0.93 - 1.70 ng/dL Final   • TSH 06/15/2020 1.470  0.270 - 4.200 uIU/mL Final   • Hemoglobin A1C 06/15/2020 6.55* 4.80 - 5.60 % Final   • Glucose " 06/15/2020 134* 65 - 99 mg/dL Final   • BUN 06/15/2020 16  8 - 23 mg/dL Final   • Creatinine 06/15/2020 0.86  0.57 - 1.00 mg/dL Final   • Sodium 06/15/2020 139  136 - 145 mmol/L Final   • Potassium 06/15/2020 4.4  3.5 - 5.2 mmol/L Final   • Chloride 06/15/2020 103  98 - 107 mmol/L Final   • CO2 06/15/2020 26.4  22.0 - 29.0 mmol/L Final   • Calcium 06/15/2020 9.2  8.6 - 10.5 mg/dL Final   • Total Protein 06/15/2020 6.3  6.0 - 8.5 g/dL Final   • Albumin 06/15/2020 4.50  3.50 - 5.20 g/dL Final   • ALT (SGPT) 06/15/2020 25  1 - 33 U/L Final   • AST (SGOT) 06/15/2020 25  1 - 32 U/L Final   • Alkaline Phosphatase 06/15/2020 56  39 - 117 U/L Final   • Total Bilirubin 06/15/2020 0.7  0.2 - 1.2 mg/dL Final   • eGFR Non African Amer 06/15/2020 65  >60 mL/min/1.73 Final   • Globulin 06/15/2020 1.8  gm/dL Final   • A/G Ratio 06/15/2020 2.5  g/dL Final   • BUN/Creatinine Ratio 06/15/2020 18.6  7.0 - 25.0 Final   • Anion Gap 06/15/2020 9.6  5.0 - 15.0 mmol/L Final   • WBC 06/15/2020 4.17  3.40 - 10.80 10*3/mm3 Final   • RBC 06/15/2020 3.99  3.77 - 5.28 10*6/mm3 Final   • Hemoglobin 06/15/2020 13.0  12.0 - 15.9 g/dL Final   • Hematocrit 06/15/2020 38.3  34.0 - 46.6 % Final   • MCV 06/15/2020 96.0  79.0 - 97.0 fL Final   • MCH 06/15/2020 32.6  26.6 - 33.0 pg Final   • MCHC 06/15/2020 33.9  31.5 - 35.7 g/dL Final   • RDW 06/15/2020 11.8* 12.3 - 15.4 % Final   • RDW-SD 06/15/2020 41.3  37.0 - 54.0 fl Final   • MPV 06/15/2020 9.9  6.0 - 12.0 fL Final   • Platelets 06/15/2020 198  140 - 450 10*3/mm3 Final   • Neutrophil % 06/15/2020 46.4  42.7 - 76.0 % Final   • Lymphocyte % 06/15/2020 38.1  19.6 - 45.3 % Final   • Monocyte % 06/15/2020 8.6  5.0 - 12.0 % Final   • Eosinophil % 06/15/2020 6.0  0.3 - 6.2 % Final   • Basophil % 06/15/2020 0.7  0.0 - 1.5 % Final   • Immature Grans % 06/15/2020 0.2  0.0 - 0.5 % Final   • Neutrophils, Absolute 06/15/2020 1.93  1.70 - 7.00 10*3/mm3 Final   • Lymphocytes, Absolute 06/15/2020 1.59  0.70 - 3.10  "10*3/mm3 Final   • Monocytes, Absolute 06/15/2020 0.36  0.10 - 0.90 10*3/mm3 Final   • Eosinophils, Absolute 06/15/2020 0.25  0.00 - 0.40 10*3/mm3 Final   • Basophils, Absolute 06/15/2020 0.03  0.00 - 0.20 10*3/mm3 Final   • Immature Grans, Absolute 06/15/2020 0.01  0.00 - 0.05 10*3/mm3 Final   • nRBC 06/15/2020 0.0  0.0 - 0.2 /100 WBC Final     No results found.    PHYSICAL EXAM  Debilities/Disabilities Identified: None  Emotional Behavior: Appropriate  Wt Readings from Last 3 Encounters:   07/01/20 59.7 kg (131 lb 9.6 oz)   06/22/20 60.1 kg (132 lb 6.4 oz)   06/18/20 59.2 kg (130 lb 9.6 oz)     Ht Readings from Last 1 Encounters:   07/01/20 160 cm (62.99\")     Body mass index is 23.32 kg/m².  Physical Exam   Constitutional: She is oriented to person, place, and time. She appears well-developed and well-nourished.   HENT:   Head: Normocephalic and atraumatic.   Eyes: Pupils are equal, round, and reactive to light. Conjunctivae are normal.   Neck: Normal range of motion. Neck supple.   Cardiovascular: Normal rate and regular rhythm.   Pulmonary/Chest: Effort normal and breath sounds normal.   Abdominal: Soft. Bowel sounds are normal. She exhibits no distension.   Musculoskeletal: Normal range of motion.   Lymphadenopathy:     She has no cervical adenopathy.   Neurological: She is alert and oriented to person, place, and time.   Skin: Skin is warm and dry.   Psychiatric: She has a normal mood and affect. Her behavior is normal.   Nursing note and vitals reviewed.      CLINICAL DATA REVIEWED   reviewed previous lab results and integrated with today's visit, reviewed notes from other physicians and/or last GI encounter, reviewed previous endoscopy results and available photos    ASSESSMENT  Diagnoses and all orders for this visit:    Irritable bowel syndrome with diarrhea    Elevated transaminase level    GERD without esophagitis    Personal history of colonic polyps    Encounter for screening for malignant neoplasm of " colon  -     Case Request; Standing  -     Follow Anesthesia Guidelines / Protocol; Future  -     Obtain Informed Consent; Future  -     Obtain informed consent; Standing  -     Verify bowel prep was successful; Standing  -     Give tap water enema if bowel prep was insufficient; Standing  -     Case Request          PLAN  Return if symptoms worsen or fail to improve.     LFTs completely normal on June labs, resolved and is maintaining healthy bmi.     Hx ibs-d improved continue colestid , hx colon polyps did cologuard 2/5/19 negative but discussed the risks especially with personal hx of polyps and strongly urged to consider colonoscopy screening and after discussing she would like to get scheduled, gerd improved with diet let us know if symptoms arise.      I have discussed the above plan with the patient.  They verbalize understanding and are in agreement with the plan.  They have been advised to contact the office for any questions, concerns, or changes related to their health.

## 2020-07-06 RX ORDER — PROCHLORPERAZINE 25 MG/1
SUPPOSITORY RECTAL
Qty: 9 EACH | Refills: 3 | Status: SHIPPED | OUTPATIENT
Start: 2020-07-06

## 2020-07-06 RX ORDER — PROCHLORPERAZINE 25 MG/1
1 SUPPOSITORY RECTAL
Qty: 3 EACH | Refills: 3 | Status: SHIPPED | OUTPATIENT
Start: 2020-07-06

## 2020-07-06 NOTE — TELEPHONE ENCOUNTER
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

## 2020-07-27 ENCOUNTER — TELEPHONE (OUTPATIENT)
Dept: GASTROENTEROLOGY | Facility: CLINIC | Age: 75
End: 2020-07-27

## 2020-07-27 NOTE — TELEPHONE ENCOUNTER
PATIENT CALLED AND LEFT MESSAGE ON MY VOICE MAIL TO RESCHEDULE HER PROCEDURE ON 08/12/2020. PLEASE CALL.

## 2020-07-27 NOTE — TELEPHONE ENCOUNTER
CALLED AND SPOKE WITH PATIENT.  SHE WAS TO GO TO [place] THIS TIME.  RESCHEDULED TO [place] 11/02/2020 AT 10:30AM - ARRIVE 9:30AM.  WILL E-MAIL NEW INSTRUCTIONS [email or web address].

## 2020-07-28 RX ORDER — LEVOTHYROXINE SODIUM 88 MCG
TABLET ORAL
Qty: 90 TABLET | Refills: 2 | Status: SHIPPED | OUTPATIENT
Start: 2020-07-28 | End: 2021-07-15

## 2020-09-20 ENCOUNTER — RESULTS ENCOUNTER (OUTPATIENT)
Dept: ENDOCRINOLOGY | Age: 75
End: 2020-09-20

## 2020-09-20 DIAGNOSIS — E78.2 MIXED HYPERLIPIDEMIA: ICD-10-CM

## 2020-09-20 DIAGNOSIS — Z79.4 LONG-TERM INSULIN USE (HCC): ICD-10-CM

## 2020-09-20 DIAGNOSIS — E03.9 ACQUIRED HYPOTHYROIDISM: ICD-10-CM

## 2020-09-20 DIAGNOSIS — IMO0002 DM (DIABETES MELLITUS), TYPE 1, UNCONTROLLED W/NEUROLOGIC COMPLICATION: ICD-10-CM

## 2020-09-25 ENCOUNTER — LAB (OUTPATIENT)
Dept: LAB | Facility: HOSPITAL | Age: 75
End: 2020-09-25

## 2020-09-25 LAB
ALBUMIN UR-MCNC: <1.2 MG/DL
CHOLEST SERPL-MCNC: 137 MG/DL (ref 0–200)
CREAT SERPL-MCNC: 0.87 MG/DL (ref 0.57–1)
CREAT UR-MCNC: 132.7 MG/DL
FOLATE SERPL-MCNC: >20 NG/ML (ref 4.78–24.2)
GFR SERPL CREATININE-BSD FRML MDRD: 64 ML/MIN/1.73
HBA1C MFR BLD: 6.81 % (ref 4.8–5.6)
HDLC SERPL-MCNC: 95 MG/DL (ref 40–60)
LDLC SERPL CALC-MCNC: 34 MG/DL (ref 0–100)
LDLC/HDLC SERPL: 0.35 {RATIO}
MICROALBUMIN/CREAT UR: NORMAL MG/G{CREAT}
T4 FREE SERPL-MCNC: 1.64 NG/DL (ref 0.93–1.7)
TRIGL SERPL-MCNC: 42 MG/DL (ref 0–150)
TSH SERPL DL<=0.05 MIU/L-ACNC: 2.32 UIU/ML (ref 0.27–4.2)
VIT B12 BLD-MCNC: 703 PG/ML (ref 211–946)
VLDLC SERPL-MCNC: 8.4 MG/DL (ref 5–40)

## 2020-09-25 PROCEDURE — 82570 ASSAY OF URINE CREATININE: CPT | Performed by: INTERNAL MEDICINE

## 2020-09-25 PROCEDURE — 84443 ASSAY THYROID STIM HORMONE: CPT | Performed by: INTERNAL MEDICINE

## 2020-09-25 PROCEDURE — 82607 VITAMIN B-12: CPT | Performed by: INTERNAL MEDICINE

## 2020-09-25 PROCEDURE — 82746 ASSAY OF FOLIC ACID SERUM: CPT | Performed by: INTERNAL MEDICINE

## 2020-09-25 PROCEDURE — 82565 ASSAY OF CREATININE: CPT | Performed by: INTERNAL MEDICINE

## 2020-09-25 PROCEDURE — 84439 ASSAY OF FREE THYROXINE: CPT | Performed by: INTERNAL MEDICINE

## 2020-09-25 PROCEDURE — 80061 LIPID PANEL: CPT | Performed by: INTERNAL MEDICINE

## 2020-09-25 PROCEDURE — 82043 UR ALBUMIN QUANTITATIVE: CPT | Performed by: INTERNAL MEDICINE

## 2020-09-25 PROCEDURE — 83036 HEMOGLOBIN GLYCOSYLATED A1C: CPT | Performed by: INTERNAL MEDICINE

## 2020-09-25 PROCEDURE — 36415 COLL VENOUS BLD VENIPUNCTURE: CPT | Performed by: INTERNAL MEDICINE

## 2020-09-26 LAB — CREAT SERPL-MCNC: 0.76 MG/DL (ref 0.57–1)

## 2020-10-02 ENCOUNTER — OFFICE VISIT (OUTPATIENT)
Dept: ENDOCRINOLOGY | Age: 75
End: 2020-10-02

## 2020-10-02 ENCOUNTER — TELEPHONE (OUTPATIENT)
Dept: ENDOCRINOLOGY | Age: 75
End: 2020-10-02

## 2020-10-02 VITALS
HEIGHT: 64 IN | DIASTOLIC BLOOD PRESSURE: 70 MMHG | SYSTOLIC BLOOD PRESSURE: 128 MMHG | OXYGEN SATURATION: 98 % | BODY MASS INDEX: 22.4 KG/M2 | HEART RATE: 60 BPM | WEIGHT: 131.2 LBS

## 2020-10-02 DIAGNOSIS — Z79.4 LONG-TERM INSULIN USE (HCC): Primary | ICD-10-CM

## 2020-10-02 DIAGNOSIS — E03.9 ACQUIRED HYPOTHYROIDISM: ICD-10-CM

## 2020-10-02 DIAGNOSIS — IMO0002 DM (DIABETES MELLITUS), TYPE 1, UNCONTROLLED W/NEUROLOGIC COMPLICATION: ICD-10-CM

## 2020-10-02 DIAGNOSIS — E78.2 HYPERLIPEMIA, MIXED: ICD-10-CM

## 2020-10-02 PROCEDURE — 99214 OFFICE O/P EST MOD 30 MIN: CPT | Performed by: INTERNAL MEDICINE

## 2020-10-02 PROCEDURE — 95251 CONT GLUC MNTR ANALYSIS I&R: CPT | Performed by: INTERNAL MEDICINE

## 2020-10-02 NOTE — PROGRESS NOTES
75 y.o.    Patient Care Team:  Yair Zapata MD as PCP - General    Chief Complaint:    FOLLOW UP/ TYPE 1 DM   Subjective     HPI    Olga Greco 75 y.o. WF presents with Type 1 dm as a follow up patient. Consulted by Dr. Zapata.      Type 1 dm - Diagnosed in 2003 was initially thought to be type 2 dm. Was started on oral agents initially. Insulin was started on April 24th 2017.    When she came in in February 2018 she was still on oral hypoglycemic agents but due to her phenotype and family history of type 1 diabetes,  antibodies were checked and they were positive     Today in clinic patient reports that she is on the Medtronic insulin pump-630 G along with the Dexcom-G6.  Average blood sugars are around 148 mg/dL range on the Dexcom.  No significant low blood sugars noted on the Dexcom in the last 2 weeks.  She does run slightly high between 6 PM-11 PM.  No history of diabetic retinopathy  Does complain of occasional diabetic neuropathy symptoms.  No history of CAD, CK D, CVA.  She is physically active and exercises at least for 3-4 times a week.  On Ace inhibitor.  Last DKA episode was in October 2018.  Continues to check her blood sugars at least 2-3 times a day apart from the Dexcom monitoring her blood sugars     Hyperlipidemia  On Lipitor 20 mg oral daily.     Hypothyroidism    On Synthroid 88 mcg oral daily.    Reviewed primary care physician's/consulting physician documentation and lab results :     Interval History      The following portions of the patient's history were reviewed and updated as appropriate: allergies, current medications, past family history, past medical history, past social history, past surgical history and problem list.    Past Medical History:   Diagnosis Date   • Colon polyp    • Diabetes mellitus (CMS/HCC)    • Hypertension    • Hypothyroidism    • Type 2 diabetes mellitus (CMS/HCC) 04/2018     Family History   Problem Relation Age of Onset   • Heart disease Father     • Diabetes Sister    • Hypertension Mother    • Breast cancer Neg Hx    • Colon cancer Neg Hx    • Colon polyps Neg Hx      Social History     Socioeconomic History   • Marital status:      Spouse name: Not on file   • Number of children: Not on file   • Years of education: Not on file   • Highest education level: Not on file   Tobacco Use   • Smoking status: Never Smoker   • Smokeless tobacco: Never Used   Substance and Sexual Activity   • Alcohol use: Yes     Alcohol/week: 2.0 standard drinks     Types: 2 Glasses of wine per week     Comment: daily   • Drug use: No   • Sexual activity: Yes     Partners: Male     Allergies   Allergen Reactions   • Aspirin GI Bleeding     GI distress   • Epinephrine Unknown - High Severity     Tachycardia       Current Outpatient Medications:   •  ACCU-CHEK SOFTCLIX LANCETS lancets, USE TO TEST BLOOD SUGAR 4 TIMES DAILY, Disp: 200 each, Rfl: 5  •  atorvastatin (LIPITOR) 20 MG tablet, TAKE ONE TABLET BY MOUTH DAILY, Disp: 90 tablet, Rfl: 2  •  B Complex Vitamins (VITAMIN B COMPLEX) capsule capsule, Take 1 capsule by mouth Daily., Disp: , Rfl:   •  benazepril-hydrochlorthiazide (LOTENSIN HCT) 20-12.5 MG per tablet, Take 1 tablet by mouth Daily., Disp: 90 tablet, Rfl: 1  •  Biotin 70497 MCG tablet, Take 1 tablet by mouth Daily., Disp: , Rfl:   •  Blood Glucose Monitoring Suppl (ACCU-CHEK RICHARD SMARTVIEW) w/Device kit, Use to check blood sugar 4 times daily  E10.9, Disp: 1 kit, Rfl: 0  •  Calcium Carb-Cholecalciferol (CALCIUM 600+D3 PO), Take 500 Units by mouth Daily., Disp: , Rfl:   •  Chromium Picolinate 1000 MCG tablet, Take 1,000 mcg by mouth daily., Disp: , Rfl:   •  colestipol (COLESTID) 1 g tablet, Take 2 tablets by mouth Every Night., Disp: 60 tablet, Rfl: 11  •  Continuous Blood Gluc Sensor (DEXCOM G6 SENSOR), Every 10 (Ten) Days. e10.65, Disp: 9 each, Rfl: 3  •  Continuous Blood Gluc Transmit (DEXCOM G6 TRANSMITTER) misc, 1 package Every 3 (Three) Months. e10.65, Disp:  "3 each, Rfl: 3  •  diclofenac (VOLTAREN) 1 % gel gel, Apply 4 g topically to the appropriate area as directed 4 (Four) Times a Day., Disp: 100 g, Rfl: 5  •  glucagon (GLUCAGON EMERGENCY) 1 MG injection, Inject 1 mg under the skin into the appropriate area as directed 1 (One) Time As Needed for Low Blood Sugar for up to 1 dose., Disp: 1 kit, Rfl: 12  •  NOVOLOG 100 UNIT/ML injection, INJECT 50 UNITS DAILY VIA INSULIN PUMP  E10.65, Disp: 50 mL, Rfl: 3  •  Probiotic Product (PROBIOTIC DAILY PO), Take  by mouth., Disp: , Rfl:   •  SYNTHROID 88 MCG tablet, TAKE 1 TABLET BY MOUTH EVERY DAY, Disp: 90 tablet, Rfl: 2        Review of Systems   Constitutional: Negative for appetite change, fatigue and fever.   Eyes: Negative for visual disturbance.   Respiratory: Negative for shortness of breath.    Cardiovascular: Negative for palpitations and leg swelling.   Gastrointestinal: Negative for abdominal pain and vomiting.   Endocrine: Negative for polydipsia and polyuria.   Musculoskeletal: Negative for joint swelling and neck pain.   Skin: Negative for rash.   Neurological: Negative for weakness and numbness.   Psychiatric/Behavioral: Negative for behavioral problems.     I have reviewed the ROS as documented by the MA; Angi Rogers MD.      Objective       Vitals:    10/02/20 1222   BP: 128/70   Pulse: 60   SpO2: 98%   Weight: 59.5 kg (131 lb 3.2 oz)   Height: 162.6 cm (64\")     Body mass index is 22.52 kg/m².      Physical Exam  Vitals signs reviewed.   Constitutional:       Appearance: She is not diaphoretic.   HENT:      Head: Normocephalic and atraumatic.   Eyes:      General: No scleral icterus.     Conjunctiva/sclera: Conjunctivae normal.   Neck:      Musculoskeletal: Normal range of motion and neck supple.      Thyroid: No thyromegaly.   Cardiovascular:      Rate and Rhythm: Normal rate.      Heart sounds: Normal heart sounds. No murmur. No friction rub.   Pulmonary:      Effort: Pulmonary effort is normal.      " Breath sounds: Normal breath sounds. No stridor. No wheezing or rales.   Abdominal:      General: Bowel sounds are normal. There is no distension.      Palpations: Abdomen is soft.      Tenderness: There is no abdominal tenderness.   Musculoskeletal:         General: No tenderness.   Lymphadenopathy:      Cervical: No cervical adenopathy.   Skin:     General: Skin is warm and dry.   Neurological:      Mental Status: She is alert and oriented to person, place, and time.         Results Review:     I reviewed the patient's new clinical results and mentioned them above in HPI and in plan as well.    Medical records reviewed  Summary:Done      Results Encounter on 09/20/2020   Component Date Value Ref Range Status   • Hemoglobin A1C 09/25/2020 6.81* 4.80 - 5.60 % Final   • Creatinine 09/25/2020 0.87  0.57 - 1.00 mg/dL Final   • eGFR Non African Amer 09/25/2020 64  >60 mL/min/1.73 Final   • Creatinine 09/25/2020 0.76  0.57 - 1.00 mg/dL Final   • eGFR Non African Am 09/25/2020 78  >59 mL/min/1.73 Final   • eGFR African Am 09/25/2020 90  >59 mL/min/1.73 Final   • Total Cholesterol 09/25/2020 137  0 - 200 mg/dL Final   • Triglycerides 09/25/2020 42  0 - 150 mg/dL Final   • HDL Cholesterol 09/25/2020 95* 40 - 60 mg/dL Final   • LDL Cholesterol  09/25/2020 34  0 - 100 mg/dL Final   • VLDL Cholesterol 09/25/2020 8.4  5 - 40 mg/dL Final   • LDL/HDL Ratio 09/25/2020 0.35   Final   • Microalbumin/Creatinine Ratio 09/25/2020    Final    Unable to calculate   • Creatinine, Urine 09/25/2020 132.7  mg/dL Final   • Microalbumin, Urine 09/25/2020 <1.2  mg/dL Final   • TSH 09/25/2020 2.320  0.270 - 4.200 uIU/mL Final   • Free T4 09/25/2020 1.64  0.93 - 1.70 ng/dL Final   • Folate 09/25/2020 >20.00  4.78 - 24.20 ng/mL Final   • Vitamin B-12 09/25/2020 703  211 - 946 pg/mL Final     Lab Results   Component Value Date    HGBA1C 6.81 (H) 09/25/2020    HGBA1C 6.55 (H) 06/15/2020    HGBA1C 7.01 (H) 12/16/2019     Lab Results   Component Value  Date    MICROALBUR <1.2 09/25/2020    CREATININE 0.87 09/25/2020    CREATININE 0.76 09/25/2020     Imaging Results (Most Recent)     None                Assessment and Plan:    Olga was seen today for diabetes.    Diagnoses and all orders for this visit:    Long-term insulin use (CMS/[facility])  -     Hemoglobin A1c; Future  -     Creatinine, Serum; Future  -     eGFR-Glomerular Filtration; Future  -     Lipid Panel; Future  -     Microalbumin / Creatinine Urine Ratio - Urine, Clean Catch; Future  -     TSH; Future  -     T4, Free; Future  -     Hemoglobin A1c; Future  -     eGFR-Glomerular Filtration; Future  -     Creatinine, Serum; Future  -     Lipid Panel; Future  -     TSH; Future  -     T4, Free; Future    DM (diabetes mellitus), type 1, uncontrolled w/neurologic complication (CMS/[facility])  -     Hemoglobin A1c; Future  -     Creatinine, Serum; Future  -     eGFR-Glomerular Filtration; Future  -     Lipid Panel; Future  -     Microalbumin / Creatinine Urine Ratio - Urine, Clean Catch; Future  -     TSH; Future  -     T4, Free; Future  -     Hemoglobin A1c; Future  -     eGFR-Glomerular Filtration; Future  -     Creatinine, Serum; Future  -     Lipid Panel; Future  -     TSH; Future  -     T4, Free; Future    Hyperlipemia, mixed  -     Hemoglobin A1c; Future  -     Creatinine, Serum; Future  -     eGFR-Glomerular Filtration; Future  -     Lipid Panel; Future  -     Microalbumin / Creatinine Urine Ratio - Urine, Clean Catch; Future  -     TSH; Future  -     T4, Free; Future  -     Hemoglobin A1c; Future  -     eGFR-Glomerular Filtration; Future  -     Creatinine, Serum; Future  -     Lipid Panel; Future  -     TSH; Future  -     T4, Free; Future    Acquired hypothyroidism  -     Hemoglobin A1c; Future  -     Creatinine, Serum; Future  -     eGFR-Glomerular Filtration; Future  -     Lipid Panel; Future  -     Microalbumin / Creatinine Urine Ratio - Urine, Clean Catch; Future  -     TSH; Future  -     T4, Free; Future  -     " Hemoglobin A1c; Future  -     eGFR-Glomerular Filtration; Future  -     Creatinine, Serum; Future  -     Lipid Panel; Future  -     TSH; Future  -     T4, Free; Future      Type 1 diabetes mellitus-extremely variable  Patient is brittle with low blood sugars.  Continue to monitor the blood sugar trends on the Dexcom.    Medtronic was not downloaded today because she recently changed her pump on the day of the office visit.  CGM was downloaded, reviewed with the patient and the below pump changes have been recommended.    Basal settings  5 PM-12 AM-0.6 --> 0.5   Bolus settings  6 PM-12 AM-11 g --> 10     Patient would like to do one change at a time as she is worried about the low blood sugars.    Hypothyroidism  Continue levothyroxine    Hyperlipidemia  Continue statin    Patient will be seen by the nurse practitioner-Evan Bella - in 3 months  Patient to follow-up with me after that for the next upcoming visit      Reviewed Lab results with the patient.               Angi Rogers MD  10/02/20    EM[facility] Dragon / transcription disclaimer:     \"Dictated utilizing Dragon dictation\".      "

## 2020-10-26 ENCOUNTER — TRANSCRIBE ORDERS (OUTPATIENT)
Dept: ADMINISTRATIVE | Facility: HOSPITAL | Age: 75
End: 2020-10-26

## 2020-10-26 ENCOUNTER — PREP FOR SURGERY (OUTPATIENT)
Dept: OTHER | Facility: HOSPITAL | Age: 75
End: 2020-10-26

## 2020-10-26 DIAGNOSIS — U07.1 COVID-19: Primary | ICD-10-CM

## 2020-10-26 DIAGNOSIS — Z12.11 ENCOUNTER FOR SCREENING FOR MALIGNANT NEOPLASM OF COLON: Primary | ICD-10-CM

## 2020-10-30 ENCOUNTER — LAB (OUTPATIENT)
Dept: LAB | Facility: HOSPITAL | Age: 75
End: 2020-10-30

## 2020-10-30 ENCOUNTER — ANESTHESIA EVENT (OUTPATIENT)
Dept: PERIOP | Facility: HOSPITAL | Age: 75
End: 2020-10-30

## 2020-10-30 DIAGNOSIS — U07.1 COVID-19: ICD-10-CM

## 2020-10-30 PROCEDURE — U0004 COV-19 TEST NON-CDC HGH THRU: HCPCS | Performed by: OBSTETRICS & GYNECOLOGY

## 2020-10-30 PROCEDURE — C9803 HOPD COVID-19 SPEC COLLECT: HCPCS

## 2020-10-31 LAB — SARS-COV-2 RNA RESP QL NAA+PROBE: NOT DETECTED

## 2020-11-02 ENCOUNTER — HOSPITAL ENCOUNTER (OUTPATIENT)
Facility: HOSPITAL | Age: 75
Setting detail: HOSPITAL OUTPATIENT SURGERY
Discharge: HOME | End: 2020-11-02
Attending: INTERNAL MEDICINE | Admitting: INTERNAL MEDICINE

## 2020-11-02 ENCOUNTER — ANESTHESIA (OUTPATIENT)
Dept: PERIOP | Facility: HOSPITAL | Age: 75
End: 2020-11-02

## 2020-11-02 VITALS
BODY MASS INDEX: 21.7 KG/M2 | HEART RATE: 90 BPM | OXYGEN SATURATION: 98 % | RESPIRATION RATE: 20 BRPM | TEMPERATURE: 97.4 F | DIASTOLIC BLOOD PRESSURE: 71 MMHG | SYSTOLIC BLOOD PRESSURE: 139 MMHG | WEIGHT: 126.4 LBS

## 2020-11-02 DIAGNOSIS — Z12.11 ENCOUNTER FOR SCREENING FOR MALIGNANT NEOPLASM OF COLON: ICD-10-CM

## 2020-11-02 LAB — GLUCOSE BLDC GLUCOMTR-MCNC: 113 MG/DL (ref 70–130)

## 2020-11-02 PROCEDURE — 88305 TISSUE EXAM BY PATHOLOGIST: CPT | Performed by: INTERNAL MEDICINE

## 2020-11-02 PROCEDURE — 25010000002 PROPOFOL 10 MG/ML EMULSION: Performed by: NURSE ANESTHETIST, CERTIFIED REGISTERED

## 2020-11-02 PROCEDURE — 45380 COLONOSCOPY AND BIOPSY: CPT | Performed by: INTERNAL MEDICINE

## 2020-11-02 PROCEDURE — 82962 GLUCOSE BLOOD TEST: CPT

## 2020-11-02 RX ORDER — SODIUM CHLORIDE 9 MG/ML
40 INJECTION, SOLUTION INTRAVENOUS AS NEEDED
Status: DISCONTINUED | OUTPATIENT
Start: 2020-11-02 | End: 2020-11-02 | Stop reason: HOSPADM

## 2020-11-02 RX ORDER — LIDOCAINE HYDROCHLORIDE 10 MG/ML
0.5 INJECTION, SOLUTION EPIDURAL; INFILTRATION; INTRACAUDAL; PERINEURAL ONCE AS NEEDED
Status: DISCONTINUED | OUTPATIENT
Start: 2020-11-02 | End: 2020-11-02 | Stop reason: HOSPADM

## 2020-11-02 RX ORDER — SODIUM CHLORIDE 0.9 % (FLUSH) 0.9 %
10 SYRINGE (ML) INJECTION AS NEEDED
Status: DISCONTINUED | OUTPATIENT
Start: 2020-11-02 | End: 2020-11-02 | Stop reason: HOSPADM

## 2020-11-02 RX ORDER — SODIUM CHLORIDE 0.9 % (FLUSH) 0.9 %
10 SYRINGE (ML) INJECTION EVERY 12 HOURS SCHEDULED
Status: DISCONTINUED | OUTPATIENT
Start: 2020-11-02 | End: 2020-11-02 | Stop reason: HOSPADM

## 2020-11-02 RX ORDER — LIDOCAINE HYDROCHLORIDE 20 MG/ML
INJECTION, SOLUTION INFILTRATION; PERINEURAL AS NEEDED
Status: DISCONTINUED | OUTPATIENT
Start: 2020-11-02 | End: 2020-11-02 | Stop reason: SURG

## 2020-11-02 RX ORDER — PROPOFOL 10 MG/ML
VIAL (ML) INTRAVENOUS AS NEEDED
Status: DISCONTINUED | OUTPATIENT
Start: 2020-11-02 | End: 2020-11-02 | Stop reason: SURG

## 2020-11-02 RX ORDER — SODIUM CHLORIDE, SODIUM LACTATE, POTASSIUM CHLORIDE, CALCIUM CHLORIDE 600; 310; 30; 20 MG/100ML; MG/100ML; MG/100ML; MG/100ML
9 INJECTION, SOLUTION INTRAVENOUS CONTINUOUS
Status: DISCONTINUED | OUTPATIENT
Start: 2020-11-02 | End: 2020-11-02 | Stop reason: HOSPADM

## 2020-11-02 RX ORDER — ONDANSETRON 2 MG/ML
4 INJECTION INTRAMUSCULAR; INTRAVENOUS ONCE AS NEEDED
Status: DISCONTINUED | OUTPATIENT
Start: 2020-11-02 | End: 2020-11-02 | Stop reason: HOSPADM

## 2020-11-02 RX ORDER — SODIUM CHLORIDE, SODIUM LACTATE, POTASSIUM CHLORIDE, CALCIUM CHLORIDE 600; 310; 30; 20 MG/100ML; MG/100ML; MG/100ML; MG/100ML
100 INJECTION, SOLUTION INTRAVENOUS CONTINUOUS
Status: DISCONTINUED | OUTPATIENT
Start: 2020-11-02 | End: 2020-11-02 | Stop reason: HOSPADM

## 2020-11-02 RX ADMIN — LIDOCAINE HYDROCHLORIDE 100 MG: 20 INJECTION, SOLUTION INFILTRATION; PERINEURAL at 10:13

## 2020-11-02 RX ADMIN — SODIUM CHLORIDE, POTASSIUM CHLORIDE, SODIUM LACTATE AND CALCIUM CHLORIDE: 600; 310; 30; 20 INJECTION, SOLUTION INTRAVENOUS at 10:11

## 2020-11-02 RX ADMIN — PROPOFOL 75 MG: 10 INJECTION, EMULSION INTRAVENOUS at 10:19

## 2020-11-02 NOTE — OP NOTE
COLONOSCOPY  Procedure Report    Patient Name:  Olga Greco  YOB: 1945    Date of Surgery:  11/2/2020     Indications:  Encounter for screening for malignant neoplasm of colon [Z12.11]      Pre-op Diagnosis:   Encounter for screening for malignant neoplasm of colon [Z12.11]    Post-Op Diagnosis Codes:     * Encounter for screening for malignant neoplasm of colon [Z12.11]     * Colon polyp [K63.5]     * Diverticulosis large intestine w/o perforation or abscess w/o bleeding [K57.30]         Procedure/CPT® Codes:      Procedure(s):  COLONOSCOPY; polypectomy    Staff:  Surgeon(s):  Cj Fallon MD         Anesthesia: Monitored Anesthesia Care    Estimated Blood Loss: none    Specimens:   ID Type Source Tests Collected by Time   A :  Polyp Large Intestine, Sigmoid Colon TISSUE PATHOLOGY EXAM Cj Fallon MD 11/2/2020 1033       Implants:    Nothing was implanted during the procedure      Description of Procedure: After having signed informed consent, she was brought to the endoscopy suite, placed in left lateral decubitus position and given her IV sedation. Rectal exam revealed no external lesions, normal anal tone, no rectal mass. The scope was introduced in the rectum and advanced under direct visualization with ease through the rectum, sigmoid colon, descending colon, to and around the splenic flexure; reduced; advanced through the transverse colon with significant looping. Multiple reductions and advancements were successful in reducing the loop, but the scope would then not advance easily to the hepatic flexure. The scope was reduced. Using abdominal splinting, the scope could be advanced more easily to and around the hepatic flexure; reduced in the ascending colon and then advanced into the cecum. The cecum was identified by the appendiceal orifice and the ileocecal valve. The ileocecal valve was not intubated. The preparation of the colon overall was good. There were  no mucosal or vascular lesions noted within the cecum. The scope was withdrawn; examined the colon in a circumferential fashion. There were no mucosal lesions noted throughout the ascending colon, hepatic flexure, transverse or descending colon. In the proximal sigmoid colon was a diminutive 3 x 4 mm polyp that was biopsied, sent separately as sigmoid colon polyp. There was a rare diverticulum noted in the sigmoid colon but no further mucosal lesions. Within the rectum the scope was retroflexed revealing intact dentate line and no mucosal lesions. The scope was deretroflexed and withdrawn. The patient tolerated the procedure very well.           Findings: Colon to Cecum Good Prep  Rare Sigmoid Diverticulosis  Polyp-Biopsy     Complications: None    Recommendations: Results to be called.      Cj Fallon MD     Date: 11/2/2020  Time: 10:43 EST

## 2020-11-02 NOTE — H&P
Patient Care Team:  Yair Zapata MD as PCP - General    CHIEF COMPLAINT: Personal hx colon polyps    HISTORY OF PRESENT ILLNESS:  > 8 years since last exam    Past Medical History:   Diagnosis Date   • Colon polyp    • Diabetes mellitus (CMS/HCC)    • Hypertension    • Hypothyroidism    • Type 2 diabetes mellitus (CMS/HCC) 04/2018     Past Surgical History:   Procedure Laterality Date   • BREAST BIOPSY Left    • CHOLECYSTECTOMY     • COLONOSCOPY     • HYSTERECTOMY     • TONSILLECTOMY     • TUBAL ABDOMINAL LIGATION       Family History   Problem Relation Age of Onset   • Heart disease Father    • Diabetes Sister    • Hypertension Mother    • Breast cancer Neg Hx    • Colon cancer Neg Hx    • Colon polyps Neg Hx      Social History     Tobacco Use   • Smoking status: Never Smoker   • Smokeless tobacco: Never Used   Substance Use Topics   • Alcohol use: Yes     Alcohol/week: 2.0 standard drinks     Types: 2 Glasses of wine per week     Comment: daily   • Drug use: No     Medications Prior to Admission   Medication Sig Dispense Refill Last Dose   • ACCU-CHEK SOFTCLIX LANCETS lancets USE TO TEST BLOOD SUGAR 4 TIMES DAILY 200 each 5    • atorvastatin (LIPITOR) 20 MG tablet TAKE ONE TABLET BY MOUTH DAILY 90 tablet 2    • B Complex Vitamins (VITAMIN B COMPLEX) capsule capsule Take 1 capsule by mouth Daily.      • benazepril-hydrochlorthiazide (LOTENSIN HCT) 20-12.5 MG per tablet Take 1 tablet by mouth Daily. 90 tablet 1    • Biotin 25531 MCG tablet Take 1 tablet by mouth Daily.      • Blood Glucose Monitoring Suppl (ACCU-CHEK RICHARD SMARTVIEW) w/Device kit Use to check blood sugar 4 times daily  E10.9 1 kit 0    • Calcium Carb-Cholecalciferol (CALCIUM 600+D3 PO) Take 500 Units by mouth Daily.      • Chromium Picolinate 1000 MCG tablet Take 1,000 mcg by mouth daily.      • colestipol (COLESTID) 1 g tablet Take 2 tablets by mouth Every Night. 60 tablet 11    • Continuous Blood Gluc Sensor (DEXCOM G6 SENSOR) Every 10  (Ten) Days. e10.65 9 each 3    • Continuous Blood Gluc Transmit (DEXCOM G6 TRANSMITTER) misc 1 package Every 3 (Three) Months. e10.65 3 each 3    • diclofenac (VOLTAREN) 1 % gel gel Apply 4 g topically to the appropriate area as directed 4 (Four) Times a Day. 100 g 5    • glucagon (GLUCAGON EMERGENCY) 1 MG injection Inject 1 mg under the skin into the appropriate area as directed 1 (One) Time As Needed for Low Blood Sugar for up to 1 dose. 1 kit 12    • NOVOLOG 100 UNIT/ML injection INJECT 50 UNITS DAILY VIA INSULIN PUMP  E10.65 50 mL 3    • Probiotic Product (PROBIOTIC DAILY PO) Take  by mouth.      • SYNTHROID 88 MCG tablet TAKE 1 TABLET BY MOUTH EVERY DAY 90 tablet 2      Allergies:  Aspirin and Epinephrine    REVIEW OF SYSTEMS:  Please see the above history of present illness for pertinent positives and negatives.  The remainder of the patient's systems have been reviewed and are negative.     Vital Signs  Temp:  [97.9 °F (36.6 °C)] 97.9 °F (36.6 °C)  Heart Rate:  [85] 85  Resp:  [20] 20  BP: (144)/(77) 144/77    Flowsheet Rows      First Filed Value   Admission Height  --   Admission Weight  57.3 kg (126 lb 6.4 oz) Documented at 11/02/2020 0928           Physical Exam:  Physical Exam   Constitutional: Patient appears well-developed and well-nourished and in no acute distress   HEENT:   Head: Normocephalic and atraumatic.   Eyes:  Pupils are equal, round, and reactive to light. EOM are intact. Sclerae are anicteric and non-injected.  Mouth and Throat: Patient has moist mucous membranes. Oropharynx is clear of any erythema or exudate.     Neck: Neck supple. No JVD present. No thyromegaly present. No lymphadenopathy present.  Cardiovascular: Regular rate, regular rhythm, S1 normal and S2 normal.  Exam reveals no gallop and no friction rub.  No murmur heard.  Pulmonary/Chest: Lungs are clear to auscultation bilaterally. No respiratory distress. No wheezes. No rhonchi. No rales.   Abdominal: Soft. Bowel sounds are  normal. No distension and no mass. There is no hepatosplenomegaly. There is no tenderness.   Musculoskeletal: Normal Muscle tone  Extremities: No edema. Pulses are palpable in all 4 extremities.  Neurological: Patient is alert and oriented to person, place, and time. Cranial nerves II-XII are grossly intact with no focal deficits.  Skin: Skin is warm. No rash noted. Nails show no clubbing.  No cyanosis or erythema.    Debilities/Disabilities Identified: None  Emotional Behavior: Appropriate     Results Review:    I reviewed the patient's new clinical results.  Lab Results (most recent)     Procedure Component Value Units Date/Time    POC Glucose Once [[id number]]  (Normal) Collected: 11/02/20 0945    Specimen: Blood Updated: 11/02/20 1008     Glucose 113 mg/dL           Imaging Results (Most Recent)     None        reviewed    ECG/EMG Results (most recent)     None        reviewed    Assessment/Plan   Personal hx colon polyps/  colonoscopy      I discussed the patients findings and my recommendations with patient.     Cj Fallon MD  11/02/20  10:40 EST    Time: 10 min prior to procedure.

## 2020-11-02 NOTE — ANESTHESIA PREPROCEDURE EVALUATION
Anesthesia Evaluation     Patient summary reviewed and Nursing notes reviewed   no history of anesthetic complications:  NPO Solid Status: > 8 hours  NPO Liquid Status: > 8 hours           Airway   Mallampati: III  TM distance: <3 FB  Neck ROM: full  No difficulty expected  Dental      Pulmonary - negative pulmonary ROS and normal exam   Cardiovascular - normal exam  Exercise tolerance: good (4-7 METS)    ECG reviewed    (+) hypertension, hyperlipidemia,       Neuro/Psych- negative ROS  GI/Hepatic/Renal/Endo    (+)   diabetes mellitus type 2 well controlled,     Musculoskeletal (-) negative ROS    Abdominal  - normal exam   Substance History   (+) alcohol use,      OB/GYN negative ob/gyn ROS         Other      history of cancer remission                    Anesthesia Plan    ASA 2     MAC     intravenous induction     Anesthetic plan, all risks, benefits, and alternatives have been provided, discussed and informed consent has been obtained with: patient.  Use of blood products discussed with patient  Consented to blood products.

## 2020-11-02 NOTE — BRIEF OP NOTE
COLONOSCOPY  Progress Note    Olga Greco  11/2/2020    Pre-op Diagnosis:   Encounter for screening for malignant neoplasm of colon [Z12.11]       Post-Op Diagnosis Codes:     * Encounter for screening for malignant neoplasm of colon [Z12.11]     * Colon polyp [K63.5]     * Diverticulosis large intestine w/o perforation or abscess w/o bleeding [K57.30]    Procedure/CPT® Codes:        Procedure(s):  COLONOSCOPY; polypectomy    Surgeon(s):  Cj Fallon MD    Anesthesia: Monitored Anesthesia Care    Staff:   Circulator: Monisha Enciso RN; Myrtle Valentin RN  Scrub Person: Zuleima Colbert         Estimated Blood Loss: none    Urine Voided: * No values recorded between 11/2/2020 10:10 AM and 11/2/2020 10:39 AM *    Specimens:                Specimens     ID Source Type Tests Collected By Collected At Frozen?      A Large Intestine, Sigmoid Colon Polyp · TISSUE PATHOLOGY EXAM   Cj Fallon MD 11/2/20 1033                  Drains: * No LDAs found *    Findings: Colon to Cecum Good Prep  Rare Sigmoid Diverticulosis  Polyp-Biopsy    Complications: None          Cj Fallon MD     Date: 11/2/2020  Time: 10:42 EST

## 2020-11-02 NOTE — ANESTHESIA POSTPROCEDURE EVALUATION
Patient: Olga Greco    Procedure Summary     Date: 11/02/20 Room / Location: [facility] ENDOSCOPY 1 /  [facility] OR    Anesthesia Start: 1011 Anesthesia Stop: 1042    Procedure: COLONOSCOPY; polypectomy (N/A ) Diagnosis:       Encounter for screening for malignant neoplasm of colon      Colon polyp      Diverticulosis large intestine w/o perforation or abscess w/o bleeding      (Encounter for screening for malignant neoplasm of colon [Z12.11])    Surgeon: Cj Fallon MD Provider: Amalia Horton CRNA    Anesthesia Type: MAC ASA Status: 2          Anesthesia Type: MAC    Vitals  Vitals Value Taken Time   /74 11/02/20 1043   Temp 97.4 °F (36.3 °C) 11/02/20 1043   Pulse 86 11/02/20 1043   Resp 20 11/02/20 1043   SpO2 99 % 11/02/20 1043           Post Anesthesia Care and Evaluation    Patient location during evaluation: bedside  Patient participation: complete - patient participated  Level of consciousness: awake and alert  Pain score: 0  Pain management: adequate  Airway patency: patent  Anesthetic complications: No anesthetic complications  PONV Status: none  Cardiovascular status: acceptable  Respiratory status: acceptable  Hydration status: acceptable  No anesthesia care post op

## 2020-11-03 LAB
LAB AP CASE REPORT: NORMAL
PATH REPORT.FINAL DX SPEC: NORMAL
PATH REPORT.GROSS SPEC: NORMAL

## 2020-11-17 ENCOUNTER — TELEPHONE (OUTPATIENT)
Dept: ENDOCRINOLOGY | Age: 75
End: 2020-11-17

## 2020-11-18 ENCOUNTER — TELEPHONE (OUTPATIENT)
Dept: ENDOCRINOLOGY | Age: 75
End: 2020-11-18

## 2020-11-18 NOTE — TELEPHONE ENCOUNTER
Patient called and left a voice mail     [facility] medical has sent over several faxes and phone calls     Needing a script for her dexcom     Patient also states that things are not done in a timely mater and wants Dr Rogers herself to call her to discuss   I am just saying what the patient is stating     Patient phone number is [phone]

## 2020-11-23 RX ORDER — BENAZEPRIL HYDROCHLORIDE AND HYDROCHLOROTHIAZIDE 20; 12.5 MG/1; MG/1
TABLET ORAL
Qty: 90 TABLET | Refills: 0 | Status: SHIPPED | OUTPATIENT
Start: 2020-11-23 | End: 2021-02-18

## 2020-11-24 ENCOUNTER — DOCUMENTATION (OUTPATIENT)
Dept: ENDOCRINOLOGY | Age: 75
End: 2020-11-24

## 2020-11-24 ENCOUNTER — TELEPHONE (OUTPATIENT)
Dept: ENDOCRINOLOGY | Age: 75
End: 2020-11-24

## 2020-11-24 NOTE — PROGRESS NOTES
I had to call the patient as she is very upset that nobody took care of her faxes from 11/17/2020 when she called our office multiple times for the [facility] medical supplies.  Per the documentation in epic [facility] sent the fax on 10/2/2020.  Further communication was sent to the diana Lee and it does not look like if she took care of it or not and I was not able to understand from the documentations from 11/17/2020.    I reassured the pt that we are going take care of it asap.     I will also send this information to our manager - to touch base with the diana SANTOS.

## 2020-11-24 NOTE — TELEPHONE ENCOUNTER
Dr Rogers spoke with patient about dexcom        ----- Message from Makenna Islas sent at 11/24/2020 12:36 PM EST -----  Contact: DEBORA HERBERT [phone]  PT CALLED VERY UPSET BECAUSE NO ONE HAS GOTTEN BACK TO HER ABOUT HER MED SUPPLIES. PT SAYS SHE ONLY WANTS TO SPEAK TO ANN MARIE AND NO ONE ELSE. PT IS IN NEED OF MED SUPPLIES FOR DEXCOM AND IT NEEDS TO BE SENT TO [facility]. PT CAN BE REACHED [phone]

## 2020-12-09 DIAGNOSIS — E78.2 MIXED HYPERLIPIDEMIA: ICD-10-CM

## 2020-12-09 DIAGNOSIS — I10 ESSENTIAL HYPERTENSION: Primary | ICD-10-CM

## 2020-12-09 DIAGNOSIS — E10.9 TYPE 1 DIABETES MELLITUS WITHOUT COMPLICATION (HCC): ICD-10-CM

## 2020-12-09 DIAGNOSIS — E03.9 ACQUIRED HYPOTHYROIDISM: ICD-10-CM

## 2020-12-10 LAB
ALBUMIN SERPL-MCNC: 4.3 G/DL (ref 3.5–5.2)
ALBUMIN/CREAT UR: 10 MG/G CREAT (ref 0–29)
ALBUMIN/GLOB SERPL: 2.4 G/DL
ALP SERPL-CCNC: 73 U/L (ref 39–117)
ALT SERPL-CCNC: 19 U/L (ref 1–33)
AST SERPL-CCNC: 17 U/L (ref 1–32)
BASOPHILS # BLD AUTO: 0.02 10*3/MM3 (ref 0–0.2)
BASOPHILS NFR BLD AUTO: 0.4 % (ref 0–1.5)
BILIRUB SERPL-MCNC: 0.7 MG/DL (ref 0–1.2)
BUN SERPL-MCNC: 18 MG/DL (ref 8–23)
BUN/CREAT SERPL: 18.6 (ref 7–25)
CALCIUM SERPL-MCNC: 9.1 MG/DL (ref 8.6–10.5)
CHLORIDE SERPL-SCNC: 103 MMOL/L (ref 98–107)
CHOLEST SERPL-MCNC: 141 MG/DL (ref 0–200)
CO2 SERPL-SCNC: 28.1 MMOL/L (ref 22–29)
CREAT SERPL-MCNC: 0.97 MG/DL (ref 0.57–1)
CREAT UR-MCNC: 417.6 MG/DL
EOSINOPHIL # BLD AUTO: 0.1 10*3/MM3 (ref 0–0.4)
EOSINOPHIL NFR BLD AUTO: 2.1 % (ref 0.3–6.2)
ERYTHROCYTE [DISTWIDTH] IN BLOOD BY AUTOMATED COUNT: 12.1 % (ref 12.3–15.4)
GLOBULIN SER CALC-MCNC: 1.8 GM/DL
GLUCOSE SERPL-MCNC: 172 MG/DL (ref 65–99)
HBA1C MFR BLD: 6.8 % (ref 4.8–5.6)
HCT VFR BLD AUTO: 36 % (ref 34–46.6)
HDLC SERPL-MCNC: 97 MG/DL (ref 40–60)
HGB BLD-MCNC: 12.2 G/DL (ref 12–15.9)
IMM GRANULOCYTES # BLD AUTO: 0.01 10*3/MM3 (ref 0–0.05)
IMM GRANULOCYTES NFR BLD AUTO: 0.2 % (ref 0–0.5)
LDLC SERPL CALC-MCNC: 33 MG/DL (ref 0–100)
LDLC/HDLC SERPL: 0.35 {RATIO}
LYMPHOCYTES # BLD AUTO: 1.37 10*3/MM3 (ref 0.7–3.1)
LYMPHOCYTES NFR BLD AUTO: 29.3 % (ref 19.6–45.3)
MCH RBC QN AUTO: 32.4 PG (ref 26.6–33)
MCHC RBC AUTO-ENTMCNC: 33.9 G/DL (ref 31.5–35.7)
MCV RBC AUTO: 95.7 FL (ref 79–97)
MICROALBUMIN UR-MCNC: 40.6 UG/ML
MONOCYTES # BLD AUTO: 0.43 10*3/MM3 (ref 0.1–0.9)
MONOCYTES NFR BLD AUTO: 9.2 % (ref 5–12)
NEUTROPHILS # BLD AUTO: 2.75 10*3/MM3 (ref 1.7–7)
NEUTROPHILS NFR BLD AUTO: 58.8 % (ref 42.7–76)
NRBC BLD AUTO-RTO: 0.2 /100 WBC (ref 0–0.2)
PLATELET # BLD AUTO: 189 10*3/MM3 (ref 140–450)
POTASSIUM SERPL-SCNC: 3.9 MMOL/L (ref 3.5–5.2)
PROT SERPL-MCNC: 6.1 G/DL (ref 6–8.5)
RBC # BLD AUTO: 3.76 10*6/MM3 (ref 3.77–5.28)
SODIUM SERPL-SCNC: 143 MMOL/L (ref 136–145)
T4 FREE SERPL-MCNC: 1.3 NG/DL (ref 0.93–1.7)
TRIGL SERPL-MCNC: 51 MG/DL (ref 0–150)
TSH SERPL DL<=0.005 MIU/L-ACNC: 4.42 UIU/ML (ref 0.27–4.2)
VLDLC SERPL CALC-MCNC: 11 MG/DL (ref 5–40)
WBC # BLD AUTO: 4.68 10*3/MM3 (ref 3.4–10.8)

## 2020-12-17 ENCOUNTER — OFFICE VISIT (OUTPATIENT)
Dept: INTERNAL MEDICINE | Facility: CLINIC | Age: 75
End: 2020-12-17

## 2020-12-17 VITALS
TEMPERATURE: 97.3 F | HEIGHT: 64 IN | HEART RATE: 72 BPM | RESPIRATION RATE: 16 BRPM | BODY MASS INDEX: 22.53 KG/M2 | OXYGEN SATURATION: 99 % | WEIGHT: 132 LBS | DIASTOLIC BLOOD PRESSURE: 80 MMHG | SYSTOLIC BLOOD PRESSURE: 140 MMHG

## 2020-12-17 DIAGNOSIS — E55.9 VITAMIN D DEFICIENCY: ICD-10-CM

## 2020-12-17 DIAGNOSIS — E10.9 TYPE 1 DIABETES MELLITUS WITHOUT COMPLICATION (HCC): Primary | ICD-10-CM

## 2020-12-17 DIAGNOSIS — Z00.00 ROUTINE HEALTH MAINTENANCE: ICD-10-CM

## 2020-12-17 DIAGNOSIS — I10 ESSENTIAL HYPERTENSION: ICD-10-CM

## 2020-12-17 DIAGNOSIS — E03.9 ACQUIRED HYPOTHYROIDISM: ICD-10-CM

## 2020-12-17 DIAGNOSIS — K58.0 IRRITABLE BOWEL SYNDROME WITH DIARRHEA: ICD-10-CM

## 2020-12-17 PROCEDURE — 99214 OFFICE O/P EST MOD 30 MIN: CPT | Performed by: FAMILY MEDICINE

## 2020-12-17 NOTE — PROGRESS NOTES
Subjective     Olga Greco is a 75 y.o. female, who presents with a chief complaint of   Chief Complaint   Patient presents with   • Hypertension   • Hypothyroidism   • Diabetes   • Vitamin D Deficiency     Diabetes    Hyperlipidemia  Exacerbating diseases include hypothyroidism.   Hypothyroidism    Hypertension    1. Diabetes mellitus.  Pt doing well with Dexcom and pump.  A few episodes of mild hypoglycemia overnight, treated with snacks.  Sees Dr. Rogers.    2. HTN.  Tolerating benazepril-hctz.  Not monitoring home blood pressures.    3. IBS.  She is feeling well with colestipol and probioticcs.    The following portions of the patient's history were reviewed and updated as appropriate: allergies, current medications, past family history, past medical history, past social history, past surgical history and problem list.    Allergies: Aspirin and Epinephrine    Review of Systems   Constitutional: Negative.    HENT: Negative.    Eyes: Negative.    Respiratory: Negative.    Cardiovascular: Negative.    Gastrointestinal: Negative.    Endocrine: Negative.    Genitourinary: Negative.    Musculoskeletal: Negative.    Skin: Negative.    Allergic/Immunologic: Negative.    Neurological: Negative.    Hematological: Negative.    Psychiatric/Behavioral: Negative.      Objective     Wt Readings from Last 3 Encounters:   12/17/20 59.9 kg (132 lb)   11/02/20 57.3 kg (126 lb 6.4 oz)   10/02/20 59.5 kg (131 lb 3.2 oz)     Temp Readings from Last 3 Encounters:   12/17/20 97.3 °F (36.3 °C) (Temporal)   11/02/20 97.4 °F (36.3 °C) (Infrared)   07/01/20 98.2 °F (36.8 °C) (Temporal)     BP Readings from Last 3 Encounters:   12/17/20 140/80   11/02/20 139/71   10/02/20 128/70     Pulse Readings from Last 3 Encounters:   12/17/20 72   11/02/20 90   10/02/20 60     Body mass index is 22.66 kg/m².  SpO2 Readings from Last 3 Encounters:   01/17/18 99%   10/11/17 98%   07/13/17 98%       Physical Exam   Constitutional: She is oriented to  person, place, and time. She appears well-developed.   HENT:   Head: Normocephalic and atraumatic.   Mouth/Throat: Mucous membranes are moist.   Eyes: Conjunctivae are normal.   Neck: Neck supple. No neck rigidity. No thyromegaly present.   Cardiovascular: Normal rate, regular rhythm and normal heart sounds.   Pulmonary/Chest: Effort normal and breath sounds normal.   Abdominal: Soft. Normal appearance and bowel sounds are normal.   Musculoskeletal: Normal range of motion.   Neurological: She is alert and oriented to person, place, and time.   Skin: Skin is warm and dry. No rash noted.   Psychiatric: Her behavior is normal. Mood normal.   Nursing note and vitals reviewed.      Results for orders placed or performed in visit on 12/09/20   T4, Free    Specimen: Blood   Result Value Ref Range    Free T4 1.30 0.93 - 1.70 ng/dL   TSH    Specimen: Blood   Result Value Ref Range    TSH 4.420 (H) 0.270 - 4.200 uIU/mL   Comprehensive Metabolic Panel    Specimen: Blood   Result Value Ref Range    Glucose 172 (H) 65 - 99 mg/dL    BUN 18 8 - 23 mg/dL    Creatinine 0.97 0.57 - 1.00 mg/dL    eGFR Non African Am 56 (L) >60 mL/min/1.73    eGFR African Am 68 >60 mL/min/1.73    BUN/Creatinine Ratio 18.6 7.0 - 25.0    Sodium 143 136 - 145 mmol/L    Potassium 3.9 3.5 - 5.2 mmol/L    Chloride 103 98 - 107 mmol/L    Total CO2 28.1 22.0 - 29.0 mmol/L    Calcium 9.1 8.6 - 10.5 mg/dL    Total Protein 6.1 6.0 - 8.5 g/dL    Albumin 4.30 3.50 - 5.20 g/dL    Globulin 1.8 gm/dL    A/G Ratio 2.4 g/dL    Total Bilirubin 0.7 0.0 - 1.2 mg/dL    Alkaline Phosphatase 73 39 - 117 U/L    AST (SGOT) 17 1 - 32 U/L    ALT (SGPT) 19 1 - 33 U/L   Lipid Panel With LDL / HDL Ratio    Specimen: Blood   Result Value Ref Range    Total Cholesterol 141 0 - 200 mg/dL    Triglycerides 51 0 - 150 mg/dL    HDL Cholesterol 97 (H) 40 - 60 mg/dL    VLDL Cholesterol Mart 11 5 - 40 mg/dL    LDL Chol Calc (NIH) 33 0 - 100 mg/dL    LDL/HDL RATIO 0.35    Hemoglobin A1c     Specimen: Blood   Result Value Ref Range    Hemoglobin A1C 6.80 (H) 4.80 - 5.60 %   Microalbumin / Creatinine Urine Ratio - Urine, Clean Catch    Specimen: Urine, Clean Catch   Result Value Ref Range    Creatinine, Urine 417.6 Not Estab. mg/dL    Microalbumin, Urine 40.6 Not Estab. ug/mL    Microalbumin/Creatinine Ratio 10 0 - 29 mg/g creat   CBC & Differential    Specimen: Blood   Result Value Ref Range    WBC 4.68 3.40 - 10.80 10*3/mm3    RBC 3.76 (L) 3.77 - 5.28 10*6/mm3    Hemoglobin 12.2 12.0 - 15.9 g/dL    Hematocrit 36.0 34.0 - 46.6 %    MCV 95.7 79.0 - 97.0 fL    MCH 32.4 26.6 - 33.0 pg    MCHC 33.9 31.5 - 35.7 g/dL    RDW 12.1 (L) 12.3 - 15.4 %    Platelets 189 140 - 450 10*3/mm3    Neutrophil Rel % 58.8 42.7 - 76.0 %    Lymphocyte Rel % 29.3 19.6 - 45.3 %    Monocyte Rel % 9.2 5.0 - 12.0 %    Eosinophil Rel % 2.1 0.3 - 6.2 %    Basophil Rel % 0.4 0.0 - 1.5 %    Neutrophils Absolute 2.75 1.70 - 7.00 10*3/mm3    Lymphocytes Absolute 1.37 0.70 - 3.10 10*3/mm3    Monocytes Absolute 0.43 0.10 - 0.90 10*3/mm3    Eosinophils Absolute 0.10 0.00 - 0.40 10*3/mm3    Basophils Absolute 0.02 0.00 - 0.20 10*3/mm3    Immature Granulocyte Rel % 0.2 0.0 - 0.5 %    Immature Grans Absolute 0.01 0.00 - 0.05 10*3/mm3    nRBC 0.2 0.0 - 0.2 /100 WBC     Assessment/Plan   Diagnoses and all orders for this visit:    1. Type 1 diabetes mellitus without complication (CMS/HCC) (Primary)  -     Hemoglobin A1c; Future  -     Vitamin B12; Future    2. Essential hypertension  -     Comprehensive Metabolic Panel; Future    3. Acquired hypothyroidism  -     CBC & Differential; Future  -     Lipid Panel With / Chol / HDL Ratio; Future  -     TSH; Future  -     T4, Free; Future    4. Vitamin D deficiency  -     Vitamin D 25 Hydroxy; Future    5. Irritable bowel syndrome with diarrhea    6. Routine health maintenance  -     Hepatitis C Antibody; Future    1. DMI. A1c 6.8. Managed by Dr. Rogers.  Eye exam UTD.    2. HTN.  A little above goal  today.  Continue benazepril-hctz 20-12.5 mg daily and monitor home blood pressures.    3. Hypothyroidism.  TSH borderline elevated.  Pt feeling okay.  Managed by Dr. Rogers.    4. Vitamin D deficiency.  Continue supplement.    5. IBS. Controlled with colestipol and probiotic.    6. RHM.  Mammogram UTD.  Colonoscopy 11/2020 by Dr. Fallon.  Shingrix done at pharmacy.  Pneumovax and flu UTD.      Outpatient Medications Prior to Visit   Medication Sig Dispense Refill   • ACCU-CHEK SOFTCLIX LANCETS lancets USE TO TEST BLOOD SUGAR 4 TIMES DAILY 200 each 5   • atorvastatin (LIPITOR) 20 MG tablet TAKE ONE TABLET BY MOUTH DAILY 90 tablet 2   • B Complex Vitamins (VITAMIN B COMPLEX) capsule capsule Take 1 capsule by mouth Daily.     • benazepril-hydrochlorthiazide (LOTENSIN HCT) 20-12.5 MG per tablet TAKE ONE TABLET BY MOUTH DAILY 90 tablet 0   • Biotin 32857 MCG tablet Take 1 tablet by mouth Daily.     • Blood Glucose Monitoring Suppl (ACCU-CHEK RICHARD SMARTVIEW) w/Device kit Use to check blood sugar 4 times daily  E10.9 1 kit 0   • Calcium Carb-Cholecalciferol (CALCIUM 600+D3 PO) Take 500 Units by mouth Daily.     • Chromium Picolinate 1000 MCG tablet Take 1,000 mcg by mouth daily.     • colestipol (COLESTID) 1 g tablet Take 2 tablets by mouth Every Night. 60 tablet 11   • Continuous Blood Gluc Sensor (DEXCOM G6 SENSOR) Every 10 (Ten) Days. e10.65 9 each 3   • Continuous Blood Gluc Transmit (DEXCOM G6 TRANSMITTER) misc 1 package Every 3 (Three) Months. e10.65 3 each 3   • diclofenac (VOLTAREN) 1 % gel gel Apply 4 g topically to the appropriate area as directed 4 (Four) Times a Day. 100 g 5   • glucagon (GLUCAGON EMERGENCY) 1 MG injection Inject 1 mg under the skin into the appropriate area as directed 1 (One) Time As Needed for Low Blood Sugar for up to 1 dose. 1 kit 12   • NOVOLOG 100 UNIT/ML injection INJECT 50 UNITS DAILY VIA INSULIN PUMP  E10.65 50 mL 3   • Probiotic Product (PROBIOTIC DAILY PO) Take  by mouth.     •  SYNTHROID 88 MCG tablet TAKE 1 TABLET BY MOUTH EVERY DAY 90 tablet 2     No facility-administered medications prior to visit.      No orders of the defined types were placed in this encounter.    [unfilled]  There are no discontinued medications.      Return in about 6 months (around 6/17/2021).

## 2020-12-22 ENCOUNTER — RESULTS ENCOUNTER (OUTPATIENT)
Dept: INTERNAL MEDICINE | Facility: CLINIC | Age: 75
End: 2020-12-22

## 2020-12-22 DIAGNOSIS — E03.9 ACQUIRED HYPOTHYROIDISM: ICD-10-CM

## 2020-12-22 DIAGNOSIS — I10 ESSENTIAL HYPERTENSION: ICD-10-CM

## 2020-12-22 DIAGNOSIS — E10.9 TYPE 1 DIABETES MELLITUS WITHOUT COMPLICATION (HCC): ICD-10-CM

## 2020-12-22 DIAGNOSIS — Z00.00 ROUTINE HEALTH MAINTENANCE: ICD-10-CM

## 2020-12-22 DIAGNOSIS — E55.9 VITAMIN D DEFICIENCY: ICD-10-CM

## 2021-01-22 ENCOUNTER — OFFICE VISIT (OUTPATIENT)
Dept: ENDOCRINOLOGY | Age: 76
End: 2021-01-22

## 2021-01-22 VITALS
HEIGHT: 64 IN | WEIGHT: 131.4 LBS | RESPIRATION RATE: 16 BRPM | DIASTOLIC BLOOD PRESSURE: 70 MMHG | SYSTOLIC BLOOD PRESSURE: 144 MMHG | BODY MASS INDEX: 22.43 KG/M2

## 2021-01-22 DIAGNOSIS — E10.649 TYPE 1 DIABETES MELLITUS WITH HYPOGLYCEMIA AND WITHOUT COMA (HCC): Primary | ICD-10-CM

## 2021-01-22 PROCEDURE — 95251 CONT GLUC MNTR ANALYSIS I&R: CPT | Performed by: NURSE PRACTITIONER

## 2021-01-22 PROCEDURE — 99214 OFFICE O/P EST MOD 30 MIN: CPT | Performed by: NURSE PRACTITIONER

## 2021-01-22 NOTE — PROGRESS NOTES
Chief Complaint  Diabetes (she is currently using a Medtronic insulin pump and Dexcom CGM and changing pump sites every 3 days; last eye exam 04/2020) and Vitamin D Deficiency    Subjective          Olga Greco presents to [facility] ENDOCRINOLOGY for     History of Present Illness     Olga Greco 75 y.o. WF presents with Type 1 dm as a follow up patient. Consulted by Dr. Zapata.      Type 1 dm - Diagnosed in 2003 was initially thought to be type 2 dm. Was started on oral agents initially. Insulin was started on April 24th 2017.      When she came in in February 2018 she was still on oral hypoglycemic agents but due to her phenotype and family history of type 1 diabetes,  antibodies were checked and they were positive     Today in clinic patient reports that she is on the Medtronic insulin pump-630 G along with the Dexcom-G6.  Average blood sugars are around 144 mg/dL range on the Dexcom.  No significant low blood sugars noted on the Dexcom in the last 2 weeks.  She feels she needs to keep her blood sugras above 80 to avoid low blood sugars, apparently through the night she has a lot of times feeling she is dropping and treats anything around 80  She does run slightly high between 9a-12p and  7 PM-10 PM.  No history of diabetic retinopathy  Denies neuropathy  No history of CAD, CKD, CVA.  She is physically active and exercises at least for 3-4 times a week.  On Ace inhibitor.  Last DKA episode was in October 2018.  No hospital visits since her last visit      Hyperlipidemia  On Lipitor 20 mg oral daily.  Tolerating well      Hypothyroidism    On Synthroid 88 mcg oral daily.  TSH 4.4  Maybe feels a little bit more tired   Hair is better on brand name      Review of Systems   Constitutional: Negative for activity change, appetite change and fatigue.   HENT: Negative for sore throat, trouble swallowing and voice change.    Respiratory: Negative for cough, choking and shortness of breath.   "  Cardiovascular: Negative for chest pain, palpitations and leg swelling.   Gastrointestinal: Negative for abdominal pain, constipation, diarrhea, nausea and vomiting.   Endocrine: Negative for cold intolerance, heat intolerance, polydipsia, polyphagia and polyuria.   Genitourinary: Negative for decreased urine volume, dysuria, flank pain and urgency.   Musculoskeletal: Negative for arthralgias, gait problem and myalgias.   Skin: Negative for color change, rash and wound.   Neurological: Negative for dizziness, weakness, light-headedness, numbness and headaches.   Hematological: Does not bruise/bleed easily.         Objective   Vital Signs:   /70   Resp 16   Ht 162.6 cm (64\")   Wt 59.6 kg (131 lb 6.4 oz)   BMI 22.55 kg/m²     Physical Exam  Vitals signs reviewed.   Constitutional:       General: She is not in acute distress.  HENT:      Head: Normocephalic and atraumatic.   Neck:      Musculoskeletal: Normal range of motion and neck supple.   Cardiovascular:      Rate and Rhythm: Normal rate and regular rhythm.   Pulmonary:      Effort: Pulmonary effort is normal. No respiratory distress.   Musculoskeletal: Normal range of motion.         General: No signs of injury.   Skin:     General: Skin is warm and dry.   Neurological:      Mental Status: She is alert and oriented to person, place, and time. Mental status is at baseline.   Psychiatric:         Mood and Affect: Mood normal.         Behavior: Behavior normal.         Thought Content: Thought content normal.         Judgment: Judgment normal.        Result Review :   The following data was reviewed by: OPAL Jiménez on 01/22/2021:  Common labs    Common Labsle 6/15/20 6/15/20 6/15/20 6/15/20 9/25/20 9/25/20 9/25/20 9/25/20 9/25/20 12/9/20 12/9/20 12/9/20 12/9/20 12/9/20    0754 0754 0754 0754 0806 0806 0806 0806 0806 0844 0844 0844 0844 0844   Glucose           172 (A)      BUN   16        18      Creatinine   0.86   0.87   0.76  0.97      eGFR " Non  Am   65   64   78  56 (A)      eGFR  Am         90  68      Sodium   139        143      Potassium   4.4        3.9      Chloride   103        103      Calcium   9.2        9.1      Total Protein           6.1      Albumin   4.50        4.30      Total Bilirubin   0.7        0.7      Alkaline Phosphatase   56        73      AST (SGOT)   25        17      ALT (SGPT)   25        19      WBC 4.17         4.68       Hemoglobin 13.0         12.2       Hematocrit 38.3         36.0       Platelets 198         189       Total Cholesterol            141     Triglycerides    38   42     51     HDL Cholesterol    94 (A)   95 (A)     97 (A)     LDL Cholesterol     33   34     33     Hemoglobin A1C  6.55 (A)   6.81 (A)        6.80 (A)    Microalbumin, Urine        <1.2      40.6   (A) Abnormal value       Comments are available for some flowsheets but are not being displayed.                     Assessment and Plan    Problem List Items Addressed This Visit        Other    Type 1 diabetes mellitus (CMS/HCC) - Primary            Dexcom review:  Average 144  Deviation 44  GMI 6.7  79% time in range  1% low  17% high  2% very high  Blood sugars are lowest around 4-5 PM  Blood sugars are highest between 9 AM to 12 PM and 7 PM to 9 PM      Follow Up   Return in about 3 months (around 4/22/2021).   Pump changes made  12a-7a 0.1  7a-3p 0.7  3p-6p 0.5  6p-12a 0.7    Carbs:  12a-7a 12  7a-2p 8.0  2p-6p 12  6p-12a 8    Sensitivity 45    Patient to update me with BS in 2-3 weeks or sooner if needed  Change synthroid to 88mcg 5 days a week and 100mcg 2 days a week     Needs close monitoring to reduce risk of complications from over or under treatment with thyroid hormone replacement        Patient was given instructions and counseling regarding her condition or for health maintenance advice. Please see specific information pulled into the AVS if appropriate.     OPAL Jiménez

## 2021-01-25 RX ORDER — MONTELUKAST SODIUM 4 MG/1
TABLET, CHEWABLE ORAL
Qty: 180 TABLET | Refills: 3 | Status: SHIPPED | OUTPATIENT
Start: 2021-01-25 | End: 2022-01-13

## 2021-02-18 RX ORDER — BENAZEPRIL HYDROCHLORIDE AND HYDROCHLOROTHIAZIDE 20; 12.5 MG/1; MG/1
TABLET ORAL
Qty: 90 TABLET | Refills: 1 | Status: SHIPPED | OUTPATIENT
Start: 2021-02-18 | End: 2021-09-13 | Stop reason: SDUPTHER

## 2021-03-08 RX ORDER — ATORVASTATIN CALCIUM 20 MG/1
TABLET, FILM COATED ORAL
Qty: 90 TABLET | Refills: 1 | Status: SHIPPED | OUTPATIENT
Start: 2021-03-08 | End: 2021-09-13 | Stop reason: SDUPTHER

## 2021-03-17 ENCOUNTER — TELEPHONE (OUTPATIENT)
Dept: INTERNAL MEDICINE | Facility: CLINIC | Age: 76
End: 2021-03-17

## 2021-03-17 DIAGNOSIS — I10 ESSENTIAL HYPERTENSION: Primary | ICD-10-CM

## 2021-03-17 DIAGNOSIS — E55.9 VITAMIN D DEFICIENCY: ICD-10-CM

## 2021-03-17 DIAGNOSIS — E03.8 OTHER SPECIFIED HYPOTHYROIDISM: ICD-10-CM

## 2021-03-17 DIAGNOSIS — E10.65 UNCONTROLLED TYPE 1 DIABETES MELLITUS WITH HYPERGLYCEMIA (HCC): ICD-10-CM

## 2021-03-17 DIAGNOSIS — Z79.899 HIGH RISK MEDICATION USE: ICD-10-CM

## 2021-03-17 NOTE — TELEPHONE ENCOUNTER
PATIENT IS GOING TO HAVE LABS DONE AT [facility] IN June. PLEASE BE SURE HER ORDERS ARE READY.    THANKS.

## 2021-03-29 ENCOUNTER — LAB (OUTPATIENT)
Dept: LAB | Facility: HOSPITAL | Age: 76
End: 2021-03-29

## 2021-03-29 PROCEDURE — 82565 ASSAY OF CREATININE: CPT | Performed by: INTERNAL MEDICINE

## 2021-03-29 PROCEDURE — 84443 ASSAY THYROID STIM HORMONE: CPT | Performed by: INTERNAL MEDICINE

## 2021-03-29 PROCEDURE — 80061 LIPID PANEL: CPT | Performed by: INTERNAL MEDICINE

## 2021-03-29 PROCEDURE — 83036 HEMOGLOBIN GLYCOSYLATED A1C: CPT | Performed by: INTERNAL MEDICINE

## 2021-03-29 PROCEDURE — 84439 ASSAY OF FREE THYROXINE: CPT | Performed by: INTERNAL MEDICINE

## 2021-04-01 ENCOUNTER — OFFICE VISIT (OUTPATIENT)
Dept: ENDOCRINOLOGY | Age: 76
End: 2021-04-01

## 2021-04-01 VITALS
SYSTOLIC BLOOD PRESSURE: 126 MMHG | BODY MASS INDEX: 22.81 KG/M2 | HEIGHT: 64 IN | DIASTOLIC BLOOD PRESSURE: 64 MMHG | WEIGHT: 133.6 LBS

## 2021-04-01 DIAGNOSIS — E03.9 ACQUIRED HYPOTHYROIDISM: ICD-10-CM

## 2021-04-01 DIAGNOSIS — E78.2 MIXED HYPERLIPIDEMIA: ICD-10-CM

## 2021-04-01 DIAGNOSIS — E10.65 TYPE 1 DIABETES MELLITUS WITH HYPERGLYCEMIA (HCC): Primary | ICD-10-CM

## 2021-04-01 PROCEDURE — 95251 CONT GLUC MNTR ANALYSIS I&R: CPT | Performed by: NURSE PRACTITIONER

## 2021-04-01 PROCEDURE — 99214 OFFICE O/P EST MOD 30 MIN: CPT | Performed by: NURSE PRACTITIONER

## 2021-04-01 NOTE — PROGRESS NOTES
"Chief Complaint  Diabetes (Pump and Dexcom)    Subjective     {Problem List  Visit Diagnosis   Encounters  Notes  Medications  Labs  Result Review Imaging  Media :23}     Olga Greco presents to [facility] ENDOCRINOLOGY  History of Present Illness  Olga Greco 75 y.o. WF presents with Type 1 dm as a follow up patient. Consulted by Dr. Zapata.      Type 1 dm - Diagnosed in 2003 was initially thought to be type 2 dm. Was started on oral agents initially. Insulin was started on April 24th 2017.      When she came in in February 2018 she was still on oral hypoglycemic agents but due to her phenotype and family history of type 1 diabetes,  antibodies were checked and they were positive  She reports her blood sugar spikes around 3a  Also feels when she takes a bolus for just her BS, it will cause hypoglycemia      Today in clinic patient reports that she is on the Medtronic insulin pump-630 G along with the Dexcom-G6.  No history of diabetic retinopathy, had appointment tomorrow   Denies neuropathy, blisters and sore   No history of CAD, CKD, CVA.  She is physically active and exercises at least for 3-4 times a week.  On Ace inhibitor.  Last DKA episode was in October 2018.  No hospital visits since her last visit   Lab Results   Component Value Date    HGBA1C 6.64 (H) 03/29/2021     Cgm download  Average glucose 146  gmi 6.8%  82% time in range  1% low  16% low  <1% very high     Hyperlipidemia  On Lipitor 20 mg oral daily.  Tolerating well   Lab Results   Component Value Date    CHOL 134 03/29/2021    CHLPL 141 12/09/2020    TRIG 42 03/29/2021    HDL 89 (H) 03/29/2021    LDL 35 03/29/2021        Hypothyroidism    On Synthroid 88 mcg oral daily.  TSH 4.4  Maybe feels a little bit more tired   Hair is better on brand name  Lab Results   Component Value Date    TSH 2.570 03/29/2021          Objective   Vital Signs:   /64   Ht 162.6 cm (64\")   Wt 60.6 kg (133 lb 9.6 oz)   BMI 22.93 " kg/m²     Physical Exam  Vitals reviewed.   Constitutional:       General: She is not in acute distress.  HENT:      Head: Normocephalic and atraumatic.   Cardiovascular:      Rate and Rhythm: Normal rate and regular rhythm.   Pulmonary:      Effort: Pulmonary effort is normal. No respiratory distress.   Musculoskeletal:         General: No signs of injury. Normal range of motion.      Cervical back: Normal range of motion and neck supple.   Skin:     General: Skin is warm and dry.   Neurological:      Mental Status: She is alert and oriented to person, place, and time. Mental status is at baseline.   Psychiatric:         Mood and Affect: Mood normal.         Behavior: Behavior normal.         Thought Content: Thought content normal.         Judgment: Judgment normal.          Result Review :   The following data was reviewed by: OPAL Jiménez on 04/01/2021:  Common labs    Common Labsle 9/25/20 9/25/20 9/25/20 9/25/20 9/25/20 12/9/20 12/9/20 12/9/20 12/9/20 12/9/20 3/29/21 3/29/21 3/29/21 3/29/21    0806 0806 0806 0806 0806 0844 0844 0844 0844 0844 0751 0751 0751 0751   Glucose       172 (A)          BUN       18          Creatinine  0.87   0.76  0.97     0.93  0.80   eGFR Non  Am  64   78  56 (A)     59 (A)  73   eGFR  Am     90  68       85   Sodium       143          Potassium       3.9          Chloride       103          Calcium       9.1          Total Protein       6.1          Albumin       4.30          Total Bilirubin       0.7          Alkaline Phosphatase       73          AST (SGOT)       17          ALT (SGPT)       19          WBC      4.68           Hemoglobin      12.2           Hematocrit      36.0           Platelets      189           Total Cholesterol   137          134    Total Cholesterol        141         Triglycerides   42     51     42    HDL Cholesterol   95 (A)     97 (A)     89 (A)    LDL Cholesterol    34     33     35    Hemoglobin A1C 6.81 (A)        6.80 (A)   6.64 (A)      Microalbumin, Urine    <1.2      40.6       (A) Abnormal value       Comments are available for some flowsheets but are not being displayed.                     Assessment and Plan    Diagnoses and all orders for this visit:    1. Type 1 diabetes mellitus with hyperglycemia (CMS/HCC) (Primary)  -     Hemoglobin A1c; Future    2. Mixed hyperlipidemia    3. Acquired hypothyroidism        Follow Up   Return in about 3 months (around 7/1/2021).   Basal 3a-7a 0.15-->0.175  6p-12a 0.7 --> 0.9  Sensitivity 45-->50  Active insulin 3hr --> 3.5  Discussed temp basal for outdoors in the heat   Continue Medtronic pump with Dexcom CGM  Continue statin and low-cholesterol diet  Continue exercise and diabetic diet  Continue current thyroid dose  Needs close monitoring to reduce risk of complications from over or under treatment with thyroid hormone replacement      Patient was given instructions and counseling regarding her condition or for health maintenance advice. Please see specific information pulled into the AVS if appropriate.     OPAL Jiménez

## 2021-04-13 ENCOUNTER — TELEPHONE (OUTPATIENT)
Dept: ENDOCRINOLOGY | Age: 76
End: 2021-04-13

## 2021-04-13 NOTE — TELEPHONE ENCOUNTER
4/13 called and lm labs are in mychart / keep up the good work   ----- Message from Angi Rogers MD sent at 4/5/2021 10:09 AM EDT -----  HbA1c is decently controlled.Keep up the good work and continue the current plan

## 2021-06-22 ENCOUNTER — LAB (OUTPATIENT)
Dept: LAB | Facility: HOSPITAL | Age: 76
End: 2021-06-22

## 2021-06-22 LAB
25(OH)D3 SERPL-MCNC: 64.1 NG/ML (ref 30–100)
ALBUMIN SERPL-MCNC: 4.4 G/DL (ref 3.5–5.2)
ALBUMIN/GLOB SERPL: 2.1 G/DL
ALP SERPL-CCNC: 63 U/L (ref 39–117)
ALT SERPL W P-5'-P-CCNC: 20 U/L (ref 1–33)
ANION GAP SERPL CALCULATED.3IONS-SCNC: 9.8 MMOL/L (ref 5–15)
AST SERPL-CCNC: 20 U/L (ref 1–32)
BASOPHILS # BLD AUTO: 0.04 10*3/MM3 (ref 0–0.2)
BASOPHILS NFR BLD AUTO: 0.9 % (ref 0–1.5)
BILIRUB SERPL-MCNC: 0.9 MG/DL (ref 0–1.2)
BUN SERPL-MCNC: 22 MG/DL (ref 8–23)
BUN/CREAT SERPL: 26.2 (ref 7–25)
CALCIUM SPEC-SCNC: 9.1 MG/DL (ref 8.6–10.5)
CHLORIDE SERPL-SCNC: 105 MMOL/L (ref 98–107)
CHOLEST SERPL-MCNC: 148 MG/DL (ref 0–200)
CO2 SERPL-SCNC: 25.2 MMOL/L (ref 22–29)
CREAT SERPL-MCNC: 0.84 MG/DL (ref 0.57–1)
DEPRECATED RDW RBC AUTO: 41.9 FL (ref 37–54)
EOSINOPHIL # BLD AUTO: 0.16 10*3/MM3 (ref 0–0.4)
EOSINOPHIL NFR BLD AUTO: 3.4 % (ref 0.3–6.2)
ERYTHROCYTE [DISTWIDTH] IN BLOOD BY AUTOMATED COUNT: 11.7 % (ref 12.3–15.4)
GFR SERPL CREATININE-BSD FRML MDRD: 66 ML/MIN/1.73
GLOBULIN UR ELPH-MCNC: 2.1 GM/DL
GLUCOSE SERPL-MCNC: 158 MG/DL (ref 65–99)
HBA1C MFR BLD: 6.58 % (ref 4.8–5.6)
HCT VFR BLD AUTO: 40.3 % (ref 34–46.6)
HDLC SERPL QL: 1.45
HDLC SERPL-MCNC: 102 MG/DL (ref 40–60)
HGB BLD-MCNC: 13.4 G/DL (ref 12–15.9)
IMM GRANULOCYTES # BLD AUTO: 0.01 10*3/MM3 (ref 0–0.05)
IMM GRANULOCYTES NFR BLD AUTO: 0.2 % (ref 0–0.5)
LDLC SERPL CALC-MCNC: 37 MG/DL (ref 0–100)
LYMPHOCYTES # BLD AUTO: 1.64 10*3/MM3 (ref 0.7–3.1)
LYMPHOCYTES NFR BLD AUTO: 35.2 % (ref 19.6–45.3)
MCH RBC QN AUTO: 32.1 PG (ref 26.6–33)
MCHC RBC AUTO-ENTMCNC: 33.3 G/DL (ref 31.5–35.7)
MCV RBC AUTO: 96.6 FL (ref 79–97)
MONOCYTES # BLD AUTO: 0.4 10*3/MM3 (ref 0.1–0.9)
MONOCYTES NFR BLD AUTO: 8.6 % (ref 5–12)
NEUTROPHILS NFR BLD AUTO: 2.41 10*3/MM3 (ref 1.7–7)
NEUTROPHILS NFR BLD AUTO: 51.7 % (ref 42.7–76)
NRBC BLD AUTO-RTO: 0 /100 WBC (ref 0–0.2)
PLATELET # BLD AUTO: 216 10*3/MM3 (ref 140–450)
PMV BLD AUTO: 9.3 FL (ref 6–12)
POTASSIUM SERPL-SCNC: 4.2 MMOL/L (ref 3.5–5.2)
PROT SERPL-MCNC: 6.5 G/DL (ref 6–8.5)
RBC # BLD AUTO: 4.17 10*6/MM3 (ref 3.77–5.28)
SODIUM SERPL-SCNC: 140 MMOL/L (ref 136–145)
T4 FREE SERPL-MCNC: 1.32 NG/DL (ref 0.93–1.7)
TRIGL SERPL-MCNC: 36 MG/DL (ref 0–150)
TSH SERPL DL<=0.05 MIU/L-ACNC: 3.44 UIU/ML (ref 0.27–4.2)
VLDLC SERPL-MCNC: 9 MG/DL (ref 5–40)
WBC # BLD AUTO: 4.66 10*3/MM3 (ref 3.4–10.8)

## 2021-06-22 PROCEDURE — 80061 LIPID PANEL: CPT | Performed by: FAMILY MEDICINE

## 2021-06-22 PROCEDURE — 84439 ASSAY OF FREE THYROXINE: CPT | Performed by: FAMILY MEDICINE

## 2021-06-22 PROCEDURE — 82306 VITAMIN D 25 HYDROXY: CPT | Performed by: FAMILY MEDICINE

## 2021-06-22 PROCEDURE — 80053 COMPREHEN METABOLIC PANEL: CPT | Performed by: FAMILY MEDICINE

## 2021-06-22 PROCEDURE — 84443 ASSAY THYROID STIM HORMONE: CPT | Performed by: FAMILY MEDICINE

## 2021-06-22 PROCEDURE — 36415 COLL VENOUS BLD VENIPUNCTURE: CPT | Performed by: FAMILY MEDICINE

## 2021-06-22 PROCEDURE — 83036 HEMOGLOBIN GLYCOSYLATED A1C: CPT | Performed by: FAMILY MEDICINE

## 2021-06-22 PROCEDURE — 85025 COMPLETE CBC W/AUTO DIFF WBC: CPT | Performed by: FAMILY MEDICINE

## 2021-06-28 ENCOUNTER — OFFICE VISIT (OUTPATIENT)
Dept: INTERNAL MEDICINE | Facility: CLINIC | Age: 76
End: 2021-06-28

## 2021-06-28 VITALS
WEIGHT: 132.2 LBS | HEIGHT: 64 IN | TEMPERATURE: 96.9 F | HEART RATE: 83 BPM | RESPIRATION RATE: 16 BRPM | OXYGEN SATURATION: 97 % | BODY MASS INDEX: 22.57 KG/M2 | DIASTOLIC BLOOD PRESSURE: 60 MMHG | SYSTOLIC BLOOD PRESSURE: 130 MMHG

## 2021-06-28 DIAGNOSIS — Z00.00 ROUTINE HEALTH MAINTENANCE: ICD-10-CM

## 2021-06-28 DIAGNOSIS — Z12.31 ENCOUNTER FOR SCREENING MAMMOGRAM FOR MALIGNANT NEOPLASM OF BREAST: ICD-10-CM

## 2021-06-28 DIAGNOSIS — I10 ESSENTIAL HYPERTENSION: Primary | ICD-10-CM

## 2021-06-28 DIAGNOSIS — Z85.828 HISTORY OF SQUAMOUS CELL CARCINOMA OF SKIN: ICD-10-CM

## 2021-06-28 DIAGNOSIS — E03.9 ACQUIRED HYPOTHYROIDISM: ICD-10-CM

## 2021-06-28 DIAGNOSIS — K58.0 IRRITABLE BOWEL SYNDROME WITH DIARRHEA: ICD-10-CM

## 2021-06-28 DIAGNOSIS — E10.9 TYPE 1 DIABETES MELLITUS WITHOUT COMPLICATION (HCC): ICD-10-CM

## 2021-06-28 DIAGNOSIS — E55.9 VITAMIN D DEFICIENCY: ICD-10-CM

## 2021-06-28 PROBLEM — M75.52 SUBACROMIAL BURSITIS OF LEFT SHOULDER JOINT: Status: RESOLVED | Noted: 2019-12-17 | Resolved: 2021-06-28

## 2021-06-28 PROBLEM — R00.2 HEART PALPITATIONS: Status: RESOLVED | Noted: 2018-10-29 | Resolved: 2021-06-28

## 2021-06-28 PROBLEM — S42.255A CLOSED NONDISPLACED FRACTURE OF GREATER TUBEROSITY OF LEFT HUMERUS: Status: RESOLVED | Noted: 2019-12-17 | Resolved: 2021-06-28

## 2021-06-28 PROBLEM — M65.312 TRIGGER THUMB OF LEFT HAND: Status: RESOLVED | Noted: 2019-09-25 | Resolved: 2021-06-28

## 2021-06-28 PROBLEM — M75.42 SUBACROMIAL IMPINGEMENT OF LEFT SHOULDER: Status: RESOLVED | Noted: 2019-12-17 | Resolved: 2021-06-28

## 2021-06-28 PROCEDURE — 99214 OFFICE O/P EST MOD 30 MIN: CPT | Performed by: FAMILY MEDICINE

## 2021-06-28 NOTE — PROGRESS NOTES
Subjective     Olga rGeco is a 75 y.o. female, who presents with a chief complaint of   Chief Complaint   Patient presents with   • Diabetes   • Hypertension     Hypertension    Hypothyroidism    Diabetes    Hyperlipidemia  Exacerbating diseases include hypothyroidism.     1. Diabetes mellitus.  Pt doing well with Dexcom and pump.  A few episodes of mild hypoglycemia overnight, treated with snacks.  Sees Dr. Rogers.    2. HTN.  Tolerating benazepril-hctz.  Not monitoring home blood pressures.    3. IBS.  She is feeling well with colestipol and probiotics.    The following portions of the patient's history were reviewed and updated as appropriate: allergies, current medications, past family history, past medical history, past social history, past surgical history and problem list.    Allergies: Aspirin and Epinephrine    Review of Systems   Constitutional: Negative.    HENT: Negative.    Eyes: Negative.    Respiratory: Negative.    Cardiovascular: Negative.    Gastrointestinal: Negative.    Endocrine: Negative.    Genitourinary: Negative.    Musculoskeletal: Negative.    Skin: Negative.    Allergic/Immunologic: Negative.    Neurological: Negative.    Hematological: Negative.    Psychiatric/Behavioral: Negative.      Objective     Wt Readings from Last 3 Encounters:   06/28/21 60 kg (132 lb 3.2 oz)   04/01/21 60.6 kg (133 lb 9.6 oz)   01/22/21 59.6 kg (131 lb 6.4 oz)     Temp Readings from Last 3 Encounters:   06/28/21 96.9 °F (36.1 °C) (Temporal)   12/17/20 97.3 °F (36.3 °C) (Temporal)   11/02/20 97.4 °F (36.3 °C) (Infrared)     BP Readings from Last 3 Encounters:   06/28/21 130/60   04/01/21 126/64   01/22/21 144/70     Pulse Readings from Last 3 Encounters:   06/28/21 83   12/17/20 72   11/02/20 90     Body mass index is 22.69 kg/m².  SpO2 Readings from Last 3 Encounters:   01/17/18 99%   10/11/17 98%   07/13/17 98%       Physical Exam   Constitutional: She is oriented to person, place, and time. She appears  well-developed.   HENT:   Head: Normocephalic and atraumatic.   Mouth/Throat: Mucous membranes are moist.   Eyes: Conjunctivae are normal.   Neck: No thyromegaly present.   Cardiovascular: Normal rate, regular rhythm and normal heart sounds.   Pulmonary/Chest: Effort normal and breath sounds normal.   Abdominal: Soft. Normal appearance. There is no abdominal tenderness.   Musculoskeletal:      Right lower leg: No edema.      Left lower leg: No edema.   Neurological: She is alert and oriented to person, place, and time.   Skin: Skin is warm and dry. No rash noted.   Psychiatric: Her behavior is normal. Mood normal.   Nursing note and vitals reviewed.      Results for orders placed or performed in visit on 03/31/21   Hemoglobin A1c    Specimen: Blood   Result Value Ref Range    Hemoglobin A1C 6.64 (H) 4.80 - 5.60 %   eGFR-Glomerular Filtration    Specimen: Blood   Result Value Ref Range    Creatinine 0.80 0.57 - 1.00 mg/dL    eGFR Non African Am 73 >59 mL/min/1.73    eGFR African Am 85 >59 mL/min/1.73   Creatinine, Serum    Specimen: Blood   Result Value Ref Range    Creatinine 0.93 0.57 - 1.00 mg/dL    eGFR Non African Amer 59 (L) >60 mL/min/1.73   Lipid Panel    Specimen: Blood   Result Value Ref Range    Total Cholesterol 134 0 - 200 mg/dL    Triglycerides 42 0 - 150 mg/dL    HDL Cholesterol 89 (H) 40 - 60 mg/dL    LDL Cholesterol  35 0 - 100 mg/dL    VLDL Cholesterol 10 5 - 40 mg/dL    LDL/HDL Ratio 0.41    TSH    Specimen: Blood   Result Value Ref Range    TSH 2.570 0.270 - 4.200 uIU/mL   T4, Free    Specimen: Blood   Result Value Ref Range    Free T4 1.47 0.93 - 1.70 ng/dL     Assessment/Plan   Diagnoses and all orders for this visit:    1. Essential hypertension (Primary)  -     Comprehensive Metabolic Panel; Future    2. Type 1 diabetes mellitus without complication (CMS/HCC)  -     Hemoglobin A1c; Future  -     Lipid Panel With / Chol / HDL Ratio; Future  -     Microalbumin / Creatinine Urine Ratio - Urine,  Clean Catch; Future  -     Vitamin B12; Future    3. Acquired hypothyroidism  -     CBC & Differential; Future  -     TSH; Future  -     T4, Free; Future    4. Vitamin D deficiency  -     Vitamin D 25 Hydroxy; Future    5. Irritable bowel syndrome with diarrhea    6. History of squamous cell carcinoma of skin    7. Routine health maintenance    8. Encounter for screening mammogram for malignant neoplasm of breast  -     Mammo Screening Bilateral With CAD; Future    1. DMI. A1c 6.58. Managed by Dr. Rogers.  Eye exam UTD.    2. HTN.  Controlled.  Continue benazepril-hctz 20-12.5 mg daily and monitor home blood pressures.  Labs reviewed.    3. Hypothyroidism.  Adequate replacement.  Managed by Dr. Rogers.    4. Vitamin D deficiency.  Continue supplement.    5. IBS.  Controlled with colestipol and probiotic.    6. 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  She sees Dr. Padgett.    7. RHM.  Mammogram due.  Colonoscopy 11/2020 by Dr. Fallon.  Shingrix done at pharmacy.  Pneumovax UTD.  Covid-19 vaccine done.      Outpatient Medications Prior to Visit   Medication Sig Dispense Refill   • ACCU-CHEK SOFTCLIX LANCETS lancets USE TO TEST BLOOD SUGAR 4 TIMES DAILY 200 each 5   • atorvastatin (LIPITOR) 20 MG tablet TAKE ONE TABLET BY MOUTH DAILY 90 tablet 1   • B Complex Vitamins (VITAMIN B COMPLEX) capsule capsule Take 1 capsule by mouth Daily.     • benazepril-hydrochlorthiazide (LOTENSIN HCT) 20-12.5 MG per tablet TAKE ONE TABLET BY MOUTH DAILY 90 tablet 1   • Biotin 79227 MCG tablet Take 1 tablet by mouth Daily.     • Blood Glucose Monitoring Suppl (ACCU-CHEK RICHARD SMARTVIEW) w/Device kit Use to check blood sugar 4 times daily  E10.9 1 kit 0   • Calcium Carb-Cholecalciferol (CALCIUM 600+D3 PO) Take 500 Units by mouth Daily.     • colestipol (COLESTID) 1 g tablet TAKE TWO TABLETS BY MOUTH ONCE NIGHTLY 180 tablet 3   • Continuous Blood Gluc Sensor (DEXCOM G6 SENSOR) Every 10 (Ten) Days. e10.65 9 each 3   •  Continuous Blood Gluc Transmit (DEXCOM G6 TRANSMITTER) misc 1 package Every 3 (Three) Months. e10.65 3 each 3   • glucagon (GLUCAGON EMERGENCY) 1 MG injection Inject 1 mg under the skin into the appropriate area as directed 1 (One) Time As Needed for Low Blood Sugar for up to 1 dose. 1 kit 12   • NOVOLOG 100 UNIT/ML injection INJECT 50 UNITS DAILY VIA INSULIN PUMP  E10.65 50 mL 3   • Probiotic Product (PROBIOTIC DAILY PO) Take  by mouth.     • SYNTHROID 88 MCG tablet TAKE 1 TABLET BY MOUTH EVERY DAY 90 tablet 2   • Chromium Picolinate 1000 MCG tablet Take 1,000 mcg by mouth daily.     • diclofenac (VOLTAREN) 1 % gel gel Apply 4 g topically to the appropriate area as directed 4 (Four) Times a Day. 100 g 5     No facility-administered medications prior to visit.     No orders of the defined types were placed in this encounter.    [unfilled]  There are no discontinued medications.      Return in about 6 months (around 12/28/2021).

## 2021-06-30 ENCOUNTER — OFFICE VISIT (OUTPATIENT)
Dept: ENDOCRINOLOGY | Age: 76
End: 2021-06-30

## 2021-06-30 VITALS
SYSTOLIC BLOOD PRESSURE: 128 MMHG | WEIGHT: 133 LBS | DIASTOLIC BLOOD PRESSURE: 78 MMHG | HEIGHT: 64 IN | BODY MASS INDEX: 22.71 KG/M2

## 2021-06-30 DIAGNOSIS — E10.649 TYPE 1 DIABETES MELLITUS WITH HYPOGLYCEMIA AND WITHOUT COMA (HCC): Primary | ICD-10-CM

## 2021-06-30 DIAGNOSIS — E03.9 HYPOTHYROIDISM, UNSPECIFIED TYPE: ICD-10-CM

## 2021-06-30 DIAGNOSIS — E10.65 TYPE 1 DIABETES MELLITUS WITH HYPERGLYCEMIA (HCC): ICD-10-CM

## 2021-06-30 DIAGNOSIS — E78.2 HYPERLIPEMIA, MIXED: ICD-10-CM

## 2021-06-30 PROCEDURE — 99214 OFFICE O/P EST MOD 30 MIN: CPT | Performed by: NURSE PRACTITIONER

## 2021-06-30 NOTE — PROGRESS NOTES
"Chief Complaint  Diabetes    Subjective          Olga Greco presents to [facility] ENDOCRINOLOGY  History of Present Illness     Type 1 dm   Diagnosed in 2003 was initially thought to be type 2 dm. Was started on oral agents initially. Insulin was started on April 24th 2017.      When she came in in February 2018 she was still on oral hypoglycemic agents but due to her phenotype and family history of type 1 diabetes,  antibodies were checked and they were positive      Today in clinic patient reports that she is on the Medtronic insulin pump-630 G along with the Dexcom-G6 sensor  No history of diabetic retinopathy, UTD with eye exam  Denies neuropathy  No history of CAD, CKD, CVA.  She is physically active and exercises at least for 3-4 times a week.  On Ace inhibitor.  Last DKA episode was in October 2018.  Hypoglycemia- around 3a, tries to have her BS around 140 before bed to prevent drops     Hyperlipidemia  On Lipitor 20 mg oral daily.  Tolerating well     Hypothyroidism    On Synthroid 88 mcg oral daily  Denies s/s of hyper/hypo thyroid  Lab Results   Component Value Date    TSH 3.440 06/22/2021       Objective   Vital Signs:   /78 (BP Location: Right arm, Patient Position: Sitting, Cuff Size: Adult)   Ht 162.6 cm (64.02\")   Wt 60.3 kg (133 lb)   BMI 22.82 kg/m²     Physical Exam  Vitals reviewed.   Constitutional:       General: She is not in acute distress.  HENT:      Head: Normocephalic and atraumatic.   Cardiovascular:      Rate and Rhythm: Normal rate and regular rhythm.   Pulmonary:      Effort: Pulmonary effort is normal. No respiratory distress.   Musculoskeletal:         General: No signs of injury. Normal range of motion.      Cervical back: Normal range of motion and neck supple.   Skin:     General: Skin is warm and dry.   Neurological:      Mental Status: She is alert and oriented to person, place, and time. Mental status is at baseline.   Psychiatric:         Mood and " Affect: Mood normal.         Behavior: Behavior normal.         Thought Content: Thought content normal.         Judgment: Judgment normal.          Result Review :   The following data was reviewed by: OPAL Jiménez on 06/30/2021:  Common labs    Common Labsle 12/9/20 12/9/20 12/9/20 12/9/20 12/9/20 3/29/21 3/29/21 3/29/21 3/29/21 6/22/21 6/22/21 6/22/21 6/22/21    0844 0844 0844 0844 0844 0751 0751 0751 0751 0842 0842 0842 0842   Glucose            158 (A)    Glucose  172 (A)              BUN  18          22    Creatinine  0.97     0.93  0.80   0.84    eGFR Non  Am  56 (A)     59 (A)  73   66    eGFR  Am  68       85       Sodium  143          140    Potassium  3.9          4.2    Chloride  103          105    Calcium  9.1          9.1    Total Protein  6.1              Albumin  4.30          4.40    Total Bilirubin  0.7          0.9    Alkaline Phosphatase  73          63    AST (SGOT)  17          20    ALT (SGPT)  19          20    WBC 4.68         4.66      Hemoglobin 12.2         13.4      Hematocrit 36.0         40.3      Platelets 189         216      Total Cholesterol        134     148   Total Cholesterol   141             Triglycerides   51     42     36   HDL Cholesterol   97 (A)     89 (A)     102 (A)   LDL Cholesterol    33     35     37   Hemoglobin A1C    6.80 (A)  6.64 (A)     6.58 (A)     Microalbumin, Urine     40.6           (A) Abnormal value       Comments are available for some flowsheets but are not being displayed.                     Assessment and Plan    Diagnoses and all orders for this visit:    1. Type 1 diabetes mellitus with hypoglycemia and without coma (CMS/HCC) (Primary)  -     Hemoglobin A1c; Future    2. Type 1 diabetes mellitus with hyperglycemia (CMS/HCC)    3. Hyperlipemia, mixed    4. Hypothyroidism, unspecified type        Follow Up   No follow-ups on file.   Will see me in september  Decreased basal insulin from 6p-3a by 0.2u/hr to help with  hypoglycemia  Continue on with statin/ace  a1c at goal  Hypoglycemia education, has glucagon at home     Continue current t4 dose  Needs close monitoring to reduce risk of complications from over or under treatment with thyroid hormone replacement    Patient was given instructions and counseling regarding her condition or for health maintenance advice. Please see specific information pulled into the AVS if appropriate.     Jena Ann APRN

## 2021-07-15 RX ORDER — LEVOTHYROXINE SODIUM 88 MCG
TABLET ORAL
Qty: 90 TABLET | Refills: 2 | Status: SHIPPED | OUTPATIENT
Start: 2021-07-15 | End: 2022-07-11

## 2021-07-16 ENCOUNTER — HOSPITAL ENCOUNTER (OUTPATIENT)
Dept: MAMMOGRAPHY | Facility: HOSPITAL | Age: 76
Discharge: HOME | End: 2021-07-16
Admitting: FAMILY MEDICINE

## 2021-07-16 DIAGNOSIS — Z12.31 ENCOUNTER FOR SCREENING MAMMOGRAM FOR MALIGNANT NEOPLASM OF BREAST: ICD-10-CM

## 2021-07-16 PROCEDURE — 77067 SCR MAMMO BI INCL CAD: CPT

## 2021-07-16 PROCEDURE — 77063 BREAST TOMOSYNTHESIS BI: CPT

## 2021-08-18 NOTE — TELEPHONE ENCOUNTER
Spoke with patient  Let patient know that she should be doing  Synthroid 88 mcg   Nsaids Pregnancy And Lactation Text: These medications are considered safe up to 30 weeks gestation. It is excreted in breast milk.

## 2021-09-02 ENCOUNTER — APPOINTMENT (OUTPATIENT)
Dept: GENERAL RADIOLOGY | Facility: HOSPITAL | Age: 76
End: 2021-09-02

## 2021-09-02 ENCOUNTER — HOSPITAL ENCOUNTER (EMERGENCY)
Facility: HOSPITAL | Age: 76
Discharge: HOME | End: 2021-09-02
Attending: EMERGENCY MEDICINE | Admitting: EMERGENCY MEDICINE

## 2021-09-02 VITALS
RESPIRATION RATE: 16 BRPM | HEART RATE: 73 BPM | OXYGEN SATURATION: 98 % | SYSTOLIC BLOOD PRESSURE: 149 MMHG | DIASTOLIC BLOOD PRESSURE: 80 MMHG | BODY MASS INDEX: 26.4 KG/M2 | TEMPERATURE: 98 F | WEIGHT: 149 LBS | HEIGHT: 63 IN

## 2021-09-02 DIAGNOSIS — S63.501A SPRAIN OF RIGHT WRIST, INITIAL ENCOUNTER: Primary | ICD-10-CM

## 2021-09-02 PROCEDURE — 73110 X-RAY EXAM OF WRIST: CPT

## 2021-09-02 PROCEDURE — 99282 EMERGENCY DEPT VISIT SF MDM: CPT | Performed by: EMERGENCY MEDICINE

## 2021-09-02 PROCEDURE — 99282 EMERGENCY DEPT VISIT SF MDM: CPT

## 2021-09-02 NOTE — ED PROVIDER NOTES
EMERGENCY DEPARTMENT ENCOUNTER      Room Number: 08/08      HPI:    Chief complaint: Wrist pain    Location: Right wrist    Quality/Severity: Moderate    Timing/Duration: Injury occurred last evening    Modifying Factors: Movement causes pain    Associated Symptoms: Swelling and bruising    Narrative: Pt is a 75 y.o. female who presents complaining of a right wrist injury as noted above.  Patient states that while walking last evening she felt that her blood sugar was getting low and she became dizzy.  Patient ultimately fell to her right side and she suffered a FOOSH injury.  Patient denies any other injuries and does not have other complaints.      PMD: Yair Zapata MD    REVIEW OF SYSTEMS  Review of Systems   Constitutional: Negative for activity change, appetite change, fatigue and fever.   Musculoskeletal: Negative for back pain and neck pain.        Current right wrist injury   Skin: Negative for wound.   Neurological: Positive for dizziness.   All other systems reviewed and are negative.      PAST MEDICAL HISTORY  Active Ambulatory Problems     Diagnosis Date Noted   • Type 1 diabetes mellitus (CMS/[facility]) 05/02/2016   • Hypertension 05/02/2016   • Hypothyroidism 05/02/2016   • Menopausal symptom 05/02/2016   • Vitamin D deficiency 05/02/2016   • Diabetic ketoacidosis without coma associated with type 1 diabetes mellitus (CMS/HCC) 10/21/2018   • Encounter for screening for malignant neoplasm of colon 01/28/2019   • History of squamous cell carcinoma of skin 07/17/2019   • Tendinopathy of rotator cuff, left 12/17/2019   • Irritable bowel syndrome with diarrhea 07/01/2020   • Routine health maintenance 12/17/2020     Resolved Ambulatory Problems     Diagnosis Date Noted   • Gastroesophageal reflux disease 05/02/2016   • Right thigh pain 05/02/2016   • Epigastric pain 11/09/2016   • Elevated transaminase level 01/17/2018   • Heart palpitations 10/29/2018   • Trigger thumb of left hand 09/25/2019   •  Subacromial bursitis of left shoulder joint 12/17/2019   • Subacromial impingement of left shoulder 12/17/2019   • Closed nondisplaced fracture of greater tuberosity of left humerus 12/17/2019     Past Medical History:   Diagnosis Date   • Colon polyp    • Diabetes mellitus (CMS/HCC)    • Type 2 diabetes mellitus (CMS/HCC) 04/2018       PAST SURGICAL HISTORY  Past Surgical History:   Procedure Laterality Date   • BREAST BIOPSY Left    • CHOLECYSTECTOMY     • COLONOSCOPY     • COLONOSCOPY N/A 11/2/2020    Procedure: COLONOSCOPY; polypectomy;  Surgeon: Cj Fallon MD;  Location: [facility];  Service: Gastroenterology;  Laterality: N/A;  DIVERTICULOSIS  POLYP     • HYSTERECTOMY     • TONSILLECTOMY     • TUBAL ABDOMINAL LIGATION         FAMILY HISTORY  Family History   Problem Relation Age of Onset   • Heart disease Father    • Diabetes Sister    • Hypertension Mother    • Breast cancer Neg Hx    • Colon cancer Neg Hx    • Colon polyps Neg Hx        SOCIAL HISTORY  Social History     Socioeconomic History   • Marital status:      Spouse name: Not on file   • Number of children: Not on file   • Years of education: Not on file   • Highest education level: Not on file   Tobacco Use   • Smoking status: Never Smoker   • Smokeless tobacco: Never Used   Substance and Sexual Activity   • Alcohol use: Yes     Alcohol/week: 2.0 standard drinks     Types: 2 Glasses of wine per week     Comment: daily   • Drug use: No   • Sexual activity: Yes     Partners: Male       ALLERGIES  Aspirin and Epinephrine    PHYSICAL EXAM  ED Triage Vitals [09/02/21 1100]   Temp Heart Rate Resp BP SpO2   98 °F (36.7 °C) 73 16 149/80 98 %      Temp src Heart Rate Source Patient Position BP Location FiO2 (%)   Oral Monitor Sitting Right arm --       Physical Exam  Vitals and nursing note reviewed.   Constitutional:       Comments: The patient is a healthy-appearing, 75-year-old, female no acute distress.   HENT:      Head:  Normocephalic and atraumatic.   Eyes:      Conjunctiva/sclera: Conjunctivae normal.   Musculoskeletal:      Cervical back: Normal range of motion and neck supple.      Comments: Examination of the right wrist does show swelling and tenderness over the distal radius and the medial carpals.  Snuffbox tenderness present.  No obvious bony deformities.  Neuromuscular vascular exams intact distally.   Neurological:      General: No focal deficit present.      Mental Status: She is alert and oriented to person, place, and time.   Psychiatric:         Mood and Affect: Mood normal.         Behavior: Behavior normal.         LAB RESULTS  Results for orders placed or performed in visit on 03/31/21   Hemoglobin A1c    Specimen: Blood   Result Value Ref Range    Hemoglobin A1C 6.64 (H) 4.80 - 5.60 %   eGFR-Glomerular Filtration    Specimen: Blood   Result Value Ref Range    Creatinine 0.80 0.57 - 1.00 mg/dL    eGFR Non African Am 73 >59 mL/min/1.73    eGFR African Am 85 >59 mL/min/1.73   Creatinine, Serum    Specimen: Blood   Result Value Ref Range    Creatinine 0.93 0.57 - 1.00 mg/dL    eGFR Non African Amer 59 (L) >60 mL/min/1.73   Lipid Panel    Specimen: Blood   Result Value Ref Range    Total Cholesterol 134 0 - 200 mg/dL    Triglycerides 42 0 - 150 mg/dL    HDL Cholesterol 89 (H) 40 - 60 mg/dL    LDL Cholesterol  35 0 - 100 mg/dL    VLDL Cholesterol 10 5 - 40 mg/dL    LDL/HDL Ratio 0.41    TSH    Specimen: Blood   Result Value Ref Range    TSH 2.570 0.270 - 4.200 uIU/mL   T4, Free    Specimen: Blood   Result Value Ref Range    Free T4 1.47 0.93 - 1.70 ng/dL         I ordered the above labs and reviewed the results    RADIOLOGY  XR Wrist 3+ View Right    Result Date: 9/2/2021  Narrative: XR WRIST 3+ VW RIGHT-: 9/2/2021 11:21 AM  INDICATION: Fall last night with right wrist pain.  COMPARISON: None available.  FINDINGS: 3 views of the right wrist.  No fracture or dislocation. No bone erosion or destruction.  No foreign body.       Impression: Negative right wrist.  This report was finalized on 9/2/2021 11:26 AM by Dr. Romero Howard MD.        I ordered the above radiologic testing and reviewed the results    PROCEDURES  Procedures      PROGRESS AND CONSULTS  ED Course as of Sep 02 1210   Thu Sep 02, 2021   1207 Final radiology report noted and images personally reviewed. results discussed with patient.  Diagnosis of wrist sprain discussed with patient along with treatment plan, expectations and warnings.    [ML]      ED Course User Index  [ML] Antonio Finley MD           MEDICAL DECISION MAKING  Results were reviewed/discussed with the patient and they were also made aware of online access. Pt also made aware that some labs, such as cultures, will not be resulted during ER visit and follow up with PMD is necessary.     MDM       DIAGNOSIS  Final diagnoses:   Sprain of right wrist, initial encounter       Latest Documented Vital Signs:  As of 12:10 EDT  BP- 149/80 HR- 73 Temp- 98 °F (36.7 °C) (Oral) O2 sat- 98%    DISPOSITION  Discharged in good condition       Medication List      No changes were made to your prescriptions during this visit.         Follow-up Information     Yair Zapata MD.    Specialty: Family Medicine  Why: As needed  Contact information:  [address]  [place] [address]  [phone]                      Antonio Finley MD  09/02/21 1210

## 2021-09-03 ENCOUNTER — PATIENT OUTREACH (OUTPATIENT)
Dept: CASE MANAGEMENT | Facility: OTHER | Age: 76
End: 2021-09-03

## 2021-09-03 NOTE — OUTREACH NOTE
Ambulatory Case Management Note    Patient Outreach  Talked with patient. Discussed 9/2/21 ED visit regarding sprain of right wrist. Patient states to be compliant with ED recommendations; states to have swelling to right wrist and hand; applying ice as directed and advised to elevate and contact PCP for follow up and recommendations. Patient verbalized understanding. Patient reports no difficulty with numbness or tingling to right hand and able to move fingers. Patient compliant with medications; medical appointments and monitoring blood sugar with Dexcom. She states to have had  episodes of hypoglycemia alleviate with intake of food and follows diabetic diet.   Patient lives with spouse; independent with ADL's; meal preparation; receiving assistance with transportation;  ambulating without assistive device   Reviewed with patient ED AVS recommendations; education provided;  COVID 19 precautions; 24/7 Nurse Line Telephone number; ACM contact information; Advance Directives; My Chart; gaps in care; AWV and Case Management services.  Patient verbalized understanding and states to appreciate phone call.  No further questions or concerns voiced at this time.   General & Health Literacy Assessment    Questions/Answers      Most Recent Value   Living Arrangement  Spouse   Type of Residence  Private Residence   Home Care Services  No   Equiptment Used at Home  -- [Dexcom ,  blood pressure cuff]   Bed or Wheelchair Confined  No   Difficulty Keeping Appointments  No        Care Evaluation    Questions/Answers      Most Recent Value   AWV Materials  Send Materials   Care Gaps Addressed  Other (See Comment), Pneumonia Vaccine, Mammogram, Flu Shot, Diabetic Eye Exam, Diabetic A1C   HbA1c Status  Up to Date-within defined limits   HbA1c Completion at [facility] or Other  [facility]   Diabetic Eye Exam Status  Up to Date   Flu Shot Status  Up to Date   Mammogram Status  Up to Date   Mammogram Completion at [facility] or Other  [facility]    Pneumonia Vaccine Status  Up to Date   Other Patient Education/Resources   24/7 [facility] Nurse Call Line, Advanced Care Planning, MyChart, Nutrition/Diet [Fall and safety precautions,  ]   24/7 Nurse Call Line Education Method  Verbal   ACP Education Method  Verbal   MyChart Education Method  Verbal   Nutrition/Diet Education Method  Verbal   Advanced Directives:  Patient Has   Medication Adherence  Medications understood   Healthy Lifestyle (Self-Efficacy)  recognizes when to contact medical assistance, self-reports important symptoms to medical professional          SDOH updated and reviewed with the patient during this program:     Financial Resource Strain: Low Risk    • Difficulty of Paying Living Expenses: Not hard at all       Food Insecurity: No Food Insecurity   • Worried About Running Out of Food in the Last Year: Never true   • Ran Out of Food in the Last Year: Never true       Transportation Needs: No Transportation Needs   • Lack of Transportation (Medical): No   • Lack of Transportation (Non-Medical): No     Bibi Romero RN  Ambulatory Case Management    9/3/2021, 10:22 EDT

## 2021-09-13 RX ORDER — ATORVASTATIN CALCIUM 20 MG/1
20 TABLET, FILM COATED ORAL DAILY
Qty: 90 TABLET | Refills: 1 | Status: SHIPPED | OUTPATIENT
Start: 2021-09-13 | End: 2022-03-16 | Stop reason: SDUPTHER

## 2021-09-13 RX ORDER — BENAZEPRIL HYDROCHLORIDE AND HYDROCHLOROTHIAZIDE 20; 12.5 MG/1; MG/1
1 TABLET ORAL DAILY
Qty: 90 TABLET | Refills: 1 | Status: SHIPPED | OUTPATIENT
Start: 2021-09-13 | End: 2022-03-16 | Stop reason: SDUPTHER

## 2021-09-30 ENCOUNTER — OFFICE VISIT (OUTPATIENT)
Dept: ENDOCRINOLOGY | Age: 76
End: 2021-09-30

## 2021-09-30 VITALS
SYSTOLIC BLOOD PRESSURE: 142 MMHG | WEIGHT: 132 LBS | BODY MASS INDEX: 23.39 KG/M2 | DIASTOLIC BLOOD PRESSURE: 78 MMHG | HEART RATE: 74 BPM | OXYGEN SATURATION: 98 % | HEIGHT: 63 IN

## 2021-09-30 DIAGNOSIS — E03.9 ACQUIRED HYPOTHYROIDISM: ICD-10-CM

## 2021-09-30 DIAGNOSIS — E10.65 TYPE 1 DIABETES MELLITUS WITH HYPERGLYCEMIA (HCC): Primary | ICD-10-CM

## 2021-09-30 DIAGNOSIS — E78.2 HYPERLIPEMIA, MIXED: ICD-10-CM

## 2021-09-30 PROCEDURE — 99214 OFFICE O/P EST MOD 30 MIN: CPT | Performed by: NURSE PRACTITIONER

## 2021-09-30 NOTE — PROGRESS NOTES
"Chief Complaint  Hypothyroidism and Diabetes    Subjective          Olga Greco presents to [facility] ENDOCRINOLOG[facility]  History of Present Illness     I have reviewed PMH, allergies and medications UTD at this visit     Type 1 dm   Diagnosed in 2003 was initially thought to be type 2 dm. Was started on oral agents initially. Insulin was started on April 24th 2017.    When she came in in February 2018 she was still on oral hypoglycemic agents but due to her phenotype and family history of type 1 diabetes,  antibodies were checked and they were positive      Today in clinic patient reports that she is on the Medtronic insulin pump-630 G along with the Dexcom-G6 sensor  She uses NovoLog and her pump  Dexcom report  Average glucose 140  GMI 6.6%  1% very high  16% high  80% time in range  2% low  Less than 1% very low  Standard deviation 45  Patient highest blood sugars are from 6 AM to 10 AM and 6 PM to 9 PM  Her basal rate was decreased from 6 PM to 3 AM by 2 units an hour last visit related to hypoglycemia however it appears we need to increase her basal rate from 6 PM to 9 PM    No history of diabetic retinopathy, 4/2021  Denies neuropathy  No history of CAD, CKD, CVA.  She is not currently physically active  She she has been eating out a low r/t stress at home with moving her sister in law into assisted living   On Ace inhibitor.  Last DKA episode was in October 2018.  Hypoglycemia- around 3a, tries to have her BS around 140 before bed to prevent drops   On ACE    Hyperlipidemia  On Lipitor 20 mg oral daily.  Low cholesterol diet      Hypothyroidism    On Synthroid 88 mcg oral daily  Energy levels worse with increased stress from family situation  Sleeping worse r/t stress   Objective   Vital Signs:   /78   Pulse 74   Ht 160 cm (63\")   Wt 59.9 kg (132 lb)   SpO2 98%   BMI 23.38 kg/m²     Physical Exam  Vitals reviewed.   Constitutional:       General: She is not in acute distress.  HENT: "      Head: Normocephalic and atraumatic.   Cardiovascular:      Rate and Rhythm: Normal rate and regular rhythm.   Pulmonary:      Effort: Pulmonary effort is normal. No respiratory distress.   Musculoskeletal:         General: No signs of injury. Normal range of motion.      Cervical back: Normal range of motion and neck supple.   Skin:     General: Skin is warm and dry.   Neurological:      Mental Status: She is alert and oriented to person, place, and time. Mental status is at baseline.   Psychiatric:         Mood and Affect: Mood normal.         Behavior: Behavior normal.         Thought Content: Thought content normal.         Judgment: Judgment normal.          Result Review :   The following data was reviewed by: OPAL Jiménez on 09/30/2021:  Common labs    Common Labsle 12/9/20 12/9/20 12/9/20 12/9/20 12/9/20 3/29/21 3/29/21 3/29/21 3/29/21 6/22/21 6/22/21 6/22/21 6/22/21    0844 0844 0844 0844 0844 0751 0751 0751 0751 [id number] [id number] [id number]   Glucose            158 (A)    Glucose  172 (A)              BUN  18          22    Creatinine  0.97     0.93  0.80   0.84    eGFR Non  Am  56 (A)     59 (A)  73   66    eGFR  Am  68       85       Sodium  143          140    Potassium  3.9          4.2    Chloride  103          105    Calcium  9.1          9.1    Total Protein  6.1              Albumin  4.30          4.40    Total Bilirubin  0.7          0.9    Alkaline Phosphatase  73          63    AST (SGOT)  17          20    ALT (SGPT)  19          20    WBC 4.68         4.66      Hemoglobin 12.2         13.4      Hematocrit 36.0         40.3      Platelets 189         216      Total Cholesterol        134     148   Total Cholesterol   141             Triglycerides   51     42     36   HDL Cholesterol   97 (A)     89 (A)     102 (A)   LDL Cholesterol    33     35     37   Hemoglobin A1C    6.80 (A)  6.64 (A)     6.58 (A)     Microalbumin, Urine     40.6           (A) Abnormal value       Comments  are available for some flowsheets but are not being displayed.                     Assessment and Plan    Diagnoses and all orders for this visit:    1. Type 1 diabetes mellitus with hyperglycemia (CMS/[facility]) (Primary)  -     Hemoglobin A1c  -     TSH; Future  -     T4, Free; Future  -     Hemoglobin A1c; Future  -     Comprehensive Metabolic Panel; Future  -     Lipid Panel; Future  -     Microalbumin / Creatinine Urine Ratio - Urine, Clean Catch; Future    2. Hyperlipemia, mixed  -     TSH; Future  -     T4, Free; Future  -     Hemoglobin A1c; Future  -     Comprehensive Metabolic Panel; Future  -     Lipid Panel; Future  -     Microalbumin / Creatinine Urine Ratio - Urine, Clean Catch; Future    3. Acquired hypothyroidism  -     TSH; Future  -     T4, Free; Future  -     Hemoglobin A1c; Future  -     Comprehensive Metabolic Panel; Future  -     Lipid Panel; Future  -     Microalbumin / Creatinine Urine Ratio - Urine, Clean Catch; Future        Follow Up   No follow-ups on file.   Will see dr causey in December   Goal a1c <7%  New pump settings  12 AM to 7 AM 0.125  7 AM to 11 AM 0.825  11 AM to 3 PM 0.7  3 PM to 6 PM 0.55  6 PM to 9 PM 0.85  9 PM to 12 AM 0.7  No changes were made to the carb ratio and insulin sensitivity.  Settings are attached to this office visit    Encourage stress and anxiety reduction and control  Encourage to resume regular exercise habits  On statin and ACE  Overdue for dental exam  Up-to-date with eye exam  Continue diabetic foot care    Patient was given instructions and counseling regarding her condition or for health maintenance advice. Please see specific information pulled into the AVS if appropriate.

## 2021-10-01 LAB — HBA1C MFR BLD: 6.2 % (ref 4.8–5.6)

## 2021-11-12 RX ORDER — LEVOTHYROXINE SODIUM 100 MCG
TABLET ORAL
Qty: 90 TABLET | Refills: 0 | Status: SHIPPED | OUTPATIENT
Start: 2021-11-12 | End: 2022-09-09

## 2021-11-29 ENCOUNTER — LAB (OUTPATIENT)
Dept: LAB | Facility: HOSPITAL | Age: 76
End: 2021-11-29

## 2021-11-29 DIAGNOSIS — E78.2 HYPERLIPEMIA, MIXED: ICD-10-CM

## 2021-11-29 DIAGNOSIS — E03.9 ACQUIRED HYPOTHYROIDISM: ICD-10-CM

## 2021-11-29 DIAGNOSIS — E10.65 TYPE 1 DIABETES MELLITUS WITH HYPERGLYCEMIA (HCC): ICD-10-CM

## 2021-11-29 LAB
25(OH)D3 SERPL-MCNC: 61.6 NG/ML (ref 30–100)
ALBUMIN SERPL-MCNC: 4.4 G/DL (ref 3.5–5.2)
ALBUMIN UR-MCNC: <1.2 MG/DL
ALBUMIN/GLOB SERPL: 2.6 G/DL
ALP SERPL-CCNC: 73 U/L (ref 39–117)
ALT SERPL W P-5'-P-CCNC: 23 U/L (ref 1–33)
ANION GAP SERPL CALCULATED.3IONS-SCNC: 11.4 MMOL/L (ref 5–15)
AST SERPL-CCNC: 25 U/L (ref 1–32)
BASOPHILS # BLD AUTO: 0.03 10*3/MM3 (ref 0–0.2)
BASOPHILS NFR BLD AUTO: 0.8 % (ref 0–1.5)
BILIRUB SERPL-MCNC: 1.3 MG/DL (ref 0–1.2)
BUN SERPL-MCNC: 14 MG/DL (ref 8–23)
BUN/CREAT SERPL: 20.9 (ref 7–25)
CALCIUM SPEC-SCNC: 9.2 MG/DL (ref 8.6–10.5)
CHLORIDE SERPL-SCNC: 102 MMOL/L (ref 98–107)
CHOLEST SERPL-MCNC: 155 MG/DL (ref 0–200)
CO2 SERPL-SCNC: 24.6 MMOL/L (ref 22–29)
CREAT SERPL-MCNC: 0.67 MG/DL (ref 0.57–1)
CREAT UR-MCNC: 141.8 MG/DL
DEPRECATED RDW RBC AUTO: 40.4 FL (ref 37–54)
EOSINOPHIL # BLD AUTO: 0.14 10*3/MM3 (ref 0–0.4)
EOSINOPHIL NFR BLD AUTO: 3.5 % (ref 0.3–6.2)
ERYTHROCYTE [DISTWIDTH] IN BLOOD BY AUTOMATED COUNT: 11.2 % (ref 12.3–15.4)
GFR SERPL CREATININE-BSD FRML MDRD: 86 ML/MIN/1.73
GLOBULIN UR ELPH-MCNC: 1.7 GM/DL
GLUCOSE SERPL-MCNC: 231 MG/DL (ref 65–99)
HBA1C MFR BLD: 6.7 % (ref 4.8–5.6)
HCT VFR BLD AUTO: 38.9 % (ref 34–46.6)
HCV AB SER DONR QL: NORMAL
HDLC SERPL QL: 1.45
HDLC SERPL-MCNC: 107 MG/DL (ref 40–60)
HGB BLD-MCNC: 12.9 G/DL (ref 12–15.9)
IMM GRANULOCYTES # BLD AUTO: 0.01 10*3/MM3 (ref 0–0.05)
IMM GRANULOCYTES NFR BLD AUTO: 0.3 % (ref 0–0.5)
LDLC SERPL CALC-MCNC: 37 MG/DL (ref 0–100)
LYMPHOCYTES # BLD AUTO: 1.56 10*3/MM3 (ref 0.7–3.1)
LYMPHOCYTES NFR BLD AUTO: 39.3 % (ref 19.6–45.3)
MCH RBC QN AUTO: 32.5 PG (ref 26.6–33)
MCHC RBC AUTO-ENTMCNC: 33.2 G/DL (ref 31.5–35.7)
MCV RBC AUTO: 98 FL (ref 79–97)
MICROALBUMIN/CREAT UR: NORMAL MG/G{CREAT}
MONOCYTES # BLD AUTO: 0.29 10*3/MM3 (ref 0.1–0.9)
MONOCYTES NFR BLD AUTO: 7.3 % (ref 5–12)
NEUTROPHILS NFR BLD AUTO: 1.94 10*3/MM3 (ref 1.7–7)
NEUTROPHILS NFR BLD AUTO: 48.8 % (ref 42.7–76)
NRBC BLD AUTO-RTO: 0 /100 WBC (ref 0–0.2)
PLATELET # BLD AUTO: 202 10*3/MM3 (ref 140–450)
PMV BLD AUTO: 9.6 FL (ref 6–12)
POTASSIUM SERPL-SCNC: 4.3 MMOL/L (ref 3.5–5.2)
PROT SERPL-MCNC: 6.1 G/DL (ref 6–8.5)
RBC # BLD AUTO: 3.97 10*6/MM3 (ref 3.77–5.28)
SODIUM SERPL-SCNC: 138 MMOL/L (ref 136–145)
T4 FREE SERPL-MCNC: 1.7 NG/DL (ref 0.93–1.7)
TRIGL SERPL-MCNC: 51 MG/DL (ref 0–150)
TSH SERPL DL<=0.05 MIU/L-ACNC: 1.95 UIU/ML (ref 0.27–4.2)
VIT B12 BLD-MCNC: 481 PG/ML (ref 211–946)
VLDLC SERPL-MCNC: 11 MG/DL (ref 5–40)
WBC NRBC COR # BLD: 3.97 10*3/MM3 (ref 3.4–10.8)

## 2021-11-29 PROCEDURE — 86803 HEPATITIS C AB TEST: CPT | Performed by: FAMILY MEDICINE

## 2021-11-29 PROCEDURE — 80053 COMPREHEN METABOLIC PANEL: CPT | Performed by: FAMILY MEDICINE

## 2021-11-29 PROCEDURE — 82570 ASSAY OF URINE CREATININE: CPT

## 2021-11-29 PROCEDURE — 85025 COMPLETE CBC W/AUTO DIFF WBC: CPT | Performed by: FAMILY MEDICINE

## 2021-11-29 PROCEDURE — 84439 ASSAY OF FREE THYROXINE: CPT | Performed by: FAMILY MEDICINE

## 2021-11-29 PROCEDURE — 83036 HEMOGLOBIN GLYCOSYLATED A1C: CPT | Performed by: FAMILY MEDICINE

## 2021-11-29 PROCEDURE — 80061 LIPID PANEL: CPT | Performed by: FAMILY MEDICINE

## 2021-11-29 PROCEDURE — 36415 COLL VENOUS BLD VENIPUNCTURE: CPT | Performed by: FAMILY MEDICINE

## 2021-11-29 PROCEDURE — 82607 VITAMIN B-12: CPT | Performed by: FAMILY MEDICINE

## 2021-11-29 PROCEDURE — 84443 ASSAY THYROID STIM HORMONE: CPT | Performed by: FAMILY MEDICINE

## 2021-11-29 PROCEDURE — 82306 VITAMIN D 25 HYDROXY: CPT | Performed by: FAMILY MEDICINE

## 2021-11-29 PROCEDURE — 82043 UR ALBUMIN QUANTITATIVE: CPT

## 2021-12-03 ENCOUNTER — OFFICE VISIT (OUTPATIENT)
Dept: ENDOCRINOLOGY | Age: 76
End: 2021-12-03

## 2021-12-03 VITALS
SYSTOLIC BLOOD PRESSURE: 130 MMHG | HEIGHT: 64 IN | DIASTOLIC BLOOD PRESSURE: 70 MMHG | BODY MASS INDEX: 22.81 KG/M2 | OXYGEN SATURATION: 98 % | HEART RATE: 91 BPM | WEIGHT: 133.6 LBS

## 2021-12-03 DIAGNOSIS — E78.2 HYPERLIPEMIA, MIXED: ICD-10-CM

## 2021-12-03 DIAGNOSIS — Z79.4 LONG-TERM INSULIN USE (HCC): ICD-10-CM

## 2021-12-03 DIAGNOSIS — E03.9 ACQUIRED HYPOTHYROIDISM: ICD-10-CM

## 2021-12-03 DIAGNOSIS — E10.649 TYPE 1 DIABETES MELLITUS WITH HYPOGLYCEMIA AND WITHOUT COMA (HCC): Primary | ICD-10-CM

## 2021-12-03 PROCEDURE — 95251 CONT GLUC MNTR ANALYSIS I&R: CPT | Performed by: INTERNAL MEDICINE

## 2021-12-03 PROCEDURE — 99214 OFFICE O/P EST MOD 30 MIN: CPT | Performed by: INTERNAL MEDICINE

## 2021-12-03 RX ORDER — INFLUENZA A VIRUS A/MICHIGAN/45/2015 X-275 (H1N1) ANTIGEN (FORMALDEHYDE INACTIVATED), INFLUENZA A VIRUS A/SINGAPORE/INFIMH-16-0019/2016 IVR-186 (H3N2) ANTIGEN (FORMALDEHYDE INACTIVATED), INFLUENZA B VIRUS B/PHUKET/3073/2013 ANTIGEN (FORMALDEHYDE INACTIVATED), AND INFLUENZA B VIRUS B/MARYLAND/15/2016 BX-69A ANTIGEN (FORMALDEHYDE INACTIVATED) 60; 60; 60; 60 UG/.7ML; UG/.7ML; UG/.7ML; UG/.7ML
INJECTION, SUSPENSION INTRAMUSCULAR
COMMUNITY
Start: 2021-10-20 | End: 2022-07-27

## 2021-12-03 NOTE — PROGRESS NOTES
"Chief Complaint  Chief Complaint   Patient presents with   • Diabetes   FOLLOW UP/ TYPE 1     Subjective          History of Present Illness    Olga Greco 76 y.o. presents with Type 1 dm as a F/u patient.     Type 1 dm - Diagnosed in 2003   Today in clinic pt reports being on medtronic pump - 630 G and Dexcom G6 sensor.   FBG - 100 - 120   Pre meals - 100 - 200  Checks BG - 6 - 8 times a day  Insulin pump - yes  Sensor - yes  Dm retinopathy - no ,Last eye exam - spring 2021  Dm nephropathy - x  Dm neuropathy - x,Dm neuropathy meds - n/a  CAD -x  CVA -x  Episodes of hypoglycemia - yes  Pt is physically active. weight has been stable.   Pt tries to follow DM diet for most part.   On Ace inb.  Prior DKA episodes -  N/a    Hyperlipidemia - lipitor 20 mg po daily.     Hypothyroidism - on synthroid 88 mcg oral  For mon - Friday, 100 mcg oral - sat and Sunday.      Reviewed primary care physician's/consulting physician documentation and lab results         I have reviewed the patient's allergies, medicines, past medical hx, family hx and social hx in detail.    Objective   Vital Signs:   /70   Pulse 91   Ht 161.3 cm (63.5\")   Wt 60.6 kg (133 lb 9.6 oz)   LMP  (LMP Unknown)   SpO2 98%   BMI 23.29 kg/m²   Physical Exam   General appearance - no distress  Eyes- anicteric sclera  Ear nose and throat-external ears and nose normal.    Respiratory-normal chest on inspection.  No respiratory distress noted.  Skin-no rashes.  Neuro-alert and oriented x3            Result Review :   The following data was reviewed by: Angi Rogers MD on 12/03/2021:  Lab on 11/29/2021   Component Date Value Ref Range Status   • Microalbumin/Creatinine Ratio 11/29/2021    Final    Unable to calculate   • Creatinine, Urine 11/29/2021 141.8  mg/dL Final   • Microalbumin, Urine 11/29/2021 <1.2  mg/dL Final     Data reviewed: PCP documentation        Results Review:    I reviewed the patient's new clinical results. " "                                                                             Assessment and Plan    Problem List Items Addressed This Visit        Other    Type 1 diabetes mellitus (HCC) - Primary    Relevant Orders    Hemoglobin A1c    Basic Metabolic Panel    Lipid Panel    TSH    T4, Free    Vitamin B12 & Folate    Hypothyroidism    Relevant Orders    Hemoglobin A1c    Basic Metabolic Panel    Lipid Panel    TSH    T4, Free    Vitamin B12 & Folate      Other Visit Diagnoses     Hyperlipemia, mixed        Relevant Orders    Hemoglobin A1c    Basic Metabolic Panel    Lipid Panel    TSH    T4, Free    Vitamin B12 & Folate    Long-term insulin use (HCC)        Relevant Orders    Hemoglobin A1c    Basic Metabolic Panel    Lipid Panel    TSH    T4, Free    Vitamin B12 & Folate        Type 1 dm - uncontrolled with hyper and hypoglycemia.  Continue the current pump settings  If patient is experiencing slightly elevated blood sugars emphasized to her to change the bolus settings at dinnertime to 1 unit - 7.5 g of carbohydrates.    Hypothyroidism  Continue Synthroid 88 mcg Monday to Friday and 100 mcg Saturday and Sunday.    Hyperlipidemia  continue Lipitor 10 mg oral daily.    Interpreted the information of the CGM after downloading it and made the current insulin changes.  Avg bg -159  Trends noted -decent control    Interpreted the blood work-up/imaging results performed by the primary care/consulting physician -    Refills sent to pharmacy    Follow Up     Patient was given instructions and counseling regarding her condition or for health maintenance advice. Please see specific information pulled into the AVS if appropriate.       Thank you for asking me to see your patient, Olga Greco in consultation.         Angi Rogers MD  12/03/21      EMR Dragon / transcription disclaimer:     \"Dictated utilizing Dragon dictation\".               "

## 2021-12-20 ENCOUNTER — OFFICE VISIT (OUTPATIENT)
Dept: INTERNAL MEDICINE | Facility: CLINIC | Age: 76
End: 2021-12-20

## 2021-12-20 VITALS
BODY MASS INDEX: 23.12 KG/M2 | SYSTOLIC BLOOD PRESSURE: 136 MMHG | HEIGHT: 64 IN | DIASTOLIC BLOOD PRESSURE: 68 MMHG | HEART RATE: 92 BPM | RESPIRATION RATE: 19 BRPM | OXYGEN SATURATION: 99 % | WEIGHT: 135.4 LBS | TEMPERATURE: 98 F

## 2021-12-20 DIAGNOSIS — E10.9 TYPE 1 DIABETES MELLITUS WITHOUT COMPLICATION (HCC): ICD-10-CM

## 2021-12-20 DIAGNOSIS — I10 PRIMARY HYPERTENSION: ICD-10-CM

## 2021-12-20 DIAGNOSIS — K58.0 IRRITABLE BOWEL SYNDROME WITH DIARRHEA: ICD-10-CM

## 2021-12-20 DIAGNOSIS — Z00.00 MEDICARE ANNUAL WELLNESS VISIT, SUBSEQUENT: Primary | ICD-10-CM

## 2021-12-20 DIAGNOSIS — M25.531 RIGHT WRIST PAIN: ICD-10-CM

## 2021-12-20 DIAGNOSIS — Z00.00 ROUTINE HEALTH MAINTENANCE: ICD-10-CM

## 2021-12-20 DIAGNOSIS — E55.9 VITAMIN D DEFICIENCY: ICD-10-CM

## 2021-12-20 DIAGNOSIS — Z85.828 HISTORY OF SQUAMOUS CELL CARCINOMA OF SKIN: ICD-10-CM

## 2021-12-20 DIAGNOSIS — E03.9 ACQUIRED HYPOTHYROIDISM: ICD-10-CM

## 2021-12-20 PROCEDURE — 96160 PT-FOCUSED HLTH RISK ASSMT: CPT | Performed by: FAMILY MEDICINE

## 2021-12-20 PROCEDURE — 99214 OFFICE O/P EST MOD 30 MIN: CPT | Performed by: FAMILY MEDICINE

## 2021-12-20 PROCEDURE — 1170F FXNL STATUS ASSESSED: CPT | Performed by: FAMILY MEDICINE

## 2021-12-20 PROCEDURE — 1126F AMNT PAIN NOTED NONE PRSNT: CPT | Performed by: FAMILY MEDICINE

## 2021-12-20 PROCEDURE — 1160F RVW MEDS BY RX/DR IN RCRD: CPT | Performed by: FAMILY MEDICINE

## 2021-12-20 PROCEDURE — G0439 PPPS, SUBSEQ VISIT: HCPCS | Performed by: FAMILY MEDICINE

## 2021-12-20 NOTE — PROGRESS NOTES
The ABCs of the Annual Wellness Visit  Subsequent Medicare Wellness Visit    Chief Complaint   Patient presents with   • Medicare Wellness-subsequent   • Hypertension      Subjective    History of Present Illness:  Olga Greco is a 76 y.o. female who presents for a Subsequent Medicare Wellness Visit.    The following portions of the patient's history were reviewed and   updated as appropriate: allergies, current medications, past family history, past medical history, past social history, past surgical history and problem list.    Compared to one year ago, the patient feels her physical   health is the same.    Compared to one year ago, the patient feels her mental   health is the same.    Recent Hospitalizations:  She was not admitted to the hospital during the last year.       Current Medical Providers:  Patient Care Team:  Yair Zapata MD as PCP - General    Outpatient Medications Prior to Visit   Medication Sig Dispense Refill   • ACCU-CHEK SOFTCLIX LANCETS lancets USE TO TEST BLOOD SUGAR 4 TIMES DAILY 200 each 5   • atorvastatin (LIPITOR) 20 MG tablet Take 1 tablet by mouth Daily. 90 tablet 1   • B Complex Vitamins (VITAMIN B COMPLEX) capsule capsule Take 1 capsule by mouth Daily.     • benazepril-hydrochlorthiazide (LOTENSIN HCT) 20-12.5 MG per tablet Take 1 tablet by mouth Daily. 90 tablet 1   • Biotin 97175 MCG tablet Take 1 tablet by mouth Daily.     • Blood Glucose Monitoring Suppl (ACCU-CHEK RICHARD SMARTVIEW) w/Device kit Use to check blood sugar 4 times daily  E10.9 1 kit 0   • Calcium Carb-Cholecalciferol (CALCIUM 600+D3 PO) Take 500 Units by mouth Daily.     • Chromium Picolinate 1000 MCG tablet Take 1,000 mcg by mouth daily.     • colestipol (COLESTID) 1 g tablet TAKE TWO TABLETS BY MOUTH ONCE NIGHTLY 180 tablet 3   • Continuous Blood Gluc Sensor (DEXCOM G6 SENSOR) Every 10 (Ten) Days. e10.65 9 each 3   • Continuous Blood Gluc Transmit (DEXCOM G6 TRANSMITTER) misc 1 package Every 3 (Three)  Months. e10.65 3 each 3   • diclofenac (VOLTAREN) 1 % gel gel Apply 4 g topically to the appropriate area as directed 4 (Four) Times a Day. 100 g 5   • glucagon (GLUCAGON EMERGENCY) 1 MG injection Inject 1 mg under the skin into the appropriate area as directed 1 (One) Time As Needed for Low Blood Sugar for up to 1 dose. 1 kit 12   • NovoLOG 100 UNIT/ML injection INJECT UNDER THE SKIN 50 UNITS DAILY VIA INSULIN PUMP 40 each 3   • Probiotic Product (PROBIOTIC DAILY PO) Take  by mouth.     • Synthroid 100 MCG tablet TAKE ONE TABLET BY MOUTH DAILY 90 tablet 0   • Synthroid 88 MCG tablet TAKE ONE TABLET BY MOUTH DAILY 90 tablet 2   • Fluzone High-Dose Quadrivalent 0.7 ML suspension prefilled syringe injection        No facility-administered medications prior to visit.       No opioid medication identified on active medication list. I have reviewed chart for other potential  high risk medication/s and harmful drug interactions in the elderly.          Aspirin is not on active medication list.  Aspirin use is not indicated based on review of current medical condition/s. Risk of harm outweighs potential benefits.  .    Patient Active Problem List   Diagnosis   • Type 1 diabetes mellitus (HCC)   • Hypertension   • Hypothyroidism   • Menopausal symptom   • Vitamin D deficiency   • Diabetic ketoacidosis without coma associated with type 1 diabetes mellitus (HCC)   • Encounter for screening for malignant neoplasm of colon   • History of squamous cell carcinoma of skin   • Tendinopathy of rotator cuff, left   • Irritable bowel syndrome with diarrhea   • Routine health maintenance     Advance Care Planning  Advance Directive is not on file.  ACP discussion was held with the patient during this visit. Patient has an advance directive (not in EMR), copy requested.          Objective    Vitals:    12/20/21 0855   BP: 136/68   Pulse: 92   Resp: 19   Temp: 98 °F (36.7 °C)   TempSrc: Temporal   SpO2: 99%   Weight: 61.4 kg (135 lb 6.4  "oz)   Height: 161.3 cm (63.5\")     BMI Readings from Last 1 Encounters:   12/20/21 23.61 kg/m²   BMI is within normal parameters. No follow-up required.    Does the patient have evidence of cognitive impairment? No    Physical Exam  Lab Results   Component Value Date    TRIG 51 11/29/2021     (H) 11/29/2021    LDL 37 11/29/2021    VLDL 11 11/29/2021    HGBA1C 6.70 (H) 11/29/2021            HEALTH RISK ASSESSMENT    Smoking Status:  Social History     Tobacco Use   Smoking Status Never Smoker   Smokeless Tobacco Never Used     Alcohol Consumption:  Social History     Substance and Sexual Activity   Alcohol Use Yes   • Alcohol/week: 2.0 standard drinks   • Types: 2 Glasses of wine per week    Comment: daily     Fall Risk Screen:    HILDA Fall Risk Assessment was completed, and patient is at LOW risk for falls.Assessment completed on:6/28/2021    Depression Screening:  PHQ-2/PHQ-9 Depression Screening 6/28/2021   Little interest or pleasure in doing things 2   Feeling down, depressed, or hopeless 0   Trouble falling or staying asleep, or sleeping too much 0   Feeling tired or having little energy 0   Poor appetite or overeating 0   Feeling bad about yourself - or that you are a failure or have let yourself or your family down 0   Trouble concentrating on things, such as reading the newspaper or watching television 0   Moving or speaking so slowly that other people could have noticed. Or the opposite - being so fidgety or restless that you have been moving around a lot more than usual 0   Thoughts that you would be better off dead, or of hurting yourself in some way 0   Total Score 2   If you checked off any problems, how difficult have these problems made it for you to do your work, take care of things at home, or get along with other people? Not difficult at all       Health Habits and Functional and Cognitive Screening:  Functional & Cognitive Status 6/18/2020   Do you have difficulty preparing food and " eating? No   Do you have difficulty bathing yourself, getting dressed or grooming yourself? No   Do you have difficulty using the toilet? No   Do you have difficulty moving around from place to place? No   Do you have trouble with steps or getting out of a bed or a chair? No   Current Diet Well Balanced Diet   Dental Exam Up to date   Eye Exam Up to date   Exercise (times per week) 0 times per week   Current Exercise Activities Include None   Do you need help using the phone?  No   Are you deaf or do you have serious difficulty hearing?  No   Do you need help with transportation? No   Do you need help shopping? No   Do you need help preparing meals?  No   Do you need help with housework?  No   Do you need help with laundry? No   Do you need help taking your medications? No   Do you need help managing money? No   Do you ever drive or ride in a car without wearing a seat belt? No   Have you felt unusual stress, anger or loneliness in the last month? No   Who do you live with? Spouse   If you need help, do you have trouble finding someone available to you? No   Have you been bothered in the last four weeks by sexual problems? No   Do you have difficulty concentrating, remembering or making decisions? Yes       Age-appropriate Screening Schedule:  Refer to the list below for future screening recommendations based on patient's age, sex and/or medical conditions. Orders for these recommended tests are listed in the plan section. The patient has been provided with a written plan.    Health Maintenance   Topic Date Due   • TDAP/TD VACCINES (1 - Tdap) Never done   • ZOSTER VACCINE (2 of 2) 11/10/2008   • DXA SCAN  05/11/2018   • DIABETIC FOOT EXAM  04/01/2022   • DIABETIC EYE EXAM  04/02/2022   • HEMOGLOBIN A1C  05/29/2022   • LIPID PANEL  11/29/2022   • URINE MICROALBUMIN  11/29/2022   • MAMMOGRAM  07/16/2023   • INFLUENZA VACCINE  Completed              Assessment/Plan   CMS Preventative Services Quick Reference  Risk  Factors Identified During Encounter  Immunizations Discussed/Encouraged (specific Immunizations; UTD  The above risks/problems have been discussed with the patient.  Follow up actions/plans if indicated are seen below in the Assessment/Plan Section.  Pertinent information has been shared with the patient in the After Visit Summary.    Diagnoses and all orders for this visit:    1. Medicare annual wellness visit, subsequent (Primary)        Follow Up:   No follow-ups on file.     An After Visit Summary and PPPS were made available to the patient.

## 2021-12-20 NOTE — PROGRESS NOTES
Subjective     Olga Greco is a 76 y.o. female, who presents with a chief complaint of   Chief Complaint   Patient presents with   • Medicare Wellness-subsequent   • Hypertension     Diabetes    Hypertension    Hypothyroidism  Associated symptoms include arthralgias and joint swelling.   Hyperlipidemia  Exacerbating diseases include hypothyroidism.     1. Diabetes mellitus.  Pt doing using Dexcom and pump.  Her blood sugar is somewhat labile and she is working with Dr. Rogers.    2. HTN.  Tolerating benazepril-hctz.  Not monitoring home blood pressures.    3. IBS.  She is feeling well with colestipol and probiotics.    4. Right wrist pain.  She fell 3.5 months ago on outstretched hand.  X-ray the following day in the ER was negative.  Pt with persistent pain and some swelling.    The following portions of the patient's history were reviewed and updated as appropriate: allergies, current medications, past family history, past medical history, past social history, past surgical history and problem list.    Allergies: Aspirin and Epinephrine    Review of Systems   Constitutional: Negative.    HENT: Negative.    Eyes: Negative.    Respiratory: Negative.    Cardiovascular: Negative.    Gastrointestinal: Negative.    Endocrine: Negative.    Genitourinary: Negative.    Musculoskeletal: Positive for arthralgias and joint swelling.   Skin: Negative.    Allergic/Immunologic: Negative.    Neurological: Negative.    Hematological: Negative.    Psychiatric/Behavioral: Negative.      Objective     Wt Readings from Last 3 Encounters:   12/20/21 61.4 kg (135 lb 6.4 oz)   12/03/21 60.6 kg (133 lb 9.6 oz)   09/30/21 59.9 kg (132 lb)     Temp Readings from Last 3 Encounters:   12/20/21 98 °F (36.7 °C) (Temporal)   09/02/21 98 °F (36.7 °C) (Oral)   06/28/21 96.9 °F (36.1 °C) (Temporal)     BP Readings from Last 3 Encounters:   12/20/21 136/68   12/03/21 130/70   09/30/21 142/78     Pulse Readings from Last 3 Encounters:   12/20/21 92    12/03/21 91   09/30/21 74     Body mass index is 23.61 kg/m².  SpO2 Readings from Last 3 Encounters:   01/17/18 99%   10/11/17 98%   07/13/17 98%       Physical Exam   Constitutional: She is oriented to person, place, and time. She appears well-developed.   HENT:   Head: Normocephalic and atraumatic.   Mouth/Throat: Mucous membranes are moist.   Eyes: Conjunctivae are normal.   Neck: No thyromegaly present.   Cardiovascular: Normal rate, regular rhythm and normal heart sounds.   Pulmonary/Chest: Effort normal and breath sounds normal.   Abdominal: Soft. Normal appearance. There is no abdominal tenderness.   Musculoskeletal:      Right wrist: She exhibits decreased range of motion, tenderness (snuff box) and swelling.      Right lower leg: No edema.      Left lower leg: No edema.   Neurological: She is alert and oriented to person, place, and time.   Skin: Skin is warm and dry. No rash noted.   Psychiatric: Her behavior is normal. Mood normal.   Nursing note and vitals reviewed.      Results for orders placed or performed in visit on 11/29/21   Microalbumin / Creatinine Urine Ratio - Urine, Clean Catch    Specimen: Urine, Clean Catch   Result Value Ref Range    Microalbumin/Creatinine Ratio      Creatinine, Urine 141.8 mg/dL    Microalbumin, Urine <1.2 mg/dL     Assessment/Plan   Diagnoses and all orders for this visit:    1. Medicare annual wellness visit, subsequent (Primary)    2. Type 1 diabetes mellitus without complication (HCC)    3. Primary hypertension    4. Acquired hypothyroidism    5. Vitamin D deficiency    6. Irritable bowel syndrome with diarrhea    7. History of squamous cell carcinoma of skin    8. Routine health maintenance    9. Right wrist pain  -     Ambulatory Referral to Hand Surgery    1. DMI. A1c 6.7.  She is having some highs and lows.  Managed by Dr. Rogers.  Eye exam UTD.    2. HTN.  Controlled.  Continue benazepril-hctz 20-12.5 mg daily and monitor home blood pressures.  Labs  reviewed.    3. Hypothyroidism.  Adequate replacement.  Managed by Dr. Rogers.    4. Vitamin D deficiency.  Continue supplement.    5. IBS.  Controlled with colestipol and probiotic.    6. 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  She sees Dr. Padgett.    7. RHM.  Mammogram UTD.  Colonoscopy 11/2020 by Dr. Fallon.  Shingrix done at pharmacy.  Pneumovax UTD.  Covid-19 vaccines done.  Flu vaccine UTD.    8. Right wrist pain.  Injured 3.5 months ago.  Snuff box tenderness.  Refer to hand surgeon.      Outpatient Medications Prior to Visit   Medication Sig Dispense Refill   • ACCU-CHEK SOFTCLIX LANCETS lancets USE TO TEST BLOOD SUGAR 4 TIMES DAILY 200 each 5   • atorvastatin (LIPITOR) 20 MG tablet Take 1 tablet by mouth Daily. 90 tablet 1   • B Complex Vitamins (VITAMIN B COMPLEX) capsule capsule Take 1 capsule by mouth Daily.     • benazepril-hydrochlorthiazide (LOTENSIN HCT) 20-12.5 MG per tablet Take 1 tablet by mouth Daily. 90 tablet 1   • Biotin 65643 MCG tablet Take 1 tablet by mouth Daily.     • Blood Glucose Monitoring Suppl (ACCU-CHEK RICHARD SMARTVIEW) w/Device kit Use to check blood sugar 4 times daily  E10.9 1 kit 0   • Calcium Carb-Cholecalciferol (CALCIUM 600+D3 PO) Take 500 Units by mouth Daily.     • Chromium Picolinate 1000 MCG tablet Take 1,000 mcg by mouth daily.     • colestipol (COLESTID) 1 g tablet TAKE TWO TABLETS BY MOUTH ONCE NIGHTLY 180 tablet 3   • Continuous Blood Gluc Sensor (DEXCOM G6 SENSOR) Every 10 (Ten) Days. e10.65 9 each 3   • Continuous Blood Gluc Transmit (DEXCOM G6 TRANSMITTER) misc 1 package Every 3 (Three) Months. e10.65 3 each 3   • diclofenac (VOLTAREN) 1 % gel gel Apply 4 g topically to the appropriate area as directed 4 (Four) Times a Day. 100 g 5   • glucagon (GLUCAGON EMERGENCY) 1 MG injection Inject 1 mg under the skin into the appropriate area as directed 1 (One) Time As Needed for Low Blood Sugar for up to 1 dose. 1 kit 12   • NovoLOG 100 UNIT/ML injection  INJECT UNDER THE SKIN 50 UNITS DAILY VIA INSULIN PUMP 40 each 3   • Probiotic Product (PROBIOTIC DAILY PO) Take  by mouth.     • Synthroid 100 MCG tablet TAKE ONE TABLET BY MOUTH DAILY 90 tablet 0   • Synthroid 88 MCG tablet TAKE ONE TABLET BY MOUTH DAILY 90 tablet 2   • Fluzone High-Dose Quadrivalent 0.7 ML suspension prefilled syringe injection        No facility-administered medications prior to visit.     No orders of the defined types were placed in this encounter.    [unfilled]  There are no discontinued medications.      Return in about 6 months (around 6/20/2022).

## 2022-01-04 ENCOUNTER — TELEPHONE (OUTPATIENT)
Dept: INTERNAL MEDICINE | Facility: CLINIC | Age: 77
End: 2022-01-04

## 2022-01-13 RX ORDER — MONTELUKAST SODIUM 4 MG/1
TABLET, CHEWABLE ORAL
Qty: 180 TABLET | Refills: 3 | Status: SHIPPED | OUTPATIENT
Start: 2022-01-13 | End: 2023-01-20

## 2022-02-23 ENCOUNTER — LAB (OUTPATIENT)
Dept: LAB | Facility: HOSPITAL | Age: 77
End: 2022-02-23

## 2022-02-23 DIAGNOSIS — E03.9 ACQUIRED HYPOTHYROIDISM: ICD-10-CM

## 2022-02-23 DIAGNOSIS — E10.649 TYPE 1 DIABETES MELLITUS WITH HYPOGLYCEMIA AND WITHOUT COMA: ICD-10-CM

## 2022-02-23 DIAGNOSIS — Z79.4 LONG-TERM INSULIN USE: ICD-10-CM

## 2022-02-23 DIAGNOSIS — E78.2 HYPERLIPEMIA, MIXED: ICD-10-CM

## 2022-02-23 LAB
ANION GAP SERPL CALCULATED.3IONS-SCNC: 7.7 MMOL/L (ref 5–15)
BUN SERPL-MCNC: 14 MG/DL (ref 8–23)
BUN/CREAT SERPL: 15.4 (ref 7–25)
CALCIUM SPEC-SCNC: 9.3 MG/DL (ref 8.6–10.5)
CHLORIDE SERPL-SCNC: 103 MMOL/L (ref 98–107)
CHOLEST SERPL-MCNC: 130 MG/DL (ref 0–200)
CO2 SERPL-SCNC: 25.3 MMOL/L (ref 22–29)
CREAT SERPL-MCNC: 0.91 MG/DL (ref 0.57–1)
FOLATE SERPL-MCNC: >20 NG/ML (ref 4.78–24.2)
GFR SERPL CREATININE-BSD FRML MDRD: 60 ML/MIN/1.73
GLUCOSE SERPL-MCNC: 235 MG/DL (ref 65–99)
HBA1C MFR BLD: 6.6 % (ref 4.8–5.6)
HDLC SERPL-MCNC: 101 MG/DL (ref 40–60)
LDLC SERPL CALC-MCNC: 18 MG/DL (ref 0–100)
LDLC/HDLC SERPL: 0.2 {RATIO}
POTASSIUM SERPL-SCNC: 4 MMOL/L (ref 3.5–5.2)
SODIUM SERPL-SCNC: 136 MMOL/L (ref 136–145)
T4 FREE SERPL-MCNC: 1.69 NG/DL (ref 0.93–1.7)
TRIGL SERPL-MCNC: 42 MG/DL (ref 0–150)
TSH SERPL DL<=0.05 MIU/L-ACNC: 2.47 UIU/ML (ref 0.27–4.2)
VIT B12 BLD-MCNC: 633 PG/ML (ref 211–946)
VLDLC SERPL-MCNC: 11 MG/DL (ref 5–40)

## 2022-02-23 PROCEDURE — 80048 BASIC METABOLIC PNL TOTAL CA: CPT

## 2022-02-23 PROCEDURE — 82746 ASSAY OF FOLIC ACID SERUM: CPT

## 2022-02-23 PROCEDURE — 36415 COLL VENOUS BLD VENIPUNCTURE: CPT

## 2022-02-23 PROCEDURE — 80061 LIPID PANEL: CPT

## 2022-02-23 PROCEDURE — 82607 VITAMIN B-12: CPT

## 2022-02-23 PROCEDURE — 83036 HEMOGLOBIN GLYCOSYLATED A1C: CPT

## 2022-02-23 PROCEDURE — 84443 ASSAY THYROID STIM HORMONE: CPT

## 2022-02-23 PROCEDURE — 84439 ASSAY OF FREE THYROXINE: CPT

## 2022-03-03 ENCOUNTER — OFFICE VISIT (OUTPATIENT)
Dept: ENDOCRINOLOGY | Age: 77
End: 2022-03-03

## 2022-03-03 VITALS
OXYGEN SATURATION: 98 % | BODY MASS INDEX: 24.27 KG/M2 | DIASTOLIC BLOOD PRESSURE: 72 MMHG | SYSTOLIC BLOOD PRESSURE: 131 MMHG | HEART RATE: 63 BPM | HEIGHT: 63 IN | WEIGHT: 137 LBS

## 2022-03-03 DIAGNOSIS — Z79.4 LONG-TERM INSULIN USE: ICD-10-CM

## 2022-03-03 DIAGNOSIS — E78.5 HYPERLIPIDEMIA ASSOCIATED WITH TYPE 2 DIABETES MELLITUS: ICD-10-CM

## 2022-03-03 DIAGNOSIS — E11.69 HYPERLIPIDEMIA ASSOCIATED WITH TYPE 2 DIABETES MELLITUS: ICD-10-CM

## 2022-03-03 DIAGNOSIS — E03.9 ACQUIRED HYPOTHYROIDISM: ICD-10-CM

## 2022-03-03 DIAGNOSIS — E10.649 TYPE 1 DIABETES MELLITUS WITH HYPOGLYCEMIA AND WITHOUT COMA: Primary | ICD-10-CM

## 2022-03-03 PROCEDURE — 99214 OFFICE O/P EST MOD 30 MIN: CPT | Performed by: INTERNAL MEDICINE

## 2022-03-03 PROCEDURE — 95251 CONT GLUC MNTR ANALYSIS I&R: CPT | Performed by: INTERNAL MEDICINE

## 2022-03-03 NOTE — PROGRESS NOTES
"Chief Complaint  Chief Complaint   Patient presents with   • Diabetes Mellitus     follow up       Subjective          History of Present Illness    Olga Greco 76 y.o. presents with Type 1 dm as a F/u patient.      Type 1 dm - Diagnosed in 2003  Today in clinic pt reports being on medtronic 630 g and G - 6 dexcom. Insulin in the pump is novolog, no pump site infections.   FBG - 100 - 120  Pre meals - 100 - 200  Checks BG - 6  - 10 times  Insulin pump - yes  Sensor - yes  Dm retinopathy - x ,Last eye exam - 3/2021  Dm nephropathy - x  Dm neuropathy - x,Dm neuropathy meds - x  CAD -x  CVA -x  Episodes of hypoglycemia - lowest Bg is 40 mg/dl, she does feel the hypoglycemic episodes.   Pt is physically active. weight has been stable.   Pt tries to follow DM diet for most part.   On Ace inb.  Prior DKA episodes - n/a    Hyperlipidemia - lipitor 20 mg po daily.      Hypothyroidism - on synthroid 88 mcg oral  For mon - Friday, 100 mcg oral - sat and Sunday.    Downloaded the pump and senor information.     Reviewed primary care physician's/consulting physician documentation and lab results         I have reviewed the patient's allergies, medicines, past medical hx, family hx and social hx in detail.    Objective   Vital Signs:   /72   Pulse 63   Ht 160 cm (63\")   Wt 62.1 kg (137 lb)   LMP  (LMP Unknown)   SpO2 98%   BMI 24.27 kg/m²   Physical Exam   General appearance - no distress  Eyes- anicteric sclera  Ear nose and throat-external ears and nose normal.    Respiratory-normal chest on inspection.  No respiratory distress noted.  Skin-no rashes.  Neuro-alert and oriented x3            Result Review :   The following data was reviewed by: Angi Rogers MD on 03/03/2022:  Lab on 02/23/2022   Component Date Value Ref Range Status   • Hemoglobin A1C 02/23/2022 6.60* 4.80 - 5.60 % Final   • Glucose 02/23/2022 235* 65 - 99 mg/dL Final   • BUN 02/23/2022 14  8 - 23 mg/dL Final   • Creatinine 02/23/2022 0.91  0.57 " - 1.00 mg/dL Final   • Sodium 02/23/2022 136  136 - 145 mmol/L Final   • Potassium 02/23/2022 4.0  3.5 - 5.2 mmol/L Final   • Chloride 02/23/2022 103  98 - 107 mmol/L Final   • CO2 02/23/2022 25.3  22.0 - 29.0 mmol/L Final   • Calcium 02/23/2022 9.3  8.6 - 10.5 mg/dL Final   • eGFR Non  Amer 02/23/2022 60* >60 mL/min/1.73 Final   • BUN/Creatinine Ratio 02/23/2022 15.4  7.0 - 25.0 Final   • Anion Gap 02/23/2022 7.7  5.0 - 15.0 mmol/L Final   • Total Cholesterol 02/23/2022 130  0 - 200 mg/dL Final   • Triglycerides 02/23/2022 42  0 - 150 mg/dL Final   • HDL Cholesterol 02/23/2022 101* 40 - 60 mg/dL Final   • LDL Cholesterol  02/23/2022 18  0 - 100 mg/dL Final   • VLDL Cholesterol 02/23/2022 11  5 - 40 mg/dL Final   • LDL/HDL Ratio 02/23/2022 0.20   Final   • TSH 02/23/2022 2.470  0.270 - 4.200 uIU/mL Final   • Free T4 02/23/2022 1.69  0.93 - 1.70 ng/dL Final   • Folate 02/23/2022 >20.00  4.78 - 24.20 ng/mL Final   • Vitamin B-12 02/23/2022 633  211 - 946 pg/mL Final     Data reviewed: pcp notes       Results Review:    I reviewed the patient's new clinical results.     Assessment and Plan    Problem List Items Addressed This Visit        Other    Type 1 diabetes mellitus (HCC) - Primary    Relevant Orders    Basic Metabolic Panel    Hemoglobin A1c    Lipid Panel    TSH    T4, Free    Microalbumin / Creatinine Urine Ratio - Urine, Clean Catch    Vitamin B12 & Folate    Hypothyroidism    Relevant Orders    Basic Metabolic Panel    Hemoglobin A1c    Lipid Panel    TSH    T4, Free    Microalbumin / Creatinine Urine Ratio - Urine, Clean Catch    Vitamin B12 & Folate      Other Visit Diagnoses     Hyperlipidemia associated with type 2 diabetes mellitus (HCC)        Relevant Orders    Basic Metabolic Panel    Hemoglobin A1c    Lipid Panel    TSH    T4, Free    Microalbumin / Creatinine Urine Ratio - Urine, Clean Catch    Vitamin B12 & Folate    Long-term insulin use (HCC)        Relevant Orders    Basic Metabolic  "Panel    Hemoglobin A1c    Lipid Panel    TSH    T4, Free    Microalbumin / Creatinine Urine Ratio - Urine, Clean Catch    Vitamin B12 & Folate        Type 1 diabetes mellitus-uncontrolled with hyper and hypoglycemia  Continue the above insulin settings  Downloaded the insulin pump and the sensor information and reviewed the data with the patient.    Interpreted the information of the CGM after downloading it and made the current insulin changes.  Avg bg -146 mg/dL range  Trends noted -decent control    Hyperlipidemia  Continue the statin.    Hypothyroidism  Continue Synthroid.    Interpreted the blood work-up/imaging results performed by the primary care/consulting physician -    Refills sent to pharmacy    Follow Up     Patient was given instructions and counseling regarding her condition or for health maintenance advice. Please see specific information pulled into the AVS if appropriate.       Thank you for asking me to see your patient, Olga Greco in consultation.         Angi Rogers MD  03/03/22      [facility] Dragon / transcription disclaimer:     \"Dictated utilizing Dragon dictation\".               "

## 2022-03-16 DIAGNOSIS — I10 PRIMARY HYPERTENSION: Primary | ICD-10-CM

## 2022-03-16 DIAGNOSIS — E78.2 MIXED HYPERLIPIDEMIA: ICD-10-CM

## 2022-03-16 RX ORDER — ATORVASTATIN CALCIUM 20 MG/1
20 TABLET, FILM COATED ORAL DAILY
Qty: 90 TABLET | Refills: 1 | Status: SHIPPED | OUTPATIENT
Start: 2022-03-16 | End: 2022-09-14 | Stop reason: SDUPTHER

## 2022-03-16 RX ORDER — BENAZEPRIL HYDROCHLORIDE AND HYDROCHLOROTHIAZIDE 20; 12.5 MG/1; MG/1
1 TABLET ORAL DAILY
Qty: 90 TABLET | Refills: 1 | Status: SHIPPED | OUTPATIENT
Start: 2022-03-16 | End: 2022-09-14 | Stop reason: SDUPTHER

## 2022-06-10 ENCOUNTER — OFFICE VISIT (OUTPATIENT)
Dept: ENDOCRINOLOGY | Age: 77
End: 2022-06-10

## 2022-06-10 VITALS
SYSTOLIC BLOOD PRESSURE: 128 MMHG | HEART RATE: 86 BPM | OXYGEN SATURATION: 99 % | DIASTOLIC BLOOD PRESSURE: 68 MMHG | BODY MASS INDEX: 24.1 KG/M2 | HEIGHT: 63 IN | TEMPERATURE: 97.8 F | WEIGHT: 136 LBS

## 2022-06-10 DIAGNOSIS — E10.649 TYPE 1 DIABETES MELLITUS WITH HYPOGLYCEMIA AND WITHOUT COMA: ICD-10-CM

## 2022-06-10 DIAGNOSIS — E10.65 TYPE 1 DIABETES MELLITUS WITH HYPERGLYCEMIA: Primary | ICD-10-CM

## 2022-06-10 DIAGNOSIS — E11.69 HYPERLIPIDEMIA ASSOCIATED WITH TYPE 2 DIABETES MELLITUS: ICD-10-CM

## 2022-06-10 DIAGNOSIS — E78.5 HYPERLIPIDEMIA ASSOCIATED WITH TYPE 2 DIABETES MELLITUS: ICD-10-CM

## 2022-06-10 DIAGNOSIS — E03.9 ACQUIRED HYPOTHYROIDISM: ICD-10-CM

## 2022-06-10 PROCEDURE — 99214 OFFICE O/P EST MOD 30 MIN: CPT | Performed by: NURSE PRACTITIONER

## 2022-06-10 NOTE — PROGRESS NOTES
"Chief Complaint  Diabetes (Type 1)    Subjective        Olga Greco presents to [facility] ENDOCRINOLOGY     History of Present Illness     Highs around 3-4a  Lab Results   Component Value Date    HGBA1C 6.60 (H) 02/23/2022       Type 1 dm   Diagnosed in 2003  Today in clinic pt reports being on medtronic 630 g and G - 6 dexcom. Insulin in the pump is novolog  FBG - <120  Pre meals - <140  Checks BG - 6  - 10 times  Insulin pump - yes  Sensor - yes  GMI 6.7%  Average   82% time in range  2% low   Dm retinopathy - x, Last eye exam - spring 2022  Dm nephropathy - x  Dm neuropathy - x  CAD -x  CVA -x  Episodes of hypoglycemia - occasionally   On Ace inb.  Prior DKA episodes - n/a     Hyperlipidemia   lipitor 20 mg po daily   Lab Results   Component Value Date    CHOL 130 02/23/2022    CHLPL 141 12/09/2020    TRIG 42 02/23/2022     (H) 02/23/2022    LDL 18 02/23/2022          Hypothyroidism   on synthroid 88 mcg oral  For mon - Friday, 100 mcg oral - sat and Sunday.  Lab Results   Component Value Date    TSH 2.470 02/23/2022           Objective   Vital Signs:  /68   Pulse 86   Temp 97.8 °F (36.6 °C)   Ht 160 cm (62.99\")   Wt 61.7 kg (136 lb)   SpO2 99%   BMI 24.10 kg/m²   Estimated body mass index is 24.1 kg/m² as calculated from the following:    Height as of this encounter: 160 cm (62.99\").    Weight as of this encounter: 61.7 kg (136 lb).    BMI is within normal parameters. No other follow-up for BMI required.      Physical Exam  Vitals reviewed.   Constitutional:       General: She is not in acute distress.  HENT:      Head: Normocephalic and atraumatic.   Cardiovascular:      Rate and Rhythm: Normal rate and regular rhythm.   Pulmonary:      Effort: Pulmonary effort is normal. No respiratory distress.   Musculoskeletal:         General: No signs of injury. Normal range of motion.      Cervical back: Normal range of motion and neck supple.   Skin:     General: Skin is warm " and dry.   Neurological:      Mental Status: She is alert and oriented to person, place, and time. Mental status is at baseline.   Psychiatric:         Mood and Affect: Mood normal.         Behavior: Behavior normal.         Thought Content: Thought content normal.         Judgment: Judgment normal.        Result Review :  The following data was reviewed by: OPAL Jiménez on 06/10/2022:  Common labs    Common Labsle 9/30/21 11/29/21 11/29/21 11/29/21 11/29/21 11/29/21 2/23/22 2/23/22 2/23/22     0750 0750 0750 0750 0750 1052 1052 1052   Glucose     231 (A)  235 (A)     BUN     14  14     Creatinine     0.67  0.91     eGFR Non  Am     86  60 (A)     Sodium     138  136     Potassium     4.3  4.0     Chloride     102  103     Calcium     9.2  9.3     Albumin     4.40       Total Bilirubin     1.3 (A)       Alkaline Phosphatase     73       AST (SGOT)     25       ALT (SGPT)     23       WBC  3.97          Hemoglobin  12.9          Hematocrit  38.9          Platelets  202          Total Cholesterol    155    130    Triglycerides    51    42    HDL Cholesterol    107 (A)    101 (A)    LDL Cholesterol     37    18    Hemoglobin A1C 6.20 (A)  6.70 (A)      6.60 (A)   Microalbumin, Urine      <1.2      (A) Abnormal value       Comments are available for some flowsheets but are not being displayed.                     Assessment and Plan   Diagnoses and all orders for this visit:    1. Type 1 diabetes mellitus with hyperglycemia (HCC) (Primary)  -     Hemoglobin A1c  -     Basic Metabolic Panel  -     TSH  -     Microalbumin / Creatinine Urine Ratio - Urine, Clean Catch    2. Type 1 diabetes mellitus with hypoglycemia and without coma (HCC)  -     Hemoglobin A1c  -     Basic Metabolic Panel  -     TSH  -     Microalbumin / Creatinine Urine Ratio - Urine, Clean Catch    3. Acquired hypothyroidism  -     Hemoglobin A1c  -     Basic Metabolic Panel  -     TSH  -     Microalbumin / Creatinine Urine Ratio - Urine,  Clean Catch    4. Hyperlipidemia associated with type 2 diabetes mellitus (HCC)  -     Hemoglobin A1c  -     Basic Metabolic Panel  -     TSH  -     Microalbumin / Creatinine Urine Ratio - Urine, Clean Catch             Follow Up   No follow-ups on file.     Increased basal rate by 0.1u/hr from 3a-7a  Labs today  A1c has been well controlled  Continue statin, will discuss findings with Dr. Rogers as LDL last check was 18  Continue current thyroid dosing as above and adjust based on findings from today's labs    Patient was given instructions and counseling regarding her condition or for health maintenance advice. Please see specific information pulled into the AVS if appropriate.     Jena Ann, APRN

## 2022-06-11 LAB
BUN SERPL-MCNC: 14 MG/DL (ref 8–27)
BUN/CREAT SERPL: 17 (ref 12–28)
CALCIUM SERPL-MCNC: 9.8 MG/DL (ref 8.7–10.3)
CHLORIDE SERPL-SCNC: 101 MMOL/L (ref 96–106)
CO2 SERPL-SCNC: 24 MMOL/L (ref 20–29)
CREAT SERPL-MCNC: 0.81 MG/DL (ref 0.57–1)
EGFRCR SERPLBLD CKD-EPI 2021: 76 ML/MIN/1.73
GLUCOSE SERPL-MCNC: 134 MG/DL (ref 65–99)
HBA1C MFR BLD: 6.5 % (ref 4.8–5.6)
POTASSIUM SERPL-SCNC: 4.2 MMOL/L (ref 3.5–5.2)
SODIUM SERPL-SCNC: 139 MMOL/L (ref 134–144)
TSH SERPL DL<=0.005 MIU/L-ACNC: 0.65 UIU/ML (ref 0.45–4.5)
UNABLE TO VOID: NORMAL

## 2022-07-11 RX ORDER — LEVOTHYROXINE SODIUM 88 MCG
TABLET ORAL
Qty: 90 TABLET | Refills: 2 | Status: SHIPPED | OUTPATIENT
Start: 2022-07-11 | End: 2022-09-15 | Stop reason: SDUPTHER

## 2022-07-27 ENCOUNTER — OFFICE VISIT (OUTPATIENT)
Dept: INTERNAL MEDICINE | Facility: CLINIC | Age: 77
End: 2022-07-27

## 2022-07-27 ENCOUNTER — TRANSCRIBE ORDERS (OUTPATIENT)
Dept: ADMINISTRATIVE | Facility: HOSPITAL | Age: 77
End: 2022-07-27

## 2022-07-27 VITALS
SYSTOLIC BLOOD PRESSURE: 134 MMHG | DIASTOLIC BLOOD PRESSURE: 78 MMHG | HEIGHT: 63 IN | OXYGEN SATURATION: 98 % | WEIGHT: 129 LBS | BODY MASS INDEX: 22.86 KG/M2 | HEART RATE: 92 BPM | TEMPERATURE: 97.5 F

## 2022-07-27 DIAGNOSIS — E10.9 TYPE 1 DIABETES MELLITUS WITHOUT COMPLICATION: Primary | ICD-10-CM

## 2022-07-27 DIAGNOSIS — K58.0 IRRITABLE BOWEL SYNDROME WITH DIARRHEA: ICD-10-CM

## 2022-07-27 DIAGNOSIS — E03.9 ACQUIRED HYPOTHYROIDISM: ICD-10-CM

## 2022-07-27 DIAGNOSIS — I10 PRIMARY HYPERTENSION: ICD-10-CM

## 2022-07-27 DIAGNOSIS — Z85.828 HISTORY OF SQUAMOUS CELL CARCINOMA OF SKIN: ICD-10-CM

## 2022-07-27 DIAGNOSIS — E55.9 VITAMIN D DEFICIENCY: ICD-10-CM

## 2022-07-27 DIAGNOSIS — Z00.00 ROUTINE HEALTH MAINTENANCE: ICD-10-CM

## 2022-07-27 DIAGNOSIS — Z12.31 BREAST CANCER SCREENING BY MAMMOGRAM: Primary | ICD-10-CM

## 2022-07-27 DIAGNOSIS — Z12.31 ENCOUNTER FOR SCREENING MAMMOGRAM FOR MALIGNANT NEOPLASM OF BREAST: ICD-10-CM

## 2022-07-27 PROCEDURE — 99214 OFFICE O/P EST MOD 30 MIN: CPT | Performed by: FAMILY MEDICINE

## 2022-07-27 NOTE — PROGRESS NOTES
Subjective     Olga Greco is a 76 y.o. female, who presents with a chief complaint of   Chief Complaint   Patient presents with   • Hypertension   • Diabetes     6 mo f/u     Hypertension    Diabetes    Hypothyroidism  Associated symptoms include arthralgias.   Hyperlipidemia  Exacerbating diseases include hypothyroidism.     1. Diabetes mellitus.  Pt doing using Dexcom and pump.  Her blood sugar is somewhat labile and she is working with Dr. Rogers.    2. HTN.  Tolerating benazepril-hctz.  Not monitoring home blood pressures.    3. IBS.  She is feeling well with colestipol and probiotics.    4. Right wrist pain.  She fell 3.5 months ago on outstretched hand.  X-ray the following day in the ER was negative.  Pt with persistent pain and some swelling.    The following portions of the patient's history were reviewed and updated as appropriate: allergies, current medications, past family history, past medical history, past social history, past surgical history and problem list.    Allergies: Aspirin and Epinephrine    Review of Systems   Constitutional: Negative.    HENT: Negative.    Eyes: Negative.    Respiratory: Negative.    Cardiovascular: Negative.    Gastrointestinal: Negative.    Endocrine: Negative.    Genitourinary: Negative.    Musculoskeletal: Positive for arthralgias.   Skin: Negative.    Allergic/Immunologic: Negative.    Neurological: Negative.    Hematological: Negative.    Psychiatric/Behavioral: Negative.      Objective     Wt Readings from Last 3 Encounters:   07/27/22 58.5 kg (129 lb)   06/10/22 61.7 kg (136 lb)   03/03/22 62.1 kg (137 lb)     Temp Readings from Last 3 Encounters:   07/27/22 97.5 °F (36.4 °C)   06/10/22 97.8 °F (36.6 °C)   12/20/21 98 °F (36.7 °C) (Temporal)     BP Readings from Last 3 Encounters:   07/27/22 134/78   06/10/22 128/68   03/03/22 131/72     Pulse Readings from Last 3 Encounters:   07/27/22 92   06/10/22 86   03/03/22 63     Body mass index is 22.86 kg/m².  SpO2  Readings from Last 3 Encounters:   01/17/18 99%   10/11/17 98%   07/13/17 98%       Physical Exam   Constitutional: She is oriented to person, place, and time. She appears well-developed.   HENT:   Head: Normocephalic and atraumatic.   Mouth/Throat: Mucous membranes are moist.   Eyes: Conjunctivae are normal.   Neck: No thyromegaly present.   Cardiovascular: Normal rate, regular rhythm and normal heart sounds.   Pulmonary/Chest: Effort normal and breath sounds normal.   Abdominal: Soft. Normal appearance. There is no abdominal tenderness.   Musculoskeletal:      Right wrist: She exhibits decreased range of motion, tenderness (snuff box) and swelling.      Right lower leg: No edema.      Left lower leg: No edema.   Neurological: She is alert and oriented to person, place, and time.   Skin: Skin is warm and dry. No rash noted.   Psychiatric: Her behavior is normal. Mood normal.   Nursing note and vitals reviewed.      Results for orders placed or performed in visit on 06/10/22   Hemoglobin A1c    Specimen: Blood   Result Value Ref Range    Hemoglobin A1C 6.5 (H) 4.8 - 5.6 %   Basic Metabolic Panel    Specimen: Blood   Result Value Ref Range    Glucose 134 (H) 65 - 99 mg/dL    BUN 14 8 - 27 mg/dL    Creatinine 0.81 0.57 - 1.00 mg/dL    EGFR Result 76 >59 mL/min/1.73    BUN/Creatinine Ratio 17 12 - 28    Sodium 139 134 - 144 mmol/L    Potassium 4.2 3.5 - 5.2 mmol/L    Chloride 101 96 - 106 mmol/L    Total CO2 24 20 - 29 mmol/L    Calcium 9.8 8.7 - 10.3 mg/dL   TSH    Specimen: Blood   Result Value Ref Range    TSH 0.653 0.450 - 4.500 uIU/mL   Unable To Void   Result Value Ref Range    Unable to Void Comment      Assessment/Plan   Diagnoses and all orders for this visit:    1. Type 1 diabetes mellitus without complication (HCC) (Primary)  -     Hemoglobin A1c; Future  -     Lipid Panel With / Chol / HDL Ratio; Future  -     Vitamin B12; Future    2. Primary hypertension  -     Comprehensive Metabolic Panel; Future    3.  Acquired hypothyroidism  -     CBC & Differential; Future  -     TSH; Future  -     T4, Free; Future    4. Vitamin D deficiency  -     Vitamin D 25 Hydroxy; Future    5. Irritable bowel syndrome with diarrhea    6. History of squamous cell carcinoma of skin    7. Routine health maintenance    8. Encounter for screening mammogram for malignant neoplasm of breast  -     Mammo Screening Bilateral With CAD; Future    1. DMI. A1c 6.5, down from 6.7. Managed by Dr. Rogers.  Eye exam UTD.    2. HTN.  Controlled.  Continue benazepril-hctz 20-12.5 mg daily and monitor home blood pressures.  Labs reviewed.    3. Hypothyroidism.  Adequate replacement.  Managed by Dr. Rogers.    4. Vitamin D deficiency.  Continue supplement.    5. IBS.  Controlled with colestipol and probiotic.    6. 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  She sees Dr. Padgett.    7. RHM.  Mammogram UTD.  Colonoscopy 11/2020 by Dr. Fallon.  Shingrix done at pharmacy.  Pneumovax UTD.  Covid-19 vaccines done.  Flu vaccine UTD.      Outpatient Medications Prior to Visit   Medication Sig Dispense Refill   • atorvastatin (LIPITOR) 20 MG tablet Take 1 tablet by mouth Daily. 90 tablet 1   • B Complex Vitamins (VITAMIN B COMPLEX) capsule capsule Take 1 capsule by mouth Daily.     • benazepril-hydrochlorthiazide (LOTENSIN HCT) 20-12.5 MG per tablet Take 1 tablet by mouth Daily. 90 tablet 1   • Biotin 58750 MCG tablet Take 1 tablet by mouth Daily.     • Calcium Carb-Cholecalciferol (CALCIUM 600+D3 PO) Take 500 Units by mouth Daily.     • Chromium Picolinate 1000 MCG tablet Take 1,000 mcg by mouth daily.     • colestipol (COLESTID) 1 g tablet TAKE TWO TABLETS BY MOUTH ONCE NIGHTLY 180 tablet 3   • Continuous Blood Gluc Sensor (DEXCOM G6 SENSOR) Every 10 (Ten) Days. e10.65 9 each 3   • Continuous Blood Gluc Transmit (DEXCOM G6 TRANSMITTER) misc 1 package Every 3 (Three) Months. e10.65 3 each 3   • glucagon (GLUCAGON EMERGENCY) 1 MG injection Inject 1 mg  under the skin into the appropriate area as directed 1 (One) Time As Needed for Low Blood Sugar for up to 1 dose. 1 kit 12   • NovoLOG 100 UNIT/ML injection INJECT UNDER THE SKIN 50 UNITS DAILY VIA INSULIN PUMP 40 each 3   • Probiotic Product (PROBIOTIC DAILY PO) Take  by mouth.     • Synthroid 100 MCG tablet TAKE ONE TABLET BY MOUTH DAILY 90 tablet 0   • Synthroid 88 MCG tablet TAKE ONE TABLET BY MOUTH DAILY 90 tablet 2   • ACCU-CHEK SOFTCLIX LANCETS lancets USE TO TEST BLOOD SUGAR 4 TIMES DAILY 200 each 5   • Blood Glucose Monitoring Suppl (ACCU-CHEK RICHARD SMARTVIEW) w/Device kit Use to check blood sugar 4 times daily  E10.9 1 kit 0   • diclofenac (VOLTAREN) 1 % gel gel Apply 4 g topically to the appropriate area as directed 4 (Four) Times a Day. 100 g 5   • Fluzone High-Dose Quadrivalent 0.7 ML suspension prefilled syringe injection        No facility-administered medications prior to visit.     No orders of the defined types were placed in this encounter.    [unfilled]  Medications Discontinued During This Encounter   Medication Reason   • Fluzone High-Dose Quadrivalent 0.7 ML suspension prefilled syringe injection *Therapy completed         Return in about 6 months (around 1/27/2023).

## 2022-08-17 ENCOUNTER — HOSPITAL ENCOUNTER (OUTPATIENT)
Dept: MAMMOGRAPHY | Facility: HOSPITAL | Age: 77
Discharge: HOME | End: 2022-08-17
Admitting: FAMILY MEDICINE

## 2022-08-17 DIAGNOSIS — Z12.31 BREAST CANCER SCREENING BY MAMMOGRAM: ICD-10-CM

## 2022-08-17 PROCEDURE — 77063 BREAST TOMOSYNTHESIS BI: CPT

## 2022-08-17 PROCEDURE — 77067 SCR MAMMO BI INCL CAD: CPT

## 2022-09-09 RX ORDER — LEVOTHYROXINE SODIUM 0.1 MG/1
TABLET ORAL
Qty: 90 TABLET | Refills: 1 | Status: SHIPPED | OUTPATIENT
Start: 2022-09-09 | End: 2022-09-15

## 2022-09-14 DIAGNOSIS — I10 PRIMARY HYPERTENSION: ICD-10-CM

## 2022-09-14 DIAGNOSIS — E78.2 MIXED HYPERLIPIDEMIA: ICD-10-CM

## 2022-09-14 RX ORDER — BENAZEPRIL HYDROCHLORIDE AND HYDROCHLOROTHIAZIDE 20; 12.5 MG/1; MG/1
1 TABLET ORAL DAILY
Qty: 90 TABLET | Refills: 1 | Status: SHIPPED | OUTPATIENT
Start: 2022-09-14 | End: 2022-09-15 | Stop reason: SDUPTHER

## 2022-09-14 RX ORDER — ATORVASTATIN CALCIUM 20 MG/1
20 TABLET, FILM COATED ORAL DAILY
Qty: 90 TABLET | Refills: 1 | Status: SHIPPED | OUTPATIENT
Start: 2022-09-14

## 2022-09-14 NOTE — TELEPHONE ENCOUNTER
Rx Refill Note  Requested Prescriptions     Pending Prescriptions Disp Refills   • atorvastatin (LIPITOR) 20 MG tablet 90 tablet 1     Sig: Take 1 tablet by mouth Daily.   • benazepril-hydrochlorthiazide (LOTENSIN HCT) 20-12.5 MG per tablet 90 tablet 1     Sig: Take 1 tablet by mouth Daily.      Last office visit with prescribing clinician: 7/27/2022      Next office visit with prescribing clinician: 1/26/2023            MOHSEN CONTEH MA  09/14/22, 10:49 EDT

## 2022-09-15 ENCOUNTER — OFFICE VISIT (OUTPATIENT)
Dept: ENDOCRINOLOGY | Age: 77
End: 2022-09-15

## 2022-09-15 VITALS
SYSTOLIC BLOOD PRESSURE: 120 MMHG | WEIGHT: 125.8 LBS | DIASTOLIC BLOOD PRESSURE: 78 MMHG | HEART RATE: 70 BPM | HEIGHT: 64 IN | OXYGEN SATURATION: 98 % | TEMPERATURE: 97.5 F | BODY MASS INDEX: 21.48 KG/M2

## 2022-09-15 DIAGNOSIS — E78.5 HYPERLIPIDEMIA ASSOCIATED WITH TYPE 2 DIABETES MELLITUS: ICD-10-CM

## 2022-09-15 DIAGNOSIS — E55.9 VITAMIN D DEFICIENCY: ICD-10-CM

## 2022-09-15 DIAGNOSIS — E10.65 TYPE 1 DIABETES MELLITUS WITH HYPERGLYCEMIA: Primary | ICD-10-CM

## 2022-09-15 DIAGNOSIS — E11.69 HYPERLIPIDEMIA ASSOCIATED WITH TYPE 2 DIABETES MELLITUS: ICD-10-CM

## 2022-09-15 DIAGNOSIS — E03.9 ACQUIRED HYPOTHYROIDISM: ICD-10-CM

## 2022-09-15 DIAGNOSIS — Z79.4 LONG-TERM INSULIN USE: ICD-10-CM

## 2022-09-15 PROCEDURE — 99214 OFFICE O/P EST MOD 30 MIN: CPT | Performed by: INTERNAL MEDICINE

## 2022-09-15 PROCEDURE — 95251 CONT GLUC MNTR ANALYSIS I&R: CPT | Performed by: INTERNAL MEDICINE

## 2022-09-15 RX ORDER — LEVOTHYROXINE SODIUM 100 MCG
TABLET ORAL
Qty: 30 TABLET | Refills: 11 | Status: SHIPPED | OUTPATIENT
Start: 2022-09-15

## 2022-09-15 RX ORDER — LEVOTHYROXINE SODIUM 88 MCG
TABLET ORAL
Qty: 90 TABLET | Refills: 2 | Status: SHIPPED | OUTPATIENT
Start: 2022-09-15

## 2022-09-15 RX ORDER — BENAZEPRIL HYDROCHLORIDE AND HYDROCHLOROTHIAZIDE 20; 12.5 MG/1; MG/1
TABLET ORAL
COMMUNITY

## 2022-09-15 NOTE — PROGRESS NOTES
"Chief Complaint  Chief Complaint   Patient presents with   • Diabetes     Type 1 DEXCOM     LAST EYE EXAM : April 2022   No tingling /burning sensation in feet          Subjective          History of Present Illness    Olga Greco 76 y.o. presents with Type 1 dm as a F/u     Type 1 dm - Diagnosed in 2003  Today in clinic pt reports being on Medtronic - 630 G and Dexcom, insulin in the pump is novolog.  FBG -   Pre meals -   Checks BG - 6 - 10 times per day  Insulin pump - yes  Sensor - yes  Dm retinopathy -x ,Last eye exam - April 2022  Dm nephropathy - x  Dm neuropathy -x ,Dm neuropathy meds -   CAD -x  CVA -x  Episodes of hypoglycemia -   Pt is physically active. weight has been stable.   Pt tries to follow DM diet for most part.   On Ace inb.  Prior DKA episodes -       Reviewed primary care physician's/consulting physician documentation and lab results         I have reviewed the patient's allergies, medicines, past medical hx, family hx and social hx in detail.    Objective   Vital Signs:   /78   Pulse 70   Temp 97.5 °F (36.4 °C)   Ht 162.6 cm (64\")   Wt 57.1 kg (125 lb 12.8 oz)   LMP  (LMP Unknown)   SpO2 98%   BMI 21.59 kg/m²   Physical Exam   General appearance - no distress  Eyes- anicteric sclera  Ear nose and throat-external ears and nose normal.    Respiratory-normal chest on inspection.  No respiratory distress noted.  Skin-no rashes.  Neuro-alert and oriented x3            Result Review :   The following data was reviewed by: Angi Rogers MD on 09/15/2022:  Office Visit on 06/10/2022   Component Date Value Ref Range Status   • Hemoglobin A1C 06/10/2022 6.5 (A) 4.8 - 5.6 % Final    Comment:          Prediabetes: 5.7 - 6.4           Diabetes: >6.4           Glycemic control for adults with diabetes: <7.0     • Glucose 06/10/2022 134 (A) 65 - 99 mg/dL Final   • BUN 06/10/2022 14  8 - 27 mg/dL Final   • Creatinine 06/10/2022 0.81  0.57 - 1.00 mg/dL Final   • EGFR Result 06/10/2022 76  >59 " mL/min/1.73 Final   • BUN/Creatinine Ratio 06/10/2022 17  12 - 28 Final   • Sodium 06/10/2022 139  134 - 144 mmol/L Final   • Potassium 06/10/2022 4.2  3.5 - 5.2 mmol/L Final   • Chloride 06/10/2022 101  96 - 106 mmol/L Final   • Total CO2 06/10/2022 24  20 - 29 mmol/L Final   • Calcium 06/10/2022 9.8  8.7 - 10.3 mg/dL Final   • TSH 06/10/2022 0.653  0.450 - 4.500 uIU/mL Final   • Unable to Void 06/10/2022 Comment   Final    Comment: The patient was not able to render a urine sample and has been  instructed to return for a urine collection at their earliest  convenience.  The urine testing that you have requested has  been deleted from this report.  When the patient returns and  provides a urine specimen, the urine testing will be performed  and separately reported.       Data reviewed: PCP and endocrine notes       Results Review:    I reviewed the patient's new clinical results.     Assessment and Plan    Problem List Items Addressed This Visit        Other    Type 1 diabetes mellitus (HCC) - Primary    Relevant Orders    TSH    T4, Free    Basic Metabolic Panel    Hemoglobin A1c    Microalbumin / Creatinine Urine Ratio - Urine, Clean Catch    Vitamin B12 & Folate    Vitamin D 25 Hydroxy    Hypothyroidism    Relevant Orders    TSH    T4, Free    Basic Metabolic Panel    Hemoglobin A1c    Microalbumin / Creatinine Urine Ratio - Urine, Clean Catch    Vitamin B12 & Folate    Vitamin D 25 Hydroxy    Vitamin D deficiency    Relevant Orders    Vitamin D 25 Hydroxy      Other Visit Diagnoses     Hyperlipidemia associated with type 2 diabetes mellitus (HCC)        Relevant Orders    TSH    T4, Free    Basic Metabolic Panel    Hemoglobin A1c    Microalbumin / Creatinine Urine Ratio - Urine, Clean Catch    Vitamin B12 & Folate    Vitamin D 25 Hydroxy    Long-term insulin use (HCC)        Relevant Orders    TSH    T4, Free    Basic Metabolic Panel    Hemoglobin A1c    Microalbumin / Creatinine Urine Ratio - Urine, Clean  "Catch    Vitamin B12 & Folate    Vitamin D 25 Hydroxy        Type 1 diabetes mellitus-uncontrolled with hyperglycemia  Check HbA1c  Downloaded the insulin pump and the sensor information  Noted the low blood sugars episodes, decreased the bolus settings for breakfast and lunch-1 unit-7.2 g of carbohydrates.    Hypothyroidism  On Synthroid 88 mcg Monday to Friday and 100 mcg Saturday and Sunday.  Check thyroid panel.      Hyperlipidemia  Continue the statin.    Interpreted the information of the CGM after downloading it and made the current insulin changes.  Avg bg -134 mg/dL range  Trends noted -low blood sugars noted  Interpreted the blood work-up/imaging results performed by the primary care/consulting physician -    Refills sent to pharmacy    Follow Up     Patient was given instructions and counseling regarding her condition or for health maintenance advice. Please see specific information pulled into the AVS if appropriate.       Thank you for asking me to see your patient, Olga Greco in consultation.         Angi Rogers MD  09/15/22      EMR Dragon / transcription disclaimer:     \"Dictated utilizing Dragon dictation\".               "

## 2022-09-19 LAB
25(OH)D3+25(OH)D2 SERPL-MCNC: 47.6 NG/ML (ref 30–100)
BUN SERPL-MCNC: 15 MG/DL (ref 8–27)
BUN/CREAT SERPL: 22 (ref 12–28)
CALCIUM SERPL-MCNC: 9.7 MG/DL (ref 8.7–10.3)
CHLORIDE SERPL-SCNC: 105 MMOL/L (ref 96–106)
CO2 SERPL-SCNC: 24 MMOL/L (ref 20–29)
CREAT SERPL-MCNC: 0.68 MG/DL (ref 0.57–1)
CREAT UR-MCNC: NORMAL MG/DL
EGFRCR SERPLBLD CKD-EPI 2021: 91 ML/MIN/1.73
FOLATE SERPL-MCNC: >20 NG/ML
GLUCOSE SERPL-MCNC: 121 MG/DL (ref 65–99)
HBA1C MFR BLD: 6.6 % (ref 4.8–5.6)
MICROALBUMIN UR-MCNC: NORMAL
POTASSIUM SERPL-SCNC: 4.5 MMOL/L (ref 3.5–5.2)
SODIUM SERPL-SCNC: 143 MMOL/L (ref 134–144)
SPECIMEN STATUS: NORMAL
T4 FREE SERPL-MCNC: 1.53 NG/DL (ref 0.82–1.77)
TSH SERPL DL<=0.005 MIU/L-ACNC: 0.54 UIU/ML (ref 0.45–4.5)
VIT B12 SERPL-MCNC: 549 PG/ML (ref 232–1245)

## 2022-09-21 ENCOUNTER — TELEPHONE (OUTPATIENT)
Dept: ENDOCRINOLOGY | Age: 77
End: 2022-09-21

## 2022-09-21 NOTE — TELEPHONE ENCOUNTER
lvm for pt to check mychart for results and to return call with questions or concerns.     ----- Message from Angi Rogers MD sent at 9/20/2022  1:25 PM EDT -----  No thyroid levels are stable, HbA1c is within thedid you call me Az[place] range.  Vitamin profile is very good.  No changes recommended at this time

## 2022-09-26 ENCOUNTER — OFFICE VISIT (OUTPATIENT)
Dept: GASTROENTEROLOGY | Facility: CLINIC | Age: 77
End: 2022-09-26

## 2022-09-26 VITALS
WEIGHT: 128.8 LBS | DIASTOLIC BLOOD PRESSURE: 78 MMHG | BODY MASS INDEX: 21.99 KG/M2 | SYSTOLIC BLOOD PRESSURE: 128 MMHG | HEIGHT: 64 IN

## 2022-09-26 DIAGNOSIS — E10.9 TYPE 1 DIABETES MELLITUS WITHOUT COMPLICATION: ICD-10-CM

## 2022-09-26 DIAGNOSIS — K59.04 CHRONIC IDIOPATHIC CONSTIPATION: Primary | ICD-10-CM

## 2022-09-26 PROCEDURE — 99213 OFFICE O/P EST LOW 20 MIN: CPT | Performed by: INTERNAL MEDICINE

## 2022-09-26 NOTE — PROGRESS NOTES
PATIENT INFORMATION  Olga Greco       - 1945    CHIEF COMPLAINT  Chief Complaint   Patient presents with   • Irritable Bowel Syndrome   • Elevated Hepatic Enzymes   • Heartburn       HISTORY OF PRESENT ILLNESS  Her for Cal[place] dn Constipation but will skip up to 3 days w/o a BM    So reviewed Constipation care and her complaints of increased GAS have to do with her constoipation asdn probable Lactose intolerance    Add Miralax to Water , Fiber(10 gms) and exercise.    No recent Abx and is maintianed on a Probiotic      REVIEWED PERTINENT RESULTS/ LABS  Lab Results   Component Value Date    CASEREPORT  2020     Surgical Pathology Report                         Case: [id number]                                  Authorizing Provider:  Cj Fallon        Collected:           2020 10:33 AM                                 MD Alejandra                                                                   Ordering Location:     [facility]   Received:            2020 10:56 AM                                 OR                                                                           Pathologist:           Yazan Magallanes MD                                                         Specimen:    Large Intestine, Sigmoid Colon                                                             FINALDX  2020     1. Sigmoid Colon Biopsy:   Benign colonic mucosa with    A. Relatively normal crypt architecture with prominent lymphoid aggregate and mild eosinophilia noted.   B. No well developed active inflammation nor granulomatous inflammation identified.    Jat/kds       Lab Results   Component Value Date    HGB 12.9 2021    MCV 98.0 (H) 2021     2021    ALT 23 2021    AST 25 2021    HGBA1C 6.6 (H) 09/15/2022    TRIG 42 2022    FERRITIN 119.00 08/15/2019    IRON 118 08/15/2019    TIBC 361 08/15/2019      No results found.    REVIEW OF  SYSTEMS  Review of Systems   Constitutional: Negative for activity change, chills, fever and unexpected weight change.   HENT: Negative for congestion.    Eyes: Negative for visual disturbance.   Respiratory: Negative for shortness of breath.    Cardiovascular: Negative for chest pain and palpitations.   Gastrointestinal: Positive for diarrhea. Negative for abdominal pain and blood in stool.   Endocrine: Negative for cold intolerance and heat intolerance.   Genitourinary: Negative for hematuria.   Musculoskeletal: Negative for gait problem.   Skin: Negative for color change.   Allergic/Immunologic: Negative for immunocompromised state.   Neurological: Negative for weakness and light-headedness.   Hematological: Negative for adenopathy.   Psychiatric/Behavioral: Negative for sleep disturbance. The patient is not nervous/anxious.          ACTIVE PROBLEMS  Patient Active Problem List    Diagnosis    • Routine health maintenance [Z00.00]    • Irritable bowel syndrome with diarrhea [K58.0]    • Tendinopathy of rotator cuff, left [M67.912]    • History of squamous cell carcinoma of skin [Z85.828]    • Encounter for screening for malignant neoplasm of colon [Z12.11]    • Diabetic ketoacidosis without coma associated with type 1 diabetes mellitus (HCC) [E10.10]    • Type 1 diabetes mellitus (HCC) [E10.9]    • Hypertension [I10]    • Hypothyroidism [E03.9]    • Menopausal symptom [N95.1]    • Vitamin D deficiency [E55.9]          PAST MEDICAL HISTORY  Past Medical History:   Diagnosis Date   • Closed nondisplaced fracture of greater tuberosity of left humerus 12/17/2019   • Colon polyp    • Diabetes mellitus (HCC)    • Heart palpitations 10/29/2018   • Hypertension    • Hypothyroidism    • Subacromial bursitis of left shoulder joint 12/17/2019   • Subacromial impingement of left shoulder 12/17/2019   • Trigger thumb of left hand 9/25/2019   • Type 2 diabetes mellitus (HCC) 04/2018         SURGICAL HISTORY  Past Surgical  History:   Procedure Laterality Date   • BREAST BIOPSY Left    • CHOLECYSTECTOMY     • COLONOSCOPY     • COLONOSCOPY N/A 11/2/2020    Procedure: COLONOSCOPY; polypectomy;  Surgeon: Cj Fallon MD;  Location: [facility];  Service: Gastroenterology;  Laterality: N/A;  DIVERTICULOSIS  POLYP     • HYSTERECTOMY     • TONSILLECTOMY     • TUBAL ABDOMINAL LIGATION           FAMILY HISTORY  Family History   Problem Relation Age of Onset   • Heart disease Father    • Diabetes Sister    • Hypertension Mother    • Breast cancer Neg Hx    • Colon cancer Neg Hx    • Colon polyps Neg Hx          SOCIAL HISTORY  Social History     Occupational History   • Not on file   Tobacco Use   • Smoking status: Never Smoker   • Smokeless tobacco: Never Used   Vaping Use   • Vaping Use: Never used   Substance and Sexual Activity   • Alcohol use: Yes     Alcohol/week: 2.0 standard drinks     Types: 2 Glasses of wine per week     Comment: daily   • Drug use: No   • Sexual activity: Yes     Partners: Male         CURRENT MEDICATIONS    Current Outpatient Medications:   •  ACCU-CHEK SOFTCLIX LANCETS lancets, USE TO TEST BLOOD SUGAR 4 TIMES DAILY, Disp: 200 each, Rfl: 5  •  atorvastatin (LIPITOR) 20 MG tablet, Take 1 tablet by mouth Daily., Disp: 90 tablet, Rfl: 1  •  B Complex Vitamins (VITAMIN B COMPLEX) capsule capsule, Take 1 capsule by mouth Daily., Disp: , Rfl:   •  benazepril-hydrochlorthiazide (LOTENSIN HCT) 20-12.5 MG per tablet, 1 TIME DAILY, Disp: , Rfl:   •  Biotin 26019 MCG tablet, Take 1 tablet by mouth Daily., Disp: , Rfl:   •  Blood Glucose Monitoring Suppl (ACCU-CHEK RICHARD SMARTVIEW) w/Device kit, Use to check blood sugar 4 times daily  E10.9, Disp: 1 kit, Rfl: 0  •  Calcium Carb-Cholecalciferol (CALCIUM 600+D3 PO), Take 500 Units by mouth Daily., Disp: , Rfl:   •  Chromium Picolinate 1000 MCG tablet, Take 1,000 mcg by mouth daily., Disp: , Rfl:   •  colestipol (COLESTID) 1 g tablet, TAKE TWO TABLETS BY MOUTH ONCE  "NIGHTLY, Disp: 180 tablet, Rfl: 3  •  Continuous Blood Gluc Sensor (DEXCOM G6 SENSOR), Every 10 (Ten) Days. e10.65, Disp: 9 each, Rfl: 3  •  Continuous Blood Gluc Transmit (DEXCOM G6 TRANSMITTER) misc, 1 package Every 3 (Three) Months. e10.65, Disp: 3 each, Rfl: 3  •  diclofenac (VOLTAREN) 1 % gel gel, Apply 4 g topically to the appropriate area as directed 4 (Four) Times a Day. (Patient taking differently: Apply 4 g topically to the appropriate area as directed 4 (Four) Times a Day. PRN), Disp: 100 g, Rfl: 5  •  glucagon (GLUCAGON EMERGENCY) 1 MG injection, Inject 1 mg under the skin into the appropriate area as directed 1 (One) Time As Needed for Low Blood Sugar for up to 1 dose., Disp: 1 kit, Rfl: 12  •  NovoLOG 100 UNIT/ML injection, INJECT UNDER THE SKIN 50 UNITS DAILY VIA INSULIN PUMP, Disp: 40 each, Rfl: 3  •  Probiotic Product (PROBIOTIC DAILY PO), Take  by mouth., Disp: , Rfl:   •  Synthroid 100 MCG tablet, Take 1 tab for two days a week., Disp: 30 tablet, Rfl: 11  •  Synthroid 88 MCG tablet, Taking Monday-FRIDAY, Disp: 90 tablet, Rfl: 2    ALLERGIES  Aspirin and Epinephrine    VITALS  Vitals:    09/26/22 1259   BP: 128/78   BP Location: Left arm   Patient Position: Sitting   Cuff Size: Large Adult   Weight: 58.4 kg (128 lb 12.8 oz)   Height: 162.6 cm (64\")       PHYSICAL EXAM  Debilities/Disabilities Identified: None  Emotional Behavior: Appropriate  Wt Readings from Last 3 Encounters:   09/26/22 58.4 kg (128 lb 12.8 oz)   09/15/22 57.1 kg (125 lb 12.8 oz)   07/27/22 58.5 kg (129 lb)     Ht Readings from Last 1 Encounters:   09/26/22 162.6 cm (64\")     Body mass index is 22.11 kg/m².  Physical Exam  Constitutional:       Appearance: She is well-developed. She is not diaphoretic.   HENT:      Head: Normocephalic and atraumatic.   Eyes:      General: No scleral icterus.     Conjunctiva/sclera: Conjunctivae normal.      Pupils: Pupils are equal, round, and reactive to light.   Neck:      Thyroid: No " thyromegaly.   Cardiovascular:      Rate and Rhythm: Normal rate and regular rhythm.      Heart sounds: Normal heart sounds. No murmur heard.    No gallop.   Pulmonary:      Effort: Pulmonary effort is normal.      Breath sounds: Normal breath sounds. No wheezing or rales.   Abdominal:      General: Bowel sounds are normal. There is no distension or abdominal bruit.      Palpations: Abdomen is soft. There is no shifting dullness, fluid wave or mass.      Tenderness: There is no abdominal tenderness. There is no guarding. Negative signs include Sumner's sign.      Hernia: There is no hernia in the ventral area.   Musculoskeletal:         General: Normal range of motion.      Cervical back: Normal range of motion and neck supple.   Lymphadenopathy:      Cervical: No cervical adenopathy.   Skin:     General: Skin is warm and dry.      Findings: No erythema or rash.   Neurological:      Mental Status: She is alert and oriented to person, place, and time.   Psychiatric:         Mood and Affect: Mood normal.         Behavior: Behavior normal.         CLINICAL DATA REVIEWED   reviewed previous lab results and integrated with today's visit, reviewed notes from other physicians and/or last GI encounter, reviewed previous endoscopy results and available photos, reviewed surgical pathology results from previous biopsies    ASSESSMENT    Diagnoses and all orders for this visit:    Chronic idiopathic constipation    Type 1 diabetes mellitus without complication (HCC)          PLAN  Daily Fiber and PRN MIralax and would have her check a home lactose tolerance test      Return in about 6 months (around 3/26/2023).    I have discussed the above plan with the patient.  They verbalize understanding and are in agreement with the plan.  They have been advised to contact the office for any questions, concerns, or changes related to their health.

## 2022-12-13 ENCOUNTER — OFFICE VISIT (OUTPATIENT)
Dept: ENDOCRINOLOGY | Age: 77
End: 2022-12-13

## 2022-12-13 VITALS
DIASTOLIC BLOOD PRESSURE: 70 MMHG | HEART RATE: 66 BPM | WEIGHT: 126.6 LBS | OXYGEN SATURATION: 99 % | TEMPERATURE: 96.6 F | BODY MASS INDEX: 21.61 KG/M2 | SYSTOLIC BLOOD PRESSURE: 120 MMHG | HEIGHT: 64 IN

## 2022-12-13 DIAGNOSIS — E10.65 TYPE 1 DIABETES MELLITUS WITH HYPERGLYCEMIA: Primary | ICD-10-CM

## 2022-12-13 DIAGNOSIS — E78.2 HYPERLIPEMIA, MIXED: ICD-10-CM

## 2022-12-13 DIAGNOSIS — E03.9 ACQUIRED HYPOTHYROIDISM: ICD-10-CM

## 2022-12-13 PROCEDURE — 99214 OFFICE O/P EST MOD 30 MIN: CPT | Performed by: NURSE PRACTITIONER

## 2022-12-13 PROCEDURE — 95251 CONT GLUC MNTR ANALYSIS I&R: CPT | Performed by: NURSE PRACTITIONER

## 2022-12-13 RX ORDER — INFLUENZA A VIRUS A/MICHIGAN/45/2015 X-275 (H1N1) ANTIGEN (FORMALDEHYDE INACTIVATED), INFLUENZA A VIRUS A/SINGAPORE/INFIMH-16-0019/2016 IVR-186 (H3N2) ANTIGEN (FORMALDEHYDE INACTIVATED), INFLUENZA B VIRUS B/PHUKET/3073/2013 ANTIGEN (FORMALDEHYDE INACTIVATED), AND INFLUENZA B VIRUS B/MARYLAND/15/2016 BX-69A ANTIGEN (FORMALDEHYDE INACTIVATED) 60; 60; 60; 60 UG/.7ML; UG/.7ML; UG/.7ML; UG/.7ML
INJECTION, SUSPENSION INTRAMUSCULAR
COMMUNITY
Start: 2022-10-03 | End: 2023-01-26

## 2022-12-13 NOTE — PROGRESS NOTES
"Chief Complaint  Diabetes (Type 1 Brought Dexcom /Up to date on eye exam /No hx of diabetic neuropathy or retinopathy)    Subjective        Olga Greco presents to [facility]  History of Present Illness     Lab Results   Component Value Date    HGBA1C 6.6 (H) 09/15/2022   cgm review  Average glucose 141  Less than 1% very high  18% high  78% time in range  4% low  GMI 6.7  Transfer hypoglycemia around 12 PM and 10 PM    Type 1 dm   Diagnosed in 2003  Today in clinic pt reports being on Medtronic - 630 G and Dexcom, insulin in the pump is novolog.  Last eye exam - April 2022, denies changes   Dm nephropathy - denies   Dm neuropathy - denies   CAD - denies CP and SOA   CVA - denies   Episodes of hypoglycemia - yes after meals , with rebound hyperglycemia   Back up plan: insulin syringes   On statin and ace-I   Glucagon on hand for emergency     Hypothyroid  Synthroid 100mcg daily Sat and Sunday   And  Synthroid 88mcg Monday through Friday       Objective   Vital Signs:  /70   Pulse 66   Temp 96.6 °F (35.9 °C) (Temporal)   Ht 162.6 cm (64\")   Wt 57.4 kg (126 lb 9.6 oz)   SpO2 99%   BMI 21.73 kg/m²   Estimated body mass index is 21.73 kg/m² as calculated from the following:    Height as of this encounter: 162.6 cm (64\").    Weight as of this encounter: 57.4 kg (126 lb 9.6 oz).    BMI is within normal parameters. No other follow-up for BMI required.      Physical Exam  Vitals reviewed.   Constitutional:       General: She is not in acute distress.  HENT:      Head: Normocephalic and atraumatic.   Pulmonary:      Effort: Pulmonary effort is normal. No respiratory distress.   Musculoskeletal:         General: No deformity or signs of injury. Normal range of motion.      Cervical back: Normal range of motion and neck supple.   Skin:     General: Skin is warm and dry.   Neurological:      Mental Status: She is alert and oriented to person, place, and time. Mental status is at " baseline.   Psychiatric:         Mood and Affect: Mood normal.         Behavior: Behavior normal.         Thought Content: Thought content normal.         Judgment: Judgment normal.        Result Review :  The following data was reviewed by: OPAL Jiménez on 12/13/2022:  Common labs    Common Labs 2/23/22 2/23/22 2/23/22 6/10/22 6/10/22 9/15/22 9/15/22 9/15/22    1052 1052 1052 1306 1306 1307 1307 1307   Glucose 235 (A)    134 (A) 121 (A)     BUN 14    14 15     Creatinine 0.91    0.81 0.68     eGFR Non African Am 60 (A)          Sodium 136    139 143     Potassium 4.0    4.2 4.5     Chloride 103    101 105     Calcium 9.3    9.8 9.7     Total Cholesterol  130         Triglycerides  42         HDL Cholesterol  101 (A)         LDL Cholesterol   18         Hemoglobin A1C   6.60 (A) 6.5 (A)   6.6 (A)    Microalbumin, Urine        CANCELED   (A) Abnormal value       Comments are available for some flowsheets but are not being displayed.                     Assessment and Plan   Diagnoses and all orders for this visit:    1. Type 1 diabetes mellitus with hyperglycemia (HCC) (Primary)  -     Hemoglobin A1c  -     Comprehensive Metabolic Panel  -     Lipid Panel  -     TSH  -     T4, Free  -     T3, Free  -     Hemoglobin A1c; Future    2. Acquired hypothyroidism  -     Hemoglobin A1c  -     Comprehensive Metabolic Panel  -     Lipid Panel  -     TSH  -     T4, Free  -     T3, Free    3. Hyperlipemia, mixed  -     Hemoglobin A1c  -     Comprehensive Metabolic Panel  -     Lipid Panel  -     TSH  -     T4, Free  -     T3, Free             Follow Up   No follow-ups on file.     Based on cgm review:  Carb ratio from 7 AM to 2 PM increased to 8  Labs today  Ensure thyroid stability  Continue statin and ACE    Patient was given instructions and counseling regarding her condition or for health maintenance advice. Please see specific information pulled into the AVS if appropriate.     OPAL Jiménez

## 2022-12-14 LAB
ALBUMIN SERPL-MCNC: 4.3 G/DL (ref 3.5–5.2)
ALBUMIN/GLOB SERPL: 2.7 G/DL
ALP SERPL-CCNC: 66 U/L (ref 39–117)
ALT SERPL-CCNC: 22 U/L (ref 1–33)
AST SERPL-CCNC: 26 U/L (ref 1–32)
BILIRUB SERPL-MCNC: 0.7 MG/DL (ref 0–1.2)
BUN SERPL-MCNC: 18 MG/DL (ref 8–23)
BUN/CREAT SERPL: 24.7 (ref 7–25)
CALCIUM SERPL-MCNC: 9.8 MG/DL (ref 8.6–10.5)
CHLORIDE SERPL-SCNC: 102 MMOL/L (ref 98–107)
CHOLEST SERPL-MCNC: 144 MG/DL (ref 0–200)
CO2 SERPL-SCNC: 28.9 MMOL/L (ref 22–29)
CREAT SERPL-MCNC: 0.73 MG/DL (ref 0.57–1)
EGFRCR SERPLBLD CKD-EPI 2021: 85.9 ML/MIN/1.73
GLOBULIN SER CALC-MCNC: 1.6 GM/DL
GLUCOSE SERPL-MCNC: 128 MG/DL (ref 65–99)
HBA1C MFR BLD: 6.7 % (ref 4.8–5.6)
HDLC SERPL-MCNC: 83 MG/DL (ref 40–60)
IMP & REVIEW OF LAB RESULTS: NORMAL
LDLC SERPL CALC-MCNC: 51 MG/DL (ref 0–100)
POTASSIUM SERPL-SCNC: 3.9 MMOL/L (ref 3.5–5.2)
PROT SERPL-MCNC: 5.9 G/DL (ref 6–8.5)
SODIUM SERPL-SCNC: 140 MMOL/L (ref 136–145)
T3FREE SERPL-MCNC: 2.1 PG/ML (ref 2–4.4)
T4 FREE SERPL-MCNC: 1.41 NG/DL (ref 0.93–1.7)
TRIGL SERPL-MCNC: 39 MG/DL (ref 0–150)
TSH SERPL DL<=0.005 MIU/L-ACNC: 1.92 UIU/ML (ref 0.27–4.2)
VLDLC SERPL CALC-MCNC: 10 MG/DL (ref 5–40)

## 2023-01-18 ENCOUNTER — LAB (OUTPATIENT)
Dept: LAB | Facility: HOSPITAL | Age: 78
End: 2023-01-18
Payer: MEDICARE

## 2023-01-18 PROCEDURE — 83036 HEMOGLOBIN GLYCOSYLATED A1C: CPT | Performed by: FAMILY MEDICINE

## 2023-01-18 PROCEDURE — 85025 COMPLETE CBC W/AUTO DIFF WBC: CPT | Performed by: FAMILY MEDICINE

## 2023-01-18 PROCEDURE — 80061 LIPID PANEL: CPT | Performed by: FAMILY MEDICINE

## 2023-01-18 PROCEDURE — 84443 ASSAY THYROID STIM HORMONE: CPT | Performed by: FAMILY MEDICINE

## 2023-01-18 PROCEDURE — 82306 VITAMIN D 25 HYDROXY: CPT | Performed by: FAMILY MEDICINE

## 2023-01-18 PROCEDURE — 80053 COMPREHEN METABOLIC PANEL: CPT | Performed by: FAMILY MEDICINE

## 2023-01-18 PROCEDURE — 84439 ASSAY OF FREE THYROXINE: CPT | Performed by: FAMILY MEDICINE

## 2023-01-18 PROCEDURE — 82607 VITAMIN B-12: CPT | Performed by: FAMILY MEDICINE

## 2023-01-20 RX ORDER — MONTELUKAST SODIUM 4 MG/1
TABLET, CHEWABLE ORAL
Qty: 180 TABLET | Refills: 3 | Status: SHIPPED | OUTPATIENT
Start: 2023-01-20

## 2023-01-20 RX ORDER — INSULIN ASPART 100 [IU]/ML
INJECTION, SOLUTION INTRAVENOUS; SUBCUTANEOUS
Qty: 40 ML | Refills: 2 | Status: SHIPPED | OUTPATIENT
Start: 2023-01-20

## 2023-01-26 ENCOUNTER — OFFICE VISIT (OUTPATIENT)
Dept: INTERNAL MEDICINE | Facility: CLINIC | Age: 78
End: 2023-01-26
Payer: MEDICARE

## 2023-01-26 VITALS
OXYGEN SATURATION: 97 % | DIASTOLIC BLOOD PRESSURE: 66 MMHG | HEART RATE: 65 BPM | HEIGHT: 64 IN | SYSTOLIC BLOOD PRESSURE: 128 MMHG | BODY MASS INDEX: 22.09 KG/M2 | WEIGHT: 129.4 LBS | TEMPERATURE: 97.5 F | RESPIRATION RATE: 18 BRPM

## 2023-01-26 DIAGNOSIS — I10 PRIMARY HYPERTENSION: ICD-10-CM

## 2023-01-26 DIAGNOSIS — Z85.828 HISTORY OF SQUAMOUS CELL CARCINOMA OF SKIN: ICD-10-CM

## 2023-01-26 DIAGNOSIS — Z00.00 ROUTINE HEALTH MAINTENANCE: ICD-10-CM

## 2023-01-26 DIAGNOSIS — K58.0 IRRITABLE BOWEL SYNDROME WITH DIARRHEA: ICD-10-CM

## 2023-01-26 DIAGNOSIS — E55.9 VITAMIN D DEFICIENCY: ICD-10-CM

## 2023-01-26 DIAGNOSIS — E03.9 ACQUIRED HYPOTHYROIDISM: ICD-10-CM

## 2023-01-26 DIAGNOSIS — E10.9 TYPE 1 DIABETES MELLITUS WITHOUT COMPLICATION: Primary | ICD-10-CM

## 2023-01-26 PROCEDURE — 3044F HG A1C LEVEL LT 7.0%: CPT | Performed by: FAMILY MEDICINE

## 2023-01-26 PROCEDURE — 99214 OFFICE O/P EST MOD 30 MIN: CPT | Performed by: FAMILY MEDICINE

## 2023-01-26 NOTE — PROGRESS NOTES
Subjective     Olga Greco is a 77 y.o. female, who presents with a chief complaint of   Chief Complaint   Patient presents with   • Diabetes     6 month follow up visit      Hypertension    Diabetes    Hypothyroidism  Associated symptoms include arthralgias.   Hyperlipidemia  Exacerbating diseases include hypothyroidism.     1. Diabetes mellitus.  Pt doing using Dexcom and pump.  Her blood sugar is somewhat labile and she is working with Dr. Rogers.    2. HTN.  Tolerating benazepril-hctz.  Not monitoring home blood pressures.    3. IBS.  She is feeling well with colestipol and probiotics.    The following portions of the patient's history were reviewed and updated as appropriate: allergies, current medications, past family history, past medical history, past social history, past surgical history and problem list.    Allergies: Aspirin and Epinephrine    Review of Systems   Constitutional: Negative.    HENT: Negative.    Eyes: Negative.    Respiratory: Negative.    Cardiovascular: Negative.    Gastrointestinal: Negative.    Endocrine: Negative.    Genitourinary: Negative.    Musculoskeletal: Positive for arthralgias.   Skin: Negative.    Allergic/Immunologic: Negative.    Neurological: Negative.    Hematological: Negative.    Psychiatric/Behavioral: Negative.      Objective     Wt Readings from Last 3 Encounters:   01/26/23 58.7 kg (129 lb 6.4 oz)   12/13/22 57.4 kg (126 lb 9.6 oz)   09/26/22 58.4 kg (128 lb 12.8 oz)     Temp Readings from Last 3 Encounters:   01/26/23 97.5 °F (36.4 °C) (Infrared)   12/13/22 96.6 °F (35.9 °C) (Temporal)   09/15/22 97.5 °F (36.4 °C)     BP Readings from Last 3 Encounters:   01/26/23 128/66   12/13/22 120/70   09/26/22 128/78     Pulse Readings from Last 3 Encounters:   01/26/23 65   12/13/22 66   09/15/22 70     Body mass index is 22.21 kg/m².  SpO2 Readings from Last 3 Encounters:   01/17/18 99%   10/11/17 98%   07/13/17 98%       Physical Exam   Constitutional: She is oriented to  person, place, and time. She appears well-developed.   HENT:   Head: Normocephalic and atraumatic.   Mouth/Throat: Mucous membranes are moist.   Eyes: Conjunctivae are normal.   Neck: No thyromegaly present.   Cardiovascular: Normal rate, regular rhythm and normal heart sounds.   Pulmonary/Chest: Effort normal and breath sounds normal.   Abdominal: Soft. Normal appearance. There is no abdominal tenderness.   Musculoskeletal:      Right lower leg: No edema.      Left lower leg: No edema.   Neurological: She is alert and oriented to person, place, and time.   Skin: Skin is warm and dry. No rash noted.   Psychiatric: Her behavior is normal. Mood normal.   Nursing note and vitals reviewed.      Results for orders placed or performed in visit on 12/13/22   Hemoglobin A1c    Specimen: Blood   Result Value Ref Range    Hemoglobin A1C 6.70 (H) 4.80 - 5.60 %   Comprehensive Metabolic Panel    Specimen: Blood   Result Value Ref Range    Glucose 128 (H) 65 - 99 mg/dL    BUN 18 8 - 23 mg/dL    Creatinine 0.73 0.57 - 1.00 mg/dL    EGFR Result 85.9 >60.0 mL/min/1.73    BUN/Creatinine Ratio 24.7 7.0 - 25.0    Sodium 140 136 - 145 mmol/L    Potassium 3.9 3.5 - 5.2 mmol/L    Chloride 102 98 - 107 mmol/L    Total CO2 28.9 22.0 - 29.0 mmol/L    Calcium 9.8 8.6 - 10.5 mg/dL    Total Protein 5.9 (L) 6.0 - 8.5 g/dL    Albumin 4.30 3.50 - 5.20 g/dL    Globulin 1.6 gm/dL    A/G Ratio 2.7 g/dL    Total Bilirubin 0.7 0.0 - 1.2 mg/dL    Alkaline Phosphatase 66 39 - 117 U/L    AST (SGOT) 26 1 - 32 U/L    ALT (SGPT) 22 1 - 33 U/L   Lipid Panel    Specimen: Blood   Result Value Ref Range    Total Cholesterol 144 0 - 200 mg/dL    Triglycerides 39 0 - 150 mg/dL    HDL Cholesterol 83 (H) 40 - 60 mg/dL    VLDL Cholesterol Mart 10 5 - 40 mg/dL    LDL Chol Calc (NIH) 51 0 - 100 mg/dL   TSH    Specimen: Blood   Result Value Ref Range    TSH 1.920 0.270 - 4.200 uIU/mL   T4, Free    Specimen: Blood   Result Value Ref Range    Free T4 1.41 0.93 - 1.70  ng/dL   T3, Free    Specimen: Blood   Result Value Ref Range    T3, Free 2.1 2.0 - 4.4 pg/mL   Cardiovascular Risk Assessment   Result Value Ref Range    Interpretation Note      Assessment/Plan   Diagnoses and all orders for this visit:    1. Type 1 diabetes mellitus without complication (HCC) (Primary)  -     Comprehensive Metabolic Panel; Future  -     Hemoglobin A1c; Future  -     Lipid Panel With / Chol / HDL Ratio; Future  -     Vitamin B12; Future    2. Primary hypertension  -     TSH; Future    3. Acquired hypothyroidism  -     CBC Auto Differential; Future    4. Vitamin D deficiency  -     Vitamin D,25-Hydroxy; Future    5. Irritable bowel syndrome with diarrhea    6. History of squamous cell carcinoma of skin    7. Routine health maintenance    1. DMI. A1c 6.4, down from 6.7. Managed by Dr. Rogers.  Eye exam UTD.    2. HTN.  Controlled.  Continue benazepril-hctz 20-12.5 mg daily and monitor home blood pressures.  Labs reviewed.    3. Hypothyroidism.  Adequate replacement.  Managed by Dr. Rogers.    4. Vitamin D deficiency.  Continue supplement.    5. IBS.  Controlled with colestipol and probiotic.    6. 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  She sees Dr. Padgett.    7. RHM.  Mammogram UTD.  Colonoscopy 11/2020 by Dr. Fallon.  Shingrix done at pharmacy.  Pneumococcal vaccines done.  Covid-19 vaccines done.  Flu vaccine UTD.      Outpatient Medications Prior to Visit   Medication Sig Dispense Refill   • ACCU-CHEK SOFTCLIX LANCETS lancets USE TO TEST BLOOD SUGAR 4 TIMES DAILY 200 each 5   • atorvastatin (LIPITOR) 20 MG tablet Take 1 tablet by mouth Daily. 90 tablet 1   • B Complex Vitamins (VITAMIN B COMPLEX) capsule capsule Take 1 capsule by mouth Daily.     • benazepril-hydrochlorthiazide (LOTENSIN HCT) 20-12.5 MG per tablet 1 TIME DAILY     • Biotin 73034 MCG tablet Take 1 tablet by mouth Daily.     • Blood Glucose Monitoring Suppl (ACCU-CHEK RICHARD SMARTVIEW) w/Device kit Use to check blood  sugar 4 times daily  E10.9 1 kit 0   • Calcium Carb-Cholecalciferol (CALCIUM 600+D3 PO) Take 500 Units by mouth Daily.     • colestipol (COLESTID) 1 g tablet TAKE TWO TABLETS BY MOUTH ONCE NIGHTLY (Patient taking differently: Take 1 g by mouth Daily.) 180 tablet 3   • Continuous Blood Gluc Sensor (DEXCOM G6 SENSOR) Every 10 (Ten) Days. e10.65 9 each 3   • Continuous Blood Gluc Transmit (DEXCOM G6 TRANSMITTER) misc 1 package Every 3 (Three) Months. e10.65 3 each 3   • diclofenac (VOLTAREN) 1 % gel gel Apply 4 g topically to the appropriate area as directed 4 (Four) Times a Day. (Patient taking differently: Apply 4 g topically to the appropriate area as directed 4 (Four) Times a Day. PRN) 100 g 5   • glucagon (GLUCAGON EMERGENCY) 1 MG injection Inject 1 mg under the skin into the appropriate area as directed 1 (One) Time As Needed for Low Blood Sugar for up to 1 dose. 1 kit 12   • NovoLOG 100 UNIT/ML injection INJECT UNDER THE SKIN 50 UNITS DAILY VIA INSULIN PUMP 40 mL 2   • Probiotic Product (PROBIOTIC DAILY PO) Take  by mouth.     • Synthroid 100 MCG tablet Take 1 tab for two days a week. 30 tablet 11   • Synthroid 88 MCG tablet Taking Monday-FRIDAY 90 tablet 2   • Chromium Picolinate 1000 MCG tablet Take 1,000 mcg by mouth daily.     • Fluzone High-Dose Quadrivalent 0.7 ML suspension prefilled syringe injection      • NovoLOG 100 UNIT/ML injection INJECT UNDER THE SKIN 50 UNITS DAILY VIA INSULIN PUMP 40 each 3     No facility-administered medications prior to visit.     No orders of the defined types were placed in this encounter.    [unfilled]  There are no discontinued medications.      Return in about 6 months (around 7/26/2023).

## 2023-03-06 ENCOUNTER — LAB (OUTPATIENT)
Dept: LAB | Facility: HOSPITAL | Age: 78
End: 2023-03-06
Payer: MEDICARE

## 2023-03-06 DIAGNOSIS — E10.65 TYPE 1 DIABETES MELLITUS WITH HYPERGLYCEMIA: ICD-10-CM

## 2023-03-06 LAB — HBA1C MFR BLD: 6.4 % (ref 4.8–5.6)

## 2023-03-06 PROCEDURE — 83036 HEMOGLOBIN GLYCOSYLATED A1C: CPT

## 2023-03-06 PROCEDURE — 36415 COLL VENOUS BLD VENIPUNCTURE: CPT

## 2023-03-13 ENCOUNTER — OFFICE VISIT (OUTPATIENT)
Dept: ENDOCRINOLOGY | Age: 78
End: 2023-03-13
Payer: MEDICARE

## 2023-03-13 VITALS
DIASTOLIC BLOOD PRESSURE: 70 MMHG | BODY MASS INDEX: 22.3 KG/M2 | OXYGEN SATURATION: 98 % | SYSTOLIC BLOOD PRESSURE: 124 MMHG | TEMPERATURE: 96.6 F | HEART RATE: 75 BPM | HEIGHT: 64 IN | WEIGHT: 130.6 LBS

## 2023-03-13 DIAGNOSIS — E78.5 HYPERLIPIDEMIA ASSOCIATED WITH TYPE 2 DIABETES MELLITUS: ICD-10-CM

## 2023-03-13 DIAGNOSIS — E03.9 ACQUIRED HYPOTHYROIDISM: ICD-10-CM

## 2023-03-13 DIAGNOSIS — E10.65 TYPE 1 DIABETES MELLITUS WITH HYPERGLYCEMIA: Primary | ICD-10-CM

## 2023-03-13 DIAGNOSIS — E11.69 HYPERLIPIDEMIA ASSOCIATED WITH TYPE 2 DIABETES MELLITUS: ICD-10-CM

## 2023-03-13 PROCEDURE — 3078F DIAST BP <80 MM HG: CPT | Performed by: NURSE PRACTITIONER

## 2023-03-13 PROCEDURE — 3044F HG A1C LEVEL LT 7.0%: CPT | Performed by: NURSE PRACTITIONER

## 2023-03-13 PROCEDURE — 99214 OFFICE O/P EST MOD 30 MIN: CPT | Performed by: NURSE PRACTITIONER

## 2023-03-13 PROCEDURE — 3074F SYST BP LT 130 MM HG: CPT | Performed by: NURSE PRACTITIONER

## 2023-03-13 NOTE — PROGRESS NOTES
"Chief Complaint  Diabetes (Type 1: Pt pump and Dexcom is attached, is up to date on eye exam, no hx of retinopathy or neuropathy. )    Subjective        Olga Greco presents to [facility] ENDOCRINOLOGY  History of Present Illness    Type 1 dm   Diagnosed in 2003  Today in clinic pt reports being on [employer]tr[employer] pump, also uses the dexcom  Last eye exam - April 2022  Dm nephropathy - denies   Dm neuropathy - denies   CAD - denies CP and SOA   CVA - denies   Episodes of hypoglycemia - yes after meals , with rebound hyperglycemia   Back up plan: insulin syringes   On statin and ace-I   Glucagon on hand for emergency      Hypothyroid  Synthroid 100mcg daily Sat and Sunday   And  Synthroid 88mcg Monday through Friday     Objective   Vital Signs:  /70   Pulse 75   Temp 96.6 °F (35.9 °C) (Temporal)   Ht 162.6 cm (64.02\")   Wt 59.2 kg (130 lb 9.6 oz)   SpO2 98%   BMI 22.41 kg/m²   Estimated body mass index is 22.41 kg/m² as calculated from the following:    Height as of this encounter: 162.6 cm (64.02\").    Weight as of this encounter: 59.2 kg (130 lb 9.6 oz).       BMI is within normal parameters. No other follow-up for BMI required.      Physical Exam  Vitals reviewed.   Constitutional:       General: She is not in acute distress.  HENT:      Head: Normocephalic and atraumatic.   Cardiovascular:      Rate and Rhythm: Normal rate.   Pulmonary:      Effort: Pulmonary effort is normal. No respiratory distress.   Musculoskeletal:         General: No signs of injury. Normal range of motion.      Cervical back: Normal range of motion and neck supple.   Skin:     General: Skin is warm and dry.   Neurological:      Mental Status: She is alert and oriented to person, place, and time. Mental status is at baseline.   Psychiatric:         Mood and Affect: Mood normal.         Behavior: Behavior normal.         Thought Content: Thought content normal.         Judgment: Judgment normal.        Result Review " :  The following data was reviewed by: OPAL Jiménez on 03/13/2023:  Common labs    Common Labs 12/13/22 12/13/22 12/13/22 1/18/23 1/18/23 1/18/23 1/18/23 3/6/23    1327 1327 1327 1245 1245 1245 1245    Glucose  128 (A)    85     BUN  18    16     Creatinine  0.73    0.78     Sodium  140    140     Potassium  3.9    4.2     Chloride  102    103     Calcium  9.8    10.2     Total Protein  5.9 (A)         Albumin  4.30    4.7     Total Bilirubin  0.7    0.9     Alkaline Phosphatase  66    63     AST (SGOT)  26    27     ALT (SGPT)  22    28     WBC    4.69       Hemoglobin    12.8       Hematocrit    38.8       Platelets    246       Total Cholesterol     147      Total Cholesterol   144        Triglycerides   39  40      HDL Cholesterol   83 (A)  110 (A)      LDL Cholesterol    51  27      Hemoglobin A1C 6.70 (A)      6.40 (A) 6.40 (A)   (A) Abnormal value       Comments are available for some flowsheets but are not being displayed.                        Assessment and Plan   Diagnoses and all orders for this visit:    1. Type 1 diabetes mellitus with hyperglycemia (HCC) (Primary)  -     TSH; Future  -     T4, Free; Future  -     T3, Free; Future  -     Hemoglobin A1c; Future  -     Comprehensive Metabolic Panel; Future  -     Lipid Panel; Future  -     Microalbumin / Creatinine Urine Ratio - Urine, Clean Catch; Future    2. Hyperlipidemia associated with type 2 diabetes mellitus (HCC)  -     TSH; Future  -     T4, Free; Future  -     T3, Free; Future  -     Hemoglobin A1c; Future  -     Comprehensive Metabolic Panel; Future  -     Lipid Panel; Future  -     Microalbumin / Creatinine Urine Ratio - Urine, Clean Catch; Future    3. Acquired hypothyroidism  -     TSH; Future  -     T4, Free; Future  -     T3, Free; Future  -     Hemoglobin A1c; Future  -     Comprehensive Metabolic Panel; Future  -     Lipid Panel; Future  -     Microalbumin / Creatinine Urine Ratio - Urine, Clean Catch; Future              Follow Up   Return in about 3 months (around 6/13/2023).     A1c at goal with reports of hypoglycemia through the night  12a-7a basal rate decreased from 0.225 to 0.175 and 0.225 to 0.2  Repeat thyroid labs before next visit  Continue statin    Patient was given instructions and counseling regarding her condition or for health maintenance advice. Please see specific information pulled into the AVS if appropriate.     Jena Ann APRN

## 2023-04-13 DIAGNOSIS — I10 PRIMARY HYPERTENSION: ICD-10-CM

## 2023-04-13 DIAGNOSIS — E78.2 MIXED HYPERLIPIDEMIA: ICD-10-CM

## 2023-04-13 RX ORDER — ATORVASTATIN CALCIUM 20 MG/1
20 TABLET, FILM COATED ORAL DAILY
Qty: 90 TABLET | Refills: 1 | Status: SHIPPED | OUTPATIENT
Start: 2023-04-13

## 2023-04-13 RX ORDER — BENAZEPRIL HYDROCHLORIDE AND HYDROCHLOROTHIAZIDE 20; 12.5 MG/1; MG/1
1 TABLET ORAL DAILY
Qty: 90 TABLET | Refills: 1 | Status: SHIPPED | OUTPATIENT
Start: 2023-04-13

## 2023-04-13 NOTE — TELEPHONE ENCOUNTER
Rx Refill Note  Requested Prescriptions     Pending Prescriptions Disp Refills   • atorvastatin (LIPITOR) 20 MG tablet 90 tablet 1     Sig: Take 1 tablet by mouth Daily.   • benazepril-hydrochlorthiazide (LOTENSIN HCT) 20-12.5 MG per tablet 90 tablet 1     Sig: Take 1 tablet by mouth Daily. 1 TIME DAILY      Last office visit with prescribing clinician: 1/26/2023   Last telemedicine visit with prescribing clinician: 7/27/2023   Next office visit with prescribing clinician: 7/27/2023                         Would you like a call back once the refill request has been completed: [] Yes [] No    If the office needs to give you a call back, can they leave a voicemail: [] Yes [] No    Li Block MA  04/13/23, 11:16 EDT

## 2023-04-17 ENCOUNTER — OFFICE VISIT (OUTPATIENT)
Dept: GASTROENTEROLOGY | Facility: CLINIC | Age: 78
End: 2023-04-17
Payer: MEDICARE

## 2023-04-17 VITALS
SYSTOLIC BLOOD PRESSURE: 140 MMHG | BODY MASS INDEX: 21.92 KG/M2 | HEIGHT: 64 IN | DIASTOLIC BLOOD PRESSURE: 70 MMHG | WEIGHT: 128.4 LBS

## 2023-04-17 DIAGNOSIS — R14.3 FLATULENCE: ICD-10-CM

## 2023-04-17 DIAGNOSIS — K58.0 IRRITABLE BOWEL SYNDROME WITH DIARRHEA: Primary | ICD-10-CM

## 2023-04-17 NOTE — PATIENT INSTRUCTIONS
Eliminate all dairy for 2 weeks, then drink 2 8 oz glasses of milk.     Ok to use simethicone as needed    Add 5-10g daily fiber supplement (Benefiber or any clear fiber)

## 2023-04-17 NOTE — PROGRESS NOTES
PATIENT INFORMATION  Olga Greco       - 1945    CHIEF COMPLAINT  Chief Complaint   Patient presents with   • GI Problem       HISTORY OF PRESENT ILLNESS  Here today for IBS follow-up    BM daily, rarely skip days, if she does if its just one. Reported at LOV she was skipping up to 3 days, but said that was inacurate once she paid attention and has BM every morning. Formed BM, usually easy to pass. Rarely has diarrhea day, but those always start solid and get liquid after 2-3 takes imodium. Will resume normal BM next day. No bleeding. Gas is awful, but not usually foul, no bloating or belching. No fiber supplement. Is on probiotic. Long times since last abx. Gas most days, can happen anytime of day. Has wondered about dairy intolerance, eats a lot of cheese, has not eliminated. Taking 1 colestipol at night for the last year.    20 last colon with 0/1 adenomas, 10 yr recall      REVIEWED PERTINENT RESULTS/ LABS  Lab Results   Component Value Date    CASEREPORT  2020     Surgical Pathology Report                         Case: [id number]                                  Authorizing Provider:  Cj Fallon        Collected:           2020 10:33 AM                                 MD Alejandra                                                                   Ordering Location:     [facility]   Received:            2020 10:56 AM                                 OR                                                                           Pathologist:           Yazan Magallanes MD                                                         Specimen:    Large Intestine, Sigmoid Colon                                                             FINALDX  2020     1. Sigmoid Colon Biopsy:   Benign colonic mucosa with    A. Relatively normal crypt architecture with prominent lymphoid aggregate and mild eosinophilia noted.   B. No well developed active inflammation nor  granulomatous inflammation identified.    Jat/kds       Lab Results   Component Value Date    HGB 12.8 01/18/2023    MCV 96.3 01/18/2023     01/18/2023    ALT 28 01/18/2023    AST 27 01/18/2023    HGBA1C 6.40 (H) 03/06/2023    TRIG 40 01/18/2023    FERRITIN 119.00 08/15/2019    IRON 118 08/15/2019    TIBC 361 08/15/2019      No results found.    REVIEW OF SYSTEMS  Review of Systems   Constitutional: Negative.    HENT: Negative.    Eyes: Negative.    Respiratory: Negative.    Cardiovascular: Negative.    Gastrointestinal: Positive for diarrhea (rarely ). Negative for abdominal pain.        CIC   Endocrine: Negative.    Genitourinary: Negative.    Musculoskeletal: Negative.    Skin: Negative.    Allergic/Immunologic: Negative.    Neurological: Negative.    Hematological: Bruises/bleeds easily.   Psychiatric/Behavioral: The patient is nervous/anxious.          ACTIVE PROBLEMS  Patient Active Problem List    Diagnosis    • Routine health maintenance [Z00.00]    • Irritable bowel syndrome with diarrhea [K58.0]    • Tendinopathy of rotator cuff, left [M67.912]    • History of squamous cell carcinoma of skin [Z85.828]    • Encounter for screening for malignant neoplasm of colon [Z12.11]    • Diabetic ketoacidosis without coma associated with type 1 diabetes mellitus [E10.10]    • Type 1 diabetes mellitus [E10.9]    • Hypertension [I10]    • Hypothyroidism [E03.9]    • Menopausal symptom [N95.1]    • Vitamin D deficiency [E55.9]          PAST MEDICAL HISTORY  Past Medical History:   Diagnosis Date   • Closed nondisplaced fracture of greater tuberosity of left humerus 12/17/2019   • Colon polyp    • Diabetes mellitus    • Heart palpitations 10/29/2018   • Hypertension    • Hypothyroidism    • Subacromial bursitis of left shoulder joint 12/17/2019   • Subacromial impingement of left shoulder 12/17/2019   • Trigger thumb of left hand 9/25/2019   • Type 2 diabetes mellitus 04/2018         SURGICAL HISTORY  Past Surgical  History:   Procedure Laterality Date   • BREAST BIOPSY Left    • CHOLECYSTECTOMY     • COLONOSCOPY     • COLONOSCOPY N/A 11/2/2020    Procedure: COLONOSCOPY; polypectomy;  Surgeon: Cj Fallon MD;  Location: [facility];  Service: Gastroenterology;  Laterality: N/A;  DIVERTICULOSIS  POLYP     • HYSTERECTOMY     • TONSILLECTOMY     • TUBAL ABDOMINAL LIGATION           FAMILY HISTORY  Family History   Problem Relation Age of Onset   • Heart disease Father    • Diabetes Sister    • Hypertension Mother    • Breast cancer Neg Hx    • Colon cancer Neg Hx    • Colon polyps Neg Hx          SOCIAL HISTORY  Social History     Occupational History   • Not on file   Tobacco Use   • Smoking status: Never   • Smokeless tobacco: Never   Vaping Use   • Vaping Use: Never used   Substance and Sexual Activity   • Alcohol use: Yes     Alcohol/week: 2.0 standard drinks     Types: 2 Glasses of wine per week     Comment: daily   • Drug use: No   • Sexual activity: Yes     Partners: Male         CURRENT MEDICATIONS    Current Outpatient Medications:   •  ACCU-CHEK SOFTCLIX LANCETS lancets, USE TO TEST BLOOD SUGAR 4 TIMES DAILY, Disp: 200 each, Rfl: 5  •  atorvastatin (LIPITOR) 20 MG tablet, Take 1 tablet by mouth Daily., Disp: 90 tablet, Rfl: 1  •  B Complex Vitamins (VITAMIN B COMPLEX) capsule capsule, Take 1 capsule by mouth Daily., Disp: , Rfl:   •  benazepril-hydrochlorthiazide (LOTENSIN HCT) 20-12.5 MG per tablet, Take 1 tablet by mouth Daily. 1 TIME DAILY, Disp: 90 tablet, Rfl: 1  •  Biotin 52132 MCG tablet, Take 1 tablet by mouth Daily., Disp: , Rfl:   •  Blood Glucose Monitoring Suppl (ACCU-CHEK RICHARD SMARTVIEW) w/Device kit, Use to check blood sugar 4 times daily  E10.9, Disp: 1 kit, Rfl: 0  •  Calcium Carb-Cholecalciferol (CALCIUM 600+D3 PO), Take 500 Units by mouth Daily., Disp: , Rfl:   •  Chromium Picolinate 1000 MCG tablet, Take 1,000 mcg by mouth daily., Disp: , Rfl:   •  colestipol (COLESTID) 1 g tablet, TAKE  "TWO TABLETS BY MOUTH ONCE NIGHTLY (Patient taking differently: Take 1 tablet by mouth Daily.), Disp: 180 tablet, Rfl: 3  •  Continuous Blood Gluc Sensor (DEXCOM G6 SENSOR), Every 10 (Ten) Days. e10.65, Disp: 9 each, Rfl: 3  •  Continuous Blood Gluc Transmit (DEXCOM G6 TRANSMITTER) misc, 1 package Every 3 (Three) Months. e10.65, Disp: 3 each, Rfl: 3  •  diclofenac (VOLTAREN) 1 % gel gel, Apply 4 g topically to the appropriate area as directed 4 (Four) Times a Day. (Patient taking differently: Apply 4 g topically to the appropriate area as directed 4 (Four) Times a Day. PRN), Disp: 100 g, Rfl: 5  •  glucagon (GLUCAGON EMERGENCY) 1 MG injection, Inject 1 mg under the skin into the appropriate area as directed 1 (One) Time As Needed for Low Blood Sugar for up to 1 dose., Disp: 1 kit, Rfl: 12  •  NovoLOG 100 UNIT/ML injection, INJECT UNDER THE SKIN 50 UNITS DAILY VIA INSULIN PUMP, Disp: 40 mL, Rfl: 2  •  Probiotic Product (PROBIOTIC DAILY PO), Take  by mouth., Disp: , Rfl:   •  Synthroid 100 MCG tablet, Take 1 tab for two days a week., Disp: 30 tablet, Rfl: 11  •  Synthroid 88 MCG tablet, Taking Monday-FRIDAY, Disp: 90 tablet, Rfl: 2    ALLERGIES  Aspirin and Epinephrine    VITALS  Vitals:    04/17/23 1005   BP: 140/70   BP Location: Left arm   Patient Position: Sitting   Cuff Size: Adult   Weight: 58.2 kg (128 lb 6.4 oz)   Height: 162.6 cm (64.02\")       PHYSICAL EXAM  Debilities/Disabilities Identified: None  Emotional Behavior: Appropriate  Wt Readings from Last 3 Encounters:   04/17/23 58.2 kg (128 lb 6.4 oz)   03/13/23 59.2 kg (130 lb 9.6 oz)   01/26/23 58.7 kg (129 lb 6.4 oz)     Ht Readings from Last 1 Encounters:   04/17/23 162.6 cm (64.02\")     Body mass index is 22.03 kg/m².  Physical Exam  Constitutional:       General: She is not in acute distress.     Appearance: Normal appearance. She is not ill-appearing.   HENT:      Head: Normocephalic and atraumatic.      Mouth/Throat:      Mouth: Mucous membranes are " moist.      Pharynx: No posterior oropharyngeal erythema.   Eyes:      General: No scleral icterus.  Cardiovascular:      Rate and Rhythm: Normal rate and regular rhythm.      Heart sounds: Normal heart sounds.   Pulmonary:      Effort: Pulmonary effort is normal.      Breath sounds: Normal breath sounds.   Abdominal:      General: Abdomen is flat. Bowel sounds are normal. There is no distension.      Palpations: Abdomen is soft. There is no mass.      Tenderness: There is no abdominal tenderness. There is no guarding or rebound. Negative signs include Sumner's sign.      Hernia: No hernia is present.   Musculoskeletal:      Cervical back: Neck supple.   Skin:     General: Skin is warm.      Capillary Refill: Capillary refill takes less than 2 seconds.   Neurological:      General: No focal deficit present.      Mental Status: She is alert and oriented to person, place, and time.   Psychiatric:         Mood and Affect: Mood normal.         Behavior: Behavior normal.         Thought Content: Thought content normal.         Judgment: Judgment normal.         CLINICAL DATA REVIEWED   reviewed previous lab results and integrated with today's visit, reviewed notes from other physicians and/or last GI encounter, reviewed previous endoscopy results and available photos, reviewed surgical pathology results from previous biopsies    ASSESSMENT  Diagnoses and all orders for this visit:    Irritable bowel syndrome with diarrhea    Flatulence          PLAN    Dairy tolerance test at home  Add fiber supplement, plenty of fluids, may be able to wean off colestipol    Return in about 6 months (around 10/17/2023).    I have discussed the above plan with the patient.  They verbalize understanding and are in agreement with the plan.  They have been advised to contact the office for any questions, concerns, or changes related to their health.

## 2023-06-06 ENCOUNTER — LAB (OUTPATIENT)
Dept: LAB | Facility: HOSPITAL | Age: 78
End: 2023-06-06
Payer: MEDICARE

## 2023-06-06 DIAGNOSIS — E78.5 HYPERLIPIDEMIA ASSOCIATED WITH TYPE 2 DIABETES MELLITUS: ICD-10-CM

## 2023-06-06 DIAGNOSIS — E03.9 ACQUIRED HYPOTHYROIDISM: ICD-10-CM

## 2023-06-06 DIAGNOSIS — E10.65 TYPE 1 DIABETES MELLITUS WITH HYPERGLYCEMIA: ICD-10-CM

## 2023-06-06 DIAGNOSIS — E11.69 HYPERLIPIDEMIA ASSOCIATED WITH TYPE 2 DIABETES MELLITUS: ICD-10-CM

## 2023-06-06 LAB
ALBUMIN SERPL-MCNC: 4.5 G/DL (ref 3.5–5.2)
ALBUMIN UR-MCNC: 1.5 MG/DL
ALBUMIN/GLOB SERPL: 2.4 G/DL
ALP SERPL-CCNC: 65 U/L (ref 39–117)
ALT SERPL W P-5'-P-CCNC: 18 U/L (ref 1–33)
ANION GAP SERPL CALCULATED.3IONS-SCNC: 12.2 MMOL/L (ref 5–15)
AST SERPL-CCNC: 24 U/L (ref 1–32)
BILIRUB SERPL-MCNC: 0.7 MG/DL (ref 0–1.2)
BUN SERPL-MCNC: 17 MG/DL (ref 8–23)
BUN/CREAT SERPL: 21.3 (ref 7–25)
CALCIUM SPEC-SCNC: 10.2 MG/DL (ref 8.6–10.5)
CHLORIDE SERPL-SCNC: 104 MMOL/L (ref 98–107)
CHOLEST SERPL-MCNC: 142 MG/DL (ref 0–200)
CO2 SERPL-SCNC: 27.8 MMOL/L (ref 22–29)
CREAT SERPL-MCNC: 0.8 MG/DL (ref 0.57–1)
CREAT UR-MCNC: 75.9 MG/DL
EGFRCR SERPLBLD CKD-EPI 2021: 76.9 ML/MIN/1.73
GLOBULIN UR ELPH-MCNC: 1.9 GM/DL
GLUCOSE SERPL-MCNC: 154 MG/DL (ref 65–99)
HBA1C MFR BLD: 6 % (ref 4.8–5.6)
HDLC SERPL-MCNC: 95 MG/DL (ref 40–60)
LDLC SERPL CALC-MCNC: 37 MG/DL (ref 0–100)
LDLC/HDLC SERPL: 0.41 {RATIO}
MICROALBUMIN/CREAT UR: 19.8 MG/G
POTASSIUM SERPL-SCNC: 4.5 MMOL/L (ref 3.5–5.2)
PROT SERPL-MCNC: 6.4 G/DL (ref 6–8.5)
SODIUM SERPL-SCNC: 144 MMOL/L (ref 136–145)
T3FREE SERPL-MCNC: 2.2 PG/ML (ref 2–4.4)
T4 FREE SERPL-MCNC: 1.41 NG/DL (ref 0.93–1.7)
TRIGL SERPL-MCNC: 42 MG/DL (ref 0–150)
TSH SERPL DL<=0.05 MIU/L-ACNC: 2.69 UIU/ML (ref 0.27–4.2)
VLDLC SERPL-MCNC: 10 MG/DL (ref 5–40)

## 2023-06-06 PROCEDURE — 36415 COLL VENOUS BLD VENIPUNCTURE: CPT

## 2023-06-06 PROCEDURE — 80053 COMPREHEN METABOLIC PANEL: CPT

## 2023-06-06 PROCEDURE — 82043 UR ALBUMIN QUANTITATIVE: CPT

## 2023-06-06 PROCEDURE — 83036 HEMOGLOBIN GLYCOSYLATED A1C: CPT

## 2023-06-06 PROCEDURE — 84439 ASSAY OF FREE THYROXINE: CPT

## 2023-06-06 PROCEDURE — 84443 ASSAY THYROID STIM HORMONE: CPT

## 2023-06-06 PROCEDURE — 84481 FREE ASSAY (FT-3): CPT

## 2023-06-06 PROCEDURE — 80061 LIPID PANEL: CPT

## 2023-06-06 PROCEDURE — 82570 ASSAY OF URINE CREATININE: CPT

## 2023-07-23 ENCOUNTER — LAB (OUTPATIENT)
Dept: LAB | Facility: HOSPITAL | Age: 78
End: 2023-07-23
Payer: MEDICARE

## 2023-07-23 DIAGNOSIS — E10.65 TYPE 1 DIABETES MELLITUS WITH HYPERGLYCEMIA: ICD-10-CM

## 2023-07-23 LAB — HBA1C MFR BLD: 6.4 % (ref 4.8–5.6)

## 2023-07-23 PROCEDURE — 83036 HEMOGLOBIN GLYCOSYLATED A1C: CPT

## 2023-07-23 PROCEDURE — 36415 COLL VENOUS BLD VENIPUNCTURE: CPT

## 2023-07-27 ENCOUNTER — OFFICE VISIT (OUTPATIENT)
Dept: INTERNAL MEDICINE | Facility: CLINIC | Age: 78
End: 2023-07-27
Payer: MEDICARE

## 2023-07-27 VITALS
HEART RATE: 76 BPM | SYSTOLIC BLOOD PRESSURE: 108 MMHG | HEIGHT: 64 IN | BODY MASS INDEX: 21.21 KG/M2 | WEIGHT: 124.2 LBS | TEMPERATURE: 97.5 F | OXYGEN SATURATION: 97 % | DIASTOLIC BLOOD PRESSURE: 48 MMHG

## 2023-07-27 DIAGNOSIS — R06.02 SHORTNESS OF BREATH: ICD-10-CM

## 2023-07-27 DIAGNOSIS — Z12.31 ENCOUNTER FOR SCREENING MAMMOGRAM FOR MALIGNANT NEOPLASM OF BREAST: ICD-10-CM

## 2023-07-27 DIAGNOSIS — R07.9 CHEST PAIN, UNSPECIFIED TYPE: ICD-10-CM

## 2023-07-27 DIAGNOSIS — Z00.00 MEDICARE ANNUAL WELLNESS VISIT, SUBSEQUENT: Primary | ICD-10-CM

## 2023-07-27 NOTE — PROGRESS NOTES
The ABCs of the Annual Wellness Visit  Subsequent Medicare Wellness Visit    Subjective    Olga Greco is a 77 y.o. female who presents for a Subsequent Medicare Wellness Visit.    The following portions of the patient's history were reviewed and   updated as appropriate: allergies, current medications, past family history, past medical history, past social history, past surgical history, and problem list.    Compared to one year ago, the patient feels her physical   health is worse.    Compared to one year ago, the patient feels her mental   health is the same.    Recent Hospitalizations:  She was not admitted to the hospital during the last year.       Current Medical Providers:  Patient Care Team:  Yair Zapata MD as PCP - General    Outpatient Medications Prior to Visit   Medication Sig Dispense Refill    atorvastatin (LIPITOR) 20 MG tablet Take 1 tablet by mouth Daily. 90 tablet 1    B Complex Vitamins (VITAMIN B COMPLEX) capsule capsule Take 1 capsule by mouth Daily.      benazepril-hydrochlorthiazide (LOTENSIN HCT) 20-12.5 MG per tablet Take 1 tablet by mouth Daily. 1 TIME DAILY 90 tablet 1    Biotin 35685 MCG tablet Take 1 tablet by mouth Daily.      Blood Glucose Monitoring Suppl (ACCU-CHEK RICHARD SMARTVIEW) w/Device kit Use to check blood sugar 4 times daily  E10.9 1 kit 0    Calcium Carb-Cholecalciferol (CALCIUM 600+D3 PO) Take 500 Units by mouth Daily.      Chromium Picolinate 1000 MCG tablet Take 1,000 mcg by mouth daily.      colestipol (COLESTID) 1 g tablet TAKE TWO TABLETS BY MOUTH ONCE NIGHTLY (Patient taking differently: Take 1 tablet by mouth Daily.) 180 tablet 3    Continuous Blood Gluc Sensor (DEXCOM G6 SENSOR) Every 10 (Ten) Days. e10.65 9 each 3    Continuous Blood Gluc Transmit (DEXCOM G6 TRANSMITTER) misc 1 package Every 3 (Three) Months. e10.65 3 each 3    NovoLOG 100 UNIT/ML injection INJECT UNDER THE SKIN 50 UNITS DAILY VIA INSULIN PUMP 40 mL 2    Probiotic Product (PROBIOTIC  DAILY PO) Take  by mouth.      Synthroid 100 MCG tablet Take 1 tab for two days a week. 30 tablet 11    Synthroid 88 MCG tablet Taking Monday-FRIDAY 90 tablet 2    ACCU-CHEK SOFTCLIX LANCETS lancets USE TO TEST BLOOD SUGAR 4 TIMES DAILY 200 each 5    diclofenac (VOLTAREN) 1 % gel gel Apply 4 g topically to the appropriate area as directed 4 (Four) Times a Day. (Patient taking differently: Apply 4 g topically to the appropriate area as directed 4 (Four) Times a Day. PRN) 100 g 5    glucagon (GLUCAGON EMERGENCY) 1 MG injection Inject 1 mg under the skin into the appropriate area as directed 1 (One) Time As Needed for Low Blood Sugar for up to 1 dose. 1 kit 12     No facility-administered medications prior to visit.       No opioid medication identified on active medication list. I have reviewed chart for other potential  high risk medication/s and harmful drug interactions in the elderly.        Aspirin is not on active medication list.  Aspirin use is not indicated based on review of current medical condition/s. Risk of harm outweighs potential benefits.  .    Patient Active Problem List   Diagnosis    Type 1 diabetes mellitus    Hypertension    Hypothyroidism    Menopausal symptom    Vitamin D deficiency    Diabetic ketoacidosis without coma associated with type 1 diabetes mellitus    Encounter for screening for malignant neoplasm of colon    History of squamous cell carcinoma of skin    Tendinopathy of rotator cuff, left    Irritable bowel syndrome with diarrhea    Routine health maintenance     Advance Care Planning   Advance Care Planning     Advance Directive is not on file.  ACP discussion was held with the patient during this visit. Patient has an advance directive (not in EMR), copy requested.     Objective    Vitals:    07/27/23 1500   BP: 108/48   BP Location: Left arm   Patient Position: Sitting   Cuff Size: Adult   Pulse: 76   Temp: 97.5 °F (36.4 °C)   TempSrc: Infrared   SpO2: 97%   Weight: 56.3 kg (124  "lb 3.2 oz)   Height: 162.6 cm (64.02\")     Estimated body mass index is 21.31 kg/m² as calculated from the following:    Height as of this encounter: 162.6 cm (64.02\").    Weight as of this encounter: 56.3 kg (124 lb 3.2 oz).    BMI is within normal parameters. No other follow-up for BMI required.      Does the patient have evidence of cognitive impairment? No    Lab Results   Component Value Date    TRIG 42 2023    HDL 95 (H) 2023    LDL 37 2023    VLDL 10 2023    HGBA1C 6.40 (H) 2023        HEALTH RISK ASSESSMENT    Smoking Status:  Social History     Tobacco Use   Smoking Status Never   Smokeless Tobacco Never     Alcohol Consumption:  Social History     Substance and Sexual Activity   Alcohol Use Yes    Alcohol/week: 2.0 standard drinks    Types: 2 Glasses of wine per week    Comment: daily     Fall Risk Screen:    HILDA Fall Risk Assessment was completed, and patient is at HIGH risk for falls. Assessment completed on:2023    Depression Screenin/27/2023     3:06 PM   PHQ-2/PHQ-9 Depression Screening   Little Interest or Pleasure in Doing Things 0-->not at all   Feeling Down, Depressed or Hopeless 0-->not at all   PHQ-9: Brief Depression Severity Measure Score 0       Health Habits and Functional and Cognitive Screenin/27/2023     3:04 PM   Functional & Cognitive Status   Do you have difficulty preparing food and eating? No   Do you have difficulty bathing yourself, getting dressed or grooming yourself? No   Do you have difficulty using the toilet? No   Do you have difficulty moving around from place to place? No   Do you have trouble with steps or getting out of a bed or a chair? No   Current Diet Well Balanced Diet   Dental Exam Up to date   Eye Exam Up to date   Exercise (times per week) 5 times per week   Current Exercises Include Yard Work   Do you need help using the phone?  No   Are you deaf or do you have serious difficulty hearing?  No   Do you need " help to go to places out of walking distance? No   Do you need help shopping? No   Do you need help preparing meals?  No   Do you need help with housework?  No   Do you need help with laundry? No   Do you need help taking your medications? No   Do you need help managing money? No   Do you ever drive or ride in a car without wearing a seat belt? No   Have you felt unusual stress, anger or loneliness in the last month? No   Who do you live with? Spouse   If you need help, do you have trouble finding someone available to you? No   Have you been bothered in the last four weeks by sexual problems? No   Do you have difficulty concentrating, remembering or making decisions? No       Age-appropriate Screening Schedule:  Refer to the list below for future screening recommendations based on patient's age, sex and/or medical conditions. Orders for these recommended tests are listed in the plan section. The patient has been provided with a written plan.    Health Maintenance   Topic Date Due    TDAP/TD VACCINES (1 - Tdap) Never done    DXA SCAN  05/11/2018    DIABETIC FOOT EXAM  04/01/2022    COVID-19 Vaccine (5 - Moderna series) 06/10/2022    INFLUENZA VACCINE  10/01/2023    HEMOGLOBIN A1C  01/23/2024    DIABETIC EYE EXAM  04/04/2024    LIPID PANEL  06/06/2024    URINE MICROALBUMIN  06/06/2024    ANNUAL WELLNESS VISIT  07/27/2024    HEPATITIS C SCREENING  Completed    Pneumococcal Vaccine 65+  Completed    ZOSTER VACCINE  Completed    COLORECTAL CANCER SCREENING  Discontinued                  CMS Preventative Services Quick Reference  Risk Factors Identified During Encounter  Immunizations Discussed/Encouraged:  UTD  The above risks/problems have been discussed with the patient.  Pertinent information has been shared with the patient in the After Visit Summary.  An After Visit Summary and PPPS were made available to the patient.    Follow Up:   Next Medicare Wellness visit to be scheduled in 1 year.       Additional E&M Note  "during same encounter follows:  Patient has multiple medical problems which are significant and separately identifiable that require additional work above and beyond the Medicare Wellness Visit.      Chief Complaint  Medicare Wellness-subsequent and Shortness of Breath (Getting easily winded lately )    Subjective        Shortness of Breath  Pertinent negatives include no chest pain, leg swelling or wheezing.   Olga Greco is also being seen today for shortness of breath.    Review of Systems   Constitutional: Negative.    Respiratory:  Positive for shortness of breath. Negative for cough, chest tightness and wheezing.    Cardiovascular:  Negative for chest pain, palpitations and leg swelling.     Objective   Vital Signs:  /48 (BP Location: Left arm, Patient Position: Sitting, Cuff Size: Adult)   Pulse 76   Temp 97.5 °F (36.4 °C) (Infrared)   Ht 162.6 cm (64.02\")   Wt 56.3 kg (124 lb 3.2 oz)   SpO2 97%   BMI 21.31 kg/m²     Physical Exam  Vitals and nursing note reviewed.   Constitutional:       Appearance: Normal appearance. She is well-developed.   HENT:      Head: Normocephalic and atraumatic.      Mouth/Throat:      Mouth: Mucous membranes are moist.   Eyes:      Extraocular Movements: Extraocular movements intact.      Conjunctiva/sclera: Conjunctivae normal.   Neck:      Thyroid: No thyromegaly.   Cardiovascular:      Rate and Rhythm: Normal rate and regular rhythm.      Heart sounds: Normal heart sounds.   Pulmonary:      Effort: Pulmonary effort is normal. No respiratory distress.      Breath sounds: Normal breath sounds. No wheezing, rhonchi or rales.   Abdominal:      Palpations: Abdomen is soft.      Tenderness: There is no abdominal tenderness.   Musculoskeletal:      Cervical back: Neck supple. No rigidity.      Right lower leg: No edema.      Left lower leg: No edema.   Skin:     General: Skin is warm and dry.   Neurological:      General: No focal deficit present.      Mental Status: She " is alert and oriented to person, place, and time.   Psychiatric:         Mood and Affect: Mood normal.         Behavior: Behavior normal.                       Assessment and Plan   Diagnoses and all orders for this visit:    1. Medicare annual wellness visit, subsequent (Primary)    2. Shortness of breath  -     XR Chest PA & Lateral; Future  -     Spirometry - Pre & Post Bronchodilator with Lung Volumes; Future  -     Ambulatory Referral to Cardiology  -     CBC w AUTO Differential    3. Encounter for screening mammogram for malignant neoplasm of breast  -     Mammo screening digital tomosynthesis bilateral w CAD; Future    4. Chest pain, unspecified type    1. DMI. A1c 6.4.  Stable.   Managed by endocrinology.  Eye exam UTD.  On statin, ACE-I.     2. HTN.  Controlled.  Continue benazepril-hctz 20-12.5 mg daily and monitor home blood pressures.  Labs reviewed.     3. Hypothyroidism.  Adequate replacement.  Managed by endocrinology.     4. Vitamin D deficiency.  Continue supplement.     5. IBS.  Controlled with colestipol and probiotic.     6. 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  She sees Dr. Padgett.     7. RHM.  Mammogram due next month.  Colonoscopy 11/2020 by Dr. Fallon.  Shingrix done at pharmacy.  Pneumococcal vaccines done.  Covid-19 vaccines done.      8. Shortness of breath with exertion.  PFT's and CXR ordered.  Reports occasional chest tightness.  She also has multiple risk factors so we'll have her see cardiology.  Long-standing DM; strong FHx CAD on father's side.  CBC today.         Follow Up   Return in about 6 months (around 1/27/2024).  Patient was given instructions and counseling regarding her condition or for health maintenance advice. Please see specific information pulled into the AVS if appropriate.

## 2023-07-28 DIAGNOSIS — D64.9 ANEMIA, UNSPECIFIED TYPE: Primary | ICD-10-CM

## 2023-07-28 LAB
BASOPHILS # BLD AUTO: 0.02 10*3/MM3 (ref 0–0.2)
BASOPHILS NFR BLD AUTO: 0.4 % (ref 0–1.5)
EOSINOPHIL # BLD AUTO: 0.13 10*3/MM3 (ref 0–0.4)
EOSINOPHIL NFR BLD AUTO: 2.7 % (ref 0.3–6.2)
ERYTHROCYTE [DISTWIDTH] IN BLOOD BY AUTOMATED COUNT: 11.3 % (ref 12.3–15.4)
HCT VFR BLD AUTO: 34.1 % (ref 34–46.6)
HGB BLD-MCNC: 11.4 G/DL (ref 12–15.9)
IMM GRANULOCYTES # BLD AUTO: 0.01 10*3/MM3 (ref 0–0.05)
IMM GRANULOCYTES NFR BLD AUTO: 0.2 % (ref 0–0.5)
LYMPHOCYTES # BLD AUTO: 1.48 10*3/MM3 (ref 0.7–3.1)
LYMPHOCYTES NFR BLD AUTO: 30.9 % (ref 19.6–45.3)
MCH RBC QN AUTO: 32.2 PG (ref 26.6–33)
MCHC RBC AUTO-ENTMCNC: 33.4 G/DL (ref 31.5–35.7)
MCV RBC AUTO: 96.3 FL (ref 79–97)
MONOCYTES # BLD AUTO: 0.38 10*3/MM3 (ref 0.1–0.9)
MONOCYTES NFR BLD AUTO: 7.9 % (ref 5–12)
NEUTROPHILS # BLD AUTO: 2.77 10*3/MM3 (ref 1.7–7)
NEUTROPHILS NFR BLD AUTO: 57.9 % (ref 42.7–76)
NRBC BLD AUTO-RTO: 0 /100 WBC (ref 0–0.2)
PLATELET # BLD AUTO: 215 10*3/MM3 (ref 140–450)
RBC # BLD AUTO: 3.54 10*6/MM3 (ref 3.77–5.28)
WBC # BLD AUTO: 4.79 10*3/MM3 (ref 3.4–10.8)

## 2023-08-02 ENCOUNTER — TELEPHONE (OUTPATIENT)
Dept: INTERNAL MEDICINE | Facility: CLINIC | Age: 78
End: 2023-08-02
Payer: MEDICARE

## 2023-08-14 ENCOUNTER — TELEPHONE (OUTPATIENT)
Dept: ENDOCRINOLOGY | Age: 78
End: 2023-08-14

## 2023-08-14 NOTE — TELEPHONE ENCOUNTER
8/14/23 Called and spoke with patient that I have faxed the prescription to [facility] on 8/9/23

## 2023-08-14 NOTE — TELEPHONE ENCOUNTER
Caller: Olga Greco    Relationship to patient: Self    Best call back number: [phone]     Patient is needing: PT STATES [facility] NEEDS A NEW PRESCRIPTION SENT OVER   FOR PTS SUPPLIES. PLEASE CHECK AND ADVISE. CALL BACK ANYTIME, OK TO M.

## 2023-08-22 ENCOUNTER — HOSPITAL ENCOUNTER (OUTPATIENT)
Dept: MAMMOGRAPHY | Facility: HOSPITAL | Age: 78
Discharge: HOME | End: 2023-08-22
Payer: MEDICARE

## 2023-08-22 ENCOUNTER — HOSPITAL ENCOUNTER (OUTPATIENT)
Dept: PULMONOLOGY | Facility: HOSPITAL | Age: 78
Discharge: HOME | End: 2023-08-22
Payer: MEDICARE

## 2023-08-22 DIAGNOSIS — Z12.31 ENCOUNTER FOR SCREENING MAMMOGRAM FOR MALIGNANT NEOPLASM OF BREAST: ICD-10-CM

## 2023-08-22 DIAGNOSIS — R06.02 SHORTNESS OF BREATH: ICD-10-CM

## 2023-08-22 PROCEDURE — 94060 EVALUATION OF WHEEZING: CPT

## 2023-08-22 PROCEDURE — 94726 PLETHYSMOGRAPHY LUNG VOLUMES: CPT

## 2023-08-22 PROCEDURE — 77063 BREAST TOMOSYNTHESIS BI: CPT

## 2023-08-22 PROCEDURE — 77067 SCR MAMMO BI INCL CAD: CPT

## 2023-08-22 RX ORDER — ALBUTEROL SULFATE 2.5 MG/3ML
2.5 SOLUTION RESPIRATORY (INHALATION) ONCE
Status: COMPLETED | OUTPATIENT
Start: 2023-08-22 | End: 2023-08-22

## 2023-08-22 RX ADMIN — ALBUTEROL SULFATE 2.5 MG: 2.5 SOLUTION RESPIRATORY (INHALATION) at 15:26

## 2023-09-05 ENCOUNTER — LAB (OUTPATIENT)
Dept: LAB | Facility: HOSPITAL | Age: 78
End: 2023-09-05
Payer: MEDICARE

## 2023-09-05 PROCEDURE — 85025 COMPLETE CBC W/AUTO DIFF WBC: CPT | Performed by: FAMILY MEDICINE

## 2023-09-05 PROCEDURE — 82607 VITAMIN B-12: CPT | Performed by: FAMILY MEDICINE

## 2023-09-05 PROCEDURE — 83540 ASSAY OF IRON: CPT | Performed by: FAMILY MEDICINE

## 2023-09-05 PROCEDURE — 84466 ASSAY OF TRANSFERRIN: CPT | Performed by: FAMILY MEDICINE

## 2023-09-05 PROCEDURE — 82728 ASSAY OF FERRITIN: CPT | Performed by: FAMILY MEDICINE

## 2023-09-11 ENCOUNTER — OFFICE VISIT (OUTPATIENT)
Dept: INTERNAL MEDICINE | Facility: CLINIC | Age: 78
End: 2023-09-11
Payer: MEDICARE

## 2023-09-11 VITALS
OXYGEN SATURATION: 97 % | WEIGHT: 125 LBS | DIASTOLIC BLOOD PRESSURE: 60 MMHG | RESPIRATION RATE: 18 BRPM | TEMPERATURE: 98.2 F | SYSTOLIC BLOOD PRESSURE: 116 MMHG | HEART RATE: 69 BPM | BODY MASS INDEX: 21.34 KG/M2 | HEIGHT: 64 IN

## 2023-09-11 DIAGNOSIS — S29.011A MUSCLE STRAIN OF CHEST WALL, INITIAL ENCOUNTER: ICD-10-CM

## 2023-09-11 DIAGNOSIS — D64.9 ANEMIA, UNSPECIFIED TYPE: Primary | ICD-10-CM

## 2023-09-11 PROCEDURE — 3078F DIAST BP <80 MM HG: CPT | Performed by: INTERNAL MEDICINE

## 2023-09-11 PROCEDURE — 3044F HG A1C LEVEL LT 7.0%: CPT | Performed by: INTERNAL MEDICINE

## 2023-09-11 PROCEDURE — 93000 ELECTROCARDIOGRAM COMPLETE: CPT | Performed by: INTERNAL MEDICINE

## 2023-09-11 PROCEDURE — 99214 OFFICE O/P EST MOD 30 MIN: CPT | Performed by: INTERNAL MEDICINE

## 2023-09-11 PROCEDURE — 3074F SYST BP LT 130 MM HG: CPT | Performed by: INTERNAL MEDICINE

## 2023-09-11 RX ORDER — MELOXICAM 7.5 MG/1
7.5 TABLET ORAL DAILY
Qty: 7 TABLET | Refills: 0 | Status: SHIPPED | OUTPATIENT
Start: 2023-09-11

## 2023-09-11 NOTE — PROGRESS NOTES
Olga Greco is a 77 y.o. female, who presents with a chief complaint of   Chief Complaint   Patient presents with    Chest Pain     Has pain all the way around her ribcage. Has been ongoing since Saturday.            Chest Pain      Pt here with pain around her rib cage.  Sx began after she was lifting a 4 gallon can of gas over the weekend.  Sx better with ibuprofen 200mg.  When she breathes in her rib cage feels like she pulled a muscle.  Pt is a type 1 diabetic with last A1c 6.4 and renal function is normal. Pt can recreate the pain with movement and taking deep breaths.      Pt was anemic on labs in July and labs last week were normal.     The following portions of the patient's history were reviewed and updated as appropriate: allergies, current medications, past family history, past medical history, past social history, past surgical history and problem list.    Allergies: Aspirin and Epinephrine    Review of Systems   Constitutional: Negative.    HENT: Negative.     Eyes: Negative.    Respiratory: Negative.     Cardiovascular:  Positive for chest pain.   Gastrointestinal: Negative.    Endocrine: Negative.    Genitourinary: Negative.    Musculoskeletal: Negative.    Skin: Negative.    Allergic/Immunologic: Negative.    Neurological: Negative.    Hematological: Negative.    Psychiatric/Behavioral: Negative.     All other systems reviewed and are negative.          Wt Readings from Last 3 Encounters:   09/11/23 56.7 kg (125 lb)   07/27/23 56.3 kg (124 lb 3.2 oz)   06/21/23 57.5 kg (126 lb 12.8 oz)     Temp Readings from Last 3 Encounters:   09/11/23 98.2 °F (36.8 °C) (Infrared)   07/27/23 97.5 °F (36.4 °C) (Infrared)   06/21/23 97 °F (36.1 °C)     BP Readings from Last 3 Encounters:   09/11/23 116/60   07/27/23 108/48   06/21/23 138/80     Pulse Readings from Last 3 Encounters:   09/11/23 69   07/27/23 76   06/21/23 96     Body mass index is 21.44 kg/m².  SpO2 Readings from Last 3 Encounters:   09/11/23  97%   07/27/23 97%   06/21/23 97%          Physical Exam  Vitals and nursing note reviewed.   Constitutional:       General: She is not in acute distress.     Appearance: She is well-developed.   HENT:      Head: Normocephalic and atraumatic.      Right Ear: External ear normal.      Left Ear: External ear normal.      Nose: Nose normal.   Eyes:      Conjunctiva/sclera: Conjunctivae normal.      Pupils: Pupils are equal, round, and reactive to light.   Cardiovascular:      Rate and Rhythm: Normal rate and regular rhythm.      Heart sounds: Normal heart sounds.   Pulmonary:      Effort: Pulmonary effort is normal. No respiratory distress.      Breath sounds: Normal breath sounds. No wheezing.      Comments: General TTP of left lateral rib area.  No specific bony ttp.   Musculoskeletal:         General: Normal range of motion.      Cervical back: Normal range of motion and neck supple.      Comments: Normal gait   Skin:     General: Skin is warm and dry.   Neurological:      Mental Status: She is alert and oriented to person, place, and time.   Psychiatric:         Behavior: Behavior normal.         Thought Content: Thought content normal.         Judgment: Judgment normal.       Results for orders placed or performed in visit on 08/02/23   Ferritin    Specimen: Blood   Result Value Ref Range    Ferritin 138.00 13.00 - 150.00 ng/mL   Iron Profile    Specimen: Blood   Result Value Ref Range    Iron 85 37 - 145 mcg/dL    Iron Saturation (TSAT) 25 20 - 50 %    Transferrin 226 200 - 360 mg/dL    TIBC 337 298 - 536 mcg/dL   Vitamin B12    Specimen: Blood   Result Value Ref Range    Vitamin B-12 518 211 - 946 pg/mL   CBC Auto Differential    Specimen: Blood   Result Value Ref Range    WBC 5.16 3.40 - 10.80 10*3/mm3    RBC 3.78 3.77 - 5.28 10*6/mm3    Hemoglobin 12.4 12.0 - 15.9 g/dL    Hematocrit 35.9 34.0 - 46.6 %    MCV 95.0 79.0 - 97.0 fL    MCH 32.8 26.6 - 33.0 pg    MCHC 34.5 31.5 - 35.7 g/dL    RDW 11.6 (L) 12.3 -  15.4 %    RDW-SD 39.7 37.0 - 54.0 fl    MPV 9.8 6.0 - 12.0 fL    Platelets 216 140 - 450 10*3/mm3    Neutrophil % 55.1 42.7 - 76.0 %    Lymphocyte % 33.1 19.6 - 45.3 %    Monocyte % 8.5 5.0 - 12.0 %    Eosinophil % 2.3 0.3 - 6.2 %    Basophil % 0.6 0.0 - 1.5 %    Immature Grans % 0.4 0.0 - 0.5 %    Neutrophils, Absolute 2.84 1.70 - 7.00 10*3/mm3    Lymphocytes, Absolute 1.71 0.70 - 3.10 10*3/mm3    Monocytes, Absolute 0.44 0.10 - 0.90 10*3/mm3    Eosinophils, Absolute 0.12 0.00 - 0.40 10*3/mm3    Basophils, Absolute 0.03 0.00 - 0.20 10*3/mm3    Immature Grans, Absolute 0.02 0.00 - 0.05 10*3/mm3    nRBC 0.0 0.0 - 0.2 /100 WBC     Result Review :                ECG 12 Lead    Date/Time: 9/11/2023 3:15 PM  Performed by: Bette Robles MD  Authorized by: Bette Robles MD   Comparison: compared with previous ECG from 10/21/2018  Comparison to previous ECG: EKG improved  Rhythm: sinus rhythm  Rate: normal  ST Segments: ST segments normal  T Waves: T waves normal  QRS axis: left    Clinical impression: abnormal EKG            Assessment and Plan    Diagnoses and all orders for this visit:    1. Anemia, unspecified type (Primary) -improved on repeat labs last week.     2. Muscle strain of chest wall, initial encounter  -     ECG 12 Lead  -     meloxicam (Mobic) 7.5 MG tablet; Take 1 tablet by mouth Daily.  Dispense: 7 tablet; Refill: 0         BMI is within normal parameters. No other follow-up for BMI required.             Outpatient Medications Prior to Visit   Medication Sig Dispense Refill    ACCU-CHEK SOFTCLIX LANCETS lancets USE TO TEST BLOOD SUGAR 4 TIMES DAILY 200 each 5    atorvastatin (LIPITOR) 20 MG tablet Take 1 tablet by mouth Daily. 90 tablet 1    B Complex Vitamins (VITAMIN B COMPLEX) capsule capsule Take 1 capsule by mouth Daily.      benazepril-hydrochlorthiazide (LOTENSIN HCT) 20-12.5 MG per tablet Take 1 tablet by mouth Daily. 1 TIME DAILY 90 tablet 1    Biotin 23528 MCG tablet Take  1 tablet by mouth Daily.      Blood Glucose Monitoring Suppl (ACCU-CHEK RICHARD SMARTVIEW) w/Device kit Use to check blood sugar 4 times daily  E10.9 1 kit 0    Calcium Carb-Cholecalciferol (CALCIUM 600+D3 PO) Take 500 Units by mouth Daily.      colestipol (COLESTID) 1 g tablet TAKE TWO TABLETS BY MOUTH ONCE NIGHTLY (Patient taking differently: Take 1 tablet by mouth Daily.) 180 tablet 3    Continuous Blood Gluc Sensor (DEXCOM G6 SENSOR) Every 10 (Ten) Days. e10.65 9 each 3    Continuous Blood Gluc Transmit (DEXCOM G6 TRANSMITTER) misc 1 package Every 3 (Three) Months. e10.65 3 each 3    diclofenac (VOLTAREN) 1 % gel gel Apply 4 g topically to the appropriate area as directed 4 (Four) Times a Day. (Patient taking differently: Apply 4 g topically to the appropriate area as directed 4 (Four) Times a Day. PRN) 100 g 5    glucagon (GLUCAGON EMERGENCY) 1 MG injection Inject 1 mg under the skin into the appropriate area as directed 1 (One) Time As Needed for Low Blood Sugar for up to 1 dose. 1 kit 12    NovoLOG 100 UNIT/ML injection INJECT UNDER THE SKIN 50 UNITS DAILY VIA INSULIN PUMP 40 mL 2    Probiotic Product (PROBIOTIC DAILY PO) Take  by mouth.      Synthroid 100 MCG tablet Take 1 tab for two days a week. 30 tablet 11    Synthroid 88 MCG tablet Taking Monday-FRIDAY 90 tablet 2    Chromium Picolinate 1000 MCG tablet Take 1,000 mcg by mouth daily. (Patient not taking: Reported on 9/11/2023)       No facility-administered medications prior to visit.     New Medications Ordered This Visit   Medications    meloxicam (Mobic) 7.5 MG tablet     Sig: Take 1 tablet by mouth Daily.     Dispense:  7 tablet     Refill:  0     [unfilled]  There are no discontinued medications.      No follow-ups on file.    Patient was given instructions and counseling regarding her condition or for health maintenance advice. Please see specific information pulled into the AVS if appropriate.

## 2023-09-13 ENCOUNTER — OFFICE VISIT (OUTPATIENT)
Dept: ENDOCRINOLOGY | Age: 78
End: 2023-09-13
Payer: MEDICARE

## 2023-09-13 VITALS
BODY MASS INDEX: 21.34 KG/M2 | HEART RATE: 69 BPM | HEIGHT: 64 IN | SYSTOLIC BLOOD PRESSURE: 130 MMHG | DIASTOLIC BLOOD PRESSURE: 70 MMHG | WEIGHT: 125 LBS | TEMPERATURE: 96.4 F | OXYGEN SATURATION: 98 %

## 2023-09-13 DIAGNOSIS — E78.5 HYPERLIPIDEMIA ASSOCIATED WITH TYPE 2 DIABETES MELLITUS: ICD-10-CM

## 2023-09-13 DIAGNOSIS — E10.65 TYPE 1 DIABETES MELLITUS WITH HYPERGLYCEMIA: Primary | ICD-10-CM

## 2023-09-13 DIAGNOSIS — E11.69 HYPERLIPIDEMIA ASSOCIATED WITH TYPE 2 DIABETES MELLITUS: ICD-10-CM

## 2023-09-13 DIAGNOSIS — E03.9 ACQUIRED HYPOTHYROIDISM: ICD-10-CM

## 2023-09-13 PROCEDURE — 99214 OFFICE O/P EST MOD 30 MIN: CPT | Performed by: NURSE PRACTITIONER

## 2023-09-13 PROCEDURE — 3078F DIAST BP <80 MM HG: CPT | Performed by: NURSE PRACTITIONER

## 2023-09-13 PROCEDURE — 95251 CONT GLUC MNTR ANALYSIS I&R: CPT | Performed by: NURSE PRACTITIONER

## 2023-09-13 PROCEDURE — 3075F SYST BP GE 130 - 139MM HG: CPT | Performed by: NURSE PRACTITIONER

## 2023-09-13 PROCEDURE — 3044F HG A1C LEVEL LT 7.0%: CPT | Performed by: NURSE PRACTITIONER

## 2023-09-13 RX ORDER — LEVOTHYROXINE SODIUM 88 MCG
TABLET ORAL
Qty: 90 TABLET | Refills: 1 | Status: SHIPPED | OUTPATIENT
Start: 2023-09-13

## 2023-09-13 RX ORDER — INSULIN ASPART 100 [IU]/ML
INJECTION, SOLUTION INTRAVENOUS; SUBCUTANEOUS
Qty: 100 ML | Refills: 1 | Status: SHIPPED | OUTPATIENT
Start: 2023-09-13

## 2023-09-13 RX ORDER — LEVOTHYROXINE SODIUM 100 MCG
TABLET ORAL
Qty: 30 TABLET | Refills: 1 | Status: SHIPPED | OUTPATIENT
Start: 2023-09-13

## 2023-09-13 NOTE — PROGRESS NOTES
"Chief Complaint  Diabetes (Medtronic and dexcom attached)    Subjective        Olga Greco presents to [facility] END[facility]INOLOG[facility]  History of Present Illness    Type 1 dm   Diagnosed in 2003  Today in clinic pt reports being on Medtronic pump, also uses the dexcom  Average glucose 143, GMI 6.75, 81% time in range, <1% low, 17% high  Last eye exam - Spring 2023  No known diabetic complications  Back up plan: insulin syringes and vials   On statin and ace-I   Glucagon on hand      Hypothyroid  Synthroid 100mcg daily Sat and Sunday   And  Synthroid 88mcg Monday through Friday   Will be having a cardiology eval upcoming     Objective   Vital Signs:  /70   Pulse 69   Temp 96.4 °F (35.8 °C) (Temporal)   Ht 162.6 cm (64.02\")   Wt 56.7 kg (125 lb)   SpO2 98%   BMI 21.44 kg/m²   Estimated body mass index is 21.44 kg/m² as calculated from the following:    Height as of this encounter: 162.6 cm (64.02\").    Weight as of this encounter: 56.7 kg (125 lb).       BMI is within normal parameters. No other follow-up for BMI required.      Physical Exam  Vitals reviewed.   Constitutional:       General: She is not in acute distress.  HENT:      Head: Normocephalic and atraumatic.   Cardiovascular:      Rate and Rhythm: Normal rate.   Pulmonary:      Effort: Pulmonary effort is normal. No respiratory distress.   Musculoskeletal:         General: No signs of injury. Normal range of motion.      Cervical back: Normal range of motion and neck supple.   Skin:     General: Skin is warm and dry.   Neurological:      Mental Status: She is alert and oriented to person, place, and time. Mental status is at baseline.   Psychiatric:         Mood and Affect: Mood normal.         Behavior: Behavior normal.         Thought Content: Thought content normal.         Judgment: Judgment normal.      Result Review :  The following data was reviewed by: OPAL Jiménez on 09/13/2023:  Common labs          7/23/2023    " 12:01 7/27/2023    15:51 9/5/2023    12:00   Common Labs   WBC  4.79  5.16    Hemoglobin  11.4  12.4    Hematocrit  34.1  35.9    Platelets  215  216    Hemoglobin A1C 6.40                     Assessment and Plan   Diagnoses and all orders for this visit:    1. Type 1 diabetes mellitus with hyperglycemia [E10.65 (ICD-10-CM)] (Primary)  -     NovoLOG 100 UNIT/ML injection; Use up to 50u daily with pump  Dispense: 100 mL; Refill: 1  -     TSH; Future  -     Hemoglobin A1c; Future  -     Basic Metabolic Panel; Future  -     Lipid Panel; Future    2. Acquired hypothyroidism  -     Synthroid 100 MCG tablet; Take 1 tab for two days a week.  Dispense: 30 tablet; Refill: 1  -     Synthroid 88 MCG tablet; Taking Monday-FRIDAY  Dispense: 90 tablet; Refill: 1  -     TSH; Future    3. Hyperlipidemia associated with type 2 diabetes mellitus             Follow Up   Return in about 3 months (around 12/13/2023).    A1c at goal, cgm reviewed  Thyroid labs stable  Continue statin and ace-I  Upgrade to 780g to reduce risk of hypoglycemia with automated basal rate adjustments     Patient was given instructions and counseling regarding her condition or for health maintenance advice. Please see specific information pulled into the AVS if appropriate.     OPAL Jiménez

## 2023-09-25 ENCOUNTER — OFFICE VISIT (OUTPATIENT)
Dept: CARDIOLOGY | Facility: CLINIC | Age: 78
End: 2023-09-25

## 2023-09-25 VITALS
HEART RATE: 59 BPM | DIASTOLIC BLOOD PRESSURE: 82 MMHG | BODY MASS INDEX: 21.34 KG/M2 | WEIGHT: 125 LBS | HEIGHT: 64 IN | SYSTOLIC BLOOD PRESSURE: 158 MMHG

## 2023-09-25 DIAGNOSIS — R06.09 EXERTIONAL DYSPNEA: Primary | ICD-10-CM

## 2023-09-25 PROCEDURE — 93000 ELECTROCARDIOGRAM COMPLETE: CPT | Performed by: INTERNAL MEDICINE

## 2023-09-25 PROCEDURE — 3077F SYST BP >= 140 MM HG: CPT | Performed by: INTERNAL MEDICINE

## 2023-09-25 PROCEDURE — 3079F DIAST BP 80-89 MM HG: CPT | Performed by: INTERNAL MEDICINE

## 2023-09-25 PROCEDURE — 99204 OFFICE O/P NEW MOD 45 MIN: CPT | Performed by: INTERNAL MEDICINE

## 2023-09-25 NOTE — PROGRESS NOTES
Subjective:     Encounter Date:09/25/2023      Patient ID: Olga Greco is a 78 y.o. female.    Chief Complaint: Exertional dyspnea  HPI:   This is a pleasant 78-year-old woman who presents for evaluation.  She has a history of type 1 diabetes treated with insulin, diagnosed later in life.      She has noticed over the last several months she has had exertional dyspnea.  Her  is a patient of mine fathers, is chronically ill with renal failure and heart failure.  As a result he has not been able to do his usual farm work.  She has now been having to do a lot of yard work.  With this she noticed that she is becoming increasingly short of breath.  She had a few episodes of chest tightness and heaviness.  She has intermittent palpitations lasting seconds at a time without prolonged tachycardia dizziness or associated chest pain or shortness of breath.    She denies orthopnea or PND.  She has exertional symptoms of dyspnea and angina with no rest symptoms.  Her EKG is unchanged from 2018 with a left axis.  Her most recent labs were normal.  She was minimally anemic with a hemoglobin 11.5 over the summer which she initially attributed her symptoms to.    The following portions of the patient's history were reviewed and updated as appropriate: allergies, current medications, past family history, past medical history, past social history, past surgical history and problem list.     REVIEW OF SYSTEMS:   All systems reviewed.  Pertinent positives identified in HPI.  All other systems are negative.    Past Medical History:   Diagnosis Date    Closed nondisplaced fracture of greater tuberosity of left humerus 12/17/2019    Colon polyp     Diabetes mellitus     Heart palpitations 10/29/2018    Hypertension     Hypothyroidism     Subacromial bursitis of left shoulder joint 12/17/2019    Subacromial impingement of left shoulder 12/17/2019    Trigger thumb of left hand 09/25/2019    Type 2 diabetes mellitus 04/2018        Family History   Problem Relation Age of Onset    Heart disease Father     Diabetes Sister     Hypertension Mother     Breast cancer Neg Hx     Colon cancer Neg Hx     Colon polyps Neg Hx        Social History     Socioeconomic History    Marital status:    Tobacco Use    Smoking status: Never    Smokeless tobacco: Never   Vaping Use    Vaping Use: Never used   Substance and Sexual Activity    Alcohol use: Yes     Alcohol/week: 2.0 standard drinks     Types: 2 Glasses of wine per week     Comment: daily    Drug use: No    Sexual activity: Yes     Partners: Male       Allergies   Allergen Reactions    Aspirin GI Bleeding and GI Intolerance     GI distress    Epinephrine Unknown - High Severity     Tachycardia       Past Surgical History:   Procedure Laterality Date    BREAST BIOPSY Left     CHOLECYSTECTOMY      COLONOSCOPY      COLONOSCOPY N/A 11/2/2020    Procedure: COLONOSCOPY; polypectomy;  Surgeon: Cj Fallon MD;  Location: [facility];  Service: Gastroenterology;  Laterality: N/A;  DIVERTICULOSIS  POLYP      HYSTERECTOMY      TONSILLECTOMY      TUBAL ABDOMINAL LIGATION           ECG 12 Lead    Date/Time: 9/25/2023 3:55 PM  Performed by: Debra Gonzalez MD  Authorized by: Debra Gonzalez MD   Comparison: compared with previous ECG from 10/21/2018  Rhythm: sinus tachycardia  Rate: normal  Conduction: conduction normal  ST Segments: ST segments normal  T Waves: T waves normal  QRS axis: left  Other findings: non-specific ST-T wave changes    Clinical impression: non-specific ECG           Objective:         PHYSICAL EXAM:  GEN: VSS, no distress,   Eyes: normal sclera, normal lids and lashes  HENT: moist mucus membranes,   Respiratory: CTAB, no rales or wheezes  CV: RRR, no murmurs, , +2 DP and 2+ carotid pulses b/l  GI: NABS, soft,  Nontender, nondistended  MSK: no edema, no scoliosis or kyphosis  Skin: no rash, warm, dry  Heme/Lymph: no bruising or bleeding  Psych: organized thought,  Ortho Post Op Check    Procedure: L ankle RICK, I&D and wound vac placement   Surgeon: Kadi     Pt seen and examined at bedside in the PACU. Reports no sensation below the knee, pain well-controlled. Wound vac intact and functional. Denies CP, SOB, N/V and tingling      Vital Signs Last 24 Hrs  T(C): 36.6 (09-09-21 @ 19:54), Max: 36.6 (09-09-21 @ 19:54)  T(F): 97.8 (09-09-21 @ 19:54), Max: 97.8 (09-09-21 @ 19:54)  HR: 96 (09-09-21 @ 22:01) (80 - 96)  BP: 117/73 (09-09-21 @ 21:46) (95/54 - 117/73)  BP(mean): 90 (09-09-21 @ 21:46) (69 - 90)  RR: 17 (09-09-21 @ 22:01) (14 - 22)  SpO2: 99% (09-09-21 @ 22:01) (99% - 100%)  AVSS    General: Pt Alert and oriented, NAD  LLE splint intact  DSG C/D/I  Wound vac in place  Sensation intact around knee but none at and below splint (likely from block)  Motor: EHL/FHL/TA/GS 0/5 (likely from block)  Toes WWP      Post-op X-Ray: Shows RICK    A/P: 29yFemale POD#0 s/p above procedure   - Stable  - Pain Control  - Continue vanc and cefepime  - f/u cultures  - DVT ppx: SCDs  - PT, WBS: NWB LLE  - Dispo plan for RTOR likely saturday for wound vac change and repeat I&D    Ortho Pager [phone] normal behavior and affect  Neuro: Cranial nerves grossly intact, Alert and Oriented x 3.         Assessment:          Diagnosis Plan   1. Exertional dyspnea  Stress Test With Myocardial Perfusion One Day             Plan:       1.  Exertional dyspnea: Very pleasant 78-year-old woman with a history of diabetes, insulin-dependent.  She has concerning exertional dyspnea with a few episodes of chest heaviness.  She has no rest symptoms.  We will plan a nuclear stress test for further evaluation.  2.  Palpitations: No concerning associated symptoms.  We will keep a close eye on these, she may need an outpatient monitor.    Dr. Zapata, thank you very much for referring this kind patient to me. Please call me with any questions or concerns. I will see the patient again in the office in 3 months.         Debra Gonzalez MD  09/25/23  [facility]    Outpatient Encounter Medications as of 9/25/2023   Medication Sig Dispense Refill    ACCU-CHEK SOFTCLIX LANCETS lancets USE TO TEST BLOOD SUGAR 4 TIMES DAILY 200 each 5    atorvastatin (LIPITOR) 20 MG tablet Take 1 tablet by mouth Daily. 90 tablet 1    B Complex Vitamins (VITAMIN B COMPLEX) capsule capsule Take 1 capsule by mouth Daily.      benazepril-hydrochlorthiazide (LOTENSIN HCT) 20-12.5 MG per tablet Take 1 tablet by mouth Daily. 1 TIME DAILY 90 tablet 1    Biotin 51387 MCG tablet Take 1 tablet by mouth Daily.      Blood Glucose Monitoring Suppl (ACCU-CHEK RICHARD SMARTVIEW) w/Device kit Use to check blood sugar 4 times daily  E10.9 1 kit 0    Chromium Picolinate 1000 MCG tablet Take 1,000 mcg by mouth Daily.      colestipol (COLESTID) 1 g tablet TAKE TWO TABLETS BY MOUTH ONCE NIGHTLY (Patient taking differently: Take 1 tablet by mouth Daily.) 180 tablet 3    Continuous Blood Gluc Sensor (DEXCOM G6 SENSOR) Every 10 (Ten) Days. e10.65 9 each 3    Continuous Blood Gluc Transmit (DEXCOM G6 TRANSMITTER) misc 1 package Every 3 (Three) Months. e10.65 3 each 3     Ortho Post Op Check    Procedure: L ankle RICK, I&D and wound vac placement   Surgeon: Kadi     Pt seen and examined at bedside in the PACU. Reports no sensation below the knee, pain well-controlled. Wound vac intact and functional. Denies CP, SOB, N/V and tingling      Vital Signs Last 24 Hrs  T(C): 36.6 (09-09-21 @ 19:54), Max: 36.6 (09-09-21 @ 19:54)  T(F): 97.8 (09-09-21 @ 19:54), Max: 97.8 (09-09-21 @ 19:54)  HR: 96 (09-09-21 @ 22:01) (80 - 96)  BP: 117/73 (09-09-21 @ 21:46) (95/54 - 117/73)  BP(mean): 90 (09-09-21 @ 21:46) (69 - 90)  RR: 17 (09-09-21 @ 22:01) (14 - 22)  SpO2: 99% (09-09-21 @ 22:01) (99% - 100%)  AVSS    General: Pt Alert and oriented, NAD  LLE splint intact  DSG C/D/I  Wound vac in place  Sensation intact around knee but none at and below splint (likely from block)  Motor: EHL/FHL/TA/GS 0/5 (likely from block)  Toes WWP      Post-op X-Ray: Shows RICK    A/P: 29yFemale POD#0 s/p above procedure   - Stable  - Pain Control  - Continue vanc and cefepime  - f/u cultures  - DVT ppx: SQH  - PT, WBS: NWB LLE  - Dispo plan for RTOR likely saturday for wound vac change and repeat I&D    Ortho Pager [phone] diclofenac (VOLTAREN) 1 % gel gel Apply 4 g topically to the appropriate area as directed 4 (Four) Times a Day. (Patient taking differently: Apply 4 g topically to the appropriate area as directed 4 (Four) Times a Day. PRN) 100 g 5    glucagon (GLUCAGON EMERGENCY) 1 MG injection Inject 1 mg under the skin into the appropriate area as directed 1 (One) Time As Needed for Low Blood Sugar for up to 1 dose. 1 kit 12    NovoLOG 100 UNIT/ML injection Use up to 50u daily with pump 100 mL 1    Probiotic Product (PROBIOTIC DAILY PO) Take  by mouth.      Synthroid 100 MCG tablet Take 1 tab for two days a week. 30 tablet 1    Synthroid 88 MCG tablet Taking Monday-FRIDAY 90 tablet 1    Calcium Carb-Cholecalciferol (CALCIUM 600+D3 PO) Take 500 Units by mouth Daily. (Patient not taking: Reported on 2023)      meloxicam (Mobic) 7.5 MG tablet Take 1 tablet by mouth Daily. 7 tablet 0    [DISCONTINUED] NovoLOG 100 UNIT/ML injection INJECT UNDER THE SKIN 50 UNITS DAILY VIA INSULIN PUMP 40 mL 2    [DISCONTINUED] Synthroid 100 MCG tablet Take 1 tab for two days a week. 30 tablet 11    [DISCONTINUED] Synthroid 88 MCG tablet Taking Monday-FRIDAY 90 tablet 2    [] albuterol (PROVENTIL) nebulizer solution 0.083% 2.5 mg/3mL        No facility-administered encounter medications on file as of 2023.

## 2023-10-06 ENCOUNTER — PATIENT ROUNDING (BHMG ONLY) (OUTPATIENT)
Dept: CARDIOLOGY | Facility: CLINIC | Age: 78
End: 2023-10-06
Payer: MEDICARE

## 2023-10-06 NOTE — PROGRESS NOTES
A My Chart message has been sent to the patient for PATIENT ROUNDING with [facility]

## 2023-10-19 DIAGNOSIS — E78.2 MIXED HYPERLIPIDEMIA: ICD-10-CM

## 2023-10-19 DIAGNOSIS — I10 PRIMARY HYPERTENSION: ICD-10-CM

## 2023-10-19 RX ORDER — ATORVASTATIN CALCIUM 20 MG/1
20 TABLET, FILM COATED ORAL DAILY
Qty: 90 TABLET | Refills: 1 | Status: SHIPPED | OUTPATIENT
Start: 2023-10-19

## 2023-10-19 RX ORDER — BENAZEPRIL HYDROCHLORIDE AND HYDROCHLOROTHIAZIDE 20; 12.5 MG/1; MG/1
1 TABLET ORAL DAILY
Qty: 90 TABLET | Refills: 1 | Status: SHIPPED | OUTPATIENT
Start: 2023-10-19

## 2023-10-19 NOTE — TELEPHONE ENCOUNTER
Rx Refill Note  Requested Prescriptions     Pending Prescriptions Disp Refills    benazepril-hydrochlorthiazide (LOTENSIN HCT) 20-12.5 MG per tablet 90 tablet 1     Sig: Take 1 tablet by mouth Daily. 1 TIME DAILY      Last office visit with prescribing clinician: 7/27/2023   Last telemedicine visit with prescribing clinician: Visit date not found   Next office visit with prescribing clinician: Visit date not found                         Would you like a call back once the refill request has been completed: [] Yes [] No    If the office needs to give you a call back, can they leave a voicemail: [] Yes [] No    Li Block MA  10/19/23, 10:46 EDT

## 2023-11-13 ENCOUNTER — HOSPITAL ENCOUNTER (OUTPATIENT)
Dept: CARDIOLOGY | Facility: HOSPITAL | Age: 78
Discharge: HOME | End: 2023-11-13
Admitting: INTERNAL MEDICINE
Payer: MEDICARE

## 2023-11-13 VITALS — BODY MASS INDEX: 21.34 KG/M2 | HEIGHT: 64 IN | WEIGHT: 125 LBS

## 2023-11-13 DIAGNOSIS — R06.09 EXERTIONAL DYSPNEA: ICD-10-CM

## 2023-11-13 LAB
BH CV NUCLEAR PRIOR STUDY: 2
BH CV REST NUCLEAR ISOTOPE DOSE: 6.3 MCI
BH CV STRESS BP STAGE 1: NORMAL
BH CV STRESS BP STAGE 2: NORMAL
BH CV STRESS DURATION MIN STAGE 1: 3
BH CV STRESS DURATION MIN STAGE 2: 3
BH CV STRESS DURATION SEC STAGE 1: 0
BH CV STRESS DURATION SEC STAGE 2: 0
BH CV STRESS GRADE STAGE 1: 10
BH CV STRESS GRADE STAGE 2: 12
BH CV STRESS HR STAGE 1: 112
BH CV STRESS HR STAGE 2: 145
BH CV STRESS METS STAGE 1: 5
BH CV STRESS METS STAGE 2: 7.5
BH CV STRESS NUCLEAR ISOTOPE DOSE: 23.5 MCI
BH CV STRESS PROTOCOL 1: NORMAL
BH CV STRESS RECOVERY BP: NORMAL MMHG
BH CV STRESS RECOVERY HR: 84 BPM
BH CV STRESS SPEED STAGE 1: 1.7
BH CV STRESS SPEED STAGE 2: 2.5
BH CV STRESS STAGE 1: 1
BH CV STRESS STAGE 2: 2
LV EF NUC BP: 70 %
MAXIMAL PREDICTED HEART RATE: 144 BPM
PERCENT MAX PREDICTED HR: 100.69 %
STRESS BASELINE BP: NORMAL MMHG
STRESS BASELINE HR: 82 BPM
STRESS PERCENT HR: 118 %
STRESS POST ESTIMATED WORKLOAD: 7.5 METS
STRESS POST EXERCISE DUR MIN: 6 MIN
STRESS POST EXERCISE DUR SEC: 0 SEC
STRESS POST PEAK BP: NORMAL MMHG
STRESS POST PEAK HR: 145 BPM
STRESS TARGET HR: 122 BPM

## 2023-11-13 PROCEDURE — A9502 TC99M TETROFOSMIN: HCPCS | Performed by: INTERNAL MEDICINE

## 2023-11-13 PROCEDURE — 93018 CV STRESS TEST I&R ONLY: CPT | Performed by: INTERNAL MEDICINE

## 2023-11-13 PROCEDURE — 78452 HT MUSCLE IMAGE SPECT MULT: CPT

## 2023-11-13 PROCEDURE — 93017 CV STRESS TEST TRACING ONLY: CPT

## 2023-11-13 PROCEDURE — 78452 HT MUSCLE IMAGE SPECT MULT: CPT | Performed by: INTERNAL MEDICINE

## 2023-11-13 PROCEDURE — 0 TECHNETIUM TETROFOSMIN KIT: Performed by: INTERNAL MEDICINE

## 2023-11-13 PROCEDURE — 93016 CV STRESS TEST SUPVJ ONLY: CPT | Performed by: INTERNAL MEDICINE

## 2023-11-13 RX ADMIN — TETROFOSMIN 1 DOSE: 1.38 INJECTION, POWDER, LYOPHILIZED, FOR SOLUTION INTRAVENOUS at 09:11

## 2023-11-13 RX ADMIN — TETROFOSMIN 1 DOSE: 1.38 INJECTION, POWDER, LYOPHILIZED, FOR SOLUTION INTRAVENOUS at 10:24

## 2023-11-14 ENCOUNTER — TELEPHONE (OUTPATIENT)
Dept: ENDOCRINOLOGY | Age: 78
End: 2023-11-14

## 2023-11-14 NOTE — TELEPHONE ENCOUNTER
Hub staff attempted to follow warm transfer process and was unsuccessful     Caller: Olga Greco    Relationship to patient: Self    Best call back number: [phone]    Patient is needing: PT HAS AN APPT 12/14 AND WANTS TO KNOW IF WE HAVE SOMEONE IN OFFICE THAT HELPS PTS SET UP THEIR PUMP DEVICES, IF SO PT WOULD LIKE TO SEE THAT PERSON DURING THEIR NEXT VISIT IF POSSIBLE

## 2023-11-15 NOTE — TELEPHONE ENCOUNTER
I'm assuming medtronic pump- please confirm with patient which pump she is needing help setting up- if medtronic, please let lianet know. If tandem or omnipod, let me know

## 2023-11-29 DIAGNOSIS — E03.9 ACQUIRED HYPOTHYROIDISM: ICD-10-CM

## 2023-11-29 RX ORDER — LEVOTHYROXINE SODIUM 88 MCG
TABLET ORAL
Qty: 90 TABLET | Refills: 0 | Status: SHIPPED | OUTPATIENT
Start: 2023-11-29

## 2023-11-29 NOTE — TELEPHONE ENCOUNTER
Rx Refill Note  Requested Prescriptions     Pending Prescriptions Disp Refills    Synthroid 88 MCG tablet 90 tablet 1     Sig: Taking Monday-FRIDAY      Last office visit with prescribing clinician: 9/13/2023   Last telemedicine visit with prescribing clinician: Visit date not found   Next office visit with prescribing clinician: 12/14/2023                         Would you like a call back once the refill request has been completed: [] Yes [] No    If the office needs to give you a call back, can they leave a voicemail: [] Yes [] No    Jacki Cedeño MA  11/29/23, 13:23 EST

## 2023-12-11 ENCOUNTER — LAB (OUTPATIENT)
Dept: LAB | Facility: HOSPITAL | Age: 78
End: 2023-12-11
Payer: MEDICARE

## 2023-12-11 DIAGNOSIS — E10.65 TYPE 1 DIABETES MELLITUS WITH HYPERGLYCEMIA: ICD-10-CM

## 2023-12-11 DIAGNOSIS — E03.9 ACQUIRED HYPOTHYROIDISM: ICD-10-CM

## 2023-12-11 LAB
ANION GAP SERPL CALCULATED.3IONS-SCNC: 9.4 MMOL/L (ref 5–15)
BUN SERPL-MCNC: 16 MG/DL (ref 8–23)
BUN/CREAT SERPL: 18.4 (ref 7–25)
CALCIUM SPEC-SCNC: 9.4 MG/DL (ref 8.6–10.5)
CHLORIDE SERPL-SCNC: 105 MMOL/L (ref 98–107)
CHOLEST SERPL-MCNC: 148 MG/DL (ref 0–200)
CO2 SERPL-SCNC: 25.6 MMOL/L (ref 22–29)
CREAT SERPL-MCNC: 0.87 MG/DL (ref 0.57–1)
EGFRCR SERPLBLD CKD-EPI 2021: 69.1 ML/MIN/1.73
GLUCOSE SERPL-MCNC: 251 MG/DL (ref 65–99)
HBA1C MFR BLD: 6.6 % (ref 4.8–5.6)
HDLC SERPL-MCNC: 101 MG/DL (ref 40–60)
LDLC SERPL CALC-MCNC: 36 MG/DL (ref 0–100)
LDLC/HDLC SERPL: 0.37 {RATIO}
POTASSIUM SERPL-SCNC: 4.3 MMOL/L (ref 3.5–5.2)
SODIUM SERPL-SCNC: 140 MMOL/L (ref 136–145)
TRIGL SERPL-MCNC: 46 MG/DL (ref 0–150)
TSH SERPL DL<=0.05 MIU/L-ACNC: 1.93 UIU/ML (ref 0.27–4.2)
VLDLC SERPL-MCNC: 11 MG/DL (ref 5–40)

## 2023-12-11 PROCEDURE — 80048 BASIC METABOLIC PNL TOTAL CA: CPT

## 2023-12-11 PROCEDURE — 84443 ASSAY THYROID STIM HORMONE: CPT

## 2023-12-11 PROCEDURE — 80061 LIPID PANEL: CPT

## 2023-12-11 PROCEDURE — 36415 COLL VENOUS BLD VENIPUNCTURE: CPT

## 2023-12-11 PROCEDURE — 83036 HEMOGLOBIN GLYCOSYLATED A1C: CPT

## 2023-12-14 ENCOUNTER — OFFICE VISIT (OUTPATIENT)
Dept: ENDOCRINOLOGY | Age: 78
End: 2023-12-14
Payer: MEDICARE

## 2023-12-14 VITALS
BODY MASS INDEX: 21.96 KG/M2 | TEMPERATURE: 96 F | HEART RATE: 73 BPM | WEIGHT: 128 LBS | DIASTOLIC BLOOD PRESSURE: 80 MMHG | OXYGEN SATURATION: 97 % | SYSTOLIC BLOOD PRESSURE: 120 MMHG

## 2023-12-14 DIAGNOSIS — E10.65 TYPE 1 DIABETES MELLITUS WITH HYPERGLYCEMIA: Primary | ICD-10-CM

## 2023-12-14 DIAGNOSIS — E03.9 ACQUIRED HYPOTHYROIDISM: ICD-10-CM

## 2023-12-14 DIAGNOSIS — E78.2 HYPERLIPEMIA, MIXED: ICD-10-CM

## 2023-12-14 PROCEDURE — 3074F SYST BP LT 130 MM HG: CPT | Performed by: NURSE PRACTITIONER

## 2023-12-14 PROCEDURE — 99214 OFFICE O/P EST MOD 30 MIN: CPT | Performed by: NURSE PRACTITIONER

## 2023-12-14 PROCEDURE — 3079F DIAST BP 80-89 MM HG: CPT | Performed by: NURSE PRACTITIONER

## 2023-12-14 PROCEDURE — 95251 CONT GLUC MNTR ANALYSIS I&R: CPT | Performed by: NURSE PRACTITIONER

## 2023-12-14 RX ORDER — LEVOTHYROXINE SODIUM 88 MCG
TABLET ORAL
Qty: 90 TABLET | Refills: 0 | Status: SHIPPED | OUTPATIENT
Start: 2023-12-14

## 2023-12-14 NOTE — PROGRESS NOTES
"Chief Complaint  Diabetes (Medtronic attached Possibly interested in omnipod.)    Subjective        Olga Greco presents to [facility] ENDOCRINOLOGY  History of Present Illness    Type 1 dm   Diagnosed in 2003  Today in clinic pt reports being on Medtronic 630g pump, also uses the dexcom  Last eye exam - Spring 2023  No known diabetic complications  Back up plan: insulin syringes and vials   On statin and ace-I   Glucagon on hand      Hypothyroid  Synthroid 100mcg daily Sat and Sunday   And  Synthroid 88mcg Monday through Friday     Cgm review  Average glucose 140  GMI 6.7%  79% time in range  3% low  18% high     Objective   Vital Signs:  /80   Pulse 73   Temp 96 °F (35.6 °C) (Temporal)   Wt 58.1 kg (128 lb)   SpO2 97%   BMI 21.96 kg/m²   Estimated body mass index is 21.96 kg/m² as calculated from the following:    Height as of 11/13/23: 162.6 cm (64.02\").    Weight as of this encounter: 58.1 kg (128 lb).       BMI is within normal parameters. No other follow-up for BMI required.      Physical Exam  Vitals reviewed.   Constitutional:       General: She is not in acute distress.  HENT:      Head: Normocephalic and atraumatic.   Cardiovascular:      Rate and Rhythm: Normal rate.   Pulmonary:      Effort: Pulmonary effort is normal. No respiratory distress.   Musculoskeletal:         General: No signs of injury. Normal range of motion.      Cervical back: Normal range of motion and neck supple.   Skin:     General: Skin is warm and dry.   Neurological:      Mental Status: She is alert and oriented to person, place, and time. Mental status is at baseline.   Psychiatric:         Mood and Affect: Mood normal.         Behavior: Behavior normal.         Thought Content: Thought content normal.         Judgment: Judgment normal.            Result Review :  The following data was reviewed by: OPAL Jiménez on 12/14/2023:  Common labs          7/27/2023    15:51 9/5/2023    12:00 " 12/11/2023    09:57   Common Labs   Glucose   251    BUN   16    Creatinine   0.87    Sodium   140    Potassium   4.3    Chloride   105    Calcium   9.4    WBC 4.79  5.16     Hemoglobin 11.4  12.4     Hematocrit 34.1  35.9     Platelets 215  216     Total Cholesterol   148    Triglycerides   46    HDL Cholesterol   101    LDL Cholesterol    36    Hemoglobin A1C   6.60                   Assessment and Plan   Diagnoses and all orders for this visit:    1. Type 1 diabetes mellitus with hyperglycemia [E10.65] (Primary)  -     Hemoglobin A1c; Future  -     Basic Metabolic Panel; Future  -     TSH; Future    2. Acquired hypothyroidism  -     Synthroid 88 MCG tablet; All 7 days of the week  Dispense: 90 tablet; Refill: 0  -     TSH; Future    3. Hyperlipemia, mixed             Follow Up   Return in about 3 months (around 3/14/2024).    Cgm reviewed  Ac at goal  Change t4 dose to 88mcg daily   Still considering medtronic 780 vs omnipod     Patient was given instructions and counseling regarding her condition or for health maintenance advice. Please see specific information pulled into the AVS if appropriate.     OPAL Jiménez

## 2023-12-19 ENCOUNTER — TELEPHONE (OUTPATIENT)
Dept: ENDOCRINOLOGY | Age: 78
End: 2023-12-19
Payer: MEDICARE

## 2023-12-19 NOTE — TELEPHONE ENCOUNTER
Called and spoke with pt to see if she was wanting to get the omnipod supplies through [facility], she shared that she did not want to order anything at this time.

## 2024-01-17 ENCOUNTER — TELEPHONE (OUTPATIENT)
Dept: ENDOCRINOLOGY | Age: 79
End: 2024-01-17
Payer: MEDICARE

## 2024-01-17 NOTE — TELEPHONE ENCOUNTER
Caller: Olga Greco    Relationship: Self    Best call back number: [phone]    Equipment requested: MEDTRONIC 780/ NEW CGM GUARDIAN 4    Reason for the request: THE PUMP IS OUT OF WARRANTY    Prescribing Provider: MICHAEL COATES(APRN)    Additional information or concerns: PT STATED THAT THEY NEED A NEW PRESCRIPTION, LABS AND OFFICE NOTES. [facility] NEEDS THIS INFORMATION.

## 2024-01-18 NOTE — TELEPHONE ENCOUNTER
Working with Tello to get this sorted out, I have faxed the most recent office notes which they were needing.

## 2024-01-30 ENCOUNTER — TELEPHONE (OUTPATIENT)
Dept: ENDOCRINOLOGY | Age: 79
End: 2024-01-30

## 2024-01-30 NOTE — TELEPHONE ENCOUNTER
Called and spoke with patient about medtronic needing her most recent office notes and I shared with her that I faxed it to them. She was curious to know more information about which medtronic pump should she upgrade to and Tello was in the office today and asked if she could speak to patient,she did speak to patient.

## 2024-02-06 ENCOUNTER — HOSPITAL ENCOUNTER (EMERGENCY)
Facility: HOSPITAL | Age: 79
Discharge: HOME | End: 2024-02-06
Attending: EMERGENCY MEDICINE | Admitting: EMERGENCY MEDICINE
Payer: MEDICARE

## 2024-02-06 ENCOUNTER — APPOINTMENT (OUTPATIENT)
Dept: GENERAL RADIOLOGY | Facility: HOSPITAL | Age: 79
End: 2024-02-06
Payer: MEDICARE

## 2024-02-06 VITALS
HEIGHT: 64 IN | RESPIRATION RATE: 15 BRPM | TEMPERATURE: 97.7 F | HEART RATE: 75 BPM | DIASTOLIC BLOOD PRESSURE: 68 MMHG | BODY MASS INDEX: 21.51 KG/M2 | SYSTOLIC BLOOD PRESSURE: 149 MMHG | WEIGHT: 126 LBS | OXYGEN SATURATION: 100 %

## 2024-02-06 DIAGNOSIS — S62.102A CLOSED FRACTURE OF LEFT WRIST, INITIAL ENCOUNTER: Primary | ICD-10-CM

## 2024-02-06 PROCEDURE — 73110 X-RAY EXAM OF WRIST: CPT

## 2024-02-06 PROCEDURE — 99283 EMERGENCY DEPT VISIT LOW MDM: CPT

## 2024-02-06 RX ORDER — HYDROCODONE BITARTRATE AND ACETAMINOPHEN 5; 325 MG/1; MG/1
1 TABLET ORAL ONCE
Status: COMPLETED | OUTPATIENT
Start: 2024-02-06 | End: 2024-02-06

## 2024-02-06 RX ORDER — HYDROCODONE BITARTRATE AND ACETAMINOPHEN 5; 325 MG/1; MG/1
1-2 TABLET ORAL EVERY 6 HOURS PRN
Qty: 24 TABLET | Refills: 0 | Status: SHIPPED | OUTPATIENT
Start: 2024-02-06 | End: 2024-02-09 | Stop reason: SDUPTHER

## 2024-02-06 RX ADMIN — HYDROCODONE BITARTRATE AND ACETAMINOPHEN 1 TABLET: 5; 325 TABLET ORAL at 12:14

## 2024-02-06 NOTE — ED PROVIDER NOTES
"Subjective     History provided by:  Patient    History of Present Illness    Chief complaint: Wrist injury    Location: Left wrist    Quality/Severity: Pain and swelling in the left wrist.    Timing/Onset: Injury occurred just prior to arrival.    Modifying Factors: Patient fell because she became hypoglycemic.  Movement of the wrist exacerbates pain.    Associated symptoms: Denies striking her head.  Denies any neck pain.  Denies other injuries.    Narrative: The patient is a 78-year-old white female who states this morning she became hypoglycemic with a blood sugar of 40.  She became dizzy and unstable on her feet and fell injuring her left wrist.  She is sensate toast with peanut butter and drank orange juice and her blood sugars back to 120.  She denies striking her head and denies neck pain.  States her left wrist is her only injury.  She states movement of her left wrist causes pain in the left wrist is tender.    Review of Systems  Past Medical History:   Diagnosis Date    Closed nondisplaced fracture of greater tuberosity of left humerus 12/17/2019    Colon polyp     Diabetes mellitus     Heart palpitations 10/29/2018    Hypertension     Hypothyroidism     Subacromial bursitis of left shoulder joint 12/17/2019    Subacromial impingement of left shoulder 12/17/2019    Trigger thumb of left hand 09/25/2019    Type 2 diabetes mellitus 04/2018     /68   Pulse 75   Temp 97.7 °F (36.5 °C) (Oral)   Resp 15   Ht 162.6 cm (64.02\")   Wt 57.2 kg (126 lb)   LMP  (LMP Unknown)   SpO2 100%   BMI 21.61 kg/m²     Past Medical History:   Diagnosis Date    Closed nondisplaced fracture of greater tuberosity of left humerus 12/17/2019    Colon polyp     Diabetes mellitus     Heart palpitations 10/29/2018    Hypertension     Hypothyroidism     Subacromial bursitis of left shoulder joint 12/17/2019    Subacromial impingement of left shoulder 12/17/2019    Trigger thumb of left hand 09/25/2019    Type 2 diabetes " mellitus 04/2018       Allergies   Allergen Reactions    Aspirin GI Bleeding and GI Intolerance     GI distress    Epinephrine Unknown - High Severity     Tachycardia       Past Surgical History:   Procedure Laterality Date    BREAST BIOPSY Left     CHOLECYSTECTOMY      COLONOSCOPY      COLONOSCOPY N/A 11/2/2020    Procedure: COLONOSCOPY; polypectomy;  Surgeon: Cj Fallon MD;  Location: [facility];  Service: Gastroenterology;  Laterality: N/A;  DIVERTICULOSIS  POLYP      HYSTERECTOMY      TONSILLECTOMY      TUBAL ABDOMINAL LIGATION         Family History   Problem Relation Age of Onset    Heart disease Father     Diabetes Sister     Hypertension Mother     Breast cancer Neg Hx     Colon cancer Neg Hx     Colon polyps Neg Hx        Social History     Socioeconomic History    Marital status:    Tobacco Use    Smoking status: Never    Smokeless tobacco: Never   Vaping Use    Vaping Use: Never used   Substance and Sexual Activity    Alcohol use: Yes     Alcohol/week: 2.0 standard drinks of alcohol     Types: 2 Glasses of wine per week     Comment: daily    Drug use: No    Sexual activity: Yes     Partners: Male           Objective   Physical Exam  Vitals and nursing note reviewed.   Constitutional:       General: She is not in acute distress.     Appearance: Normal appearance. She is normal weight. She is not ill-appearing or toxic-appearing.      Comments: Patient appears healthy in no acute distress.  Review of her vital signs: Her blood pressure is elevated 167/83, remainder vital signs within normal limits.   HENT:      Head: Normocephalic and atraumatic.   Eyes:      General: No scleral icterus.     Conjunctiva/sclera: Conjunctivae normal.   Musculoskeletal:      Cervical back: Neck supple. No tenderness.      Comments: The patient's left wrist is swollen and tender with a slight deformity consistent with a probable fracture.  There is pain with any range of motion of the left wrist.  The  proximal left forearm, and left elbow are nontender without deformity.  The distal hand and digits are nontender without deformity and neurovascularly intact.   Skin:     Capillary Refill: Capillary refill takes less than 2 seconds.      Coloration: Skin is not pale.      Findings: No bruising, erythema or rash.   Neurological:      General: No focal deficit present.      Mental Status: She is alert and oriented to person, place, and time.      Cranial Nerves: No cranial nerve deficit.      Sensory: No sensory deficit.      Motor: No weakness.   Psychiatric:         Mood and Affect: Mood normal.         Behavior: Behavior normal.         Thought Content: Thought content normal.         Judgment: Judgment normal.         Procedures           ED Course  ED Course as of 02/06/24 1322   Tue Feb 06, 2024 1215 X-ray of the patient's left wrist shows an intra-articular distal radius fracture.  There is also an ulnar styloid fracture. [TP]   1216 12: 02 patient discussed with Dr. Lamb, orthopedics on-call, who stated the patient could be splinted and follow-up with their office later this week. [TP]   1216 I paced the patient's left wrist in a volar splint using 4 inch Ortho-Glass.  Patient's left hand was neurovascular intact after placement. [TP]   1217 The patient was administered a Vicodin 5 mg p.o. for pain. [TP]      ED Course User Index  [TP] Skyler Schroeder MD                                     [facility] reviewed by Skyler Schroeder MD       Medical Decision Making  Problems Addressed:  Closed fracture of left wrist, initial encounter: complicated acute illness or injury    Amount and/or Complexity of Data Reviewed  Radiology: ordered. Decision-making details documented in ED Course.  Discussion of management or test interpretation with external provider(s): Details documented in the ED course.    Risk  Prescription drug management.        Final diagnoses:   Closed fracture of left wrist, initial encounter        ED Disposition  ED Disposition       ED Disposition   Discharge    Condition   Stable    Comment   --               Shoaib Lamb MD  [address] [address] [phone]    Call today  To schedule an appointment to be seen this week.         Medication List        New Prescriptions      HYDROcodone-acetaminophen 5-325 MG per tablet  Commonly known as: NORCO  Take 1-2 tablets by mouth Every 6 (Six) Hours As Needed for Severe Pain.            Changed      colestipol 1 g tablet  Commonly known as: COLESTID  TAKE TWO TABLETS BY MOUTH ONCE NIGHTLY  What changed:   how much to take  when to take this     diclofenac 1 % gel gel  Commonly known as: VOLTAREN  Apply 4 g topically to the appropriate area as directed 4 (Four) Times a Day.  What changed: additional instructions               Where to Get Your Medications        These medications were sent to [facility] [id number] - [place] - [address] - [phone]  - [phone] FX  [address] [address]      Phone: [phone]   HYDROcodone-acetaminophen 5-325 MG per tablet           Labs Reviewed - No data to display  XR Wrist 3+ View Left   Final Result   Impression:   1.Avulsion injury ulnar styloid   2.Distal radial fracture with extension of fracture to the distal articular margin.   3.Osseous demineralization.         Electronically Signed: Chilango Tabares MD     2/6/2024 11:33 AM EST     Workstation ID: [id number]             Medication List        New Prescriptions      HYDROcodone-acetaminophen 5-325 MG per tablet  Commonly known as: NORCO  Take 1-2 tablets by mouth Every 6 (Six) Hours As Needed for Severe Pain.            Changed      colestipol 1 g tablet  Commonly known as: COLESTID  TAKE TWO TABLETS BY MOUTH ONCE NIGHTLY  What changed:   how much to take  when to take this     diclofenac 1 % gel gel  Commonly known as: VOLTAREN  Apply 4 g topically to the appropriate area as directed 4 (Four) Times a  Day.  What changed: additional instructions               Where to Get Your Medications        These medications were sent to [facility] [id number] - [place] - [address] - [phone] [facility] [phone] FX  [address] [address]      Phone: [phone]   HYDROcodone-acetaminophen 5-325 MG per tablet              Skyler Schroeder MD  02/06/24 5512

## 2024-02-07 ENCOUNTER — PATIENT OUTREACH (OUTPATIENT)
Dept: CASE MANAGEMENT | Facility: OTHER | Age: 79
End: 2024-02-07
Payer: MEDICARE

## 2024-02-07 NOTE — OUTREACH NOTE
AMBULATORY CASE MANAGEMENT NOTE    Name and Relationship of Patient/Support Person: Olga Greco T - Self    Patient Outreach  RN-ACM outreach with patient. Discussed 2/6/24 ED visit regarding closed fracture of left wrist. Patient treated and discharged home. Patient states to be compliant with ED recommendations; using splint; taking medications as directed and states to have 2/8/24 ortho appointment. Patient states to be compliant with medications; compliant with insulin pump and CGM. Patient states blood sugar dropped down to the 40's  and fell.Patient states will be updating insulin pump and CGM next month.  Patient states no difficulty with headache; chest pain; SOB; sleeping but does have decrease in appetite but tolerating foods. Patient states this morning blood sugar in the 80's. Reviewed with patient ED AVS recommendations; education; role of RN-ACM and HRCm case management services. Patient verbalized understanding. Patient states to appreciate outreach. No further questions voiced at this time.   Adult Patient Profile  Questions/Answers      Flowsheet Row Most Recent Value   Symptoms/Conditions Managed at Home diabetes, type 2, musculoskeletal   Diabetes Management Strategies insulin therapy, medication therapy, other (see comments)   Musculoskeletal Symptoms/Conditions fracture, mobility limited, joint pain, other (see comments)  [closed fracture of left wrist]   Musculoskeletal Management Strategies medication therapy, medical device, other (see comments)  [Splint/sling,  physician follow up]   Barriers to Taking Medication as Prescribed none   Equipment Currently Used at Home other (see comments)  [insulin pump and continous glucose monitor]   Primary Source of Support/Comfort spouse   People in Home spouse        Social Work Assessment  Questions/Answers      Flowsheet Row Most Recent Value   People in Home spouse   Functional Status Comments Patient states to be independent with ADL's,  light meal  preparation,  transportation and ambulating without assistive device. Patient states currently receiving assistance as needed due to fracture of left wrist.   Equipment Currently Used at Home other (see comments)  [insulin pump and continous glucose monitor]        Send Education  Questions/Answers      Flowsheet Row Most Recent Value   Annual Wellness Visit:  Patient Has Completed   Other Patient Education/Resources  24/7 [facility] Nurse Call Line, [facility]   24/7 Nurse Call Line Education Method Verbal            Education Documentation  Follow-Up Care, taught by Bibi Romero RN at 2/7/2024  3:42 PM.  Learner: Patient  Readiness: Acceptance  Method: Explanation  Response: Verbalizes Understanding    Blood Glucose Monitoring, taught by Bibi Romero RN at 2/7/2024  3:42 PM.  Learner: Patient  Readiness: Acceptance  Method: Explanation  Response: Verbalizes Understanding    Diet Modification, taught by Bibi Romero RN at 2/7/2024  3:42 PM.  Learner: Patient  Readiness: Acceptance  Method: Explanation  Response: Verbalizes Understanding    Unresolved/Worsening Symptoms, taught by Bibi Romero RN at 2/7/2024  3:42 PM.  Learner: Patient  Readiness: Acceptance  Method: Explanation  Response: Verbalizes Understanding    Provider Follow-Up, taught by Bibi Romero RN at 2/7/2024  3:42 PM.  Learner: Patient  Readiness: Acceptance  Method: Explanation  Response: Verbalizes Understanding    Fluid/Food Intake, taught by Bibi Romero RN at 2/7/2024  3:42 PM.  Learner: Patient  Readiness: Acceptance  Method: Explanation  Response: Verbalizes Understanding          Bibi WANG  Ambulatory Case Management    2/7/2024, 15:42 EST

## 2024-02-08 ENCOUNTER — ANESTHESIA EVENT (OUTPATIENT)
Dept: PERIOP | Facility: HOSPITAL | Age: 79
End: 2024-02-08
Payer: MEDICARE

## 2024-02-08 ENCOUNTER — OFFICE VISIT (OUTPATIENT)
Dept: ORTHOPEDIC SURGERY | Facility: CLINIC | Age: 79
End: 2024-02-08
Payer: MEDICARE

## 2024-02-08 ENCOUNTER — LAB (OUTPATIENT)
Dept: LAB | Facility: HOSPITAL | Age: 79
End: 2024-02-08
Payer: MEDICARE

## 2024-02-08 ENCOUNTER — HOSPITAL ENCOUNTER (OUTPATIENT)
Dept: CT IMAGING | Facility: HOSPITAL | Age: 79
Discharge: HOME | End: 2024-02-08
Payer: MEDICARE

## 2024-02-08 VITALS
DIASTOLIC BLOOD PRESSURE: 73 MMHG | WEIGHT: 126 LBS | HEART RATE: 68 BPM | BODY MASS INDEX: 21.51 KG/M2 | HEIGHT: 64 IN | SYSTOLIC BLOOD PRESSURE: 134 MMHG

## 2024-02-08 DIAGNOSIS — S52.502A CLOSED FRACTURE OF DISTAL END OF LEFT RADIUS, UNSPECIFIED FRACTURE MORPHOLOGY, INITIAL ENCOUNTER: ICD-10-CM

## 2024-02-08 DIAGNOSIS — E11.9 TYPE 2 DIABETES MELLITUS WITHOUT COMPLICATION, UNSPECIFIED WHETHER LONG TERM INSULIN USE: ICD-10-CM

## 2024-02-08 DIAGNOSIS — S52.502A CLOSED FRACTURE OF DISTAL END OF LEFT RADIUS, UNSPECIFIED FRACTURE MORPHOLOGY, INITIAL ENCOUNTER: Primary | ICD-10-CM

## 2024-02-08 LAB
ALBUMIN SERPL-MCNC: 4.5 G/DL (ref 3.5–5.2)
ALBUMIN/GLOB SERPL: 3 G/DL
ALP SERPL-CCNC: 66 U/L (ref 39–117)
ALT SERPL W P-5'-P-CCNC: 22 U/L (ref 1–33)
ANION GAP SERPL CALCULATED.3IONS-SCNC: 10 MMOL/L (ref 5–15)
AST SERPL-CCNC: 21 U/L (ref 1–32)
BILIRUB SERPL-MCNC: 1.2 MG/DL (ref 0–1.2)
BUN SERPL-MCNC: 16 MG/DL (ref 8–23)
BUN/CREAT SERPL: 19.3 (ref 7–25)
CALCIUM SPEC-SCNC: 9.3 MG/DL (ref 8.6–10.5)
CHLORIDE SERPL-SCNC: 101 MMOL/L (ref 98–107)
CO2 SERPL-SCNC: 25 MMOL/L (ref 22–29)
CREAT SERPL-MCNC: 0.83 MG/DL (ref 0.57–1)
EGFRCR SERPLBLD CKD-EPI 2021: 73.2 ML/MIN/1.73
GLOBULIN UR ELPH-MCNC: 1.5 GM/DL
GLUCOSE SERPL-MCNC: 165 MG/DL (ref 65–99)
HBA1C MFR BLD: 6.3 % (ref 4.8–5.6)
POTASSIUM SERPL-SCNC: 4.2 MMOL/L (ref 3.5–5.2)
PROT SERPL-MCNC: 6 G/DL (ref 6–8.5)
SODIUM SERPL-SCNC: 136 MMOL/L (ref 136–145)

## 2024-02-08 PROCEDURE — 83036 HEMOGLOBIN GLYCOSYLATED A1C: CPT

## 2024-02-08 PROCEDURE — 80053 COMPREHEN METABOLIC PANEL: CPT

## 2024-02-08 PROCEDURE — 36415 COLL VENOUS BLD VENIPUNCTURE: CPT

## 2024-02-08 PROCEDURE — 73200 CT UPPER EXTREMITY W/O DYE: CPT

## 2024-02-08 RX ORDER — MELOXICAM 7.5 MG/1
15 TABLET ORAL ONCE
Status: CANCELLED | OUTPATIENT
Start: 2024-02-08 | End: 2024-02-08

## 2024-02-08 RX ORDER — PREGABALIN 150 MG/1
150 CAPSULE ORAL ONCE
Status: CANCELLED | OUTPATIENT
Start: 2024-02-08 | End: 2024-02-08

## 2024-02-08 RX ORDER — ACETAMINOPHEN 325 MG/1
1000 TABLET ORAL ONCE
Status: CANCELLED | OUTPATIENT
Start: 2024-02-08 | End: 2024-02-08

## 2024-02-08 RX ORDER — IBUPROFEN 200 MG
200 TABLET ORAL EVERY 6 HOURS PRN
COMMUNITY

## 2024-02-08 RX ORDER — MELATONIN
1000 DAILY
COMMUNITY

## 2024-02-08 NOTE — PROGRESS NOTES
Subjective:     Patient ID: Olga Greco is a 78 y.o. female.    Chief Complaint:    History of Present Illness  Olga Greco presents to clinic today for evaluation of left wrist pain.  The patient states that she became hypoglycemic on 2/6/2024 and fell onto an outstretched left wrist.  She had immediate pain deformity and decreased range of motion.  She was seen in the ER and diagnosed with left distal radial fracture.  She was placed in a short arm splint and referred to orthopedics.  She denies any numbness or tingling in her hand or any prior wrist fracture.     Social History     Occupational History    Not on file   Tobacco Use    Smoking status: Never    Smokeless tobacco: Never   Vaping Use    Vaping Use: Never used   Substance and Sexual Activity    Alcohol use: Yes     Alcohol/week: 2.0 standard drinks of alcohol     Types: 2 Glasses of wine per week     Comment: daily    Drug use: No    Sexual activity: Yes     Partners: Male      Past Medical History:   Diagnosis Date    Closed nondisplaced fracture of greater tuberosity of left humerus 12/17/2019    Colon polyp     Diabetes mellitus     Heart palpitations 10/29/2018    Hypertension     Hypothyroidism     Subacromial bursitis of left shoulder joint 12/17/2019    Subacromial impingement of left shoulder 12/17/2019    Trigger thumb of left hand 09/25/2019    Type 2 diabetes mellitus 04/2018     Past Surgical History:   Procedure Laterality Date    BREAST BIOPSY Left     CHOLECYSTECTOMY      COLONOSCOPY      COLONOSCOPY N/A 11/2/2020    Procedure: COLONOSCOPY; polypectomy;  Surgeon: Cj Fallon MD;  Location: [facility];  Service: Gastroenterology;  Laterality: N/A;  DIVERTICULOSIS  POLYP      HYSTERECTOMY      TONSILLECTOMY      TUBAL ABDOMINAL LIGATION         Family History   Problem Relation Age of Onset    Heart disease Father     Diabetes Sister     Hypertension Mother     Breast cancer Neg Hx     Colon cancer Neg Hx     Colon polyps  "Neg Hx                  Objective:  Vitals:    02/08/24 0807   BP: 134/73   Pulse: 68   Weight: 57.2 kg (126 lb)   Height: 162.6 cm (64.02\")         02/08/24  0807   Weight: 57.2 kg (126 lb)     Body mass index is 21.62 kg/m².        Left Hand Exam     Tenderness   The patient is experiencing tenderness in the dorsal area, radial area, palmar area and snuff box.     Range of Motion   Wrist   Extension:  abnormal   Flexion:  abnormal   Pronation:  abnormal   Supination:  abnormal     Muscle Strength   :  3/5     Other   Erythema: absent  Scars: absent  Sensation: normal  Pulse: present               Imaging: 3 views of the left wrist from 2/6/2024 reviewed by myself in the office today  Indication: Left wrist injury  Findings: X-rays demonstrate intra-articular and significantly angulated distal radial fracture.  Styloid fracture is well  Comparative studies: None    XR Wrist 3+ View Left    Result Date: 2/6/2024  Impression: 1.Avulsion injury ulnar styloid 2.Distal radial fracture with extension of fracture to the distal articular margin. 3.Osseous demineralization. Electronically Signed: Chilango Tabares MD  2/6/2024 11:33 AM EST  Workstation ID: [id number]       Assessment:        1. Closed fracture of distal end of left radius, unspecified fracture morphology, initial encounter    2. Type 2 diabetes mellitus without complication, unspecified whether long term insulin use           Plan:          She has sustained a displaced left distal radius fracture and is interested in proceeding with open reduction and internal fixation possible DRUJ stabilization and all associated procedures.  I discussed with the patient that I would like her to get a stat CT scan of her left wrist today as her fracture is difficult to define on plain radiographs.  This will aid in diagnosis and surgical planning.    The goals, indications, risks, and complications of the procedure were discussed with the patient. Specifically, I discussed " the expectations for postoperative recovery including 4 to 6 weeks of immobilization and potential need for occupational therapy. I also discussed the risks of the procedure, including stiffness, fracture, implant loosening, implant failure, nonunion or malunion, venous thromboembolism, infection, nerve palsy, vascular injury, EPL rupture, stiffness, need for more procedures and the risks of anesthesia. I also explained that she would meet with Anesthesiology preoperatively to discuss anesthetic risk.  All questions were answered.     Plan for left distal radius open reduction and internal fixation possible DRUJ stabilization and all associated procedures.    Implants: [employer] Distal Radius Plating system  Antibiotics: Cefazolin  Admission Type: Same Day     Olga Greco was in agreement with plan and had all questions answered.     Medications:  No orders of the defined types were placed in this encounter.      Followup:  No follow-ups on file.    Diagnoses and all orders for this visit:    1. Closed fracture of distal end of left radius, unspecified fracture morphology, initial encounter (Primary)  -     CT upper extremity left wo contrast; Future  -     Case Request; Standing  -     ethyl alcohol 62 % 2 each  -     acetaminophen (TYLENOL) tablet 975 mg  -     meloxicam (MOBIC) tablet 15 mg  -     pregabalin (LYRICA) capsule 150 mg  -     Basic Metabolic Panel; Future  -     Comprehensive Metabolic Panel; Future  -     Hemoglobin A1c; Future  -     ceFAZolin (ANCEF) 2,000 mg in sodium chloride 0.9 % 100 mL IVPB  -     Case Request    2. Type 2 diabetes mellitus without complication, unspecified whether long term insulin use  -     Hemoglobin A1c; Future    Other orders  -     Follow Anesthesia Guidelines / Protocol; Future  -     Follow Anesthesia Guidelines / Protocol; Standing  -     Verify NPO Status; Standing  -     Radiology Technician to Be Present for Intra-Op C-Arm Use; Standing  -     Nerve  Block; Standing  -     Obtain Informed Consent (If Not Done Inpatient or PAT); Standing          Dictated utilizing Dragon dictation

## 2024-02-09 ENCOUNTER — HOSPITAL ENCOUNTER (OUTPATIENT)
Facility: HOSPITAL | Age: 79
Setting detail: HOSPITAL OUTPATIENT SURGERY
Discharge: HOME | End: 2024-02-09
Attending: INTERNAL MEDICINE | Admitting: INTERNAL MEDICINE
Payer: MEDICARE

## 2024-02-09 ENCOUNTER — HOSPITAL ENCOUNTER (OUTPATIENT)
Dept: GENERAL RADIOLOGY | Facility: HOSPITAL | Age: 79
Setting detail: HOSPITAL OUTPATIENT SURGERY
Discharge: HOME | End: 2024-02-09
Payer: MEDICARE

## 2024-02-09 ENCOUNTER — ANESTHESIA (OUTPATIENT)
Dept: PERIOP | Facility: HOSPITAL | Age: 79
End: 2024-02-09
Payer: MEDICARE

## 2024-02-09 VITALS
TEMPERATURE: 97.7 F | WEIGHT: 130.8 LBS | OXYGEN SATURATION: 97 % | BODY MASS INDEX: 22.44 KG/M2 | SYSTOLIC BLOOD PRESSURE: 131 MMHG | DIASTOLIC BLOOD PRESSURE: 79 MMHG | HEART RATE: 71 BPM | RESPIRATION RATE: 16 BRPM

## 2024-02-09 DIAGNOSIS — S52.502A CLOSED FRACTURE OF DISTAL END OF LEFT RADIUS, UNSPECIFIED FRACTURE MORPHOLOGY, INITIAL ENCOUNTER: ICD-10-CM

## 2024-02-09 DIAGNOSIS — S62.102A CLOSED FRACTURE OF LEFT WRIST, INITIAL ENCOUNTER: ICD-10-CM

## 2024-02-09 DIAGNOSIS — S62.102A WRIST FRACTURE, CLOSED, LEFT, INITIAL ENCOUNTER: ICD-10-CM

## 2024-02-09 LAB — GLUCOSE BLDC GLUCOMTR-MCNC: 184 MG/DL (ref 70–130)

## 2024-02-09 PROCEDURE — 25609 OPTX DST RD XART FX/EP SEP3+: CPT | Performed by: SPECIALIST/TECHNOLOGIST, OTHER

## 2024-02-09 PROCEDURE — 25010000002 MIDAZOLAM PER 1MG: Performed by: NURSE ANESTHETIST, CERTIFIED REGISTERED

## 2024-02-09 PROCEDURE — 76000 FLUOROSCOPY <1 HR PHYS/QHP: CPT

## 2024-02-09 PROCEDURE — 25810000003 LACTATED RINGERS PER 1000 ML: Performed by: NURSE ANESTHETIST, CERTIFIED REGISTERED

## 2024-02-09 PROCEDURE — C1713 ANCHOR/SCREW BN/BN,TIS/BN: HCPCS | Performed by: INTERNAL MEDICINE

## 2024-02-09 PROCEDURE — 25010000002 DEXAMETHASONE PER 1 MG: Performed by: NURSE ANESTHETIST, CERTIFIED REGISTERED

## 2024-02-09 PROCEDURE — 82948 REAGENT STRIP/BLOOD GLUCOSE: CPT

## 2024-02-09 PROCEDURE — 25010000002 CEFAZOLIN PER 500 MG: Performed by: INTERNAL MEDICINE

## 2024-02-09 PROCEDURE — 25010000002 BUPIVACAINE (PF) 0.5 % SOLUTION: Performed by: ANESTHESIOLOGY

## 2024-02-09 PROCEDURE — 25010000002 PROPOFOL 10 MG/ML EMULSION: Performed by: NURSE ANESTHETIST, CERTIFIED REGISTERED

## 2024-02-09 PROCEDURE — S0260 H&P FOR SURGERY: HCPCS | Performed by: INTERNAL MEDICINE

## 2024-02-09 PROCEDURE — 25010000002 ONDANSETRON PER 1 MG: Performed by: NURSE ANESTHETIST, CERTIFIED REGISTERED

## 2024-02-09 PROCEDURE — 25609 OPTX DST RD XART FX/EP SEP3+: CPT | Performed by: INTERNAL MEDICINE

## 2024-02-09 PROCEDURE — 73100 X-RAY EXAM OF WRIST: CPT

## 2024-02-09 DEVICE — IMPLANTABLE DEVICE: Type: IMPLANTABLE DEVICE | Site: WRIST | Status: FUNCTIONAL

## 2024-02-09 DEVICE — SCRW CORT GEMINUS LK TI 3.5X11MM: Type: IMPLANTABLE DEVICE | Site: WRIST | Status: FUNCTIONAL

## 2024-02-09 DEVICE — SCRW GEMINUS PA NL TI 3.5X12MM: Type: IMPLANTABLE DEVICE | Site: WRIST | Status: FUNCTIONAL

## 2024-02-09 DEVICE — PEG GEMINUS THRD LK TI 2.3X22MM: Type: IMPLANTABLE DEVICE | Site: WRIST | Status: FUNCTIONAL

## 2024-02-09 DEVICE — SCRW CORT GEMINUS LK TI 3.5X10MM: Type: IMPLANTABLE DEVICE | Site: WRIST | Status: FUNCTIONAL

## 2024-02-09 DEVICE — PLT DIST/RAD GEMINUS NRW 3HL LT: Type: IMPLANTABLE DEVICE | Site: WRIST | Status: FUNCTIONAL

## 2024-02-09 DEVICE — PEG GEMINUS THRD LK TI 2.3X20MM: Type: IMPLANTABLE DEVICE | Site: WRIST | Status: FUNCTIONAL

## 2024-02-09 RX ORDER — SODIUM CHLORIDE 9 MG/ML
40 INJECTION, SOLUTION INTRAVENOUS AS NEEDED
Status: DISCONTINUED | OUTPATIENT
Start: 2024-02-09 | End: 2024-02-12 | Stop reason: HOSPADM

## 2024-02-09 RX ORDER — PREGABALIN 75 MG/1
150 CAPSULE ORAL ONCE
Status: COMPLETED | OUTPATIENT
Start: 2024-02-09 | End: 2024-02-09

## 2024-02-09 RX ORDER — SENNOSIDES 8.6 MG
650 CAPSULE ORAL EVERY 8 HOURS PRN
COMMUNITY

## 2024-02-09 RX ORDER — MIDAZOLAM HYDROCHLORIDE 2 MG/2ML
0.5 INJECTION, SOLUTION INTRAMUSCULAR; INTRAVENOUS
Status: DISCONTINUED | OUTPATIENT
Start: 2024-02-09 | End: 2024-02-12 | Stop reason: HOSPADM

## 2024-02-09 RX ORDER — PROPOFOL 10 MG/ML
VIAL (ML) INTRAVENOUS AS NEEDED
Status: DISCONTINUED | OUTPATIENT
Start: 2024-02-09 | End: 2024-02-09 | Stop reason: SURG

## 2024-02-09 RX ORDER — ACETAMINOPHEN 500 MG
1000 TABLET ORAL ONCE
Status: COMPLETED | OUTPATIENT
Start: 2024-02-09 | End: 2024-02-09

## 2024-02-09 RX ORDER — SODIUM CHLORIDE, SODIUM LACTATE, POTASSIUM CHLORIDE, CALCIUM CHLORIDE 600; 310; 30; 20 MG/100ML; MG/100ML; MG/100ML; MG/100ML
100 INJECTION, SOLUTION INTRAVENOUS CONTINUOUS
Status: DISCONTINUED | OUTPATIENT
Start: 2024-02-09 | End: 2024-02-12 | Stop reason: HOSPADM

## 2024-02-09 RX ORDER — MELOXICAM 7.5 MG/1
15 TABLET ORAL ONCE
Status: COMPLETED | OUTPATIENT
Start: 2024-02-09 | End: 2024-02-09

## 2024-02-09 RX ORDER — SODIUM CHLORIDE, SODIUM LACTATE, POTASSIUM CHLORIDE, CALCIUM CHLORIDE 600; 310; 30; 20 MG/100ML; MG/100ML; MG/100ML; MG/100ML
9 INJECTION, SOLUTION INTRAVENOUS CONTINUOUS
Status: DISCONTINUED | OUTPATIENT
Start: 2024-02-09 | End: 2024-02-12 | Stop reason: HOSPADM

## 2024-02-09 RX ORDER — DEXAMETHASONE SODIUM PHOSPHATE 4 MG/ML
8 INJECTION, SOLUTION INTRA-ARTICULAR; INTRALESIONAL; INTRAMUSCULAR; INTRAVENOUS; SOFT TISSUE ONCE AS NEEDED
Status: COMPLETED | OUTPATIENT
Start: 2024-02-09 | End: 2024-02-09

## 2024-02-09 RX ORDER — FAMOTIDINE 10 MG/ML
20 INJECTION, SOLUTION INTRAVENOUS
Status: COMPLETED | OUTPATIENT
Start: 2024-02-09 | End: 2024-02-09

## 2024-02-09 RX ORDER — DEXMEDETOMIDINE HYDROCHLORIDE 100 UG/ML
INJECTION, SOLUTION INTRAVENOUS
Status: COMPLETED | OUTPATIENT
Start: 2024-02-09 | End: 2024-02-09

## 2024-02-09 RX ORDER — AMOXICILLIN 250 MG
1 CAPSULE ORAL DAILY
Qty: 30 TABLET | Refills: 0 | Status: SHIPPED | OUTPATIENT
Start: 2024-02-09

## 2024-02-09 RX ORDER — HYDROCODONE BITARTRATE AND ACETAMINOPHEN 5; 325 MG/1; MG/1
1-2 TABLET ORAL EVERY 6 HOURS PRN
Qty: 30 TABLET | Refills: 0 | Status: SHIPPED | OUTPATIENT
Start: 2024-02-09

## 2024-02-09 RX ORDER — ONDANSETRON 2 MG/ML
4 INJECTION INTRAMUSCULAR; INTRAVENOUS ONCE AS NEEDED
Status: COMPLETED | OUTPATIENT
Start: 2024-02-09 | End: 2024-02-09

## 2024-02-09 RX ORDER — SODIUM CHLORIDE 0.9 % (FLUSH) 0.9 %
10 SYRINGE (ML) INJECTION EVERY 12 HOURS SCHEDULED
Status: DISCONTINUED | OUTPATIENT
Start: 2024-02-09 | End: 2024-02-12 | Stop reason: HOSPADM

## 2024-02-09 RX ORDER — SODIUM CHLORIDE 0.9 % (FLUSH) 0.9 %
10 SYRINGE (ML) INJECTION AS NEEDED
Status: DISCONTINUED | OUTPATIENT
Start: 2024-02-09 | End: 2024-02-12 | Stop reason: HOSPADM

## 2024-02-09 RX ORDER — BUPIVACAINE HYDROCHLORIDE 5 MG/ML
INJECTION, SOLUTION EPIDURAL; INTRACAUDAL
Status: COMPLETED | OUTPATIENT
Start: 2024-02-09 | End: 2024-02-09

## 2024-02-09 RX ORDER — ONDANSETRON 4 MG/1
4 TABLET, FILM COATED ORAL EVERY 8 HOURS PRN
Qty: 30 TABLET | Refills: 0 | Status: SHIPPED | OUTPATIENT
Start: 2024-02-09

## 2024-02-09 RX ORDER — LIDOCAINE HYDROCHLORIDE 10 MG/ML
0.5 INJECTION, SOLUTION INFILTRATION; PERINEURAL ONCE AS NEEDED
Status: DISCONTINUED | OUTPATIENT
Start: 2024-02-09 | End: 2024-02-12 | Stop reason: HOSPADM

## 2024-02-09 RX ORDER — ONDANSETRON 2 MG/ML
4 INJECTION INTRAMUSCULAR; INTRAVENOUS ONCE AS NEEDED
Status: DISCONTINUED | OUTPATIENT
Start: 2024-02-09 | End: 2024-02-12 | Stop reason: HOSPADM

## 2024-02-09 RX ADMIN — SODIUM CHLORIDE, POTASSIUM CHLORIDE, SODIUM LACTATE AND CALCIUM CHLORIDE 9 ML/HR: 600; 310; 30; 20 INJECTION, SOLUTION INTRAVENOUS at 10:33

## 2024-02-09 RX ADMIN — DEXMEDETOMIDINE 25 MCG: 100 INJECTION, SOLUTION, CONCENTRATE INTRAVENOUS at 11:25

## 2024-02-09 RX ADMIN — PREGABALIN 150 MG: 75 CAPSULE ORAL at 10:27

## 2024-02-09 RX ADMIN — FAMOTIDINE 20 MG: 10 INJECTION, SOLUTION INTRAVENOUS at 10:52

## 2024-02-09 RX ADMIN — BUPIVACAINE HYDROCHLORIDE 20 ML: 5 INJECTION, SOLUTION EPIDURAL; INTRACAUDAL; PERINEURAL at 11:25

## 2024-02-09 RX ADMIN — PROPOFOL 50 MG: 10 INJECTION, EMULSION INTRAVENOUS at 12:41

## 2024-02-09 RX ADMIN — MELOXICAM 15 MG: 7.5 TABLET ORAL at 10:27

## 2024-02-09 RX ADMIN — CEFAZOLIN 2 G: 2 INJECTION, POWDER, FOR SOLUTION INTRAVENOUS at 12:44

## 2024-02-09 RX ADMIN — ONDANSETRON 4 MG: 2 INJECTION INTRAMUSCULAR; INTRAVENOUS at 10:52

## 2024-02-09 RX ADMIN — ACETAMINOPHEN 1000 MG: 500 TABLET ORAL at 10:27

## 2024-02-09 RX ADMIN — PROPOFOL 120 MCG/KG/MIN: 10 INJECTION, EMULSION INTRAVENOUS at 12:43

## 2024-02-09 RX ADMIN — MIDAZOLAM HYDROCHLORIDE 0.5 MG: 1 INJECTION, SOLUTION INTRAMUSCULAR; INTRAVENOUS at 11:14

## 2024-02-09 RX ADMIN — DEXAMETHASONE SODIUM PHOSPHATE 8 MG: 4 INJECTION, SOLUTION INTRAMUSCULAR; INTRAVENOUS at 10:52

## 2024-02-09 NOTE — ANESTHESIA PROCEDURE NOTES
Peripheral Block    Pre-sedation assessment completed: 2/9/2024 11:15 AM    Patient reassessed immediately prior to procedure    Patient location during procedure: pre-op  Start time: 2/9/2024 11:17 AM  Stop time: 2/9/2024 11:25 AM  Reason for block: at surgeon's request and post-op pain management  Performed by  Anesthesiologist: Danii Albright MDSRNA: Tae Aguirre SRNA  Preanesthetic Checklist  Completed: patient identified, IV checked, site marked, risks and benefits discussed, surgical consent, monitors and equipment checked, pre-op evaluation and timeout performed  Prep:  Pt Position: supine  Sterile barriers:cap, gloves, mask and washed/disinfected hands  Prep: ChloraPrep  Patient monitoring: blood pressure monitoring, continuous pulse oximetry and EKG  Procedure    Sedation: yes  Performed under: local infiltration  Guidance:ultrasound guided    ULTRASOUND INTERPRETATION.  Using ultrasound guidance a 21 G gauge needle was placed in close proximity to the brachial plexus nerve, at which point, under ultrasound guidance anesthetic was injected in the area of the nerve and spread of the anesthesia was seen on ultrasound in close proximity thereto.  There were no abnormalities seen on ultrasound; a digital image was taken; and the patient tolerated the procedure with no complications. Images:still images obtained, printed/placed on chart    Laterality:left  Block Type:supraclavicular  Injection Technique:single-shot  Needle Type:echogenic  Needle Gauge:21 G  Resistance on Injection: none    Medications Used: bupivacaine PF (MARCAINE) injection 0.5% - Injection   20 mL - 2/9/2024 11:25:00 AM  dexmedetomidine HCl (PRECEDEX) injection - Intravenous   25 mcg - 2/9/2024 11:25:00 AM      Post Assessment  Injection Assessment: negative aspiration for heme, no paresthesia on injection and incremental injection  Patient Tolerance:comfortable throughout block  Complications:no  Additional Notes  Present for  block.

## 2024-02-09 NOTE — ADDENDUM NOTE
Addendum  created 02/09/24 1501 by Danii Albright MD    Clinical Note Signed, Diagnosis association updated, Intraprocedure Blocks edited, SmartForm saved

## 2024-02-09 NOTE — ANESTHESIA PREPROCEDURE EVALUATION
Anesthesia Evaluation     Patient summary reviewed and Nursing notes reviewed   history of anesthetic complications:  PONV  NPO Solid Status: > 8 hours  NPO Liquid Status: > 2 hours           Airway   Mallampati: II  TM distance: <3 FB  Neck ROM: full  No difficulty expected  Dental          Pulmonary - normal exam    breath sounds clear to auscultation  Cardiovascular - normal exam  Exercise tolerance: good (4-7 METS)    Rhythm: regular  Rate: normal    (+) hypertension well controlled      Neuro/Psych  GI/Hepatic/Renal/Endo    (+) diabetes mellitus type 1 well controlled using insulin, thyroid problem hypothyroidism    Musculoskeletal     Abdominal     Abdomen: soft.   Substance History   (+) alcohol use      Comment: Drinks one or two glasses of wine per day.    OB/GYN          Other                          Anesthesia Plan    ASA 2     regional     intravenous induction     Anesthetic plan, risks, benefits, and alternatives have been provided, discussed and informed consent has been obtained with: patient.    Use of blood products discussed with patient  Consented to blood products.    Plan discussed with CRNA and resident.        CODE STATUS:

## 2024-02-09 NOTE — ANESTHESIA POSTPROCEDURE EVALUATION
Patient: Olga Greco    Procedure Summary       Date: 02/09/24 Room / Location:  [facility] OR 3 /  [facility]    Anesthesia Start: 1234 Anesthesia Stop: 1341    Procedure: WRIST OPEN REDUCTION INTERNAL FIXATION, possible distal radio-ulnar joint stabilization, all associated procedures (Left: Wrist) Diagnosis:       Closed fracture of distal end of left radius, unspecified fracture morphology, initial encounter      (Closed fracture of distal end of left radius, unspecified fracture morphology, initial encounter [S52.502A])    Surgeons: Shoaib Lamb MD Provider: Ansley Lopez CRNA    Anesthesia Type: regional ASA Status: 2            Anesthesia Type: regional    Vitals  Vitals Value Taken Time   /77 02/09/24 1420   Temp 97.7 °F (36.5 °C) 02/09/24 1339   Pulse 68 02/09/24 1428   Resp 16 02/09/24 1410   SpO2 92 % 02/09/24 1426   Vitals shown include unfiled device data.        Post Anesthesia Care and Evaluation    Patient location during evaluation: PHASE II  Patient participation: complete - patient participated  Level of consciousness: awake and alert  Pain score: 0  Pain management: satisfactory to patient    Airway patency: patent  Anesthetic complications: No anesthetic complications  PONV Status: none  Cardiovascular status: acceptable  Respiratory status: acceptable  Hydration status: acceptable

## 2024-02-09 NOTE — H&P
[facility]   HISTORY AND PHYSICAL    Patient Name:Olga Greco  : 1945  MRN: [id number]  Primary Care Physician: Yair Zapata MD  Date of admission: (Not on file)    Subjective   Subjective     Chief Complaint: Left distal radial fracture    History of Present Illness   Olga Greco is a 78 y.o. female who presented to clinic on 2024 following a fall onto an outstretched left wrist.  The patient became hypoglycemic on T6 and fell.  She had immediate pain deformity and decreased range of motion.  She was seen in the ER and diagnosed with a left distal radial fracture.  She was seen by me in the office yesterday and decision was made to proceed with open reduction and internal fixation.  The patient presents for preoperative evaluation in anticipation of the upcoming procedure.    Review of Systems   Musculoskeletal:  Positive for arthralgias, gait problem, joint swelling and myalgias.   All other systems reviewed and are negative.        Personal History     Past Medical History:   Diagnosis Date    Anemia     Closed nondisplaced fracture of greater tuberosity of left humerus 2019    Colon polyp     Elevated cholesterol     Heart palpitations 10/29/2018    Hypertension     Hypothyroidism     PONV (postoperative nausea and vomiting)     Subacromial bursitis of left shoulder joint 2019    Subacromial impingement of left shoulder 2019    Trigger thumb of left hand 2019    Type 2 diabetes mellitus 2018       Past Surgical History:   Procedure Laterality Date    BREAST BIOPSY Left     CHOLECYSTECTOMY      COLONOSCOPY      COLONOSCOPY N/A 2020    Procedure: COLONOSCOPY; polypectomy;  Surgeon: Cj Fallon MD;  Location: [facility];  Service: Gastroenterology;  Laterality: N/A;  DIVERTICULOSIS  POLYP      HYSTERECTOMY      TONSILLECTOMY      TUBAL ABDOMINAL LIGATION         Family History: Her family history includes Diabetes in her sister; Heart disease  in her father; Hypertension in her mother.     Social History: She  reports that she has never smoked. She has never used smokeless tobacco. She reports that she does not currently use alcohol. She reports that she does not use drugs.    Home Medications:  Biotin, Chromium Picolinate, Dexcom G6 Sensor, Dexcom G6 Transmitter, HYDROcodone-acetaminophen, Insulin Aspart, Pediatric Multivitamins-Iron, Probiotic Product, atorvastatin, benazepril-hydrochlorthiazide, cholecalciferol, colestipol, diclofenac, glucagon, ibuprofen, levothyroxine, and vitamin b complex    Allergies:  She is allergic to aspirin and epinephrine.    Objective    Objective     Vitals:    Heart Rate:  [68] 68  BP: (134)/(73) 134/73    Physical Exam   Left Hand Exam      Tenderness   The patient is experiencing tenderness in the dorsal area, radial area, palmar area and snuff box.      Range of Motion   Wrist   Extension:  abnormal   Flexion:  abnormal   Pronation:  abnormal   Supination:  abnormal      Muscle Strength   :  3/5      Other   Erythema: absent  Scars: absent  Sensation: normal  Pulse: present  Result Review    Imaging: 3 views of the left wrist   Indication: Left wrist injury  Findings: X-rays demonstrate intra-articular and significantly angulated distal radial fracture.  Styloid fracture is well  Comparative studies: None    Assessment & Plan   Assessment / Plan     Brief Patient Summary:  Olga Greco is a 78 y.o. female with displaced intra-articular comminuted fracture of the left distal radius    Active Hospital Problems:  There are no active hospital problems to display for this patient.    Plan:   She has sustained a displaced left distal radius fracture and is interested in proceeding with open reduction and internal fixation possible DRUJ stabilization and all associated procedures.    The goals, indications, risks, and complications of the procedure were discussed with the patient. Specifically, I discussed the  expectations for postoperative recovery including 4 to 6 weeks of immobilization and potential need for occupational therapy. I also discussed the risks of the procedure, including stiffness, fracture, implant loosening, implant failure, nonunion or malunion, venous thromboembolism, infection, nerve palsy, vascular injury, EPL rupture, stiffness, need for more procedures and the risks of anesthesia. I also explained that she would meet with Anesthesiology preoperatively to discuss anesthetic risk.  All questions were answered.     Plan for left distal radius open reduction and internal fixation possible DRUJ stabilization and all associated procedures.    Implants: [employer] Distal Radius Plating system  Antibiotics: Cefazolin  Admission Type: Same Day      DVT prophylaxis:  No DVT prophylaxis order currently exists.        Shoaib Lamb MD

## 2024-02-12 ENCOUNTER — TELEPHONE (OUTPATIENT)
Dept: ORTHOPEDIC SURGERY | Facility: CLINIC | Age: 79
End: 2024-02-12
Payer: MEDICARE

## 2024-02-23 ENCOUNTER — OFFICE VISIT (OUTPATIENT)
Dept: ORTHOPEDIC SURGERY | Facility: CLINIC | Age: 79
End: 2024-02-23
Payer: MEDICARE

## 2024-02-23 VITALS — BODY MASS INDEX: 22.2 KG/M2 | WEIGHT: 130 LBS | HEIGHT: 64 IN

## 2024-02-23 DIAGNOSIS — Z98.890 STATUS POST WRIST SURGERY: Primary | ICD-10-CM

## 2024-02-23 DIAGNOSIS — S52.502A CLOSED FRACTURE OF DISTAL END OF LEFT RADIUS, UNSPECIFIED FRACTURE MORPHOLOGY, INITIAL ENCOUNTER: ICD-10-CM

## 2024-02-23 NOTE — PROGRESS NOTES
Subjective:     Patient ID: Olga Greco is a 78 y.o. female.    Chief Complaint:    History of Present Illness  Olga Greco returns to clinic today for evaluation of left distal radius fracture status post ORIF 2 weeks ago.  The patient is doing much better and states the pain stiffness and swelling are improving.  She denies any fevers chills or drainage from her surgical incision or any numbness in her hand.  She has been immobilized and in the postoperative splint which was taken down today.  Sutures were removed and Steri-Strips were placed.     Social History     Occupational History    Not on file   Tobacco Use    Smoking status: Never     Passive exposure: Never    Smokeless tobacco: Never   Vaping Use    Vaping Use: Never used   Substance and Sexual Activity    Alcohol use: Not Currently     Comment: 1-2 glasses/wine per night    Drug use: No    Sexual activity: Yes     Partners: Male      Past Medical History:   Diagnosis Date    Anemia     Closed nondisplaced fracture of greater tuberosity of left humerus 12/17/2019    Colon polyp     Elevated cholesterol     Heart palpitations 10/29/2018    Hypertension     Hypothyroidism     PONV (postoperative nausea and vomiting)     Subacromial bursitis of left shoulder joint 12/17/2019    Subacromial impingement of left shoulder 12/17/2019    Trigger thumb of left hand 09/25/2019    Type 2 diabetes mellitus 04/2018     Past Surgical History:   Procedure Laterality Date    BREAST BIOPSY Left     CHOLECYSTECTOMY      COLONOSCOPY      COLONOSCOPY N/A 11/2/2020    Procedure: COLONOSCOPY; polypectomy;  Surgeon: Cj Fallon MD;  Location: [facility];  Service: Gastroenterology;  Laterality: N/A;  DIVERTICULOSIS  POLYP      HYSTERECTOMY      ORIF WRIST FRACTURE Left 2/9/2024    Procedure: WRIST OPEN REDUCTION INTERNAL FIXATION, possible distal radio-ulnar joint stabilization, all associated procedures;  Surgeon: Shoaib Lamb MD;  Location: [facility] OR;   "Service: Orthopedics;  Laterality: Left;    TONSILLECTOMY      TUBAL ABDOMINAL LIGATION         Family History   Problem Relation Age of Onset    Hypertension Mother     Heart disease Father     Diabetes Sister     Breast cancer Neg Hx     Colon cancer Neg Hx     Colon polyps Neg Hx     Malig Hyperthermia Neg Hx                  Objective:  Vitals:    02/23/24 0955   Weight: 59 kg (130 lb)   Height: 162.6 cm (64\")         02/23/24  0955   Weight: 59 kg (130 lb)     Body mass index is 22.31 kg/m².        Left Hand Exam     Tenderness   The patient is experiencing tenderness in the radial area, palmar area and dorsal area.     Range of Motion   Wrist   Extension:  abnormal   Flexion:  abnormal   Pronation:  normal   Supination:  abnormal     Muscle Strength   :  3/5     Other   Erythema: absent  Scars: present  Sensation: normal  Pulse: present               Imaging: 3 views of the left wrist were ordered and reviewed by myself in the office today  Indication: Left distal radial fracture  Findings: X-rays demonstrate an intra-articular left distal radial fracture status post ORIF with implants in expected position.  There is maintenance of radial height and inclination and volar tilt.  No signs of loss of fixation.  There is an ulnar styloid fracture.  Comparative studies: Immediate postoperative x-rays    Assessment:        1. Status post wrist surgery    2. Closed fracture of distal end of left radius, unspecified fracture morphology, initial encounter           Plan:          Discussed treatment options at length with patient at today's visit. I discussed with the patient at this point in time but I am happy with the progress they have made.  Swelling has improved significantly and the incision is healing without signs of infection.  Sutures were removed today and Steri-Strips were placed.  The patient will be placed in a short arm Exos brace for the next roughly 4 weeks.  I would like the patient to begin " working on thumb and finger flexion and extension as well as forearm pronation and supination.  I will plan to keep the patient nonweightbearing.  The patient may remove the Exos brace 1-2 times per day to bathe and wash their hand.  I would like him to avoid prolonged soaking or submersion of the surgical incision and water until all the scabbing has come off.  I discussed with the patient that if they note any increasing swelling fevers chills or drainage of any kind from the surgical incision to please call the office immediately.  I will plan to see the patient back in 2 to 3 weeks for a 4-week checkup.  Depending upon how they are doing at that time we may refer the patient to occupational therapy.   Follow up: 3 weeks with 2 views of the left wrist      Olga Greco was in agreement with plan and had all questions answered.     Medications:  No orders of the defined types were placed in this encounter.      Followup:  No follow-ups on file.    Diagnoses and all orders for this visit:    1. Status post wrist surgery (Primary)  -     XR Wrist 3+ View Left    2. Closed fracture of distal end of left radius, unspecified fracture morphology, initial encounter          Dictated utilizing Dragon dictation

## 2024-03-05 RX ORDER — MONTELUKAST SODIUM 4 MG/1
1 TABLET, CHEWABLE ORAL NIGHTLY
Qty: 90 TABLET | Refills: 0 | Status: SHIPPED | OUTPATIENT
Start: 2024-03-05

## 2024-03-06 ENCOUNTER — TELEPHONE (OUTPATIENT)
Dept: ENDOCRINOLOGY | Age: 79
End: 2024-03-06
Payer: MEDICARE

## 2024-03-06 RX ORDER — PROCHLORPERAZINE 25 MG/1
1 SUPPOSITORY RECTAL
Qty: 1 EACH | Refills: 0 | Status: SHIPPED | OUTPATIENT
Start: 2024-03-06

## 2024-03-06 RX ORDER — PROCHLORPERAZINE 25 MG/1
SUPPOSITORY RECTAL
Qty: 3 EACH | Refills: 0 | Status: SHIPPED | OUTPATIENT
Start: 2024-03-06

## 2024-03-06 RX ORDER — PROCHLORPERAZINE 25 MG/1
1 SUPPOSITORY RECTAL
Qty: 1 EACH | Refills: 0 | Status: SHIPPED | OUTPATIENT
Start: 2024-03-06 | End: 2024-03-06

## 2024-03-06 NOTE — TELEPHONE ENCOUNTER
Patient called. She said she needs a refill on her dexcom g6 supplies. She is in between getting switched to different cgm.    Can we send this to [facility] in [place]. She is wanting to pay out of pocket for it     Patient is asking for a call back once completed

## 2024-03-07 ENCOUNTER — TELEPHONE (OUTPATIENT)
Dept: ORTHOPEDIC SURGERY | Facility: CLINIC | Age: 79
End: 2024-03-07
Payer: MEDICARE

## 2024-03-07 NOTE — TELEPHONE ENCOUNTER
Patient calling she is having some sharp shooting pain from her elbow down into her thumb it only lasts for a few seconds and then will resolve- patient reports that she has loosened the exos and this did seem to help it as well. Patient reports that her swelling has gone down some- did encourage rest, ice and elevation.     Status post left distal radius fracture status post ORIF 02.09.2024.    Do you have any further recommendations?

## 2024-03-08 ENCOUNTER — LAB (OUTPATIENT)
Dept: LAB | Facility: HOSPITAL | Age: 79
End: 2024-03-08
Payer: MEDICARE

## 2024-03-08 DIAGNOSIS — E10.65 TYPE 1 DIABETES MELLITUS WITH HYPERGLYCEMIA: ICD-10-CM

## 2024-03-08 DIAGNOSIS — E03.9 ACQUIRED HYPOTHYROIDISM: ICD-10-CM

## 2024-03-08 LAB
ANION GAP SERPL CALCULATED.3IONS-SCNC: 9.9 MMOL/L (ref 5–15)
BUN SERPL-MCNC: 17 MG/DL (ref 8–23)
BUN/CREAT SERPL: 20.2 (ref 7–25)
CALCIUM SPEC-SCNC: 9.6 MG/DL (ref 8.6–10.5)
CHLORIDE SERPL-SCNC: 102 MMOL/L (ref 98–107)
CO2 SERPL-SCNC: 27.1 MMOL/L (ref 22–29)
CREAT SERPL-MCNC: 0.84 MG/DL (ref 0.57–1)
EGFRCR SERPLBLD CKD-EPI 2021: 72.1 ML/MIN/1.73
GLUCOSE SERPL-MCNC: 96 MG/DL (ref 65–99)
HBA1C MFR BLD: 6.4 % (ref 4.8–5.6)
POTASSIUM SERPL-SCNC: 4.1 MMOL/L (ref 3.5–5.2)
SODIUM SERPL-SCNC: 139 MMOL/L (ref 136–145)
TSH SERPL DL<=0.05 MIU/L-ACNC: 1.89 UIU/ML (ref 0.27–4.2)

## 2024-03-08 PROCEDURE — 80048 BASIC METABOLIC PNL TOTAL CA: CPT

## 2024-03-08 PROCEDURE — 36415 COLL VENOUS BLD VENIPUNCTURE: CPT

## 2024-03-08 PROCEDURE — 84443 ASSAY THYROID STIM HORMONE: CPT

## 2024-03-08 PROCEDURE — 83036 HEMOGLOBIN GLYCOSYLATED A1C: CPT

## 2024-03-13 ENCOUNTER — OFFICE VISIT (OUTPATIENT)
Dept: ENDOCRINOLOGY | Age: 79
End: 2024-03-13
Payer: MEDICARE

## 2024-03-13 VITALS
SYSTOLIC BLOOD PRESSURE: 132 MMHG | HEIGHT: 64 IN | WEIGHT: 127.4 LBS | BODY MASS INDEX: 21.75 KG/M2 | HEART RATE: 82 BPM | TEMPERATURE: 96.8 F | DIASTOLIC BLOOD PRESSURE: 80 MMHG

## 2024-03-13 DIAGNOSIS — E10.65 TYPE 1 DIABETES MELLITUS WITH HYPERGLYCEMIA: Primary | ICD-10-CM

## 2024-03-13 DIAGNOSIS — E78.2 HYPERLIPEMIA, MIXED: ICD-10-CM

## 2024-03-13 DIAGNOSIS — E03.9 ACQUIRED HYPOTHYROIDISM: ICD-10-CM

## 2024-03-13 PROCEDURE — 95251 CONT GLUC MNTR ANALYSIS I&R: CPT | Performed by: NURSE PRACTITIONER

## 2024-03-13 PROCEDURE — 3079F DIAST BP 80-89 MM HG: CPT | Performed by: NURSE PRACTITIONER

## 2024-03-13 PROCEDURE — 99214 OFFICE O/P EST MOD 30 MIN: CPT | Performed by: NURSE PRACTITIONER

## 2024-03-13 PROCEDURE — 3075F SYST BP GE 130 - 139MM HG: CPT | Performed by: NURSE PRACTITIONER

## 2024-03-13 RX ORDER — INSULIN ASPART 100 [IU]/ML
INJECTION, SOLUTION INTRAVENOUS; SUBCUTANEOUS
Qty: 100 ML | Refills: 1 | Status: SHIPPED | OUTPATIENT
Start: 2024-03-13

## 2024-03-13 RX ORDER — IBUPROFEN 200 MG
TABLET ORAL
Qty: 500 EACH | Refills: 4 | Status: SHIPPED | OUTPATIENT
Start: 2024-03-13

## 2024-03-13 RX ORDER — LEVOTHYROXINE SODIUM 88 MCG
TABLET ORAL
Qty: 90 TABLET | Refills: 1 | Status: SHIPPED | OUTPATIENT
Start: 2024-03-13

## 2024-03-13 NOTE — PROGRESS NOTES
"Chief Complaint  Diabetes (Medtronic pump attached, dexcom attached)    Subjective        Olga Greco presents to [facility] ENDOCRINOLOGY  History of Present Illness    Had to cancel 780 upgrade training yesterday   is sick and in ICU    Type 1 dm, 2003   Current diabetic regimen: Medtronic 630g pump and the dexcom  Last eye exam - Spring 2023  Back up plan: insulin syringes and vials   Hlp, mixed: statin   Renal: ace-I   Unsure if Glucagon on hand is unexpired      Hypothyroid  Synthroid 88mcg daily  Heart palpitations improved     Cgm review, 93% active data 2/29/24-3/13/24  Average glucose 148  GMI 6.8%  77% time in range  2% low  19% high        Objective   Vital Signs:  /80   Pulse 82   Temp 96.8 °F (36 °C) (Temporal)   Ht 162.6 cm (64\")   Wt 57.8 kg (127 lb 6.4 oz)   BMI 21.87 kg/m²   Estimated body mass index is 21.87 kg/m² as calculated from the following:    Height as of this encounter: 162.6 cm (64\").    Weight as of this encounter: 57.8 kg (127 lb 6.4 oz).       BMI is within normal parameters. No other follow-up for BMI required.      Physical Exam  Vitals reviewed.   Constitutional:       General: She is not in acute distress.  HENT:      Head: Normocephalic and atraumatic.   Cardiovascular:      Rate and Rhythm: Normal rate.   Pulmonary:      Effort: Pulmonary effort is normal. No respiratory distress.   Musculoskeletal:         General: No signs of injury. Normal range of motion.      Cervical back: Normal range of motion and neck supple.   Skin:     General: Skin is warm and dry.   Neurological:      Mental Status: She is alert and oriented to person, place, and time. Mental status is at baseline.   Psychiatric:         Mood and Affect: Mood normal.         Behavior: Behavior normal.         Thought Content: Thought content normal.         Judgment: Judgment normal.        Result Review :    The following data was reviewed by: OPAL Jiménez on " "03/13/2024:  Common labs          12/11/2023    09:57 2/8/2024    08:59 3/8/2024    13:29   Common Labs   Glucose 251  165  96    BUN 16  16  17    Creatinine 0.87  0.83  0.84    Sodium 140  136  139    Potassium 4.3  4.2  4.1    Chloride 105  101  102    Calcium 9.4  9.3  9.6    Albumin  4.5     Total Bilirubin  1.2     Alkaline Phosphatase  66     AST (SGOT)  21     ALT (SGPT)  22     Total Cholesterol 148      Triglycerides 46      HDL Cholesterol 101      LDL Cholesterol  36      Hemoglobin A1C 6.60  6.30  6.40                   Assessment and Plan     Diagnoses and all orders for this visit:    1. Type 1 diabetes mellitus with hyperglycemia [E10.65 (ICD-10-CM)] (Primary)  -     NovoLOG 100 UNIT/ML injection; Use up to 50u daily with pump  Dispense: 100 mL; Refill: 1  -     Insulin Syringe 29G X 1/2\" 0.3 ML misc; For use to give insulin from vial up to 5 times a day if pump failure e10.65  Dispense: 500 each; Refill: 4  -     glucagon (GLUCAGEN) 1 MG injection; Inject 1 mg under the skin into the appropriate area as directed 1 (One) Time As Needed for Low Blood Sugar (complicated hypoglycemia).  Dispense: 1 kit; Refill: 1    2. Acquired hypothyroidism  -     Synthroid 88 MCG tablet; All 7 days of the week  Dispense: 90 tablet; Refill: 1    3. Hyperlipemia, mixed             Follow Up     Return in about 3 months (around 6/13/2024).    Cgm reviewed  12a-3a basal rate decreased to 0.15  Sens changed from 50 to 70  Continue t4 dose at 88mcg daily, thyroid labs stable    Continue statin and ace  Upgrade to 780 when able     Patient was given instructions and counseling regarding her condition or for health maintenance advice. Please see specific information pulled into the AVS if appropriate.     Jena Ann, APRN      "

## 2024-03-14 ENCOUNTER — PRIOR AUTHORIZATION (OUTPATIENT)
Dept: ENDOCRINOLOGY | Age: 79
End: 2024-03-14
Payer: MEDICARE

## 2024-03-14 NOTE — TELEPHONE ENCOUNTER
Pa submitted to plan for Glucagon Emergency 1MG kit    Key: [id number]  PA Case ID #: [id number]  Rx #: [id number]

## 2024-03-15 ENCOUNTER — OFFICE VISIT (OUTPATIENT)
Dept: ORTHOPEDIC SURGERY | Facility: CLINIC | Age: 79
End: 2024-03-15
Payer: MEDICARE

## 2024-03-15 VITALS — WEIGHT: 127 LBS | HEIGHT: 64 IN | BODY MASS INDEX: 21.68 KG/M2

## 2024-03-15 DIAGNOSIS — Z98.890 STATUS POST WRIST SURGERY: Primary | ICD-10-CM

## 2024-03-15 DIAGNOSIS — S52.502A CLOSED FRACTURE OF DISTAL END OF LEFT RADIUS, UNSPECIFIED FRACTURE MORPHOLOGY, INITIAL ENCOUNTER: ICD-10-CM

## 2024-03-15 NOTE — PROGRESS NOTES
Subjective:     Patient ID: Olga Greco is a 78 y.o. female.    Chief Complaint:    History of Present Illness  Olga Greco returns to clinic today for evaluation of left distal radial fracture status post ORIF 5 weeks ago.  The patient states that the pain is improving and she has been very diligent about avoiding motion.  She denies any numbness or tingling in her hand but states that she places 1 states trying to come out.  She denies fevers chills or drainage from the surgical incision.  She has been working on thumb and finger flexion extension and forearm pronation and supination but has been avoiding any wrist motion exercises.  Unfortunately she has been in the hospital with her  who is quite sick.     Social History     Occupational History    Not on file   Tobacco Use    Smoking status: Never     Passive exposure: Never    Smokeless tobacco: Never   Vaping Use    Vaping status: Never Used   Substance and Sexual Activity    Alcohol use: Not Currently     Comment: 1-2 glasses/wine per night    Drug use: No    Sexual activity: Yes     Partners: Male      Past Medical History:   Diagnosis Date    Anemia     Closed nondisplaced fracture of greater tuberosity of left humerus 12/17/2019    Colon polyp     Elevated cholesterol     Heart palpitations 10/29/2018    Hypertension     Hypothyroidism     PONV (postoperative nausea and vomiting)     Subacromial bursitis of left shoulder joint 12/17/2019    Subacromial impingement of left shoulder 12/17/2019    Trigger thumb of left hand 09/25/2019    Type 2 diabetes mellitus 04/2018     Past Surgical History:   Procedure Laterality Date    BREAST BIOPSY Left     CHOLECYSTECTOMY      COLONOSCOPY      COLONOSCOPY N/A 11/2/2020    Procedure: COLONOSCOPY; polypectomy;  Surgeon: Cj Fallon MD;  Location: [facility];  Service: Gastroenterology;  Laterality: N/A;  DIVERTICULOSIS  POLYP      HYSTERECTOMY      ORIF WRIST FRACTURE Left 2/9/2024     "Procedure: WRIST OPEN REDUCTION INTERNAL FIXATION, possible distal radio-ulnar joint stabilization, all associated procedures;  Surgeon: Shoaib Lamb MD;  Location: [facility];  Service: Orthopedics;  Laterality: Left;    TONSILLECTOMY      TUBAL ABDOMINAL LIGATION         Family History   Problem Relation Age of Onset    Hypertension Mother     Heart disease Father     Diabetes Sister     Breast cancer Neg Hx     Colon cancer Neg Hx     Colon polyps Neg Hx     Malig Hyperthermia Neg Hx                  Objective:  Vitals:    03/15/24 1050   Weight: 57.6 kg (127 lb)   Height: 162.6 cm (64\")         03/15/24  1050   Weight: 57.6 kg (127 lb)     Body mass index is 21.8 kg/m².        Left Hand Exam     Tenderness   The patient is experiencing tenderness in the radial area.     Range of Motion   Wrist   Extension:  abnormal   Flexion:  abnormal   Pronation:  normal   Supination:  normal     Muscle Strength   Wrist extension: 3/5   Wrist flexion: 3/5   :  4/5     Other   Erythema: absent  Scars: present  Sensation: normal  Pulse: present           Nylon suture removed incision healed.    Imagin views of the left wrist were ordered and reviewed by myself in the office today  Indication: Left distal radial fracture  Findings: X-rays demonstrate an intra-articular left distal radial fracture status post ORIF with implants in expected position.  There is maintenance of radial height and inclination and volar tilt.  No signs of loss of fixation.  There is an ulnar styloid fracture.  Comparative studies: X-rays on 2024    Assessment:        1. Status post wrist surgery    2. Closed fracture of distal end of left radius, unspecified fracture morphology, initial encounter           Plan:          Discussed treatment options at length with patient at today's visit. I discussed with the patient at this point in time but I am happy with the progress they have made.  Swelling has improved significantly and the " incision is healed without signs of infection.  Last Suture was removed today.  The patient should continue with the short arm Exos brace for the next roughly 1 week.  I would like the patient to continue working on thumb and finger flexion and extension as well as forearm pronation and supination.  I will plan to keep the patient nonweightbearing.  The patient may remove the Exos brace 1-2 times per day to bathe and wash their hand.  I offered a referral to Occupational Therapy for the patient but she states she is taking care of her  right now and she is not sure she has time.  Additionally she states that she has done quite well with her motion on her own.  Over the next week or so I like her to wean out of the Exos and begin working on wrist flexion and extension exercises.  I discussed with her that I will plan to see her back in 4 weeks for final evaluation.  I wished her good luck with her .  Follow up: 4 weeks with 2 views of the left wrist      Olga Greco was in agreement with plan and had all questions answered.     Medications:  No orders of the defined types were placed in this encounter.      Followup:  No follow-ups on file.    Diagnoses and all orders for this visit:    1. Status post wrist surgery (Primary)  -     XR Wrist 2 View Left    2. Closed fracture of distal end of left radius, unspecified fracture morphology, initial encounter          Dictated utilizing Dragon dictation

## 2024-03-25 ENCOUNTER — TELEPHONE (OUTPATIENT)
Dept: ORTHOPEDIC SURGERY | Facility: CLINIC | Age: 79
End: 2024-03-25
Payer: MEDICARE

## 2024-03-25 NOTE — TELEPHONE ENCOUNTER
Status post wrist surgery 2/09/2024    Closed fracture of distal end of left radius, unspecified fracture morphology, initial encounter    Patient called concerned that her arm is very sore and a little swollen. She does admit that she has over used her arm since Friday when she took the exos brace off. She does get some relief resting icing and elevating it, but hasn't been able to do that much.She said that she is taking ibuprofen for the pain. I encouraged her to alternate ibuprofen and Tylenol and to rest, ice, and elevate more often. Please advise for further advice.

## 2024-04-04 ENCOUNTER — TELEPHONE (OUTPATIENT)
Dept: ORTHOPEDIC SURGERY | Facility: CLINIC | Age: 79
End: 2024-04-04

## 2024-04-04 DIAGNOSIS — Z98.890 STATUS POST WRIST SURGERY: ICD-10-CM

## 2024-04-04 DIAGNOSIS — S52.502A CLOSED FRACTURE OF DISTAL END OF LEFT RADIUS, UNSPECIFIED FRACTURE MORPHOLOGY, INITIAL ENCOUNTER: Primary | ICD-10-CM

## 2024-04-04 NOTE — TELEPHONE ENCOUNTER
Provider:     Caller: DEBORA HERBERT     Relationship to Patient: SELF    Phone Number: [phone]    Reason for Call: PATIENT IS SCHEDULED FOR A FOLLOW UP FOR HER LEFT WRIST ON 4/12/24. PATIENT WANTS TO KNOW IF SHE CAN START PHYSICAL THERAPY BEFORE SHE COMES FOR THIS VISIT. PATIENT WOULD LIKE A CALL BACK REGARDING THIS.

## 2024-04-12 ENCOUNTER — OFFICE VISIT (OUTPATIENT)
Dept: ORTHOPEDIC SURGERY | Facility: CLINIC | Age: 79
End: 2024-04-12
Payer: MEDICARE

## 2024-04-12 VITALS — WEIGHT: 127 LBS | HEIGHT: 64 IN | BODY MASS INDEX: 21.68 KG/M2

## 2024-04-12 DIAGNOSIS — S52.502A CLOSED FRACTURE OF DISTAL END OF LEFT RADIUS, UNSPECIFIED FRACTURE MORPHOLOGY, INITIAL ENCOUNTER: ICD-10-CM

## 2024-04-12 DIAGNOSIS — Z98.890 STATUS POST WRIST SURGERY: Primary | ICD-10-CM

## 2024-04-12 NOTE — PROGRESS NOTES
Subjective:     Patient ID: Olga Greco is a 78 y.o. female.    Chief Complaint:    History of Present Illness  Olga Greco returns to clinic today for evaluation of left distal radius fracture status post ORIF 9 weeks ago.  She states she is doing well and is happy with the results of 4 but states that you are still quite stiff and she has quite a bit of sensitivity to even light touch over her surgical incision.  She denies any fevers chills or drainage or any numbness or tingling in her hand.  She has not done any formal therapy due to having to take care of her  but is scheduled to start next week.     Social History     Occupational History    Not on file   Tobacco Use    Smoking status: Never     Passive exposure: Never    Smokeless tobacco: Never   Vaping Use    Vaping status: Never Used   Substance and Sexual Activity    Alcohol use: Not Currently     Comment: 1-2 glasses/wine per night    Drug use: No    Sexual activity: Yes     Partners: Male      Past Medical History:   Diagnosis Date    Anemia     Closed nondisplaced fracture of greater tuberosity of left humerus 12/17/2019    Colon polyp     Elevated cholesterol     Heart palpitations 10/29/2018    Hypertension     Hypothyroidism     PONV (postoperative nausea and vomiting)     Subacromial bursitis of left shoulder joint 12/17/2019    Subacromial impingement of left shoulder 12/17/2019    Trigger thumb of left hand 09/25/2019    Type 2 diabetes mellitus 04/2018     Past Surgical History:   Procedure Laterality Date    BREAST BIOPSY Left     CHOLECYSTECTOMY      COLONOSCOPY      COLONOSCOPY N/A 11/2/2020    Procedure: COLONOSCOPY; polypectomy;  Surgeon: Cj Fallon MD;  Location: [facility];  Service: Gastroenterology;  Laterality: N/A;  DIVERTICULOSIS  POLYP      HYSTERECTOMY      ORIF WRIST FRACTURE Left 2/9/2024    Procedure: WRIST OPEN REDUCTION INTERNAL FIXATION, possible distal radio-ulnar joint stabilization, all associated  "procedures;  Surgeon: Shoaib Lamb MD;  Location: [facility];  Service: Orthopedics;  Laterality: Left;    TONSILLECTOMY      TUBAL ABDOMINAL LIGATION         Family History   Problem Relation Age of Onset    Hypertension Mother     Heart disease Father     Diabetes Sister     Breast cancer Neg Hx     Colon cancer Neg Hx     Colon polyps Neg Hx     Malig Hyperthermia Neg Hx                  Objective:  Vitals:    04/12/24 1046   Weight: 57.6 kg (127 lb)   Height: 162.6 cm (64\")         04/12/24  1046   Weight: 57.6 kg (127 lb)     Body mass index is 21.8 kg/m².        Left Hand Exam     Tenderness   The patient is experiencing tenderness in the palmar area (over incision).     Range of Motion   Wrist   Extension:  abnormal   Flexion:  abnormal   Supination:  normal     Muscle Strength   Wrist extension: 4/5   Wrist flexion: 4/5   :  4/5     Other   Erythema: absent  Scars: present  Sensation: normal  Pulse: present             Imaging: no new    Assessment:        1. Status post left wrist surgery    2. Closed fracture of distal end of left radius, unspecified fracture morphology, initial encounter           Plan:          Discussed treatment options at length with patient at today's visit.  We discussed with the patient that I think physical therapy will help her significantly given her extreme tenderness to light touch over her surgical incision and significant wrist stiffness.  They will help with desensitization range of motion and strengthening exercises.  From my standpoint her x-rays look excellent at last visit and her fracture is healed.  I discussed that I think she is a little bit behind due to her lack of formal physical therapy and that this should get better with therapy.  I would like the patient to work hard in therapy and go at least 2 if not 3 days a week and do exercises on her own time.  I will plan to see the patient back in 4 weeks for 12-week checkup.  Follow up: 4 weeks with 2 views " of the left wrist      Olga Greco was in agreement with plan and had all questions answered.     Medications:  No orders of the defined types were placed in this encounter.      Followup:  No follow-ups on file.    Diagnoses and all orders for this visit:    1. Status post left wrist surgery (Primary)  -     XR Wrist 2 View Left    2. Closed fracture of distal end of left radius, unspecified fracture morphology, initial encounter          Dictated utilizing Dragon dictation

## 2024-04-25 RX ORDER — BENAZEPRIL HYDROCHLORIDE AND HYDROCHLOROTHIAZIDE 20; 12.5 MG/1; MG/1
1 TABLET ORAL DAILY
Qty: 90 TABLET | Refills: 1 | Status: SHIPPED | OUTPATIENT
Start: 2024-04-25

## 2024-04-26 DIAGNOSIS — E78.2 MIXED HYPERLIPIDEMIA: ICD-10-CM

## 2024-04-26 DIAGNOSIS — I10 PRIMARY HYPERTENSION: ICD-10-CM

## 2024-04-26 RX ORDER — ATORVASTATIN CALCIUM 20 MG/1
20 TABLET, FILM COATED ORAL DAILY
Qty: 90 TABLET | Refills: 1 | Status: SHIPPED | OUTPATIENT
Start: 2024-04-26

## 2024-04-26 NOTE — TELEPHONE ENCOUNTER
Rx Refill Note  Requested Prescriptions     Pending Prescriptions Disp Refills    atorvastatin (LIPITOR) 20 MG tablet 90 tablet 1     Sig: Take 1 tablet by mouth Daily.      Last office visit with prescribing clinician: 7/27/2023   Last telemedicine visit with prescribing clinician: Visit date not found   Next office visit with prescribing clinician: Visit date not found                         Would you like a call back once the refill request has been completed: [] Yes [] No    If the office needs to give you a call back, can they leave a voicemail: [] Yes [] No    Leola Sharp CMA  04/26/24, 08:36 EDT

## 2024-05-28 ENCOUNTER — TELEPHONE (OUTPATIENT)
Dept: ORTHOPEDIC SURGERY | Facility: CLINIC | Age: 79
End: 2024-05-28
Payer: MEDICARE

## 2024-05-28 NOTE — TELEPHONE ENCOUNTER
Provider: LOGAN    Caller: PATIENT    Phone Number: [phone]    Reason for Call: PATIENT STATES HER ARM IS DOING VERY WELL. SHE STATES SHE HAS BEEN DOING HER PHYSICAL THERAPY. SHE IS ASKING IF DR. FORD WANTS HER TO COME BACK IN FOR FOLLOW UP, OR CAN SHE JUST CONTINUE WITH HER P/T.

## 2024-06-17 ENCOUNTER — TELEPHONE (OUTPATIENT)
Dept: INTERNAL MEDICINE | Facility: CLINIC | Age: 79
End: 2024-06-17

## 2024-06-17 DIAGNOSIS — E10.65 TYPE 1 DIABETES MELLITUS WITH HYPERGLYCEMIA: ICD-10-CM

## 2024-06-17 DIAGNOSIS — I10 PRIMARY HYPERTENSION: Primary | ICD-10-CM

## 2024-06-17 DIAGNOSIS — E55.9 VITAMIN D DEFICIENCY: ICD-10-CM

## 2024-06-17 DIAGNOSIS — E03.9 ACQUIRED HYPOTHYROIDISM: ICD-10-CM

## 2024-06-17 NOTE — TELEPHONE ENCOUNTER
Caller: Olga Greco    Relationship: Self    Best call back number: [phone]     What orders are you requesting (i.e. lab or imaging): BLOOD WORK     Where will you receive your lab/imaging services: [facility];     Additional notes: PATIENT HAS AN APPOINTMENT ON 6/19 AND IS REQUESTING TO KNOW IF DR. GRANADOS WANTS HER TO HAVE BLOOD WORK DONE AT THE HOSPITAL BEFORE THIS APPOINTMENT     PLEASE ADVISE

## 2024-06-18 ENCOUNTER — OFFICE VISIT (OUTPATIENT)
Dept: ORTHOPEDIC SURGERY | Facility: CLINIC | Age: 79
End: 2024-06-18
Payer: MEDICARE

## 2024-06-18 ENCOUNTER — LAB (OUTPATIENT)
Dept: LAB | Facility: HOSPITAL | Age: 79
End: 2024-06-18
Payer: MEDICARE

## 2024-06-18 VITALS — HEIGHT: 64 IN | BODY MASS INDEX: 21.68 KG/M2 | WEIGHT: 127 LBS

## 2024-06-18 DIAGNOSIS — E10.65 TYPE 1 DIABETES MELLITUS WITH HYPERGLYCEMIA: ICD-10-CM

## 2024-06-18 DIAGNOSIS — Z98.890 STATUS POST WRIST SURGERY: Primary | ICD-10-CM

## 2024-06-18 DIAGNOSIS — E55.9 VITAMIN D DEFICIENCY: ICD-10-CM

## 2024-06-18 DIAGNOSIS — E03.9 ACQUIRED HYPOTHYROIDISM: ICD-10-CM

## 2024-06-18 DIAGNOSIS — I10 PRIMARY HYPERTENSION: ICD-10-CM

## 2024-06-18 LAB
25(OH)D3 SERPL-MCNC: 43.3 NG/ML (ref 30–100)
ALBUMIN SERPL-MCNC: 4.6 G/DL (ref 3.5–5.2)
ALBUMIN/GLOB SERPL: 2.6 G/DL
ALP SERPL-CCNC: 64 U/L (ref 39–117)
ALT SERPL W P-5'-P-CCNC: 34 U/L (ref 1–33)
ANION GAP SERPL CALCULATED.3IONS-SCNC: 9 MMOL/L (ref 5–15)
AST SERPL-CCNC: 31 U/L (ref 1–32)
BASOPHILS # BLD AUTO: 0.03 10*3/MM3 (ref 0–0.2)
BASOPHILS NFR BLD AUTO: 0.6 % (ref 0–1.5)
BILIRUB SERPL-MCNC: 1.3 MG/DL (ref 0–1.2)
BUN SERPL-MCNC: 18 MG/DL (ref 8–23)
BUN/CREAT SERPL: 24 (ref 7–25)
CALCIUM SPEC-SCNC: 9.8 MG/DL (ref 8.6–10.5)
CHLORIDE SERPL-SCNC: 100 MMOL/L (ref 98–107)
CHOLEST SERPL-MCNC: 175 MG/DL (ref 0–200)
CO2 SERPL-SCNC: 29 MMOL/L (ref 22–29)
CREAT SERPL-MCNC: 0.75 MG/DL (ref 0.57–1)
DEPRECATED RDW RBC AUTO: 44.1 FL (ref 37–54)
EGFRCR SERPLBLD CKD-EPI 2021: 82.6 ML/MIN/1.73
EOSINOPHIL # BLD AUTO: 0.06 10*3/MM3 (ref 0–0.4)
EOSINOPHIL NFR BLD AUTO: 1.2 % (ref 0.3–6.2)
ERYTHROCYTE [DISTWIDTH] IN BLOOD BY AUTOMATED COUNT: 12.3 % (ref 12.3–15.4)
GLOBULIN UR ELPH-MCNC: 1.8 GM/DL
GLUCOSE SERPL-MCNC: 169 MG/DL (ref 65–99)
HBA1C MFR BLD: 7.6 % (ref 4.8–5.6)
HCT VFR BLD AUTO: 38.4 % (ref 34–46.6)
HDLC SERPL QL: 1.5
HDLC SERPL-MCNC: 117 MG/DL (ref 40–60)
HGB BLD-MCNC: 13 G/DL (ref 12–15.9)
IMM GRANULOCYTES # BLD AUTO: 0.02 10*3/MM3 (ref 0–0.05)
IMM GRANULOCYTES NFR BLD AUTO: 0.4 % (ref 0–0.5)
LDLC SERPL CALC-MCNC: 48 MG/DL (ref 0–100)
LYMPHOCYTES # BLD AUTO: 1.4 10*3/MM3 (ref 0.7–3.1)
LYMPHOCYTES NFR BLD AUTO: 29.1 % (ref 19.6–45.3)
MCH RBC QN AUTO: 33 PG (ref 26.6–33)
MCHC RBC AUTO-ENTMCNC: 33.9 G/DL (ref 31.5–35.7)
MCV RBC AUTO: 97.5 FL (ref 79–97)
MONOCYTES # BLD AUTO: 0.42 10*3/MM3 (ref 0.1–0.9)
MONOCYTES NFR BLD AUTO: 8.7 % (ref 5–12)
NEUTROPHILS NFR BLD AUTO: 2.88 10*3/MM3 (ref 1.7–7)
NEUTROPHILS NFR BLD AUTO: 60 % (ref 42.7–76)
NRBC BLD AUTO-RTO: 0 /100 WBC (ref 0–0.2)
PLATELET # BLD AUTO: 222 10*3/MM3 (ref 140–450)
PMV BLD AUTO: 9.5 FL (ref 6–12)
POTASSIUM SERPL-SCNC: 4.2 MMOL/L (ref 3.5–5.2)
PROT SERPL-MCNC: 6.4 G/DL (ref 6–8.5)
RBC # BLD AUTO: 3.94 10*6/MM3 (ref 3.77–5.28)
SODIUM SERPL-SCNC: 138 MMOL/L (ref 136–145)
T4 FREE SERPL-MCNC: 1.14 NG/DL (ref 0.92–1.68)
TRIGL SERPL-MCNC: 46 MG/DL (ref 0–150)
TSH SERPL DL<=0.05 MIU/L-ACNC: 11.3 UIU/ML (ref 0.27–4.2)
VIT B12 BLD-MCNC: 465 PG/ML (ref 211–946)
VLDLC SERPL-MCNC: 10 MG/DL (ref 5–40)
WBC NRBC COR # BLD AUTO: 4.81 10*3/MM3 (ref 3.4–10.8)

## 2024-06-18 PROCEDURE — 84439 ASSAY OF FREE THYROXINE: CPT

## 2024-06-18 PROCEDURE — 84443 ASSAY THYROID STIM HORMONE: CPT

## 2024-06-18 PROCEDURE — 99213 OFFICE O/P EST LOW 20 MIN: CPT | Performed by: INTERNAL MEDICINE

## 2024-06-18 PROCEDURE — 36415 COLL VENOUS BLD VENIPUNCTURE: CPT

## 2024-06-18 PROCEDURE — 82306 VITAMIN D 25 HYDROXY: CPT

## 2024-06-18 PROCEDURE — 85025 COMPLETE CBC W/AUTO DIFF WBC: CPT

## 2024-06-18 PROCEDURE — 80053 COMPREHEN METABOLIC PANEL: CPT

## 2024-06-18 PROCEDURE — 83036 HEMOGLOBIN GLYCOSYLATED A1C: CPT

## 2024-06-18 PROCEDURE — 82607 VITAMIN B-12: CPT

## 2024-06-18 PROCEDURE — 73100 X-RAY EXAM OF WRIST: CPT | Performed by: INTERNAL MEDICINE

## 2024-06-18 PROCEDURE — 80061 LIPID PANEL: CPT

## 2024-06-18 NOTE — PROGRESS NOTES
Subjective:     Patient ID: Olga Greco is a 78 y.o. female.    Chief Complaint:    History of Present Illness  Olga Greco returns to clinic today for evaluation of left distal radial fracture status post ORIF 4-1/2 months ago on 2/9/2024.  The patient is doing quite well.  She denies any numbness or tingling in her hand or any fevers chills or drainage from her surgical incision.  She has been doing her physical therapy exercises on her own and is very happy with result.  She has near full painless range of motion.  She states a little bit stiff in the mornings loosens up as the day goes on.     Social History     Occupational History    Not on file   Tobacco Use    Smoking status: Never     Passive exposure: Never    Smokeless tobacco: Never   Vaping Use    Vaping status: Never Used   Substance and Sexual Activity    Alcohol use: Not Currently     Comment: 1-2 glasses/wine per night    Drug use: No    Sexual activity: Yes     Partners: Male      Past Medical History:   Diagnosis Date    Anemia     Closed nondisplaced fracture of greater tuberosity of left humerus 12/17/2019    Colon polyp     Elevated cholesterol     Heart palpitations 10/29/2018    Hypertension     Hypothyroidism     PONV (postoperative nausea and vomiting)     Subacromial bursitis of left shoulder joint 12/17/2019    Subacromial impingement of left shoulder 12/17/2019    Trigger thumb of left hand 09/25/2019    Type 2 diabetes mellitus 04/2018     Past Surgical History:   Procedure Laterality Date    BREAST BIOPSY Left     CHOLECYSTECTOMY      COLONOSCOPY      COLONOSCOPY N/A 11/2/2020    Procedure: COLONOSCOPY; polypectomy;  Surgeon: Cj Fallon MD;  Location: [facility];  Service: Gastroenterology;  Laterality: N/A;  DIVERTICULOSIS  POLYP      HYSTERECTOMY      ORIF WRIST FRACTURE Left 2/9/2024    Procedure: WRIST OPEN REDUCTION INTERNAL FIXATION, possible distal radio-ulnar joint stabilization, all associated procedures;   "Surgeon: Shoaib Lamb MD;  Location: [facility];  Service: Orthopedics;  Laterality: Left;    TONSILLECTOMY      TUBAL ABDOMINAL LIGATION         Family History   Problem Relation Age of Onset    Hypertension Mother     Heart disease Father     Diabetes Sister     Breast cancer Neg Hx     Colon cancer Neg Hx     Colon polyps Neg Hx     Malig Hyperthermia Neg Hx                  Objective:  Vitals:    06/18/24 1131   Weight: 57.6 kg (127 lb)   Height: 162.6 cm (64\")         06/18/24  1131   Weight: 57.6 kg (127 lb)     Body mass index is 21.8 kg/m².        Left Hand Exam     Tenderness   The patient is experiencing tenderness in the radial area.     Range of Motion   Wrist   Extension:  normal   Flexion:  normal   Pronation:  normal   Supination:  normal     Muscle Strength   Wrist extension: 5/5   Wrist flexion: 5/5   :  5/5     Other   Erythema: absent  Scars: present  Sensation: normal  Pulse: present               Imaging: 3 views of the left wrist were ordered and reviewed by myself in the office today  Indication: Left wrist fx  Findings: X-rays demonstrate a left distal radial fracture status post ORIF with implants in expected position.  Fracture is healed.  There is maintenance of volar tilt radial height and inclination.  Comparative studies: X-rays at last visit    Assessment:        1. Status post wrist surgery           Plan:          Discussed treatment options at length with patient at today's visit.  I discussed with the patient that I am happy with the progress that she has made.  She has full painless range of motion and only a little bit of mild tenderness along the radial border.  She has been doing her physical therapy exercises on her own.  I would like her to continue with her exercises.  I discussed with her that as long as she continues to make improvements I do not need to see the patient back for further follow-up.  I am however happy to see the patient if any issues questions or " concerns arise  Follow up: As needed      Olga Greco was in agreement with plan and had all questions answered.     Medications:  No orders of the defined types were placed in this encounter.      Followup:  No follow-ups on file.    Diagnoses and all orders for this visit:    1. Status post wrist surgery (Primary)  -     XR Wrist 2 View Left          Dictated utilizing Dragon dictation

## 2024-06-19 ENCOUNTER — OFFICE VISIT (OUTPATIENT)
Dept: INTERNAL MEDICINE | Facility: CLINIC | Age: 79
End: 2024-06-19
Payer: MEDICARE

## 2024-06-19 VITALS
SYSTOLIC BLOOD PRESSURE: 126 MMHG | TEMPERATURE: 98.2 F | OXYGEN SATURATION: 99 % | BODY MASS INDEX: 20.79 KG/M2 | HEART RATE: 75 BPM | HEIGHT: 64 IN | DIASTOLIC BLOOD PRESSURE: 74 MMHG | WEIGHT: 121.8 LBS

## 2024-06-19 DIAGNOSIS — E55.9 VITAMIN D DEFICIENCY: ICD-10-CM

## 2024-06-19 DIAGNOSIS — Z23 ENCOUNTER FOR ADMINISTRATION OF VACCINE: ICD-10-CM

## 2024-06-19 DIAGNOSIS — E03.9 ACQUIRED HYPOTHYROIDISM: ICD-10-CM

## 2024-06-19 DIAGNOSIS — I10 PRIMARY HYPERTENSION: ICD-10-CM

## 2024-06-19 DIAGNOSIS — Z85.828 HISTORY OF SQUAMOUS CELL CARCINOMA OF SKIN: ICD-10-CM

## 2024-06-19 DIAGNOSIS — Z12.31 ENCOUNTER FOR SCREENING MAMMOGRAM FOR MALIGNANT NEOPLASM OF BREAST: ICD-10-CM

## 2024-06-19 DIAGNOSIS — K58.0 IRRITABLE BOWEL SYNDROME WITH DIARRHEA: ICD-10-CM

## 2024-06-19 DIAGNOSIS — Z00.00 ROUTINE HEALTH MAINTENANCE: ICD-10-CM

## 2024-06-19 DIAGNOSIS — E10.65 TYPE 1 DIABETES MELLITUS WITH HYPERGLYCEMIA: Primary | ICD-10-CM

## 2024-06-19 PROCEDURE — 3074F SYST BP LT 130 MM HG: CPT | Performed by: FAMILY MEDICINE

## 2024-06-19 PROCEDURE — 3078F DIAST BP <80 MM HG: CPT | Performed by: FAMILY MEDICINE

## 2024-06-19 PROCEDURE — 1126F AMNT PAIN NOTED NONE PRSNT: CPT | Performed by: FAMILY MEDICINE

## 2024-06-19 PROCEDURE — 99214 OFFICE O/P EST MOD 30 MIN: CPT | Performed by: FAMILY MEDICINE

## 2024-06-19 PROCEDURE — G0009 ADMIN PNEUMOCOCCAL VACCINE: HCPCS | Performed by: FAMILY MEDICINE

## 2024-06-19 PROCEDURE — 1159F MED LIST DOCD IN RCRD: CPT | Performed by: FAMILY MEDICINE

## 2024-06-19 PROCEDURE — 90677 PCV20 VACCINE IM: CPT | Performed by: FAMILY MEDICINE

## 2024-06-19 PROCEDURE — 1160F RVW MEDS BY RX/DR IN RCRD: CPT | Performed by: FAMILY MEDICINE

## 2024-06-19 NOTE — PROGRESS NOTES
Problem: Suicide  Goal: *STG: Remains safe in hospital  Outcome: Progressing Towards Goal     Problem: Suicide  Goal: *STG/LTG: Complies with medication therapy  Outcome: Progressing Towards Goal     Problem: Alcohol Withdrawal  Goal: *STG: Maintains appropriate nutrition and hydration  Outcome: Progressing Towards Goal      Patient remains safe in the hospital and is medication compliant. Maintains appropriate nutrition and hydration AEB eating and drinking meals. Subjective     Olga Greco is a 78 y.o. female, who presents with a chief complaint of   Chief Complaint   Patient presents with    Depression     Not feeling very well, she lost her  last week. Has been taking an iron supplement      Diabetes    Hypertension    Hypothyroidism  Associated symptoms include arthralgias.   Hyperlipidemia  Exacerbating diseases include hypothyroidism.   Depression  Her past medical history is significant for depression.     1. Diabetes mellitus.  Pt doing using Dexcom and pump.  Her blood sugar is somewhat labile and she is working with endocrinology.    2. HTN.  Tolerating benazepril-hctz.  Not monitoring home blood pressures.    3. IBS.  She hasn't been taking colestipol recently and is doing okay.  Still taking probiotics.    4. Grief.  Her  of 57 years  one month ago.  She is in the stages of early grief.  She has good support.  She says she is sleeping okay and eating okay; she has lost a few pounds.    The following portions of the patient's history were reviewed and updated as appropriate: allergies, current medications, past family history, past medical history, past social history, past surgical history and problem list.    Allergies: Aspirin and Epinephrine    Review of Systems   Constitutional: Negative.    HENT: Negative.     Eyes: Negative.    Respiratory: Negative.     Cardiovascular: Negative.    Gastrointestinal: Negative.    Endocrine: Negative.    Genitourinary: Negative.    Musculoskeletal:  Positive for arthralgias.   Skin: Negative.    Allergic/Immunologic: Negative.    Neurological: Negative.    Hematological: Negative.    Psychiatric/Behavioral: Negative.       Objective     Wt Readings from Last 3 Encounters:   24 55.2 kg (121 lb 12.8 oz)   24 57.6 kg (127 lb)   24 57.6 kg (127 lb)     Temp Readings from Last 3 Encounters:   24 98.2 °F (36.8 °C) (Infrared)   24 96.8 °F (36 °C) (Temporal)   24 97.7 °F (36.5  °C) (Oral)     BP Readings from Last 3 Encounters:   06/19/24 126/74   03/13/24 132/80   02/09/24 131/79     Pulse Readings from Last 3 Encounters:   06/19/24 75   03/13/24 82   02/09/24 71     Body mass index is 20.91 kg/m².  SpO2 Readings from Last 3 Encounters:   01/17/18 99%   10/11/17 98%   07/13/17 98%       Physical Exam   Constitutional: She is oriented to person, place, and time. She appears well-developed.   HENT:   Head: Normocephalic and atraumatic.   Mouth/Throat: Mucous membranes are moist.   Eyes: Conjunctivae are normal.   Neck: No thyromegaly present.   Cardiovascular: Normal rate, regular rhythm and normal heart sounds.   Pulmonary/Chest: Effort normal and breath sounds normal.   Abdominal: Soft. Normal appearance. There is no abdominal tenderness.   Musculoskeletal:      Right lower leg: No edema.      Left lower leg: No edema.   Neurological: She is alert and oriented to person, place, and time.   Skin: Skin is warm and dry. No rash noted.   Psychiatric: Her behavior is normal. Mood normal.   Nursing note and vitals reviewed.      Results for orders placed or performed in visit on 06/18/24   T4, Free    Specimen: Blood   Result Value Ref Range    Free T4 1.14 0.92 - 1.68 ng/dL   Comprehensive Metabolic Panel    Specimen: Blood   Result Value Ref Range    Glucose 169 (H) 65 - 99 mg/dL    BUN 18 8 - 23 mg/dL    Creatinine 0.75 0.57 - 1.00 mg/dL    Sodium 138 136 - 145 mmol/L    Potassium 4.2 3.5 - 5.2 mmol/L    Chloride 100 98 - 107 mmol/L    CO2 29.0 22.0 - 29.0 mmol/L    Calcium 9.8 8.6 - 10.5 mg/dL    Total Protein 6.4 6.0 - 8.5 g/dL    Albumin 4.6 3.5 - 5.2 g/dL    ALT (SGPT) 34 (H) 1 - 33 U/L    AST (SGOT) 31 1 - 32 U/L    Alkaline Phosphatase 64 39 - 117 U/L    Total Bilirubin 1.3 (H) 0.0 - 1.2 mg/dL    Globulin 1.8 gm/dL    A/G Ratio 2.6 g/dL    BUN/Creatinine Ratio 24.0 7.0 - 25.0    Anion Gap 9.0 5.0 - 15.0 mmol/L    eGFR 82.6 >60.0 mL/min/1.73   Hemoglobin A1c    Specimen: Blood   Result  Value Ref Range    Hemoglobin A1C 7.60 (H) 4.80 - 5.60 %   Lipid Panel With / Chol / HDL Ratio    Specimen: Blood   Result Value Ref Range    Total Cholesterol 175 0 - 200 mg/dL    Triglycerides 46 0 - 150 mg/dL    HDL Cholesterol 117 (H) 40 - 60 mg/dL    LDL Cholesterol  48 0 - 100 mg/dL    VLDL Cholesterol 10 5 - 40 mg/dL    Chol/HDL Ratio 1.50    TSH    Specimen: Blood   Result Value Ref Range    TSH 11.300 (H) 0.270 - 4.200 uIU/mL   Vitamin B12    Specimen: Blood   Result Value Ref Range    Vitamin B-12 465 211 - 946 pg/mL   Vitamin D,25-Hydroxy    Specimen: Blood   Result Value Ref Range    25 Hydroxy, Vitamin D 43.3 30.0 - 100.0 ng/ml   CBC Auto Differential    Specimen: Blood   Result Value Ref Range    WBC 4.81 3.40 - 10.80 10*3/mm3    RBC 3.94 3.77 - 5.28 10*6/mm3    Hemoglobin 13.0 12.0 - 15.9 g/dL    Hematocrit 38.4 34.0 - 46.6 %    MCV 97.5 (H) 79.0 - 97.0 fL    MCH 33.0 26.6 - 33.0 pg    MCHC 33.9 31.5 - 35.7 g/dL    RDW 12.3 12.3 - 15.4 %    RDW-SD 44.1 37.0 - 54.0 fl    MPV 9.5 6.0 - 12.0 fL    Platelets 222 140 - 450 10*3/mm3    Neutrophil % 60.0 42.7 - 76.0 %    Lymphocyte % 29.1 19.6 - 45.3 %    Monocyte % 8.7 5.0 - 12.0 %    Eosinophil % 1.2 0.3 - 6.2 %    Basophil % 0.6 0.0 - 1.5 %    Immature Grans % 0.4 0.0 - 0.5 %    Neutrophils, Absolute 2.88 1.70 - 7.00 10*3/mm3    Lymphocytes, Absolute 1.40 0.70 - 3.10 10*3/mm3    Monocytes, Absolute 0.42 0.10 - 0.90 10*3/mm3    Eosinophils, Absolute 0.06 0.00 - 0.40 10*3/mm3    Basophils, Absolute 0.03 0.00 - 0.20 10*3/mm3    Immature Grans, Absolute 0.02 0.00 - 0.05 10*3/mm3    nRBC 0.0 0.0 - 0.2 /100 WBC     Assessment/Plan   Diagnoses and all orders for this visit:    1. Type 1 diabetes mellitus with hyperglycemia (Primary)    2. Primary hypertension    3. Acquired hypothyroidism    4. Vitamin D deficiency    5. Irritable bowel syndrome with diarrhea    6. History of squamous cell carcinoma of skin    7. Routine health maintenance    8. Encounter for  screening mammogram for malignant neoplasm of breast  -     Mammo screening digital tomosynthesis bilateral w CAD; Future    9. Encounter for administration of vaccine  -     Pneumococcal Conjugate Vaccine 20-Valent (PCV20)    1. DMI. A1c 7.6.    Managed by endocrinology.  Eye exam UTD.  On statin, ACE-I.     2. HTN.  Controlled.  Continue benazepril-hctz 20-12.5 mg daily and monitor home blood pressures.  Labs reviewed.     3. Hypothyroidism.  TSH elevated this time, but she missed some doses of levothyroxine on vacation last week.  Managed by endocrinology.     4. Vitamin D deficiency.  Continue supplement.     5. IBS.  Controlled with probiotic and occasional Imodium.     6. 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  She sees Dr. Padgett.     7. RHM.  Mammogram due in 2 months and is ordered.  Colonoscopy 11/2020 by Dr. Fallon.  Shingrix done at pharmacy.  Prevnar 20 today.    8. Grief reaction.  She has good support from friends and family.  She sees her daughter and son-in-law every day.  She went on vacation to [place] last week.    Outpatient Medications Prior to Visit   Medication Sig Dispense Refill    atorvastatin (LIPITOR) 20 MG tablet Take 1 tablet by mouth Daily. 90 tablet 1    B Complex Vitamins (VITAMIN B COMPLEX) capsule capsule Take 1 capsule by mouth Daily.      benazepril-hydrochlorthiazide (LOTENSIN HCT) 20-12.5 MG per tablet Take 1 tablet by mouth Daily. 1 TIME DAILY 90 tablet 1    Biotin 05811 MCG tablet Take 1 tablet by mouth Daily.      cholecalciferol (Vitamin D) 25 MCG (1000 UT) tablet Take 1 tablet by mouth Daily.      Chromium Picolinate 1000 MCG tablet Take 1,000 mcg by mouth Daily.      diclofenac (VOLTAREN) 1 % gel gel Apply 4 g topically to the appropriate area as directed 4 (Four) Times a Day. (Patient taking differently: Apply 4 g topically to the appropriate area as directed 4 (Four) Times a Day As Needed (pain).) 100 g 5    glucagon (GLUCAGEN) 1 MG injection Inject 1  "mg under the skin into the appropriate area as directed 1 (One) Time As Needed for Low Blood Sugar (complicated hypoglycemia). 1 kit 1    ibuprofen (ADVIL,MOTRIN) 200 MG tablet Take 1 tablet by mouth Every 6 (Six) Hours As Needed for Mild Pain.      Insulin Syringe 29G X 1/2\" 0.3 ML misc For use to give insulin from vial up to 5 times a day if pump failure e10.65 500 each 4    NovoLOG 100 UNIT/ML injection Use up to 50u daily with pump 100 mL 1    Pediatric Multivitamins-Iron (FLINTSTONES PLUS IRON PO) Take 1 tablet by mouth Daily.      Probiotic Product (PROBIOTIC DAILY PO) Take 1 capsule by mouth Daily.      Synthroid 88 MCG tablet All 7 days of the week 90 tablet 1    colestipol (COLESTID) 1 g tablet Take 1 tablet by mouth Every Night. (Patient not taking: Reported on 6/19/2024) 90 tablet 0    Continuous Blood Gluc Sensor (Dexcom G6 Sensor) Use Every 10 (Ten) Days. e10.65 (Patient not taking: Reported on 6/19/2024) 3 each 0    Continuous Blood Gluc Transmit (Dexcom G6 Transmitter) misc Use 1 package Every 3 (Three) Months. e10.65 (Patient not taking: Reported on 6/19/2024) 1 each 0    acetaminophen (TYLENOL) 650 MG 8 hr tablet Take 1 tablet by mouth Every 8 (Eight) Hours As Needed for Mild Pain.       No facility-administered medications prior to visit.     No orders of the defined types were placed in this encounter.      Medications Discontinued During This Encounter   Medication Reason    acetaminophen (TYLENOL) 650 MG 8 hr tablet *Therapy completed       Return in about 5 months (around 11/19/2024) for Medicare Wellness.      "

## 2024-06-20 ENCOUNTER — OFFICE VISIT (OUTPATIENT)
Dept: ENDOCRINOLOGY | Age: 79
End: 2024-06-20
Payer: MEDICARE

## 2024-06-20 VITALS
TEMPERATURE: 97.8 F | HEART RATE: 67 BPM | SYSTOLIC BLOOD PRESSURE: 122 MMHG | OXYGEN SATURATION: 97 % | HEIGHT: 64 IN | BODY MASS INDEX: 20.9 KG/M2 | DIASTOLIC BLOOD PRESSURE: 70 MMHG | WEIGHT: 122.4 LBS

## 2024-06-20 DIAGNOSIS — E78.2 HYPERLIPEMIA, MIXED: ICD-10-CM

## 2024-06-20 DIAGNOSIS — E03.9 ACQUIRED HYPOTHYROIDISM: ICD-10-CM

## 2024-06-20 DIAGNOSIS — E10.65 TYPE 1 DIABETES MELLITUS WITH HYPERGLYCEMIA: Primary | ICD-10-CM

## 2024-06-20 PROCEDURE — 3074F SYST BP LT 130 MM HG: CPT | Performed by: NURSE PRACTITIONER

## 2024-06-20 PROCEDURE — 99214 OFFICE O/P EST MOD 30 MIN: CPT | Performed by: NURSE PRACTITIONER

## 2024-06-20 PROCEDURE — 95251 CONT GLUC MNTR ANALYSIS I&R: CPT | Performed by: NURSE PRACTITIONER

## 2024-06-20 PROCEDURE — 3078F DIAST BP <80 MM HG: CPT | Performed by: NURSE PRACTITIONER

## 2024-06-20 NOTE — PROGRESS NOTES
"Chief Complaint  Diabetes    Subjective        Olga Greco presents to [facility] END[facility]OLOG[facility]  History of Present Illness    Elizabeth  may 15, 2024    Type 1 dm,    Current diabetic regimen: Medtronic 780g with smartguard technology   Last eye exam: 2024  Back up plan: insulin syringes and vials   Hlp, mixed: statin   Renal: ace-I   Glucagon: yes  Hypothyroid: Synthroid 88mcg daily       Cgm review 24-24, 96% smartguard use   67% time in range  1% low  23% high  9% very high  Gmi 7.2%  TDD 20u  Bolus 55%    Objective   Vital Signs:  /70 (BP Location: Left arm)   Pulse 67   Temp 97.8 °F (36.6 °C) (Oral)   Ht 162.6 cm (64.02\")   Wt 55.5 kg (122 lb 6.4 oz)   SpO2 97%   BMI 21.00 kg/m²   Estimated body mass index is 21 kg/m² as calculated from the following:    Height as of this encounter: 162.6 cm (64.02\").    Weight as of this encounter: 55.5 kg (122 lb 6.4 oz).       BMI is within normal parameters. No other follow-up for BMI required.      Physical Exam  Vitals reviewed.   Constitutional:       General: She is not in acute distress.  HENT:      Head: Normocephalic and atraumatic.   Cardiovascular:      Rate and Rhythm: Normal rate.   Pulmonary:      Effort: Pulmonary effort is normal. No respiratory distress.   Musculoskeletal:         General: No signs of injury. Normal range of motion.      Cervical back: Normal range of motion and neck supple.   Skin:     General: Skin is warm and dry.   Neurological:      Mental Status: She is alert and oriented to person, place, and time. Mental status is at baseline.   Psychiatric:         Mood and Affect: Mood normal.         Behavior: Behavior normal.         Thought Content: Thought content normal.         Judgment: Judgment normal.          Result Review :    The following data was reviewed by: OPAL Jiménez on 2024:  Common labs          2024    08:59 3/8/2024    13:29 2024    11:15   Common Labs "   Glucose 165  96  169    BUN 16  17  18    Creatinine 0.83  0.84  0.75    Sodium 136  139  138    Potassium 4.2  4.1  4.2    Chloride 101  102  100    Calcium 9.3  9.6  9.8    Albumin 4.5   4.6    Total Bilirubin 1.2   1.3    Alkaline Phosphatase 66   64    AST (SGOT) 21   31    ALT (SGPT) 22   34    WBC   4.81    Hemoglobin   13.0    Hematocrit   38.4    Platelets   222    Total Cholesterol   175    Triglycerides   46    HDL Cholesterol   117    LDL Cholesterol    48    Hemoglobin A1C 6.30  6.40  7.60                   Assessment and Plan     Diagnoses and all orders for this visit:    1. Type 1 diabetes mellitus with hyperglycemia [E10.65 (ICD-10-CM)] (Primary)  -     TSH; Future  -     T4, Free; Future  -     Hemoglobin A1c; Future  -     Basic Metabolic Panel; Future  -     Microalbumin / Creatinine Urine Ratio - Urine, Clean Catch; Future    2. Hyperlipemia, mixed  -     TSH; Future  -     T4, Free; Future  -     Hemoglobin A1c; Future  -     Basic Metabolic Panel; Future  -     Microalbumin / Creatinine Urine Ratio - Urine, Clean Catch; Future    3. Acquired hypothyroidism  -     TSH; Future  -     T4, Free; Future  -     Hemoglobin A1c; Future  -     Basic Metabolic Panel; Future  -     Microalbumin / Creatinine Urine Ratio - Urine, Clean Catch; Future             Follow Up     Return in about 3 months (around 9/20/2024).    A1c understandable given her husbands death  Will hold on t4 dose change as she missed several doses following her husbands passing  Cgm reviewed, nearing target range     Patient was given instructions and counseling regarding her condition or for health maintenance advice. Please see specific information pulled into the AVS if appropriate.     OPAL Jiménez

## 2024-08-13 ENCOUNTER — OFFICE VISIT (OUTPATIENT)
Dept: ORTHOPEDIC SURGERY | Facility: CLINIC | Age: 79
End: 2024-08-13
Payer: MEDICARE

## 2024-08-13 VITALS — WEIGHT: 122 LBS | BODY MASS INDEX: 20.83 KG/M2 | HEIGHT: 64 IN

## 2024-08-13 DIAGNOSIS — Z98.890 STATUS POST WRIST SURGERY: Primary | ICD-10-CM

## 2024-08-13 DIAGNOSIS — M65.4 DE QUERVAIN'S TENOSYNOVITIS, LEFT: ICD-10-CM

## 2024-08-13 PROCEDURE — 20550 NJX 1 TENDON SHEATH/LIGAMENT: CPT | Performed by: INTERNAL MEDICINE

## 2024-08-13 PROCEDURE — 99213 OFFICE O/P EST LOW 20 MIN: CPT | Performed by: INTERNAL MEDICINE

## 2024-08-13 PROCEDURE — 73100 X-RAY EXAM OF WRIST: CPT | Performed by: INTERNAL MEDICINE

## 2024-08-13 RX ORDER — LIDOCAINE HYDROCHLORIDE 10 MG/ML
1 INJECTION, SOLUTION EPIDURAL; INFILTRATION; INTRACAUDAL; PERINEURAL
Status: COMPLETED | OUTPATIENT
Start: 2024-08-13 | End: 2024-08-13

## 2024-08-13 RX ORDER — TRIAMCINOLONE ACETONIDE 40 MG/ML
40 INJECTION, SUSPENSION INTRA-ARTICULAR; INTRAMUSCULAR
Status: COMPLETED | OUTPATIENT
Start: 2024-08-13 | End: 2024-08-13

## 2024-08-13 RX ADMIN — TRIAMCINOLONE ACETONIDE 40 MG: 40 INJECTION, SUSPENSION INTRA-ARTICULAR; INTRAMUSCULAR at 16:04

## 2024-08-13 RX ADMIN — LIDOCAINE HYDROCHLORIDE 1 ML: 10 INJECTION, SOLUTION EPIDURAL; INFILTRATION; INTRACAUDAL; PERINEURAL at 16:04

## 2024-08-13 NOTE — PROGRESS NOTES
Subjective:     Patient ID: Olga Greco is a 78 y.o. female.    Chief Complaint:    History of Present Illness  Olga Greco returns to clinic today for evaluation of left wrist radial sided pain and swelling.  The patient states the pain has gotten worse over the last few weeks.  She states it is worse with movement and with ulnar deviation of her hand.  She denies any fever chills or drainage from her surgical incision or any significant swelling near her incision.  She states that she notices some swelling in the bump along her radial border of her wrist.     Social History     Occupational History    Not on file   Tobacco Use    Smoking status: Never     Passive exposure: Never    Smokeless tobacco: Never   Vaping Use    Vaping status: Never Used   Substance and Sexual Activity    Alcohol use: Not Currently     Comment: 1-2 glasses/wine per night    Drug use: No    Sexual activity: Yes     Partners: Male      Past Medical History:   Diagnosis Date    Anemia     Closed nondisplaced fracture of greater tuberosity of left humerus 12/17/2019    Colon polyp     Elevated cholesterol     Heart palpitations 10/29/2018    Hypertension     Hypothyroidism     PONV (postoperative nausea and vomiting)     Subacromial bursitis of left shoulder joint 12/17/2019    Subacromial impingement of left shoulder 12/17/2019    Trigger thumb of left hand 09/25/2019    Type 2 diabetes mellitus 04/2018     Past Surgical History:   Procedure Laterality Date    BREAST BIOPSY Left     CHOLECYSTECTOMY      COLONOSCOPY      COLONOSCOPY N/A 11/2/2020    Procedure: COLONOSCOPY; polypectomy;  Surgeon: Cj Fallon MD;  Location: [facility];  Service: Gastroenterology;  Laterality: N/A;  DIVERTICULOSIS  POLYP      HYSTERECTOMY      ORIF WRIST FRACTURE Left 2/9/2024    Procedure: WRIST OPEN REDUCTION INTERNAL FIXATION, possible distal radio-ulnar joint stabilization, all associated procedures;  Surgeon: Shoaib Lamb MD;   "Location: [facility];  Service: Orthopedics;  Laterality: Left;    TONSILLECTOMY      TUBAL ABDOMINAL LIGATION         Family History   Problem Relation Age of Onset    Hypertension Mother     Heart disease Father     Diabetes Sister     Breast cancer Neg Hx     Colon cancer Neg Hx     Colon polyps Neg Hx     Malig Hyperthermia Neg Hx                  Objective:  Vitals:    24 1537   Weight: 55.3 kg (122 lb)   Height: 162.6 cm (64.02\")         24  1537   Weight: 55.3 kg (122 lb)     Body mass index is 20.93 kg/m².        Right Hand Exam     Tenderness   The patient is experiencing tenderness in the radial area.    Range of Motion   Wrist   Extension:  normal   Flexion:  normal   Pronation:  normal   Supination:  normal     Muscle Strength   : 5/5     Tests   Finkelstein's test: positive    Other   Erythema: absent  Scars: present  Sensation: normal  Pulse: present               Imagin views of the left wrist were ordered and reviewed by myself in the office today  Indication: Left wrist fx  Findings: X-rays demonstrate a left distal radial fracture status post ORIF with implants in expected position.  Fracture is healed.  There is maintenance of volar tilt radial height and inclination.  There is soft tissue swelling noted along the radial border of the radius  Comparative studies: X-rays at last visit    Assessment:        1. Status post wrist surgery    2. De Quervain's tenosynovitis, left           Plan:    - Hand/Upper Extremity Injection for (1st dorsal extensor compartment) on 2024 4:04 PM  Indications: pain  Details: 22 G needle, lateral approach  Medications: 40 mg triamcinolone acetonide 40 MG/ML; 1 mL lidocaine PF 1% 1 %  Outcome: tolerated well, no immediate complications  Procedure, treatment alternatives, risks and benefits explained, specific risks discussed. Consent was given by the patient. Immediately prior to procedure a time out was called to verify the correct patient, " procedure, equipment, support staff and site/side marked as required. Patient was prepped and draped in the usual sterile fashion.                 Discussed treatment options at length with patient at today's visit.  Patient that she has developed de Quervain's tenosynovitis.  She has no signs of screw penetration laterally or tendon rupture.  She does have some soft tissue swelling over the first dorsal extensor compartment. I discussed with the patient that the mainstays of conservative treatment for de Quervain's include physical therapy, nonsteroidal anti-inflammatories, injections, activity modification, and home exercises.  The patient states that they  would like to try first dorsal extensor compartment corticosteroid injection.    3 weeks without x-rays      Olga Greco was in agreement with plan and had all questions answered.     Medications:  No orders of the defined types were placed in this encounter.      Followup:  No follow-ups on file.    Diagnoses and all orders for this visit:    1. Status post wrist surgery (Primary)  -     XR Wrist 2 View Left    2. De Quervain's tenosynovitis, left    Other orders  -     - Hand/Upper Extremity Injection          Dictated utilizing Dragon dictation

## 2024-08-26 ENCOUNTER — HOSPITAL ENCOUNTER (OUTPATIENT)
Dept: MAMMOGRAPHY | Facility: HOSPITAL | Age: 79
Discharge: HOME | End: 2024-08-26
Admitting: FAMILY MEDICINE
Payer: MEDICARE

## 2024-08-26 DIAGNOSIS — Z12.31 ENCOUNTER FOR SCREENING MAMMOGRAM FOR MALIGNANT NEOPLASM OF BREAST: ICD-10-CM

## 2024-08-26 PROCEDURE — 77063 BREAST TOMOSYNTHESIS BI: CPT

## 2024-08-26 PROCEDURE — 77067 SCR MAMMO BI INCL CAD: CPT

## 2024-08-26 PROCEDURE — 77067 SCR MAMMO BI INCL CAD: CPT | Performed by: RADIOLOGY

## 2024-08-26 PROCEDURE — 77063 BREAST TOMOSYNTHESIS BI: CPT | Performed by: RADIOLOGY

## 2024-08-30 ENCOUNTER — OFFICE VISIT (OUTPATIENT)
Dept: ORTHOPEDIC SURGERY | Facility: CLINIC | Age: 79
End: 2024-08-30
Payer: MEDICARE

## 2024-08-30 VITALS — HEIGHT: 64 IN | BODY MASS INDEX: 20.83 KG/M2 | WEIGHT: 122 LBS

## 2024-08-30 DIAGNOSIS — M65.4 DE QUERVAIN'S TENOSYNOVITIS, LEFT: Primary | ICD-10-CM

## 2024-08-30 DIAGNOSIS — Z98.890 STATUS POST WRIST SURGERY: ICD-10-CM

## 2024-08-30 NOTE — PROGRESS NOTES
Subjective:     Patient ID: Olga Greco is a 78 y.o. female.    Chief Complaint:    History of Present Illness  Olga Greco returns to clinic today for evaluation of left radial sided wrist pain.  At last visit she was diagnosed with severe de Quervain's tenosynovitis.  She was given a first dorsal extensor compartment corticosteroid injection.  She states that this helped significantly.  She states every once while she notices a small twinge of pain and it still little bit swollen but overall she is doing tremendously better.  She thanked me for my help.  She is not interested in any further treatment today.     Social History     Occupational History    Not on file   Tobacco Use    Smoking status: Never     Passive exposure: Never    Smokeless tobacco: Never   Vaping Use    Vaping status: Never Used   Substance and Sexual Activity    Alcohol use: Not Currently     Comment: 1-2 glasses/wine per night    Drug use: No    Sexual activity: Yes     Partners: Male      Past Medical History:   Diagnosis Date    Anemia     Closed nondisplaced fracture of greater tuberosity of left humerus 12/17/2019    Colon polyp     Elevated cholesterol     Heart palpitations 10/29/2018    Hypertension     Hypothyroidism     PONV (postoperative nausea and vomiting)     Subacromial bursitis of left shoulder joint 12/17/2019    Subacromial impingement of left shoulder 12/17/2019    Trigger thumb of left hand 09/25/2019    Type 2 diabetes mellitus 04/2018     Past Surgical History:   Procedure Laterality Date    BREAST BIOPSY Left     CHOLECYSTECTOMY      COLONOSCOPY      COLONOSCOPY N/A 11/2/2020    Procedure: COLONOSCOPY; polypectomy;  Surgeon: Cj Fallon MD;  Location: [facility];  Service: Gastroenterology;  Laterality: N/A;  DIVERTICULOSIS  POLYP      HYSTERECTOMY      ORIF WRIST FRACTURE Left 2/9/2024    Procedure: WRIST OPEN REDUCTION INTERNAL FIXATION, possible distal radio-ulnar joint stabilization, all associated  "procedures;  Surgeon: Shoaib Lamb MD;  Location: [facility];  Service: Orthopedics;  Laterality: Left;    TONSILLECTOMY      TUBAL ABDOMINAL LIGATION         Family History   Problem Relation Age of Onset    Hypertension Mother     Heart disease Father     Diabetes Sister     Breast cancer Neg Hx     Colon cancer Neg Hx     Colon polyps Neg Hx     Malig Hyperthermia Neg Hx                  Objective:  Vitals:    08/30/24 0940   Weight: 55.3 kg (122 lb)   Height: 162.6 cm (64.02\")         08/30/24  0940   Weight: 55.3 kg (122 lb)     Body mass index is 20.93 kg/m².        Left Hand Exam     Tenderness   The patient is experiencing no tenderness.     Range of Motion   Wrist   Extension:  normal   Flexion:  normal   Pronation:  normal   Supination:  normal     Muscle Strength   :  5/5     Tests   Phalen’s Sign: negative  Finkelstein's test: negative    Other   Erythema: absent  Scars: present  Sensation: normal  Pulse: present               Imaging: no new    Assessment:        1. De Quervain's tenosynovitis, left    2. Status post wrist surgery           Plan:          Discussed treatment options at length with patient at today's visit.  I discussed with her that I am happy to hear she is doing so much better.  From my standpoint she has no residual deficits.  I discussed with her that I would not recommend any further treatment today if she is completely asymptomatic.  I discussed with her that she may get another injection if needed 3 months after the last 1.  I did discuss that if after 2 injections she does not have complete resolution of her symptoms I would not recommend further injections.  At that point time we may need to discuss first dorsal extensor compartment release.  The patient voiced understanding and agreement with that plan and thanked me for my help.  She would like to come back on an as-needed basis.  Follow up: as needed      Olga Greco was in agreement with plan and had all questions " answered.     Medications:  No orders of the defined types were placed in this encounter.      Followup:  No follow-ups on file.    Diagnoses and all orders for this visit:    1. De Quervain's tenosynovitis, left (Primary)    2. Status post wrist surgery          Dictated utilizing Dragon dictation

## 2024-09-13 ENCOUNTER — OFFICE VISIT (OUTPATIENT)
Dept: ENDOCRINOLOGY | Age: 79
End: 2024-09-13
Payer: MEDICARE

## 2024-09-13 VITALS
HEIGHT: 64 IN | WEIGHT: 121.2 LBS | HEART RATE: 88 BPM | BODY MASS INDEX: 20.69 KG/M2 | SYSTOLIC BLOOD PRESSURE: 132 MMHG | TEMPERATURE: 97.9 F | OXYGEN SATURATION: 98 % | DIASTOLIC BLOOD PRESSURE: 70 MMHG

## 2024-09-13 DIAGNOSIS — E78.2 HYPERLIPEMIA, MIXED: ICD-10-CM

## 2024-09-13 DIAGNOSIS — E03.9 ACQUIRED HYPOTHYROIDISM: ICD-10-CM

## 2024-09-13 DIAGNOSIS — E10.65 TYPE 1 DIABETES MELLITUS WITH HYPERGLYCEMIA: Primary | ICD-10-CM

## 2024-09-13 PROCEDURE — 3078F DIAST BP <80 MM HG: CPT | Performed by: NURSE PRACTITIONER

## 2024-09-13 PROCEDURE — 95251 CONT GLUC MNTR ANALYSIS I&R: CPT | Performed by: NURSE PRACTITIONER

## 2024-09-13 PROCEDURE — 99214 OFFICE O/P EST MOD 30 MIN: CPT | Performed by: NURSE PRACTITIONER

## 2024-09-13 PROCEDURE — 3075F SYST BP GE 130 - 139MM HG: CPT | Performed by: NURSE PRACTITIONER

## 2024-09-13 NOTE — PROGRESS NOTES
"Chief Complaint  Diabetes    Subjective        Olga Greco presents to [facility] ENDOCRINOLOGY  History of Present Illness     Type 1 dm, 2003   Current diabetic regimen: Medtronic 780g with smartguard technology with novolog vials   Last eye exam: April 2024  Back up plan: insulin syringes and vials   CV: statin   Renal: benazepril   Glucagon: unexpired   Hypothyroid: Synthroid 88mcg daily        Cgm review 8/31/24-9/13/24, 96% smartguard use   64% time in range  1% low  24% high  11% very high  Gmi 7.4%  TDD 20u  Bolus 12u    Objective   Vital Signs:  /70   Pulse 88   Temp 97.9 °F (36.6 °C) (Oral)   Ht 162.6 cm (64.02\")   Wt 55 kg (121 lb 3.2 oz)   SpO2 98%   BMI 20.79 kg/m²   Estimated body mass index is 20.79 kg/m² as calculated from the following:    Height as of this encounter: 162.6 cm (64.02\").    Weight as of this encounter: 55 kg (121 lb 3.2 oz).    BMI is within normal parameters. No other follow-up for BMI required.      Physical Exam  Vitals reviewed.   Constitutional:       General: She is not in acute distress.  HENT:      Head: Normocephalic and atraumatic.   Cardiovascular:      Rate and Rhythm: Normal rate.   Pulmonary:      Effort: Pulmonary effort is normal. No respiratory distress.   Musculoskeletal:         General: No signs of injury. Normal range of motion.      Cervical back: Normal range of motion and neck supple.   Skin:     General: Skin is warm and dry.   Neurological:      Mental Status: She is alert and oriented to person, place, and time. Mental status is at baseline.   Psychiatric:         Mood and Affect: Mood normal.         Behavior: Behavior normal.         Thought Content: Thought content normal.         Judgment: Judgment normal.        Result Review :  The following data was reviewed by: OPAL Jiménez on 09/13/2024:  Common labs          2/8/2024    08:59 3/8/2024    13:29 6/18/2024    11:15   Common Labs   Glucose 165  96  169    BUN 16  " 17  18    Creatinine 0.83  0.84  0.75    Sodium 136  139  138    Potassium 4.2  4.1  4.2    Chloride 101  102  100    Calcium 9.3  9.6  9.8    Albumin 4.5   4.6    Total Bilirubin 1.2   1.3    Alkaline Phosphatase 66   64    AST (SGOT) 21   31    ALT (SGPT) 22   34    WBC   4.81    Hemoglobin   13.0    Hematocrit   38.4    Platelets   222    Total Cholesterol   175    Triglycerides   46    HDL Cholesterol   117    LDL Cholesterol    48    Hemoglobin A1C 6.30  6.40  7.60                Assessment and Plan   Diagnoses and all orders for this visit:    1. Type 1 diabetes mellitus with hyperglycemia [E10.65 (ICD-10-CM)] (Primary)  -     Hemoglobin A1c  -     Comprehensive Metabolic Panel  -     TSH    2. Hyperlipemia, mixed    3. Acquired hypothyroidism             Follow Up   Return in about 3 months (around 12/13/2024).    Cgm reviewed, favorable given her life changes with her husbands recent passing  Labs today, ensure thyroid labs stable, may need to follow up with PCP if hand shakiness not related to anxiety/ low blood sugars   Continue statin and ace-I  Demonstrated knowledge on bolusing before meals and with hyperglycemia   Further recommendations to follow as needed     Patient was given instructions and counseling regarding her condition or for health maintenance advice. Please see specific information pulled into the AVS if appropriate.     OPAL Jiménez

## 2024-10-29 ENCOUNTER — TELEPHONE (OUTPATIENT)
Dept: ORTHOPEDIC SURGERY | Facility: CLINIC | Age: 79
End: 2024-10-29
Payer: MEDICARE

## 2024-10-29 DIAGNOSIS — I10 PRIMARY HYPERTENSION: ICD-10-CM

## 2024-10-29 DIAGNOSIS — E78.2 MIXED HYPERLIPIDEMIA: ICD-10-CM

## 2024-10-29 RX ORDER — ATORVASTATIN CALCIUM 20 MG/1
20 TABLET, FILM COATED ORAL DAILY
Qty: 90 TABLET | Refills: 1 | Status: SHIPPED | OUTPATIENT
Start: 2024-10-29

## 2024-10-29 RX ORDER — BENAZEPRIL HYDROCHLORIDE AND HYDROCHLOROTHIAZIDE 20; 12.5 MG/1; MG/1
1 TABLET ORAL DAILY
Qty: 90 TABLET | Refills: 1 | Status: SHIPPED | OUTPATIENT
Start: 2024-10-29

## 2024-10-29 NOTE — TELEPHONE ENCOUNTER
Caller: Olga Greco    Relationship to patient: Self    Best call back number: [phone]    Chief complaint: RT FOOT/ANKLE    Type of visit: NEW PROBLEM     Additional notes:PATIENT STEPPED OFF A PORCH AND HURT HER RIGHT ANKLE/FOOT.  SHE IS ESTABLISHED WITH DR FORD BUT HE DOESN'T TREAT FEET.  PLEASE ADVISE ON SCHEDULING

## 2024-10-29 NOTE — TELEPHONE ENCOUNTER
Rx Refill Note  Requested Prescriptions     Pending Prescriptions Disp Refills    benazepril-hydrochlorthiazide (LOTENSIN HCT) 20-12.5 MG per tablet 90 tablet 1     Sig: Take 1 tablet by mouth Daily. 1 TIME DAILY     Signed Prescriptions Disp Refills    atorvastatin (LIPITOR) 20 MG tablet 90 tablet 1     Sig: Take 1 tablet by mouth Daily.     Authorizing Provider: SOLIS GRANADOS     Ordering User: MOHSEN CONTEH      Last office visit with prescribing clinician: 6/19/2024   Last telemedicine visit with prescribing clinician: Visit date not found   Next office visit with prescribing clinician: Visit date not found                         Would you like a call back once the refill request has been completed: [] Yes [] No    If the office needs to give you a call back, can they leave a voicemail: [] Yes [] No    Mohsen Conteh MA  10/29/24, 16:14 EDT

## 2024-10-30 ENCOUNTER — OFFICE VISIT (OUTPATIENT)
Dept: ORTHOPEDIC SURGERY | Facility: CLINIC | Age: 79
End: 2024-10-30
Payer: MEDICARE

## 2024-10-30 VITALS
HEART RATE: 91 BPM | HEIGHT: 64 IN | BODY MASS INDEX: 20.49 KG/M2 | SYSTOLIC BLOOD PRESSURE: 165 MMHG | WEIGHT: 120 LBS | DIASTOLIC BLOOD PRESSURE: 70 MMHG

## 2024-10-30 DIAGNOSIS — M79.671 RIGHT FOOT PAIN: Primary | ICD-10-CM

## 2024-10-30 PROCEDURE — 99213 OFFICE O/P EST LOW 20 MIN: CPT | Performed by: NURSE PRACTITIONER

## 2024-10-30 NOTE — PROGRESS NOTES
Subjective:     Patient ID: Olga Greco is a 79 y.o. female.    Chief Complaint:  Right foot injury, new issue to examiner  History of Present Illness  History of Present Illness  The patient presents to the clinic today for evaluation of her right foot.    She sustained an injury to her right foot on 10/10/2024 when she twisted her ankle. Since then, she has been experiencing pain on the top and bottom of her foot, particularly at the base of the fourth metatarsal. She describes the pain as dull and rates it as 1 to 2 on a scale of 10. She also reports some swelling on the top and side of her foot, initial bruising, and stiffness and discomfort during walking activities.    She has found some relief by wearing tennis shoes, which provide more support, and taking Advil. She has not had any recent imaging tests such as x-ray, MRI, or CT scan. Her symptoms have improved, but as she is planning to start exercising at the gym, she has come to the clinic for an evaluation. She reports no other concerns.       Social History     Occupational History    Not on file   Tobacco Use    Smoking status: Never     Passive exposure: Never    Smokeless tobacco: Never   Vaping Use    Vaping status: Never Used   Substance and Sexual Activity    Alcohol use: Not Currently     Comment: 1-2 glasses/wine per night    Drug use: No    Sexual activity: Yes     Partners: Male      Past Medical History:   Diagnosis Date    Anemia     Closed nondisplaced fracture of greater tuberosity of left humerus 12/17/2019    Colon polyp     Elevated cholesterol     Heart palpitations 10/29/2018    Hypertension     Hypothyroidism     PONV (postoperative nausea and vomiting)     Subacromial bursitis of left shoulder joint 12/17/2019    Subacromial impingement of left shoulder 12/17/2019    Trigger thumb of left hand 09/25/2019    Type 2 diabetes mellitus 04/2018     Past Surgical History:   Procedure Laterality Date    BREAST BIOPSY Left      "CHOLECYSTECTOMY      COLONOSCOPY      COLONOSCOPY N/A 11/2/2020    Procedure: COLONOSCOPY; polypectomy;  Surgeon: Cj Fallon MD;  Location:  [facility];  Service: Gastroenterology;  Laterality: N/A;  DIVERTICULOSIS  POLYP      HYSTERECTOMY      ORIF WRIST FRACTURE Left 2/9/2024    Procedure: WRIST OPEN REDUCTION INTERNAL FIXATION, possible distal radio-ulnar joint stabilization, all associated procedures;  Surgeon: Shoaib Lamb MD;  Location:  [facility];  Service: Orthopedics;  Laterality: Left;    TONSILLECTOMY      TUBAL ABDOMINAL LIGATION         Family History   Problem Relation Age of Onset    Hypertension Mother     Heart disease Father     Diabetes Sister     Breast cancer Neg Hx     Colon cancer Neg Hx     Colon polyps Neg Hx     Malig Hyperthermia Neg Hx                Objective:  Physical Exam    Vital signs reviewed.   General: No acute distress.  Eyes: conjunctiva clear; pupils equally round and reactive  ENT: external ears and nose atraumatic; oropharynx clear  CV: no peripheral edema  Resp: normal respiratory effort  Skin: no rashes or wounds; normal turgor  Psych: mood and affect appropriate; recent and remote memory intact    Vitals:    10/30/24 1351   BP: 165/70   Pulse: 91   Weight: 54.4 kg (120 lb)   Height: 162.6 cm (64\")         10/30/24  1351   Weight: 54.4 kg (120 lb)     Body mass index is 20.6 kg/m².      Ortho Exam     Physical Exam  Right foot examined:  Flex/extend all digits right foot  Brisk cap refill all digits right foot  Maximal tenderness present along the base of the fourth metatarsal with associated swelling  Mild swelling along the ATF no tenderness to palpate  Dorsiflexion 25 degrees plantarflexion 45 degrees with 4 out of 5 strength    Imaging:  Right Foot X-Ray  Indication: Pain  AP, Lateral, and Oblique views    Findings:  Suspected nondisplaced fracture along the base of the fourth metatarsal no evidence of fracture displacement no evidence of fracture " along the fifth digit no other acute osseous abnormality identified on x-ray imaging, diffuse decrease bone mineral density along entire aspect of foot  No bony lesion  Normal soft tissues  Normal joint spaces    No prior studies were available for comparison.    Assessment:        1. Right foot pain         Assessment & Plan  1. Right foot injury.  The injury occurred on 10/10/2024 when she rolled her ankle, resulting in pain at the dorsum and plantar aspect of the foot, specifically along the base of the fourth metatarsal. She rates the discomfort as 1-2 out of 10, dull in nature, with some swelling to the dorsum and lateral aspect of the foot. Bruising was initially present, along with stiffness and discomfort during ambulatory activities. She has been using tennis shoes for greater support and taking Advil. No recent x-ray, MRI, or CT has been done. Symptoms have improved, but she is preparing to start exercising at the gym. A likely nondisplaced fracture along the base of the fourth metatarsal was discussed. She should avoid walking on a treadmill for now and instead use a recumbent bike. She can proceed with the leg press machine, abduction, adduction exercises, and upper body workouts. Discomfort is expected to continue for the next few weeks. Ice is recommended if swelling occurs. No evidence of a displaced fracture along the fourth digit and no abnormality along the fifth digit. Continue with activity as tolerated. She verbalized understanding and agreed with the plan of care. Follow-up if symptoms do not improve.      Orders:  Orders Placed This Encounter   Procedures    XR Foot 3+ View Right     No orders of the defined types were placed in this encounter.      Dragon dictation utilized  BMI is within normal parameters. No other follow-up for BMI required.       Patient or patient representative verbalized consent for the use of Ambient Listening during the visit with  OPAL Gill for chart  documentation. 10/30/2024  20:03 EDT

## 2024-12-11 DIAGNOSIS — E03.9 ACQUIRED HYPOTHYROIDISM: ICD-10-CM

## 2024-12-11 RX ORDER — LEVOTHYROXINE SODIUM 88 MCG
TABLET ORAL
Qty: 90 TABLET | Refills: 0 | Status: SHIPPED | OUTPATIENT
Start: 2024-12-11

## 2024-12-11 NOTE — TELEPHONE ENCOUNTER
Rx Refill Note  Requested Prescriptions     Pending Prescriptions Disp Refills    Synthroid 88 MCG tablet [Pharmacy Med Name: SYNTHROID 88 MCG TABLET] 90 tablet 1     Sig: TAKE 1 TABLET BY MOUTH DAILY      Last office visit with prescribing clinician: 9/13/2024   Last telemedicine visit with prescribing clinician: Visit date not found   Next office visit with prescribing clinician: 12/20/2024                         Would you like a call back once the refill request has been completed: [] Yes [] No    If the office needs to give you a call back, can they leave a voicemail: [] Yes [] No    Zayda Lobo MA  12/11/24, 09:40 EST

## 2024-12-16 ENCOUNTER — LAB (OUTPATIENT)
Dept: LAB | Facility: HOSPITAL | Age: 79
End: 2024-12-16
Payer: MEDICARE

## 2024-12-16 PROCEDURE — 80048 BASIC METABOLIC PNL TOTAL CA: CPT | Performed by: NURSE PRACTITIONER

## 2024-12-16 PROCEDURE — 84443 ASSAY THYROID STIM HORMONE: CPT | Performed by: NURSE PRACTITIONER

## 2024-12-16 PROCEDURE — 82043 UR ALBUMIN QUANTITATIVE: CPT | Performed by: NURSE PRACTITIONER

## 2024-12-16 PROCEDURE — 82570 ASSAY OF URINE CREATININE: CPT | Performed by: NURSE PRACTITIONER

## 2024-12-16 PROCEDURE — 83036 HEMOGLOBIN GLYCOSYLATED A1C: CPT | Performed by: NURSE PRACTITIONER

## 2024-12-16 PROCEDURE — 84439 ASSAY OF FREE THYROXINE: CPT | Performed by: NURSE PRACTITIONER

## 2024-12-20 ENCOUNTER — OFFICE VISIT (OUTPATIENT)
Dept: ENDOCRINOLOGY | Age: 79
End: 2024-12-20
Payer: MEDICARE

## 2024-12-20 VITALS
TEMPERATURE: 97.6 F | WEIGHT: 124.6 LBS | HEART RATE: 70 BPM | SYSTOLIC BLOOD PRESSURE: 134 MMHG | DIASTOLIC BLOOD PRESSURE: 68 MMHG | HEIGHT: 64 IN | BODY MASS INDEX: 21.27 KG/M2 | OXYGEN SATURATION: 99 %

## 2024-12-20 DIAGNOSIS — E10.65 TYPE 1 DIABETES MELLITUS WITH HYPERGLYCEMIA: ICD-10-CM

## 2024-12-20 DIAGNOSIS — I10 PRIMARY HYPERTENSION: ICD-10-CM

## 2024-12-20 DIAGNOSIS — E78.2 MIXED HYPERLIPIDEMIA: ICD-10-CM

## 2024-12-20 DIAGNOSIS — E03.9 ACQUIRED HYPOTHYROIDISM: ICD-10-CM

## 2024-12-20 PROCEDURE — 99214 OFFICE O/P EST MOD 30 MIN: CPT | Performed by: NURSE PRACTITIONER

## 2024-12-20 PROCEDURE — 3075F SYST BP GE 130 - 139MM HG: CPT | Performed by: NURSE PRACTITIONER

## 2024-12-20 PROCEDURE — 3078F DIAST BP <80 MM HG: CPT | Performed by: NURSE PRACTITIONER

## 2024-12-20 PROCEDURE — 95251 CONT GLUC MNTR ANALYSIS I&R: CPT | Performed by: NURSE PRACTITIONER

## 2024-12-20 RX ORDER — INSULIN ASPART 100 [IU]/ML
INJECTION, SOLUTION INTRAVENOUS; SUBCUTANEOUS
Qty: 100 ML | Refills: 1 | Status: SHIPPED | OUTPATIENT
Start: 2024-12-20

## 2024-12-20 RX ORDER — LEVOTHYROXINE SODIUM 88 MCG
TABLET ORAL
Qty: 90 TABLET | Refills: 1 | Status: SHIPPED | OUTPATIENT
Start: 2024-12-20

## 2024-12-20 RX ORDER — ATORVASTATIN CALCIUM 20 MG/1
20 TABLET, FILM COATED ORAL DAILY
Qty: 90 TABLET | Refills: 1 | Status: SHIPPED | OUTPATIENT
Start: 2024-12-20

## 2024-12-20 NOTE — PROGRESS NOTES
"Chief Complaint  Diabetes    Subjective        Olga Greco presents to [facility] ENDOCRINOLOGY  History of Present Illness    Type 1 dm, 2003   Current diabetic regimen: Medtronic 780g with smartguard technology with novolog vials   Last eye exam: April 2024  Back up plan: insulin syringes and vials   CV: atorvastatin 20mg QD  Renal: benazepril 20mg QD  Glucagon: yes  Hypothyroid: Synthroid 88mcg daily        Cgm review 12/7/24-12/20/24  73% time in range  1% low  26% high  11% very high  Gmi 7%  TDD 20u  Bolus 57%      Objective   Vital Signs:  /68   Pulse 70   Temp 97.6 °F (36.4 °C) (Oral)   Ht 162.6 cm (64.02\")   Wt 56.5 kg (124 lb 9.6 oz)   SpO2 99%   BMI 21.38 kg/m²   Estimated body mass index is 21.38 kg/m² as calculated from the following:    Height as of this encounter: 162.6 cm (64.02\").    Weight as of this encounter: 56.5 kg (124 lb 9.6 oz).    BMI is within normal parameters. No other follow-up for BMI required.      Physical Exam  Vitals reviewed.   Constitutional:       General: She is not in acute distress.  HENT:      Head: Normocephalic and atraumatic.   Cardiovascular:      Rate and Rhythm: Normal rate.   Pulmonary:      Effort: Pulmonary effort is normal. No respiratory distress.   Musculoskeletal:         General: No signs of injury. Normal range of motion.      Cervical back: Normal range of motion and neck supple.   Skin:     General: Skin is warm and dry.   Neurological:      Mental Status: She is alert and oriented to person, place, and time. Mental status is at baseline.   Psychiatric:         Mood and Affect: Mood normal.         Behavior: Behavior normal.         Thought Content: Thought content normal.         Judgment: Judgment normal.        Result Review :  The following data was reviewed by: OPAL Jiménez on 12/20/2024:  Common labs          3/8/2024    13:29 6/18/2024    11:15 12/16/2024    15:26   Common Labs   Glucose 96  169  116    BUN 17  " 18  16    Creatinine 0.84  0.75  0.76    Sodium 139  138  140    Potassium 4.1  4.2  4.1    Chloride 102  100  102    Calcium 9.6  9.8  9.6    Albumin  4.6     Total Bilirubin  1.3     Alkaline Phosphatase  64     AST (SGOT)  31     ALT (SGPT)  34     WBC  4.81     Hemoglobin  13.0     Hematocrit  38.4     Platelets  222     Total Cholesterol  175     Triglycerides  46     HDL Cholesterol  117     LDL Cholesterol   48     Hemoglobin A1C 6.40  7.60  7.10    Microalbumin, Urine   1.5                Assessment and Plan   Diagnoses and all orders for this visit:    1. Type 1 diabetes mellitus with hyperglycemia [E10.65 (ICD-10-CM)]  -     NovoLOG 100 UNIT/ML injection; Use up to 50u daily with pump  Dispense: 100 mL; Refill: 1    2. Acquired hypothyroidism  -     Synthroid 88 MCG tablet; TAKE 1 TABLET BY MOUTH DAILY  Dispense: 90 tablet; Refill: 1    3. Primary hypertension  -     atorvastatin (LIPITOR) 20 MG tablet; Take 1 tablet by mouth Daily.  Dispense: 90 tablet; Refill: 1    4. Mixed hyperlipidemia  -     atorvastatin (LIPITOR) 20 MG tablet; Take 1 tablet by mouth Daily.  Dispense: 90 tablet; Refill: 1             Follow Up   Return in about 3 months (around 3/20/2025).    Cgm reviewed, at goal  A1c at goal  Thyroid labs stable   Continue plan as above, no additional changes made at this time     Patient was given instructions and counseling regarding her condition or for health maintenance advice. Please see specific information pulled into the AVS if appropriate.     OPAL Jiménez

## 2025-01-29 RX ORDER — BENAZEPRIL HYDROCHLORIDE AND HYDROCHLOROTHIAZIDE 20; 12.5 MG/1; MG/1
1 TABLET ORAL DAILY
Qty: 30 TABLET | Refills: 0 | Status: SHIPPED | OUTPATIENT
Start: 2025-01-29

## 2025-01-29 RX ORDER — LEVOTHYROXINE SODIUM 88 MCG
88 TABLET ORAL
Qty: 14 TABLET | Refills: 0 | COMMUNITY
Start: 2025-01-29

## 2025-01-29 NOTE — TELEPHONE ENCOUNTER
Sending short term. Discussed with pcp.    Rx Refill Note  Requested Prescriptions     Pending Prescriptions Disp Refills    benazepril-hydrochlorthiazide (LOTENSIN HCT) 20-12.5 MG per tablet 90 tablet 1     Sig: Take 1 tablet by mouth Daily. 1 TIME DAILY      Last office visit with prescribing clinician: 6/19/2024   Last telemedicine visit with prescribing clinician: Visit date not found   Next office visit with prescribing clinician: Visit date not found                         Would you like a call back once the refill request has been completed: [] Yes [] No    If the office needs to give you a call back, can they leave a voicemail: [] Yes [] No    Marcin Cameron MA  01/29/25, 13:06 EST

## 2025-02-10 NOTE — TELEPHONE ENCOUNTER
PATIENT IS CALLING IN SHE IS WANTING TO KNOW STATUS ON REFERRAL FOR DOCTOR TO CHECK ON HAND AND WRIST STILL HAS NOT HEARD ANYTHING ABOUT IT.      CALLBACK NUMBER IS  [phone]    Never smoker

## 2025-02-12 ENCOUNTER — TELEPHONE (OUTPATIENT)
Dept: ENDOCRINOLOGY | Age: 80
End: 2025-02-12
Payer: MEDICARE

## 2025-02-12 NOTE — TELEPHONE ENCOUNTER
Called and spoke with patient regarding the pump.  Jocy from [employer] was in the office today. Jocy is going to reach to Olga and help her with the setting

## 2025-03-27 ENCOUNTER — TELEPHONE (OUTPATIENT)
Dept: ENDOCRINOLOGY | Age: 80
End: 2025-03-27

## 2025-03-27 ENCOUNTER — TELEPHONE (OUTPATIENT)
Dept: ENDOCRINOLOGY | Age: 80
End: 2025-03-27
Payer: MEDICARE

## 2025-03-27 RX ORDER — AVOBENZONE, HOMOSALATE, OCTISALATE, OCTOCRYLENE 30; 40; 45; 26 MG/ML; MG/ML; MG/ML; MG/ML
CREAM TOPICAL
Qty: 300 EACH | Refills: 4 | Status: SHIPPED | OUTPATIENT
Start: 2025-03-27

## 2025-03-27 NOTE — TELEPHONE ENCOUNTER
Provider: MICHAEL COATES    Caller: Olga Greco    Relationship to Patient: Self    Pharmacy: [facility]    Phone Number: [phone]    Reason for Call: PATIENT NEEDS LANCETS AND TEST STRIPS REFILLED FOR THE ACCU CHEK GUIDE. DON'T SEE ON THE MEDICATION LIST TO REQUEST. PLEASE ADVISE

## 2025-03-27 NOTE — TELEPHONE ENCOUNTER
Hub staff attempted to follow warm transfer process and was unsuccessful     Caller: Olga Greco    Relationship to patient: Self    Best call back number: [phone]    Patient is needing: PT RETURNING TSERING'S PHONE CALL . ASKED FOR A CALL BACK

## 2025-04-01 ENCOUNTER — OFFICE VISIT (OUTPATIENT)
Dept: ENDOCRINOLOGY | Age: 80
End: 2025-04-01
Payer: MEDICARE

## 2025-04-01 VITALS
BODY MASS INDEX: 21.41 KG/M2 | HEIGHT: 64 IN | TEMPERATURE: 97.7 F | WEIGHT: 125.4 LBS | DIASTOLIC BLOOD PRESSURE: 82 MMHG | HEART RATE: 58 BPM | SYSTOLIC BLOOD PRESSURE: 126 MMHG | OXYGEN SATURATION: 99 %

## 2025-04-01 DIAGNOSIS — E78.2 MIXED HYPERLIPIDEMIA: ICD-10-CM

## 2025-04-01 DIAGNOSIS — E10.65 TYPE 1 DIABETES MELLITUS WITH HYPERGLYCEMIA: Primary | ICD-10-CM

## 2025-04-01 DIAGNOSIS — E03.9 ACQUIRED HYPOTHYROIDISM: ICD-10-CM

## 2025-04-01 PROCEDURE — 95251 CONT GLUC MNTR ANALYSIS I&R: CPT | Performed by: NURSE PRACTITIONER

## 2025-04-01 PROCEDURE — 3074F SYST BP LT 130 MM HG: CPT | Performed by: NURSE PRACTITIONER

## 2025-04-01 PROCEDURE — 99214 OFFICE O/P EST MOD 30 MIN: CPT | Performed by: NURSE PRACTITIONER

## 2025-04-01 PROCEDURE — 3079F DIAST BP 80-89 MM HG: CPT | Performed by: NURSE PRACTITIONER

## 2025-04-01 NOTE — PROGRESS NOTES
"Chief Complaint  Diabetes    Subjective        Olga Greco presents to [facility] ENDOCRINOLOGY  History of Present Illness    Type 1 dm,    Current diabetic regimen: Medtronic 780g with smartguard technology with novolog vials   Recently had to get a new pump after the one she had / malfunctioned  Last eye exam: 2024  Back up plan: insulin syringes and vials   CV: atorvastatin 20mg QD  Renal: benazepril 20mg QD  Glucagon: yes  Hypothyroid: Synthroid 88mcg daily        Cgm review 3/19/25-25  64% time in range  0% low  31% high  5% very high  Gmi 7.2%  TDD 16u  Bolus 61%    Objective   Vital Signs:  /82   Pulse 58   Temp 97.7 °F (36.5 °C) (Oral)   Ht 162.6 cm (64.02\")   Wt 56.9 kg (125 lb 6.4 oz)   SpO2 99%   BMI 21.51 kg/m²   Estimated body mass index is 21.51 kg/m² as calculated from the following:    Height as of this encounter: 162.6 cm (64.02\").    Weight as of this encounter: 56.9 kg (125 lb 6.4 oz).    BMI is within normal parameters. No other follow-up for BMI required.      Physical Exam  Vitals reviewed.   Constitutional:       General: She is not in acute distress.  HENT:      Head: Normocephalic and atraumatic.   Cardiovascular:      Rate and Rhythm: Normal rate.   Pulmonary:      Effort: Pulmonary effort is normal. No respiratory distress.   Musculoskeletal:         General: No signs of injury. Normal range of motion.      Cervical back: Normal range of motion and neck supple.   Skin:     General: Skin is warm and dry.   Neurological:      Mental Status: She is alert and oriented to person, place, and time. Mental status is at baseline.   Psychiatric:         Mood and Affect: Mood normal.         Behavior: Behavior normal.         Thought Content: Thought content normal.         Judgment: Judgment normal.        Result Review :  The following data was reviewed by: OPAL Jiménez on 2025:  Common labs          2024    11:15 2024    " 15:26   Common Labs   Glucose 169  116    BUN 18  16    Creatinine 0.75  0.76    Sodium 138  140    Potassium 4.2  4.1    Chloride 100  102    Calcium 9.8  9.6    Albumin 4.6     Total Bilirubin 1.3     Alkaline Phosphatase 64     AST (SGOT) 31     ALT (SGPT) 34     WBC 4.81     Hemoglobin 13.0     Hematocrit 38.4     Platelets 222     Total Cholesterol 175     Triglycerides 46     HDL Cholesterol 117     LDL Cholesterol  48     Hemoglobin A1C 7.60  7.10    Microalbumin, Urine  1.5                Assessment and Plan   Diagnoses and all orders for this visit:    1. Type 1 diabetes mellitus with hyperglycemia (Primary)  -     Basic Metabolic Panel; Future  -     Hemoglobin A1c; Future    2. Acquired hypothyroidism    3. Mixed hyperlipidemia             Follow Up   Return in about 3 months (around 7/1/2025).    Labs at [facility] sometime this week  No additional pump setting changes made at this time  Cgm reviewed, favorable  Continue plan as above for now, additional recommendations to follow as needed    Patient was given instructions and counseling regarding her condition or for health maintenance advice. Please see specific information pulled into the AVS if appropriate.     OPAL Jiménez

## 2025-04-04 ENCOUNTER — LAB (OUTPATIENT)
Dept: LAB | Facility: HOSPITAL | Age: 80
End: 2025-04-04
Payer: MEDICARE

## 2025-04-04 DIAGNOSIS — E10.65 TYPE 1 DIABETES MELLITUS WITH HYPERGLYCEMIA: ICD-10-CM

## 2025-04-04 LAB
ANION GAP SERPL CALCULATED.3IONS-SCNC: 12.2 MMOL/L (ref 5–15)
BUN SERPL-MCNC: 17 MG/DL (ref 8–23)
BUN/CREAT SERPL: 22.4 (ref 7–25)
CALCIUM SPEC-SCNC: 9.8 MG/DL (ref 8.6–10.5)
CHLORIDE SERPL-SCNC: 101 MMOL/L (ref 98–107)
CO2 SERPL-SCNC: 26.8 MMOL/L (ref 22–29)
CREAT SERPL-MCNC: 0.76 MG/DL (ref 0.57–1)
EGFRCR SERPLBLD CKD-EPI 2021: 80.8 ML/MIN/1.73
GLUCOSE SERPL-MCNC: 166 MG/DL (ref 65–99)
HBA1C MFR BLD: 7.2 % (ref 4.8–5.6)
POTASSIUM SERPL-SCNC: 4.1 MMOL/L (ref 3.5–5.2)
SODIUM SERPL-SCNC: 140 MMOL/L (ref 136–145)

## 2025-04-04 PROCEDURE — 36415 COLL VENOUS BLD VENIPUNCTURE: CPT

## 2025-04-04 PROCEDURE — 83036 HEMOGLOBIN GLYCOSYLATED A1C: CPT

## 2025-04-04 PROCEDURE — 80048 BASIC METABOLIC PNL TOTAL CA: CPT

## 2025-05-12 NOTE — TELEPHONE ENCOUNTER
Rx Refill Note  Requested Prescriptions     Pending Prescriptions Disp Refills    benazepril-hydrochlorthiazide (LOTENSIN HCT) 20-12.5 MG per tablet 30 tablet 0     Sig: Take 1 tablet by mouth Daily. 1 TIME DAILY      Last office visit with prescribing clinician: 6/19/2024   Last telemedicine visit with prescribing clinician: Visit date not found   Next office visit with prescribing clinician: Visit date not found                         Would you like a call back once the refill request has been completed: [] Yes [] No    If the office needs to give you a call back, can they leave a voicemail: [] Yes [] No    Marcin Cameron MA  05/12/25, 16:29 EDT

## 2025-05-13 ENCOUNTER — OFFICE VISIT (OUTPATIENT)
Dept: INTERNAL MEDICINE | Facility: CLINIC | Age: 80
End: 2025-05-13
Payer: MEDICARE

## 2025-05-13 VITALS
BODY MASS INDEX: 21.51 KG/M2 | HEART RATE: 73 BPM | WEIGHT: 125.4 LBS | SYSTOLIC BLOOD PRESSURE: 114 MMHG | DIASTOLIC BLOOD PRESSURE: 70 MMHG | TEMPERATURE: 97.8 F | OXYGEN SATURATION: 99 %

## 2025-05-13 DIAGNOSIS — R30.0 DYSURIA: Primary | ICD-10-CM

## 2025-05-13 LAB
BILIRUB BLD-MCNC: NEGATIVE MG/DL
CLARITY, POC: CLEAR
COLOR UR: YELLOW
EXPIRATION DATE: ABNORMAL
GLUCOSE UR STRIP-MCNC: NEGATIVE MG/DL
KETONES UR QL: NEGATIVE
LEUKOCYTE EST, POC: ABNORMAL
Lab: ABNORMAL
NITRITE UR-MCNC: NEGATIVE MG/ML
PH UR: 7 [PH] (ref 5–8)
PROT UR STRIP-MCNC: NEGATIVE MG/DL
RBC # UR STRIP: ABNORMAL /UL
SP GR UR: 1 (ref 1–1.03)
UROBILINOGEN UR QL: ABNORMAL

## 2025-05-13 RX ORDER — FLUCONAZOLE 150 MG/1
150 TABLET ORAL ONCE
Qty: 1 TABLET | Refills: 0 | Status: SHIPPED | OUTPATIENT
Start: 2025-05-13 | End: 2025-05-13

## 2025-05-13 RX ORDER — BENAZEPRIL HYDROCHLORIDE AND HYDROCHLOROTHIAZIDE 20; 12.5 MG/1; MG/1
1 TABLET ORAL DAILY
Qty: 30 TABLET | Refills: 2 | Status: SHIPPED | OUTPATIENT
Start: 2025-05-13

## 2025-05-13 RX ORDER — CEPHALEXIN 500 MG/1
500 CAPSULE ORAL 2 TIMES DAILY
Qty: 10 CAPSULE | Refills: 0 | Status: SHIPPED | OUTPATIENT
Start: 2025-05-13 | End: 2025-05-18

## 2025-05-13 NOTE — PROGRESS NOTES
Olga Greco is a 79 y.o. female, who presents with a chief complaint of   Chief Complaint   Patient presents with    Difficulty Urinating     Started last night felt irritated after urinating            HPI   Pt noticed vaginal irritation and pressure about 1-2 days ago.  She feels like she needs to go and only a few drop come out. She does not have recuurent UTI's or yeast infections.        The following portions of the patient's history were reviewed and updated as appropriate: allergies, current medications, past family history, past medical history, past social history, past surgical history and problem list.    Allergies: Aspirin and Epinephrine    Review of Systems   Constitutional: Negative.    HENT: Negative.     Eyes: Negative.    Respiratory: Negative.     Cardiovascular: Negative.    Gastrointestinal: Negative.    Endocrine: Negative.    Genitourinary:  Positive for dysuria.   Musculoskeletal: Negative.    Skin: Negative.    Allergic/Immunologic: Negative.    Neurological: Negative.    Hematological: Negative.    Psychiatric/Behavioral: Negative.     All other systems reviewed and are negative.            Wt Readings from Last 3 Encounters:   05/13/25 56.9 kg (125 lb 6.4 oz)   04/01/25 56.9 kg (125 lb 6.4 oz)   12/20/24 56.5 kg (124 lb 9.6 oz)     Temp Readings from Last 3 Encounters:   05/13/25 97.8 °F (36.6 °C) (Infrared)   04/01/25 97.7 °F (36.5 °C) (Oral)   12/20/24 97.6 °F (36.4 °C) (Oral)     BP Readings from Last 3 Encounters:   05/13/25 114/70   04/01/25 126/82   12/20/24 134/68     Pulse Readings from Last 3 Encounters:   05/13/25 73   04/01/25 58   12/20/24 70     Body mass index is 21.51 kg/m².  SpO2 Readings from Last 3 Encounters:   05/13/25 99%   04/01/25 99%   12/20/24 99%          Physical Exam  Vitals and nursing note reviewed.   Constitutional:       General: She is not in acute distress.     Appearance: She is well-developed.   HENT:      Head: Normocephalic and atraumatic.       Right Ear: External ear normal.      Left Ear: External ear normal.      Nose: Nose normal.   Eyes:      Conjunctiva/sclera: Conjunctivae normal.      Pupils: Pupils are equal, round, and reactive to light.   Cardiovascular:      Rate and Rhythm: Normal rate and regular rhythm.      Heart sounds: Normal heart sounds.   Pulmonary:      Effort: Pulmonary effort is normal. No respiratory distress.      Breath sounds: Normal breath sounds. No wheezing.   Musculoskeletal:         General: Normal range of motion.      Cervical back: Normal range of motion and neck supple.      Comments: Normal gait   Skin:     General: Skin is warm and dry.   Neurological:      Mental Status: She is alert and oriented to person, place, and time.   Psychiatric:         Behavior: Behavior normal.         Thought Content: Thought content normal.         Judgment: Judgment normal.         Results for orders placed or performed in visit on 05/13/25   POC Urinalysis Dipstick, Automated    Collection Time: 05/13/25 12:32 PM    Specimen: Urine   Result Value Ref Range    Color Yellow Yellow, Straw, Dark Yellow, Naomi    Clarity, UA Clear Clear    Specific Gravity  1.005 1.005 - 1.030    pH, Urine 7.0 5.0 - 8.0    Leukocytes Small (1+) (A) Negative    Nitrite, UA Negative Negative    Protein, POC Negative Negative mg/dL    Glucose, UA Negative Negative mg/dL    Ketones, UA Negative Negative    Urobilinogen, UA 0.2 E.U./dL Normal, 0.2 E.U./dL    Bilirubin Negative Negative    Blood, UA Moderate (A) Negative    Lot Number [id number]     Expiration Date 4/10/2026      Result Review :                  Assessment and Plan    Diagnoses and all orders for this visit:    1. Dysuria (Primary)  -     POC Urinalysis Dipstick, Automated  -     fluconazole (Diflucan) 150 MG tablet; Take 1 tablet by mouth 1 (One) Time for 1 dose.  Dispense: 1 tablet; Refill: 0  -     Urine Culture - Urine, Urine, Clean Catch; Future  -     cephalexin (KEFLEX) 500 MG  "capsule; Take 1 capsule by mouth 2 (Two) Times a Day for 5 days.  Dispense: 10 capsule; Refill: 0  -     Urine Culture - Urine, Urine, Clean Catch         BMI is within normal parameters. No other follow-up for BMI required.             Outpatient Medications Prior to Visit   Medication Sig Dispense Refill    atorvastatin (LIPITOR) 20 MG tablet Take 1 tablet by mouth Daily. 90 tablet 1    B Complex Vitamins (VITAMIN B COMPLEX) capsule capsule Take 1 capsule by mouth Daily.      benazepril-hydrochlorthiazide (LOTENSIN HCT) 20-12.5 MG per tablet Take 1 tablet by mouth Daily. 1 TIME DAILY 30 tablet 2    Biotin 70305 MCG tablet Take 1 tablet by mouth Daily.      cholecalciferol (Vitamin D) 25 MCG (1000 UT) tablet Take 1 tablet by mouth Daily.      Chromium Picolinate 1000 MCG tablet Take 1,000 mcg by mouth Daily.      colestipol (COLESTID) 1 g tablet Take 1 tablet by mouth Every Night. 90 tablet 0    Continuous Blood Gluc Sensor (Dexcom G6 Sensor) Use Every 10 (Ten) Days. e10.65 3 each 0    diclofenac (VOLTAREN) 1 % gel gel Apply 4 g topically to the appropriate area as directed 4 (Four) Times a Day. (Patient taking differently: Apply 4 g topically to the appropriate area as directed 4 (Four) Times a Day As Needed (pain).) 100 g 5    Ferrous Sulfate (IRON PO) Take  by mouth Every Other Day.      glucagon (GLUCAGEN) 1 MG injection Inject 1 mg under the skin into the appropriate area as directed 1 (One) Time As Needed for Low Blood Sugar (complicated hypoglycemia). 1 kit 1    glucose blood test strip Dispense based on insurance preference: Check 3 times a day Dx: E 10.65 300 each 4    ibuprofen (ADVIL,MOTRIN) 200 MG tablet Take 1 tablet by mouth Every 6 (Six) Hours As Needed for Mild Pain.      Insulin Syringe 29G X 1/2\" 0.3 ML misc For use to give insulin from vial up to 5 times a day if pump failure e10.65 500 each 4    Lancets misc Dispense based on insurance preference: Check 3 times a day. Dx: E 10.65 300 each 4    " NovoLOG 100 UNIT/ML injection Use up to 50u daily with pump 100 mL 1    Probiotic Product (PROBIOTIC DAILY PO) Take 1 capsule by mouth Daily.      Synthroid 88 MCG tablet TAKE 1 TABLET BY MOUTH DAILY 90 tablet 1    Pediatric Multivitamins-Iron (FLINTSTONES PLUS IRON PO) Take 1 tablet by mouth Daily. (Patient not taking: Reported on 12/20/2024)       No facility-administered medications prior to visit.     New Medications Ordered This Visit   Medications    fluconazole (Diflucan) 150 MG tablet     Sig: Take 1 tablet by mouth 1 (One) Time for 1 dose.     Dispense:  1 tablet     Refill:  0    cephalexin (KEFLEX) 500 MG capsule     Sig: Take 1 capsule by mouth 2 (Two) Times a Day for 5 days.     Dispense:  10 capsule     Refill:  0     [unfilled]  There are no discontinued medications.      No follow-ups on file.    Patient was given instructions and counseling regarding her condition or for health maintenance advice. Please see specific information pulled into the AVS if appropriate.

## 2025-05-13 NOTE — TELEPHONE ENCOUNTER
Rx Refill Note  Requested Prescriptions     Pending Prescriptions Disp Refills    benazepril-hydrochlorthiazide (LOTENSIN HCT) 20-12.5 MG per tablet 30 tablet 2     Sig: Take 1 tablet by mouth Daily. 1 TIME DAILY      Last office visit with prescribing clinician: 6/19/2024   Last telemedicine visit with prescribing clinician: Visit date not found   Next office visit with prescribing clinician: Visit date not found                         Would you like a call back once the refill request has been completed: [] Yes [] No    If the office needs to give you a call back, can they leave a voicemail: [] Yes [] No    Marcin Cameron MA  05/13/25, 10:49 EDT

## 2025-05-16 LAB
BACTERIA UR CULT: ABNORMAL
BACTERIA UR CULT: ABNORMAL
OTHER ANTIBIOTIC SUSC ISLT: ABNORMAL

## 2025-05-17 ENCOUNTER — DOCUMENTATION (OUTPATIENT)
Dept: INTERNAL MEDICINE | Facility: CLINIC | Age: 80
End: 2025-05-17
Payer: MEDICARE

## 2025-05-17 RX ORDER — SULFAMETHOXAZOLE AND TRIMETHOPRIM 800; 160 MG/1; MG/1
1 TABLET ORAL 2 TIMES DAILY
Qty: 6 TABLET | Refills: 0 | Status: SHIPPED | OUTPATIENT
Start: 2025-05-17

## 2025-06-02 ENCOUNTER — LAB (OUTPATIENT)
Dept: LAB | Facility: HOSPITAL | Age: 80
End: 2025-06-02
Payer: MEDICARE

## 2025-06-02 DIAGNOSIS — E55.9 VITAMIN D DEFICIENCY: Primary | ICD-10-CM

## 2025-06-02 DIAGNOSIS — E03.9 ACQUIRED HYPOTHYROIDISM: ICD-10-CM

## 2025-06-02 DIAGNOSIS — E10.65 TYPE 1 DIABETES MELLITUS WITH HYPERGLYCEMIA: ICD-10-CM

## 2025-06-02 DIAGNOSIS — E55.9 VITAMIN D DEFICIENCY: ICD-10-CM

## 2025-06-02 LAB
25(OH)D3 SERPL-MCNC: 40.1 NG/ML (ref 30–100)
ALBUMIN SERPL-MCNC: 4.1 G/DL (ref 3.5–5.2)
ALBUMIN/GLOB SERPL: 2.2 G/DL
ALP SERPL-CCNC: 63 U/L (ref 39–117)
ALT SERPL W P-5'-P-CCNC: 38 U/L (ref 1–33)
ANION GAP SERPL CALCULATED.3IONS-SCNC: 9 MMOL/L (ref 5–15)
AST SERPL-CCNC: 34 U/L (ref 1–32)
BILIRUB SERPL-MCNC: 1 MG/DL (ref 0–1.2)
BUN SERPL-MCNC: 13 MG/DL (ref 8–23)
BUN/CREAT SERPL: 22 (ref 7–25)
CALCIUM SPEC-SCNC: 9.7 MG/DL (ref 8.6–10.5)
CHLORIDE SERPL-SCNC: 104 MMOL/L (ref 98–107)
CHOLEST SERPL-MCNC: 144 MG/DL (ref 0–200)
CO2 SERPL-SCNC: 27 MMOL/L (ref 22–29)
CREAT SERPL-MCNC: 0.59 MG/DL (ref 0.57–1)
DEPRECATED RDW RBC AUTO: 39.5 FL (ref 37–54)
EGFRCR SERPLBLD CKD-EPI 2021: 93 ML/MIN/1.73
ERYTHROCYTE [DISTWIDTH] IN BLOOD BY AUTOMATED COUNT: 11.2 % (ref 12.3–15.4)
GLOBULIN UR ELPH-MCNC: 1.9 GM/DL
GLUCOSE SERPL-MCNC: 137 MG/DL (ref 65–99)
HBA1C MFR BLD: 7.3 % (ref 4.8–5.6)
HCT VFR BLD AUTO: 37.9 % (ref 34–46.6)
HDLC SERPL QL: 1.55
HDLC SERPL-MCNC: 93 MG/DL (ref 40–60)
HGB BLD-MCNC: 12.9 G/DL (ref 12–15.9)
LDLC SERPL CALC-MCNC: 40 MG/DL (ref 0–100)
MCH RBC QN AUTO: 33.3 PG (ref 26.6–33)
MCHC RBC AUTO-ENTMCNC: 34 G/DL (ref 31.5–35.7)
MCV RBC AUTO: 97.9 FL (ref 79–97)
PLATELET # BLD AUTO: 215 10*3/MM3 (ref 140–450)
PMV BLD AUTO: 9.2 FL (ref 6–12)
POTASSIUM SERPL-SCNC: 3.9 MMOL/L (ref 3.5–5.2)
PROT SERPL-MCNC: 6 G/DL (ref 6–8.5)
RBC # BLD AUTO: 3.87 10*6/MM3 (ref 3.77–5.28)
SODIUM SERPL-SCNC: 140 MMOL/L (ref 136–145)
T4 FREE SERPL-MCNC: 1.26 NG/DL (ref 0.92–1.68)
TRIGL SERPL-MCNC: 46 MG/DL (ref 0–150)
TSH SERPL DL<=0.05 MIU/L-ACNC: 1.72 UIU/ML (ref 0.27–4.2)
VIT B12 BLD-MCNC: 426 PG/ML (ref 211–946)
VLDLC SERPL-MCNC: 11 MG/DL (ref 5–40)
WBC NRBC COR # BLD AUTO: 4.71 10*3/MM3 (ref 3.4–10.8)

## 2025-06-02 PROCEDURE — 80053 COMPREHEN METABOLIC PANEL: CPT | Performed by: NURSE PRACTITIONER

## 2025-06-02 PROCEDURE — 36415 COLL VENOUS BLD VENIPUNCTURE: CPT

## 2025-06-02 PROCEDURE — 84439 ASSAY OF FREE THYROXINE: CPT | Performed by: NURSE PRACTITIONER

## 2025-06-02 PROCEDURE — 84443 ASSAY THYROID STIM HORMONE: CPT | Performed by: NURSE PRACTITIONER

## 2025-06-02 PROCEDURE — 82607 VITAMIN B-12: CPT | Performed by: FAMILY MEDICINE

## 2025-06-02 PROCEDURE — 80061 LIPID PANEL: CPT | Performed by: NURSE PRACTITIONER

## 2025-06-02 PROCEDURE — 82306 VITAMIN D 25 HYDROXY: CPT | Performed by: FAMILY MEDICINE

## 2025-06-02 PROCEDURE — 83036 HEMOGLOBIN GLYCOSYLATED A1C: CPT | Performed by: FAMILY MEDICINE

## 2025-06-02 PROCEDURE — 85027 COMPLETE CBC AUTOMATED: CPT | Performed by: FAMILY MEDICINE

## 2025-06-09 ENCOUNTER — OFFICE VISIT (OUTPATIENT)
Dept: INTERNAL MEDICINE | Facility: CLINIC | Age: 80
End: 2025-06-09
Payer: MEDICARE

## 2025-06-09 VITALS
SYSTOLIC BLOOD PRESSURE: 108 MMHG | BODY MASS INDEX: 21.34 KG/M2 | DIASTOLIC BLOOD PRESSURE: 70 MMHG | OXYGEN SATURATION: 98 % | HEIGHT: 64 IN | WEIGHT: 125 LBS | TEMPERATURE: 98.2 F | HEART RATE: 82 BPM

## 2025-06-09 DIAGNOSIS — Z85.828 HISTORY OF SQUAMOUS CELL CARCINOMA OF SKIN: ICD-10-CM

## 2025-06-09 DIAGNOSIS — K58.0 IRRITABLE BOWEL SYNDROME WITH DIARRHEA: ICD-10-CM

## 2025-06-09 DIAGNOSIS — E03.9 ACQUIRED HYPOTHYROIDISM: ICD-10-CM

## 2025-06-09 DIAGNOSIS — Z00.00 ROUTINE HEALTH MAINTENANCE: ICD-10-CM

## 2025-06-09 DIAGNOSIS — Z00.00 MEDICARE ANNUAL WELLNESS VISIT, SUBSEQUENT: Primary | ICD-10-CM

## 2025-06-09 DIAGNOSIS — E10.65 TYPE 1 DIABETES MELLITUS WITH HYPERGLYCEMIA: ICD-10-CM

## 2025-06-09 DIAGNOSIS — Z12.31 ENCOUNTER FOR SCREENING MAMMOGRAM FOR MALIGNANT NEOPLASM OF BREAST: ICD-10-CM

## 2025-06-09 DIAGNOSIS — E55.9 VITAMIN D DEFICIENCY: ICD-10-CM

## 2025-06-09 DIAGNOSIS — I10 PRIMARY HYPERTENSION: ICD-10-CM

## 2025-06-09 NOTE — PROGRESS NOTES
Subjective   The ABCs of the Annual Wellness Visit  Medicare Wellness Visit      Olga Greco is a 79 y.o. patient who presents for a Medicare Wellness Visit.    The following portions of the patient's history were reviewed and   updated as appropriate: allergies, current medications, past family history, past medical history, past social history, past surgical history, and problem list.    Compared to one year ago, the patient's physical   health is the same.  Compared to one year ago, the patient's mental   health is the same.    Recent Hospitalizations:  She was not admitted to the hospital during the last year.     Current Medical Providers:  Patient Care Team:  Yair Zapata MD as PCP - General    Outpatient Medications Prior to Visit   Medication Sig Dispense Refill    atorvastatin (LIPITOR) 20 MG tablet Take 1 tablet by mouth Daily. 90 tablet 1    B Complex Vitamins (VITAMIN B COMPLEX) capsule capsule Take 1 capsule by mouth Daily.      benazepril-hydrochlorthiazide (LOTENSIN HCT) 20-12.5 MG per tablet Take 1 tablet by mouth Daily. 1 TIME DAILY 30 tablet 2    Biotin 97458 MCG tablet Take 1 tablet by mouth Daily.      Calcium Carbonate (CALCIUM 500 PO) Take  by mouth.      cholecalciferol (Vitamin D) 25 MCG (1000 UT) tablet Take 1 tablet by mouth Daily.      Chromium Picolinate 1000 MCG tablet Take 1,000 mcg by mouth Daily.      Continuous Blood Gluc Sensor (Dexcom G6 Sensor) Use Every 10 (Ten) Days. e10.65 3 each 0    diclofenac (VOLTAREN) 1 % gel gel Apply 4 g topically to the appropriate area as directed 4 (Four) Times a Day. (Patient taking differently: Apply 4 g topically to the appropriate area as directed 4 (Four) Times a Day As Needed (pain).) 100 g 5    Ferrous Sulfate (IRON PO) Take  by mouth Every Other Day.      glucagon (GLUCAGEN) 1 MG injection Inject 1 mg under the skin into the appropriate area as directed 1 (One) Time As Needed for Low Blood Sugar (complicated hypoglycemia). 1 kit 1  "   glucose blood test strip Dispense based on insurance preference: Check 3 times a day Dx: E 10.65 300 each 4    ibuprofen (ADVIL,MOTRIN) 200 MG tablet Take 1 tablet by mouth Every 6 (Six) Hours As Needed for Mild Pain.      Insulin Syringe 29G X 1/2\" 0.3 ML misc For use to give insulin from vial up to 5 times a day if pump failure e10.65 500 each 4    Lancets misc Dispense based on insurance preference: Check 3 times a day. Dx: E 10.65 300 each 4    NovoLOG 100 UNIT/ML injection Use up to 50u daily with pump 100 mL 1    Probiotic Product (PROBIOTIC DAILY PO) Take 1 capsule by mouth Daily.      Synthroid 88 MCG tablet TAKE 1 TABLET BY MOUTH DAILY 90 tablet 1     No facility-administered medications prior to visit.     No opioid medication identified on active medication list. I have reviewed chart for other potential  high risk medication/s and harmful drug interactions in the elderly.      Aspirin is not on active medication list.  Aspirin use is not indicated based on review of current medical condition/s. Risk of harm outweighs potential benefits.  .    Patient Active Problem List   Diagnosis    Type 1 diabetes mellitus    Hypertension    Hypothyroidism    Menopausal symptom    Vitamin D deficiency    Diabetic ketoacidosis without coma associated with type 1 diabetes mellitus    Encounter for screening for malignant neoplasm of colon    History of squamous cell carcinoma of skin    Tendinopathy of rotator cuff, left    Irritable bowel syndrome with diarrhea    Routine health maintenance    Closed fracture of left distal radius     Advance Care Planning Advance Directive is not on file.  ACP discussion was held with the patient during this visit. Patient has an advance directive (not in EMR), copy requested.            Objective   Vitals:    06/09/25 1512   BP: 108/70   BP Location: Left arm   Patient Position: Sitting   Cuff Size: Adult   Pulse: 82   Temp: 98.2 °F (36.8 °C)   TempSrc: Infrared   SpO2: 98% " "  Weight: 56.7 kg (125 lb)   Height: 162.6 cm (64\")   PainSc: 0-No pain       Estimated body mass index is 21.46 kg/m² as calculated from the following:    Height as of this encounter: 162.6 cm (64\").    Weight as of this encounter: 56.7 kg (125 lb).    BMI is within normal parameters. No other follow-up for BMI required.           Does the patient have evidence of cognitive impairment? No  Lab Results   Component Value Date    TRIG 46 2025    HDL 93 (H) 2025    LDL 40 2025    VLDL 11 2025    HGBA1C 7.30 (H) 2025                                                                                                Health  Risk Assessment    Smoking Status:  Social History     Tobacco Use   Smoking Status Never    Passive exposure: Never   Smokeless Tobacco Never     Alcohol Consumption:  Social History     Substance and Sexual Activity   Alcohol Use Not Currently    Comment: 1-2 glasses/wine per night       Fall Risk Screen  STEADI Fall Risk Assessment was completed, and patient is at MODERATE risk for falls. Assessment completed on:2025    Depression Screening   Little interest or pleasure in doing things? Not at all   Feeling down, depressed, or hopeless? Several days ( passed away may 2024)   PHQ-2 Total Score 1      Health Habits and Functional and Cognitive Screenin/9/2025     3:09 PM   Functional & Cognitive Status   Do you have difficulty preparing food and eating? No   Do you have difficulty bathing yourself, getting dressed or grooming yourself? No   Do you have difficulty using the toilet? No   Do you have difficulty moving around from place to place? No   Do you have trouble with steps or getting out of a bed or a chair? No   Current Diet Well Balanced Diet   Dental Exam Up to date   Eye Exam Up to date   Exercise (times per week) 2 times per week   Current Exercises Include Yard Work   Do you need help using the phone?  No   Are you deaf or do you have serious " difficulty hearing?  No   Do you need help to go to places out of walking distance? No   Do you need help shopping? No   Do you need help preparing meals?  No   Do you need help with housework?  No   Do you need help with laundry? No   Do you need help taking your medications? No   Do you need help managing money? No   Do you ever drive or ride in a car without wearing a seat belt? No   Have you felt unusual stress, anger or loneliness in the last month? Yes   Who do you live with? Alone   If you need help, do you have trouble finding someone available to you? No   Have you been bothered in the last four weeks by sexual problems? No   Do you have difficulty concentrating, remembering or making decisions? Yes           Age-appropriate Screening Schedule:  Refer to the list below for future screening recommendations based on patient's age, sex and/or medical conditions. Orders for these recommended tests are listed in the plan section. The patient has been provided with a written plan.    Health Maintenance List  Health Maintenance   Topic Date Due    TDAP/TD VACCINES (1 - Tdap) Never done    DXA SCAN  05/11/2018    DIABETIC FOOT EXAM  04/01/2022    RSV Vaccine - Adults (1 - 1-dose 75+ series) Never done    ANNUAL WELLNESS VISIT  07/27/2024    COVID-19 Vaccine (5 - 2024-25 season) 09/01/2024    DIABETIC EYE EXAM  05/02/2025    INFLUENZA VACCINE  07/01/2025    HEMOGLOBIN A1C  12/02/2025    URINE MICROALBUMIN-CREATININE RATIO (uACR)  12/16/2025    LIPID PANEL  06/02/2026    HEPATITIS C SCREENING  Completed    Pneumococcal Vaccine 50+  Completed    ZOSTER VACCINE  Completed    MAMMOGRAM  Discontinued    COLORECTAL CANCER SCREENING  Discontinued                                                                                                                                                CMS Preventative Services Quick Reference  Risk Factors Identified During Encounter  Immunizations Discussed/Encouraged: Tdap    The above  "risks/problems have been discussed with the patient.  Pertinent information has been shared with the patient in the After Visit Summary.  An After Visit Summary and PPPS were made available to the patient.    Follow Up:   Next Medicare Wellness visit to be scheduled in 1 year.         Additional E&M Note during same encounter follows:  Patient has additional, significant, and separately identifiable condition(s)/problem(s) that require work above and beyond the Medicare Wellness Visit     Chief Complaint  Medicare Wellness-subsequent, Hypothyroidism, and Diabetes    Subjective   Hypothyroidism    Diabetes    Olga is also being seen today for additional medical problem/s.    Review of Systems   Constitutional: Negative.    HENT: Negative.     Respiratory: Negative.     Cardiovascular: Negative.    Neurological: Negative.    Psychiatric/Behavioral: Negative.            Objective   Vital Signs:  /70 (BP Location: Left arm, Patient Position: Sitting, Cuff Size: Adult)   Pulse 82   Temp 98.2 °F (36.8 °C) (Infrared)   Ht 162.6 cm (64\")   Wt 56.7 kg (125 lb)   SpO2 98%   BMI 21.46 kg/m²   Physical Exam  Vitals and nursing note reviewed.   Constitutional:       Appearance: Normal appearance. She is well-developed.   HENT:      Head: Normocephalic and atraumatic.      Right Ear: Tympanic membrane normal.      Left Ear: Tympanic membrane normal.      Mouth/Throat:      Mouth: Mucous membranes are moist.      Pharynx: Oropharynx is clear.   Eyes:      General: No scleral icterus.        Right eye: No discharge.         Left eye: No discharge.      Extraocular Movements: Extraocular movements intact.      Conjunctiva/sclera: Conjunctivae normal.      Pupils: Pupils are equal, round, and reactive to light.   Neck:      Thyroid: No thyromegaly.      Vascular: No carotid bruit.   Cardiovascular:      Rate and Rhythm: Normal rate and regular rhythm.      Heart sounds: Normal heart sounds.   Pulmonary:      Effort: " Pulmonary effort is normal. No respiratory distress.      Breath sounds: Normal breath sounds. No wheezing, rhonchi or rales.   Abdominal:      General: There is no distension.      Palpations: Abdomen is soft. There is no mass.      Tenderness: There is no abdominal tenderness.      Hernia: No hernia is present.   Musculoskeletal:      Cervical back: Neck supple. No rigidity.      Right lower leg: No edema.      Left lower leg: No edema.   Lymphadenopathy:      Cervical: No cervical adenopathy.   Skin:     General: Skin is warm and dry.   Neurological:      General: No focal deficit present.      Mental Status: She is alert and oriented to person, place, and time.      Gait: Gait normal.      Deep Tendon Reflexes: Reflexes normal.   Psychiatric:         Mood and Affect: Mood normal.         Behavior: Behavior normal.         Thought Content: Thought content normal.         Judgment: Judgment normal.                    Assessment and Plan      Medicare annual wellness visit, subsequent         Type 1 diabetes mellitus with hyperglycemia           Primary hypertension           Acquired hypothyroidism         Vitamin D deficiency         Irritable bowel syndrome with diarrhea         History of squamous cell carcinoma of skin         Routine health maintenance         Encounter for screening mammogram for malignant neoplasm of breast    Orders:    Mammo screening digital tomosynthesis bilateral w CAD; Future    1. DMI. A1c 7.3.    Managed by endocrinology.  Eye exam UTD.  On statin, ACE-I.     2. HTN.  Controlled.  Continue benazepril-hctz 20-12.5 mg daily and monitor home blood pressures.  Labs reviewed.     3. Hypothyroidism.  Adequate replacement.  Managed by endocrinology.     4. Vitamin D deficiency.  Resolved.  Continue supplement.     5. IBS.  Controlled with probiotic and occasional Imodium.     6. 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  She sees Dr. Padgett.     7. RHM.  Mammogram due in 2 months  and is ordered.  Colonoscopy 11/2020 by Dr. Fallon.  Shingrix done at pharmacy.  Prevnar 20 UTD.  Tdap at pharmacy.          Follow Up   No follow-ups on file.  Patient was given instructions and counseling regarding her condition or for health maintenance advice. Please see specific information pulled into the AVS if appropriate.

## 2025-06-30 ENCOUNTER — OFFICE VISIT (OUTPATIENT)
Dept: ENDOCRINOLOGY | Age: 80
End: 2025-06-30
Payer: MEDICARE

## 2025-06-30 VITALS
HEIGHT: 64 IN | WEIGHT: 124.8 LBS | BODY MASS INDEX: 21.31 KG/M2 | HEART RATE: 64 BPM | OXYGEN SATURATION: 97 % | SYSTOLIC BLOOD PRESSURE: 132 MMHG | DIASTOLIC BLOOD PRESSURE: 68 MMHG

## 2025-06-30 DIAGNOSIS — E10.65 TYPE 1 DIABETES MELLITUS WITH HYPERGLYCEMIA: Primary | ICD-10-CM

## 2025-06-30 DIAGNOSIS — E03.9 ACQUIRED HYPOTHYROIDISM: ICD-10-CM

## 2025-06-30 DIAGNOSIS — E78.2 MIXED HYPERLIPIDEMIA: ICD-10-CM

## 2025-06-30 PROCEDURE — 3075F SYST BP GE 130 - 139MM HG: CPT | Performed by: NURSE PRACTITIONER

## 2025-06-30 PROCEDURE — 3078F DIAST BP <80 MM HG: CPT | Performed by: NURSE PRACTITIONER

## 2025-06-30 PROCEDURE — 99214 OFFICE O/P EST MOD 30 MIN: CPT | Performed by: NURSE PRACTITIONER

## 2025-06-30 PROCEDURE — 95251 CONT GLUC MNTR ANALYSIS I&R: CPT | Performed by: NURSE PRACTITIONER

## 2025-06-30 NOTE — PROGRESS NOTES
"Chief Complaint  Diabetes (T1DM. Medtronic is attached)    Subjective        Olga Greco presents to [facility] END[facility]CR[facility]OLOGY  History of Present Illness    Type 1 dm, 2003   Current diabetic regimen: Medtronic 780g, smartguard technology and novolog vials   Last eye exam: April 2024  Back up plan: insulin syringes and vials   CV: atorvastatin 20mg QD  Renal: benazepril 20mg QD  Glucagon: yes  Hypothyroid: Synthroid 88mcg daily        Cgm review 6/17/25-6/30/25  58% time in range  0% low  30% high  12% very high  Gmi 7.5%  TDD 19u  Bolus 65%    Objective   Vital Signs:  /68   Pulse 64   Ht 162.6 cm (64.02\")   Wt 56.6 kg (124 lb 12.8 oz)   SpO2 97%   BMI 21.41 kg/m²   Estimated body mass index is 21.41 kg/m² as calculated from the following:    Height as of this encounter: 162.6 cm (64.02\").    Weight as of this encounter: 56.6 kg (124 lb 12.8 oz).    BMI is within normal parameters. No other follow-up for BMI required.      Physical Exam  Vitals reviewed.   Constitutional:       General: She is not in acute distress.  HENT:      Head: Normocephalic and atraumatic.   Cardiovascular:      Rate and Rhythm: Normal rate.   Pulmonary:      Effort: Pulmonary effort is normal. No respiratory distress.   Musculoskeletal:         General: No signs of injury. Normal range of motion.      Cervical back: Normal range of motion and neck supple.   Skin:     General: Skin is warm and dry.   Neurological:      Mental Status: She is alert and oriented to person, place, and time. Mental status is at baseline.   Psychiatric:         Mood and Affect: Mood normal.         Behavior: Behavior normal.         Thought Content: Thought content normal.         Judgment: Judgment normal.        Result Review :  The following data was reviewed by: OPAL Jiménez on 06/30/2025:  Common labs          12/16/2024    15:26 4/4/2025    17:33 6/2/2025    09:31   Common Labs   Glucose 116  166  137    BUN 16  17  13.0 "    Creatinine 0.76  0.76  0.59    Sodium 140  140  140    Potassium 4.1  4.1  3.9    Chloride 102  101  104    Calcium 9.6  9.8  9.7    Albumin   4.1    Total Bilirubin   1.0    Alkaline Phosphatase   63    AST (SGOT)   34    ALT (SGPT)   38    WBC   4.71    Hemoglobin   12.9    Hematocrit   37.9    Platelets   215    Total Cholesterol   144    Triglycerides   46    HDL Cholesterol   93    LDL Cholesterol    40    Hemoglobin A1C 7.10  7.20  7.30    Microalbumin, Urine 1.5                  Assessment and Plan   Diagnoses and all orders for this visit:    1. Type 1 diabetes mellitus with hyperglycemia (Primary)    2. Acquired hypothyroidism    3. Mixed hyperlipidemia             Follow Up   Return in about 3 months (around 9/30/2025).    A1c stable, favorable   Cgm reviewed, not quite at goal, still in favorable range  Continue to aim for meal time bolusing before meals as often as possible  No other questions or concerns today  Continue plan as above  No additional changes made at this time     Patient was given instructions and counseling regarding her condition or for health maintenance advice. Please see specific information pulled into the AVS if appropriate.       OPAL Jiménez

## 2025-08-22 RX ORDER — BENAZEPRIL HYDROCHLORIDE AND HYDROCHLOROTHIAZIDE 20; 12.5 MG/1; MG/1
1 TABLET ORAL DAILY
Qty: 30 TABLET | Refills: 2 | Status: SHIPPED | OUTPATIENT
Start: 2025-08-22

## 2025-08-27 ENCOUNTER — HOSPITAL ENCOUNTER (OUTPATIENT)
Dept: MAMMOGRAPHY | Facility: HOSPITAL | Age: 80
Discharge: HOME | End: 2025-08-27
Admitting: FAMILY MEDICINE
Payer: MEDICARE

## 2025-08-27 DIAGNOSIS — Z12.31 ENCOUNTER FOR SCREENING MAMMOGRAM FOR MALIGNANT NEOPLASM OF BREAST: ICD-10-CM

## 2025-08-27 PROCEDURE — 77067 SCR MAMMO BI INCL CAD: CPT

## 2025-08-27 PROCEDURE — 77063 BREAST TOMOSYNTHESIS BI: CPT

## 2025-08-28 PROCEDURE — 77067 SCR MAMMO BI INCL CAD: CPT | Performed by: RADIOLOGY

## 2025-08-28 PROCEDURE — 77063 BREAST TOMOSYNTHESIS BI: CPT | Performed by: RADIOLOGY

## (undated) DEVICE — CUFF TOURNI 1BLADDER 1PRT 4X18IN RED

## (undated) DEVICE — BLD SCLPL BP SS SZ15

## (undated) DEVICE — SOL ISO/ALC RUB 70PCT 4OZ

## (undated) DEVICE — PK BASIC ORTHO 90

## (undated) DEVICE — GLV SURG SENSICARE PI LF PF 8 GRN STRL

## (undated) DEVICE — BW-412T DISP COMBO CLEANING BRUSH: Brand: SINGLE USE COMBINATION CLEANING BRUSH

## (undated) DEVICE — KWIRE STD TP 1.6X127MM
Type: IMPLANTABLE DEVICE | Site: WRIST | Status: NON-FUNCTIONAL
Removed: 2024-02-09

## (undated) DEVICE — JACKT LAB F/R KNIT CUFF/COLR XLG BLU

## (undated) DEVICE — SPNG GZ WOVN 4X4IN 12PLY 10/BX STRL

## (undated) DEVICE — PAD UNDERCAST WYTEX 4IN 4YD LF STRL

## (undated) DEVICE — DRVR QC UNIV T10

## (undated) DEVICE — DRVR AO CONNECT SQ TP 2MM

## (undated) DEVICE — Device

## (undated) DEVICE — VIAL FORMALIN CAP 10P 40ML

## (undated) DEVICE — FRCP BX RADJAW4 NDL 2.8 240CM LG OG BX40

## (undated) DEVICE — SUT ETHLN 3/0 PS2 18 IN 1669H

## (undated) DEVICE — BNDG ESMARK 4IN 9FT LF STRL BLU

## (undated) DEVICE — BIT DRL SLD SD CUT 2.5X40MM

## (undated) DEVICE — BNDG ELAS ELITE V/CLOSE 3IN 5YD LF STRL

## (undated) DEVICE — CVR C/ARM MINI

## (undated) DEVICE — TRAP FLD MINIVAC MEGADYNE 100ML

## (undated) DEVICE — GLV SURG SENSICARE PI MIC PF SZ7.5 LF STRL

## (undated) DEVICE — PAD CAST WEBRIL STRL 2IN

## (undated) DEVICE — SUT MNCRYL 2/0 SH 27IN Y417H

## (undated) DEVICE — SOL IRR H2O BTL 1000ML STRL

## (undated) DEVICE — KT ORCA ORCAPOD DISP STRL

## (undated) DEVICE — TBG PENCL TELESCP MEGADYNE SMOKE EVAC 10FT

## (undated) DEVICE — GUIDE AIMING 1.5MM

## (undated) DEVICE — SUCTION CANISTER, 3000CC,SAFELINER: Brand: DEROYAL

## (undated) DEVICE — DRP SURG U/DRP INVISISHIELD PA 48X52IN

## (undated) DEVICE — BIT DRL SLD SD CUT 2.0X40MM

## (undated) DEVICE — DRSNG PAD ABD 8X10IN STRL

## (undated) DEVICE — BNDG ELAS MATRX V/CLS 4IN 5YD LF

## (undated) DEVICE — GLV SURG SENSICARE PI LF PF 7.5

## (undated) DEVICE — PCH INST SURG INVISISHIELD 2PCKT

## (undated) DEVICE — TP SXN SURG FRAZIER W/CONTRL/VNT 10F

## (undated) DEVICE — TOWL STRL OR PREWSH COTN 17X27IN BLU DISP STRL PK/4